# Patient Record
Sex: FEMALE | Race: WHITE | NOT HISPANIC OR LATINO | Employment: OTHER | RURAL
[De-identification: names, ages, dates, MRNs, and addresses within clinical notes are randomized per-mention and may not be internally consistent; named-entity substitution may affect disease eponyms.]

---

## 2017-01-16 ENCOUNTER — OFFICE VISIT (OUTPATIENT)
Dept: FAMILY MEDICINE CLINIC | Facility: CLINIC | Age: 72
End: 2017-01-16

## 2017-01-16 VITALS
DIASTOLIC BLOOD PRESSURE: 79 MMHG | OXYGEN SATURATION: 98 % | TEMPERATURE: 98.3 F | SYSTOLIC BLOOD PRESSURE: 123 MMHG | BODY MASS INDEX: 30.55 KG/M2 | WEIGHT: 166 LBS | HEIGHT: 62 IN | HEART RATE: 81 BPM

## 2017-01-16 DIAGNOSIS — F41.1 GENERALIZED ANXIETY DISORDER: ICD-10-CM

## 2017-01-16 DIAGNOSIS — E78.49 OTHER HYPERLIPIDEMIA: ICD-10-CM

## 2017-01-16 DIAGNOSIS — H81.03 MENIERE'S DISEASE OF BOTH EARS: ICD-10-CM

## 2017-01-16 DIAGNOSIS — Z00.00 INITIAL MEDICARE ANNUAL WELLNESS VISIT: Primary | ICD-10-CM

## 2017-01-16 DIAGNOSIS — R73.9 HYPERGLYCEMIA: ICD-10-CM

## 2017-01-16 DIAGNOSIS — K64.4 EXTERNAL HEMORRHOIDS: ICD-10-CM

## 2017-01-16 DIAGNOSIS — M15.9 GENERALIZED OSTEOARTHRITIS: ICD-10-CM

## 2017-01-16 DIAGNOSIS — I10 ESSENTIAL HYPERTENSION: ICD-10-CM

## 2017-01-16 DIAGNOSIS — K21.9 GASTROESOPHAGEAL REFLUX DISEASE WITHOUT ESOPHAGITIS: ICD-10-CM

## 2017-01-16 LAB
ALBUMIN SERPL-MCNC: 4.1 G/DL (ref 3.4–4.8)
ALBUMIN/GLOB SERPL: 1.3 G/DL (ref 1.5–2.5)
ALP SERPL-CCNC: 68 U/L (ref 46–116)
ALT SERPL W P-5'-P-CCNC: 30 U/L (ref 10–36)
ANION GAP SERPL CALCULATED.3IONS-SCNC: 4.4 MMOL/L (ref 3.6–11.2)
AST SERPL-CCNC: 44 U/L (ref 10–30)
BASOPHILS # BLD AUTO: 0.09 10*3/MM3 (ref 0–0.3)
BASOPHILS NFR BLD AUTO: 1.8 % (ref 0–2)
BILIRUB SERPL-MCNC: 0.6 MG/DL (ref 0.2–1.8)
BUN BLD-MCNC: 10 MG/DL (ref 7–21)
BUN/CREAT SERPL: 11.2 (ref 7–25)
CALCIUM SPEC-SCNC: 9.6 MG/DL (ref 7.7–10)
CHLORIDE SERPL-SCNC: 105 MMOL/L (ref 99–112)
CHOLEST SERPL-MCNC: 229 MG/DL (ref 0–200)
CO2 SERPL-SCNC: 32.6 MMOL/L (ref 24.3–31.9)
CREAT BLD-MCNC: 0.89 MG/DL (ref 0.43–1.29)
DEPRECATED RDW RBC AUTO: 42.9 FL (ref 37–54)
EOSINOPHIL # BLD AUTO: 0.4 10*3/MM3 (ref 0–0.7)
EOSINOPHIL NFR BLD AUTO: 7.9 % (ref 0–7)
ERYTHROCYTE [DISTWIDTH] IN BLOOD BY AUTOMATED COUNT: 12.7 % (ref 11.5–14.5)
GFR SERPL CREATININE-BSD FRML MDRD: 62 ML/MIN/1.73
GLOBULIN UR ELPH-MCNC: 3.2 GM/DL
GLUCOSE BLD-MCNC: 104 MG/DL (ref 70–110)
HBA1C MFR BLD: 5.9 % (ref 4.5–5.7)
HCT VFR BLD AUTO: 46.2 % (ref 37–47)
HDLC SERPL-MCNC: 60 MG/DL (ref 60–100)
HGB BLD-MCNC: 14.8 G/DL (ref 12–16)
IMM GRANULOCYTES # BLD: 0.01 10*3/MM3 (ref 0–0.03)
IMM GRANULOCYTES NFR BLD: 0.2 % (ref 0–0.5)
LDLC SERPL CALC-MCNC: 131 MG/DL (ref 0–100)
LDLC/HDLC SERPL: 2.18 {RATIO}
LYMPHOCYTES # BLD AUTO: 1.46 10*3/MM3 (ref 1–3)
LYMPHOCYTES NFR BLD AUTO: 28.7 % (ref 16–46)
MCH RBC QN AUTO: 30.3 PG (ref 27–33)
MCHC RBC AUTO-ENTMCNC: 32 G/DL (ref 33–37)
MCV RBC AUTO: 94.5 FL (ref 80–94)
MONOCYTES # BLD AUTO: 0.36 10*3/MM3 (ref 0.1–0.9)
MONOCYTES NFR BLD AUTO: 7.1 % (ref 0–12)
NEUTROPHILS # BLD AUTO: 2.77 10*3/MM3 (ref 1.4–6.5)
NEUTROPHILS NFR BLD AUTO: 54.3 % (ref 40–75)
OSMOLALITY SERPL CALC.SUM OF ELEC: 282.5 MOSM/KG (ref 273–305)
PLATELET # BLD AUTO: 166 10*3/MM3 (ref 130–400)
PMV BLD AUTO: 12.1 FL (ref 6–10)
POTASSIUM BLD-SCNC: 4.5 MMOL/L (ref 3.5–5.3)
PROT SERPL-MCNC: 7.3 G/DL (ref 6–8)
RBC # BLD AUTO: 4.89 10*6/MM3 (ref 4.2–5.4)
SODIUM BLD-SCNC: 142 MMOL/L (ref 135–153)
TRIGL SERPL-MCNC: 191 MG/DL (ref 0–150)
TSH SERPL DL<=0.05 MIU/L-ACNC: 0.74 MIU/ML (ref 0.55–4.78)
VLDLC SERPL-MCNC: 38.2 MG/DL
WBC NRBC COR # BLD: 5.09 10*3/MM3 (ref 4.5–12.5)

## 2017-01-16 PROCEDURE — 80061 LIPID PANEL: CPT | Performed by: NURSE PRACTITIONER

## 2017-01-16 PROCEDURE — G0438 PPPS, INITIAL VISIT: HCPCS | Performed by: NURSE PRACTITIONER

## 2017-01-16 PROCEDURE — 85025 COMPLETE CBC W/AUTO DIFF WBC: CPT | Performed by: NURSE PRACTITIONER

## 2017-01-16 PROCEDURE — 36415 COLL VENOUS BLD VENIPUNCTURE: CPT | Performed by: NURSE PRACTITIONER

## 2017-01-16 PROCEDURE — 83036 HEMOGLOBIN GLYCOSYLATED A1C: CPT | Performed by: NURSE PRACTITIONER

## 2017-01-16 PROCEDURE — 80053 COMPREHEN METABOLIC PANEL: CPT | Performed by: NURSE PRACTITIONER

## 2017-01-16 PROCEDURE — 84443 ASSAY THYROID STIM HORMONE: CPT | Performed by: NURSE PRACTITIONER

## 2017-01-16 PROCEDURE — 99213 OFFICE O/P EST LOW 20 MIN: CPT | Performed by: NURSE PRACTITIONER

## 2017-01-16 RX ORDER — OMEPRAZOLE 20 MG/1
20 CAPSULE, DELAYED RELEASE ORAL DAILY
Qty: 90 CAPSULE | Refills: 1 | Status: SHIPPED | OUTPATIENT
Start: 2017-01-16 | End: 2017-04-20 | Stop reason: ALTCHOICE

## 2017-01-16 RX ORDER — VALSARTAN 40 MG/1
20 TABLET ORAL 2 TIMES DAILY
Qty: 90 TABLET | Refills: 1 | Status: SHIPPED | OUTPATIENT
Start: 2017-01-16 | End: 2017-09-01 | Stop reason: SDUPTHER

## 2017-01-16 RX ORDER — MECLIZINE HYDROCHLORIDE 25 MG/1
25 TABLET ORAL EVERY 8 HOURS PRN
Qty: 270 TABLET | Refills: 1 | Status: SHIPPED | OUTPATIENT
Start: 2017-01-16 | End: 2018-07-03 | Stop reason: SDUPTHER

## 2017-01-16 RX ORDER — DIAZEPAM 5 MG/1
5 TABLET ORAL 2 TIMES DAILY
Qty: 60 TABLET | Refills: 2
Start: 2017-01-16 | End: 2017-07-13 | Stop reason: SDUPTHER

## 2017-01-16 RX ORDER — HYDROCODONE BITARTRATE AND ACETAMINOPHEN 5; 325 MG/1; MG/1
1 TABLET ORAL 2 TIMES DAILY PRN
Qty: 40 TABLET | Refills: 0
Start: 2017-01-16 | End: 2017-07-13 | Stop reason: SDUPTHER

## 2017-01-16 RX ORDER — LIDOCAINE 5 G/100G
CREAM RECTAL; TOPICAL 3 TIMES DAILY PRN
Qty: 90 G | Refills: 1 | Status: SHIPPED | OUTPATIENT
Start: 2017-01-16 | End: 2018-05-14

## 2017-01-16 RX ORDER — CALCIUM CARBONATE 200(500)MG
1 TABLET,CHEWABLE ORAL 2 TIMES DAILY PRN
COMMUNITY
End: 2022-02-07 | Stop reason: ALTCHOICE

## 2017-01-16 NOTE — MR AVS SNAPSHOT
Edgar Cotto   1/16/2017 11:00 AM   Office Visit    Dept Phone:  [phone]   Encounter #:  [id number]    Provider:  JOSSELINE Guido   Department:  [facility] FAMILY MEDICINE                Your Full Care Plan              Today's Medication Changes          These changes are accurate as of: 1/16/17 12:16 PM.  If you have any questions, ask your nurse or doctor.               New Medication(s)Ordered:     omeprazole 20 MG capsule   Commonly known as:  priLOSEC   Take 1 capsule by mouth Daily.   Started by:  JOSSELINE Guido         Medication(s)that have changed:     valsartan 40 MG tablet   Commonly known as:  DIOVAN   Take 0.5 tablets by mouth 2 (Two) Times a Day.   What changed:    - how much to take  - when to take this  - additional instructions   Changed by:  JOSSELINE Guido         Stop taking medication(s)listed here:     RABEprazole 20 MG EC tablet   Commonly known as:  ACIPHEX   Stopped by:  JOSSELINE Guido                Where to Get Your Medications      These medications were sent to [facility] - [place], [place] - [address] [place] [address] - [phone]  - [phone] FX  [address] [facility] [address]     Phone:  [phone]     Lidocaine (Anorectal) 5 % cream cream    meclizine 25 MG tablet    omeprazole 20 MG capsule    valsartan 40 MG tablet                  Your Updated Medication List          This list is accurate as of: 1/16/17 12:16 PM.  Always use your most recent med list.                acetaminophen 500 MG tablet   Commonly known as:  TYLENOL       aspirin 81 MG tablet       calcium carbonate 500 MG chewable tablet   Commonly known as:  TUMS       diazePAM 5 MG tablet   Commonly known as:  VALIUM   Take 1 tablet by mouth 2 (two) times a day.       HYDROcodone-acetaminophen 5-325 MG per tablet   Commonly known as:  NORCO   Take 1 tablet by mouth 2 (Two) Times a Day As Needed (pain).       Lidocaine (Anorectal) 5 % cream  cream   Commonly known as:  LMX 5   Apply  topically 3 (Three) Times a Day As Needed (pain).       meclizine 25 MG tablet   Commonly known as:  ANTIVERT   Take 1 tablet by mouth Every 8 (Eight) Hours As Needed for dizziness.       omeprazole 20 MG capsule   Commonly known as:  priLOSEC   Take 1 capsule by mouth Daily.       valsartan 40 MG tablet   Commonly known as:  DIOVAN   Take 0.5 tablets by mouth 2 (Two) Times a Day.               We Performed the Following     CBC & Differential     CBC Auto Differential     Comprehensive Metabolic Panel     Hemoglobin A1c     Lipid Panel     TSH       You Were Diagnosed With        Codes Comments    Initial Medicare annual wellness visit    -  Primary ICD-10-CM: Z00.00  ICD-9-CM: V70.0     Other hyperlipidemia     ICD-10-CM: E78.4  ICD-9-CM: 272.4     External hemorrhoids     ICD-10-CM: K64.4  ICD-9-CM: 455.3     Essential hypertension     ICD-10-CM: I10  ICD-9-CM: 401.9     Meniere's disease of both ears     ICD-10-CM: H81.03  ICD-9-CM: 386.00     Generalized osteoarthritis     ICD-10-CM: M15.9  ICD-9-CM: 715.00     Generalized anxiety disorder     ICD-10-CM: F41.1  ICD-9-CM: 300.02     Gastroesophageal reflux disease without esophagitis     ICD-10-CM: K21.9  ICD-9-CM: 530.81     Hyperglycemia     ICD-10-CM: R73.9  ICD-9-CM: 790.29       Instructions     None    Patient Instructions History      Upcoming Appointments     Visit Type Date Time Department    OFFICE VISIT 1/16/2017 11:00 AM [facility]    FOLLOW UP 7/13/2017 11:00 AM [facility]      Savedailyhart Signup     Our records indicate that you have declined [facility] Pricelockt signup. If you would like to sign up for [email or web address], please email [email or web address] or call [phone] to obtain an activation code.             Other Info from Your Visit           Your Appointments     Jul 13, 2017 11:00 AM EDT   Follow Up with JOSSELINE Guido   [facility] FAMILY MEDICINE (--)  "   [address] [place] [id number] [phone]           Arrive 15 minutes prior to appointment.              Allergies     Z-mary [Azithromycin] Allergy Swelling    Cortisone      Fish-derived Products      SEAFOOD    Iodinated Diagnostic Agents      Niacin      Other      Sulfa Antibiotics      Verapamil        Reason for Visit     Osteoarthritis     Med Refill all medications 90 days    Mouth Lesions           Vital Signs     Blood Pressure Pulse Temperature Height Weight Oxygen Saturation    123/79 (BP Location: Right arm, Patient Position: Sitting) 81 98.3 °F (36.8 °C) (Oral) 61.5\" (156.2 cm) 166 lb (75.3 kg) 98%    Body Mass Index Smoking Status                30.86 kg/m2 Never Smoker          Problems and Diagnoses Noted     High blood pressure    External hemorrhoids    Generalized anxiety disorder    Generalized osteoarthritis    High cholesterol or triglycerides    Meniere's disease of both ears    Initial Medicare annual wellness visit    -  Primary    Acid reflux disease        Hyperglycemia          Results         "

## 2017-01-16 NOTE — PROGRESS NOTES
"Subjective   Edgar Cotto is a 71 y.o. female.   Chief Complaint   Patient presents with   • Osteoarthritis   • Med Refill     all medications 90 days   • Mouth Lesions     History of Present Illness     The patient presents today for routine follow up. Her blood pressure is stable in the office today. She does not monitor routinely at home. She has a history of GERD. States she discontinued the Aciphex, because she could not \"digest the medication.\" Would like to try another agent. She requests refill of Valium. She uses this as needed for treatment of chronic vertigo. This is stable with medication. Also uses the hydrocodone sparingly for chronic arthritis related pain. This has been stable. The chronic mouth discomfort is persistent.     The following portions of the patient's history were reviewed and updated as appropriate: allergies, current medications, past family history, past medical history, past social history, past surgical history and problem list.  Visit Vitals   • /79 (BP Location: Right arm, Patient Position: Sitting)   • Pulse 81   • Temp 98.3 °F (36.8 °C) (Oral)   • Ht 61.5\" (156.2 cm)   • Wt 166 lb (75.3 kg)   • SpO2 98%  Comment: Room air   • BMI 30.86 kg/m2     Review of Systems   Constitutional: Negative for chills and fever.   HENT: Positive for mouth sores. Negative for congestion, ear pain, rhinorrhea and sore throat.    Respiratory: Negative for cough, shortness of breath and wheezing.    Cardiovascular: Negative for chest pain and palpitations.   Gastrointestinal: Negative for abdominal pain, diarrhea, nausea and vomiting.   Genitourinary: Negative for dysuria and urgency.   Musculoskeletal: Positive for arthralgias and back pain. Negative for myalgias.   Skin: Negative for rash and wound.   Neurological: Negative for dizziness, light-headedness and headaches.   Psychiatric/Behavioral: Negative for sleep disturbance. The patient is not nervous/anxious.        Objective "   Physical Exam   Constitutional: She is oriented to person, place, and time. She appears well-developed and well-nourished.   HENT:   Head: Normocephalic and atraumatic.   Mouth/Throat: Oropharynx is clear and moist.   Cardiovascular: Normal rate, regular rhythm and normal heart sounds.    Pulmonary/Chest: Effort normal and breath sounds normal.   Abdominal: Soft. Bowel sounds are normal.   Neurological: She is alert and oriented to person, place, and time.   Skin: Skin is warm and dry.   Psychiatric: She has a normal mood and affect. Her behavior is normal.       Assessment/Plan   Edgar was seen today for osteoarthritis, med refill and mouth lesions.    Diagnoses and all orders for this visit:    Initial Medicare annual wellness visit    Other hyperlipidemia  -     CBC & Differential  -     Comprehensive Metabolic Panel  -     Hemoglobin A1c  -     Lipid Panel  -     TSH  -     CBC Auto Differential    External hemorrhoids  -     Lidocaine, Anorectal, (LMX 5) 5 % cream cream; Apply  topically 3 (Three) Times a Day As Needed (pain).  -     CBC & Differential  -     Comprehensive Metabolic Panel  -     Hemoglobin A1c  -     Lipid Panel  -     TSH  -     CBC Auto Differential    Essential hypertension  -     valsartan (DIOVAN) 40 MG tablet; Take 0.5 tablets by mouth 2 (Two) Times a Day.  -     CBC & Differential  -     Comprehensive Metabolic Panel  -     Hemoglobin A1c  -     Lipid Panel  -     TSH  -     CBC Auto Differential    Meniere's disease of both ears  -     meclizine (ANTIVERT) 25 MG tablet; Take 1 tablet by mouth Every 8 (Eight) Hours As Needed for dizziness.  -     CBC & Differential  -     Comprehensive Metabolic Panel  -     Hemoglobin A1c  -     Lipid Panel  -     TSH  -     CBC Auto Differential    Generalized osteoarthritis  -     CBC & Differential  -     Comprehensive Metabolic Panel  -     Hemoglobin A1c  -     Lipid Panel  -     TSH  -     CBC Auto Differential    Generalized anxiety  disorder  -     CBC & Differential  -     Comprehensive Metabolic Panel  -     Hemoglobin A1c  -     Lipid Panel  -     TSH  -     CBC Auto Differential    Gastroesophageal reflux disease without esophagitis  -     omeprazole (priLOSEC) 20 MG capsule; Take 1 capsule by mouth Daily.  -     CBC & Differential  -     Comprehensive Metabolic Panel  -     Hemoglobin A1c  -     Lipid Panel  -     TSH  -     CBC Auto Differential    Hyperglycemia  -     Hemoglobin A1c      Overall, she is stable at this time. Will renew medications as requested. Will stop the Aciphex and start Prilosec as noted. Will obtain lab today, she is fasting. I have discussed her plan of care with Dr. Ruby. He will renew Valium 5 mg, take one tablet BID, dispense #60 with 2 refills. He will also renew Hydrocodone 5 mg, take one tablet BID PRN, dispense #40 with 0 refills. This is a dose reduction. The patient voices understanding. Will follow up in 6 months and PRN.  As part of this patient's treatment plan Dr. Ruby is  prescribing controlled substances. The patient has been made aware of appropriate use of such medications, including potential risk of somnolence, limited ability to drive and/or work safely, and potential for dependence or overdose. It has also been made clear that these medications are for use by this patient only, without concomitant use of alcohol or other substances unless prescribed.  Patient has completed prescribing agreement detailing terms of continued prescribing of controlled substances, including monitoring LUIS MANUEL reports, urine drug screening, and pill counts if necessary. The patient is aware that inappropriate use will results in cessation of prescribing such medications.  LUIS MANUEL report has been reviewed.  LUIS MANUEL is updated every 3 months.  History and physical exam exhibit continued safe and appropriate use of controlled substances.

## 2017-01-16 NOTE — PROGRESS NOTES
QUICK REFERENCE INFORMATION:  The ABCs of the Annual Wellness Visit    Initial Medicare Wellness Visit    HEALTH RISK ASSESSMENT    Recent Hospitalizations:  No recent hospitalization(s)..    Health Habits:  Current Diet: Well Balanced Diet  Dental Exam. up to date  Eye Exam. up to date  Exercise: 5 times/week.  Current exercise activities include: walking    Current Medical Providers:  Patient Care Team:  Rahat Ruby MD as PCP - General  Rahat Ruby MD as PCP - Family Medicine    [facility] providers who are involved in the care of this patient are listed above. Additional providers and suppliers are listed below:  Ophthalmologist  Chiropractor        Smoking Status:  History   Smoking Status   • Never Smoker   Smokeless Tobacco   • Never Used       Alcohol Consumption:  History   Alcohol Use No       Depression Screen:   PHQ-9 Depression Screening 1/16/2017   Little interest or pleasure in doing things 2   Feeling down, depressed, or hopeless 2   Trouble falling or staying asleep, or sleeping too much 0   Feeling tired or having little energy 1   Poor appetite or overeating 0   Feeling bad about yourself - or that you are a failure or have let yourself or your family down 1   Trouble concentrating on things, such as reading the newspaper or watching television 0   Moving or speaking so slowly that other people could have noticed. Or the opposite - being so fidgety or restless that you have been moving around a lot more than usual 0   Thoughts that you would be better off dead, or of hurting yourself in some way 0   PHQ-9 Total Score 6   If you checked off any problems, how difficult have these problems made it for you to do your work, take care of things at home, or get along with other people? Not difficult at all       Functional and Cognitive Screening:  Functional & Cognitive Status 1/16/2017   Do you have difficulty preparing food and eating? No   Do you have difficulty bathing  yourself? No   Do you have difficulty getting dressed? No   Do you have difficulty using the toilet? No   Do you have difficulty moving around from place to place? No   In the past year have you fallen or experienced a near fall? No   Do you need help using the phone?  No   Are you deaf or do you have serious difficulty hearing?  No   Do you need help with transportation? Yes   Do you need help shopping? Yes   Do you need help preparing meals?  No   Do you need help with housework?  Yes   Do you need help with laundry? No   Do you need help taking your medications? No   Do you need help managing money? No   Do you have difficulty concentrating, remembering or making decisions? No       Falls Risk Assessment:       Does the patient have evidence of cognitive impairment? No    Recent Lab Results:  CMP:  Lab Results   Component Value Date    BUN <5 (L) 08/29/2016    CREATININE 0.79 08/29/2016    EGFRIFNONA 71 08/29/2016    EGFRIFAFRI  08/29/2016      Comment:      <15 Indicative of kidney failure.    BCR  08/29/2016      Comment:      Unable to calculate Bun/Crea Ratio.     08/29/2016    K 4.6 08/29/2016    CO2 32.7 (H) 08/29/2016    CALCIUM 9.3 08/29/2016    ALBUMIN 4.4 04/26/2016    LABIL2 1.4 (L) 04/26/2016    BILITOT 0.9 04/26/2016    ALKPHOS 85 04/26/2016    AST 61 (H) 04/26/2016    ALT 43 (H) 04/26/2016     Lipid Panel:  Lab Results   Component Value Date    CHLPL 231 (H) 04/26/2016    TRIG 152 (H) 04/26/2016    HDL 57 (L) 04/26/2016    VLDL 30 04/26/2016     (H) 04/26/2016     HbA1c:  Lab Results   Component Value Date    HGBA1C 6.30 (H) 08/29/2016     Urine Microalbumin:  No results found for: MICROALBUR, POCMALB  Visual Acuity:  No exam data present    Age-appropriate Screening Schedule:  Refer to the list below for future screening recommendations based on patient's age, sex and/or medical conditions. Orders for these recommended tests are listed in the plan section. The patient has been provided  with a written plan.    Health Maintenance   Topic Date Due   • LIPID PANEL  04/26/2017   • MAMMOGRAM  10/02/2017   • PNEUMOCOCCAL VACCINES (65+ LOW/MEDIUM RISK) (2 of 2 - PPSV23) 01/16/2018   • TDAP/TD VACCINES (2 - Td) 01/16/2020   • COLONOSCOPY  05/08/2025   • INFLUENZA VACCINE  Addressed   • ZOSTER VACCINE  Addressed        Subjective   History of Present Illness    Edgar Cotto is a 71 y.o. female who presents for an Annual Wellness Visit. In addition, we addressed the following health issues: GERD    The following portions of the patient's history were reviewed and updated as appropriate: allergies, current medications, past family history, past medical history, past social history, past surgical history and problem list.    Outpatient Medications Prior to Visit   Medication Sig Dispense Refill   • acetaminophen (TYLENOL) 500 MG tablet Take 500 mg by mouth Every 6 (Six) Hours As Needed for mild pain (1-3) or moderate pain (4-6).     • aspirin 81 MG tablet Take 81 mg by mouth as needed.     • diazepam (VALIUM) 5 MG tablet Take 1 tablet by mouth 2 (two) times a day. 60 tablet 2   • HYDROcodone-acetaminophen (NORCO) 5-325 MG per tablet Take 1 tablet by mouth 2 (Two) Times a Day As Needed (pain). 45 tablet 0   • Lidocaine, Anorectal, (LMX 5) 5 % cream cream Apply  topically 3 (Three) Times a Day As Needed (pain). 30 g 1   • meclizine (ANTIVERT) 25 MG tablet Take 1 tablet by mouth every 8 (eight) hours as needed for dizziness. 90 tablet 6   • valsartan (DIOVAN) 40 MG tablet Take 1 tablet by mouth daily. 1/2 Tablet 2 x daily     • RABEprazole (ACIPHEX) 20 MG EC tablet Take 1 tablet by mouth daily.       No facility-administered medications prior to visit.        Patient Active Problem List   Diagnosis   • Atopic rhinitis   • Ankle swelling   • Dizziness   • External hemorrhoids   • Generalized anxiety disorder   • Generalized osteoarthritis   • Hyperlipidemia   • Essential hypertension   • Auditory vertigo  "  • Mitral and aortic valve disease   • Stomatitis   • Seasonal allergic rhinitis   • Varicose veins of lower extremities   • Meniere's disease of both ears       Advanced Care Planning:  has NO advanced directive - not interested in additional information    Identification of Risk Factors:  Risk factors include: weight .    Review of Systems    Compared to one year ago, the patient feels his physical health is the same.  Compared to one year ago, the patient feels his mental health is the same.    Objective     Physical Exam    Vitals:    01/16/17 1104   BP: 123/79   BP Location: Right arm   Patient Position: Sitting   Pulse: 81   Temp: 98.3 °F (36.8 °C)   TempSrc: Oral   SpO2: 98%   Weight: 166 lb (75.3 kg)   Height: 61.5\" (156.2 cm)       Body mass index is 30.86 kg/(m^2).  Discussed the patient's BMI with her. The BMI is above average; no BMI management plan is appropriate..    Assessment/Plan   Patient Self-Management and Personalized Health Advice  The patient has been provided with information about: exercise, weight management and fall prevention and preventive services including:   · Diabetes screening, see lab orders, Exercise counseling provided.    Visit Diagnoses:    ICD-10-CM ICD-9-CM   1. Initial Medicare annual wellness visit Z00.00 V70.0   2. Other hyperlipidemia E78.4 272.4   3. External hemorrhoids K64.4 455.3   4. Essential hypertension I10 401.9   5. Meniere's disease of both ears H81.03 386.00   6. Generalized osteoarthritis M15.9 715.00   7. Generalized anxiety disorder F41.1 300.02   8. Gastroesophageal reflux disease without esophagitis K21.9 530.81   9. Hyperglycemia R73.9 790.29       Orders Placed This Encounter   Procedures   • Comprehensive Metabolic Panel   • Hemoglobin A1c   • Lipid Panel   • TSH   • CBC Auto Differential       Outpatient Encounter Prescriptions as of 1/16/2017   Medication Sig Dispense Refill   • acetaminophen (TYLENOL) 500 MG tablet Take 500 mg by mouth Every 6 " (Six) Hours As Needed for mild pain (1-3) or moderate pain (4-6).     • aspirin 81 MG tablet Take 81 mg by mouth as needed.     • calcium carbonate (TUMS) 500 MG chewable tablet Chew 1 tablet 2 (Two) Times a Day.     • Lidocaine, Anorectal, (LMX 5) 5 % cream cream Apply  topically 3 (Three) Times a Day As Needed (pain). 90 g 1   • meclizine (ANTIVERT) 25 MG tablet Take 1 tablet by mouth Every 8 (Eight) Hours As Needed for dizziness. 270 tablet 1   • valsartan (DIOVAN) 40 MG tablet Take 0.5 tablets by mouth 2 (Two) Times a Day. 90 tablet 1   • [DISCONTINUED] diazepam (VALIUM) 5 MG tablet Take 1 tablet by mouth 2 (two) times a day. 60 tablet 2   • [DISCONTINUED] HYDROcodone-acetaminophen (NORCO) 5-325 MG per tablet Take 1 tablet by mouth 2 (Two) Times a Day As Needed (pain). 45 tablet 0   • [DISCONTINUED] Lidocaine, Anorectal, (LMX 5) 5 % cream cream Apply  topically 3 (Three) Times a Day As Needed (pain). 30 g 1   • [DISCONTINUED] meclizine (ANTIVERT) 25 MG tablet Take 1 tablet by mouth every 8 (eight) hours as needed for dizziness. 90 tablet 6   • [DISCONTINUED] valsartan (DIOVAN) 40 MG tablet Take 1 tablet by mouth daily. 1/2 Tablet 2 x daily     • omeprazole (priLOSEC) 20 MG capsule Take 1 capsule by mouth Daily. 90 capsule 1   • [DISCONTINUED] RABEprazole (ACIPHEX) 20 MG EC tablet Take 1 tablet by mouth daily.       No facility-administered encounter medications on file as of 1/16/2017.        Reviewed use of high risk medication in the elderly: no  Reviewed for potential of harmful drug interactions in the elderly: no    We have discussed PME. She has deferred vaccinations, mammogram, and pap smear.     Follow Up:  Return in about 6 months (around 7/16/2017) for Recheck.     An After Visit Summary and PPPS with all of these plans were given to the patient.

## 2017-01-24 ENCOUNTER — TELEPHONE (OUTPATIENT)
Dept: FAMILY MEDICINE CLINIC | Facility: CLINIC | Age: 72
End: 2017-01-24

## 2017-04-20 ENCOUNTER — OFFICE VISIT (OUTPATIENT)
Dept: FAMILY MEDICINE CLINIC | Facility: CLINIC | Age: 72
End: 2017-04-20

## 2017-04-20 VITALS
DIASTOLIC BLOOD PRESSURE: 78 MMHG | WEIGHT: 160 LBS | HEART RATE: 90 BPM | TEMPERATURE: 98.5 F | SYSTOLIC BLOOD PRESSURE: 114 MMHG | BODY MASS INDEX: 29.44 KG/M2 | HEIGHT: 62 IN

## 2017-04-20 DIAGNOSIS — S80.11XA HEMATOMA OF LOWER EXTREMITY, RIGHT, INITIAL ENCOUNTER: Primary | ICD-10-CM

## 2017-04-20 DIAGNOSIS — I83.93 VARICOSE VEINS OF BOTH LOWER EXTREMITIES: ICD-10-CM

## 2017-04-20 PROCEDURE — 99213 OFFICE O/P EST LOW 20 MIN: CPT | Performed by: FAMILY MEDICINE

## 2017-04-20 RX ORDER — CYCLOSPORINE 0.5 MG/ML
1 EMULSION OPHTHALMIC EVERY 12 HOURS
COMMUNITY
Start: 2017-04-03 | End: 2018-05-14 | Stop reason: ALTCHOICE

## 2017-04-20 RX ORDER — RABEPRAZOLE SODIUM 20 MG/1
20 TABLET, DELAYED RELEASE ORAL NIGHTLY
COMMUNITY
Start: 2017-04-11 | End: 2018-01-15 | Stop reason: SDUPTHER

## 2017-04-20 RX ORDER — OLOPATADINE HCL 0.2 %
1 DROPS OPHTHALMIC (EYE) DAILY
COMMUNITY
Start: 2017-04-03 | End: 2017-07-13 | Stop reason: SINTOL

## 2017-04-20 NOTE — PROGRESS NOTES
"Subjective   Edgar Cotto is a 72 y.o. female.     History of Present Illness area noted on right leg looks like a bruise wonder about a clot.  Has been active.  No history of trauma.  Area is actually smaller.  No change in persistent concerns regarding oropharyngeal symptoms of chronic nature.  Significant amount of stress within the family structure with recent deaths and family dependencies.    The following portions of the patient's history were reviewed and updated as appropriate: allergies, past family history, past medical history, past social history, past surgical history and problem list.    Review of Systems see the history of present illness    Objective   Physical Exam   Constitutional: She is oriented to person, place, and time. She appears well-developed and well-nourished.   HENT:   Head: Normocephalic.   Eyes: Conjunctivae are normal.   Neck: Normal range of motion. Neck supple.   Cardiovascular: Normal rate, regular rhythm, normal heart sounds and intact distal pulses.    No murmur heard.  Has chronic superficial varicosities.  Has a resolving hematoma right thigh.  No tenderness associated redness or induration.   Pulmonary/Chest: Effort normal and breath sounds normal.   Musculoskeletal: She exhibits no edema.   Neurological: She is alert and oriented to person, place, and time.   Psychiatric: She has a normal mood and affect.   Vitals reviewed.    /78 (BP Location: Right arm, Patient Position: Sitting)  Pulse 90  Temp 98.5 °F (36.9 °C) (Oral)   Ht 61.5\" (156.2 cm)  Wt 160 lb (72.6 kg)  BMI 29.74 kg/m2  Assessment/Plan   Edgar was seen today for leg pain.    Diagnoses and all orders for this visit:    Hematoma of lower extremity, right, initial encounter    Varicose veins of both lower extremities        Reassurance.  Discussed the family anxieties.  Keep follow-ups as scheduled.  Consider warm compresses if desired.  Encourage you to continue to try to use least amount of the " pain medicine as possible.

## 2017-07-13 ENCOUNTER — OFFICE VISIT (OUTPATIENT)
Dept: FAMILY MEDICINE CLINIC | Facility: CLINIC | Age: 72
End: 2017-07-13

## 2017-07-13 VITALS
OXYGEN SATURATION: 97 % | BODY MASS INDEX: 29.77 KG/M2 | SYSTOLIC BLOOD PRESSURE: 139 MMHG | HEART RATE: 84 BPM | TEMPERATURE: 97.7 F | HEIGHT: 62 IN | WEIGHT: 161.8 LBS | DIASTOLIC BLOOD PRESSURE: 88 MMHG

## 2017-07-13 DIAGNOSIS — I10 ESSENTIAL HYPERTENSION: ICD-10-CM

## 2017-07-13 DIAGNOSIS — R73.9 HYPERGLYCEMIA: ICD-10-CM

## 2017-07-13 DIAGNOSIS — F41.1 GENERALIZED ANXIETY DISORDER: ICD-10-CM

## 2017-07-13 DIAGNOSIS — H81.03 MENIERE'S DISEASE OF BOTH EARS: ICD-10-CM

## 2017-07-13 DIAGNOSIS — M15.9 GENERALIZED OSTEOARTHRITIS: ICD-10-CM

## 2017-07-13 DIAGNOSIS — E78.49 OTHER HYPERLIPIDEMIA: Primary | ICD-10-CM

## 2017-07-13 PROCEDURE — 36415 COLL VENOUS BLD VENIPUNCTURE: CPT | Performed by: NURSE PRACTITIONER

## 2017-07-13 PROCEDURE — 99213 OFFICE O/P EST LOW 20 MIN: CPT | Performed by: NURSE PRACTITIONER

## 2017-07-13 RX ORDER — LIDOCAINE 50 MG/G
OINTMENT TOPICAL
COMMUNITY
Start: 2017-04-24 | End: 2017-07-13

## 2017-07-13 RX ORDER — KETOCONAZOLE 20 MG/G
CREAM TOPICAL DAILY
COMMUNITY
Start: 2017-07-10 | End: 2018-05-14 | Stop reason: ALTCHOICE

## 2017-07-13 RX ORDER — HYDROCODONE BITARTRATE AND ACETAMINOPHEN 5; 325 MG/1; MG/1
1 TABLET ORAL 2 TIMES DAILY PRN
Qty: 30 TABLET | Refills: 0
Start: 2017-07-13 | End: 2018-01-15

## 2017-07-13 RX ORDER — DIAZEPAM 5 MG/1
5 TABLET ORAL 2 TIMES DAILY
Qty: 60 TABLET | Refills: 2
Start: 2017-07-13 | End: 2018-01-15 | Stop reason: SDUPTHER

## 2017-07-13 NOTE — PROGRESS NOTES
"Subjective   Edgar Cotto is a 72 y.o. female.   Chief Complaint   Patient presents with   • Anxiety   • Med Refill     Valium,Hydrocodone      History of Present Illness     The patient presents today for routine follow-up.  Her blood pressure is stable in the office.  Home readings are consistent.  She has a history of vertigo.  Uses the Valium and meclizine when needed.  Also has chronic pain related to the arthritis.  Has used hydrocodone sparingly when needed. She does have the chronic, persistent dry mouth. She has been drinking more fluids. Today, she has no new complaints.     The following portions of the patient's history were reviewed and updated as appropriate: allergies, current medications, past family history, past medical history, past social history, past surgical history and problem list.  /88 (BP Location: Right arm, Patient Position: Sitting)  Pulse 84  Temp 97.7 °F (36.5 °C) (Oral)   Ht 61.5\" (156.2 cm)  Wt 161 lb 12.8 oz (73.4 kg)  SpO2 97% Comment: Room air  BMI 30.08 kg/m2  Review of Systems   Constitutional: Negative for chills, fatigue and fever.   HENT: Negative for congestion, ear pain, rhinorrhea and sore throat.    Respiratory: Negative for cough, shortness of breath and wheezing.    Cardiovascular: Negative for chest pain, palpitations and leg swelling.   Gastrointestinal: Negative for abdominal pain, diarrhea, nausea and vomiting.   Genitourinary: Negative for dysuria.   Musculoskeletal: Positive for arthralgias. Negative for myalgias.   Skin: Negative for rash and wound.   Neurological: Positive for dizziness. Negative for light-headedness and headaches.   Psychiatric/Behavioral: Negative for sleep disturbance. The patient is not nervous/anxious.        Objective   Physical Exam   Constitutional: She is oriented to person, place, and time. She appears well-developed and well-nourished.   HENT:   Head: Normocephalic and atraumatic.   Cardiovascular: Normal rate, " regular rhythm and normal heart sounds.    Pulmonary/Chest: Effort normal and breath sounds normal.   Abdominal: Soft. Bowel sounds are normal.   Musculoskeletal:   Gait and station normal.   Neurological: She is alert and oriented to person, place, and time.   Skin: Skin is warm and dry.   Psychiatric: She has a normal mood and affect. Her behavior is normal.       Assessment/Plan   Edgar was seen today for anxiety and med refill.    Diagnoses and all orders for this visit:    Other hyperlipidemia  -     CBC & Differential  -     Comprehensive Metabolic Panel  -     Hemoglobin A1c    Essential hypertension  -     CBC & Differential  -     Comprehensive Metabolic Panel  -     Hemoglobin A1c    Meniere's disease of both ears    Generalized osteoarthritis    Generalized anxiety disorder    Hyperglycemia  -     CBC & Differential  -     Comprehensive Metabolic Panel  -     Hemoglobin A1c      Overall, she is stable.  Lab has been ordered today.  I have discussed her plan of care with Dr. Ruby.  He will renew the Valium 5 mg, take 1 tablet 2 times daily, dispense #60, 2 refills.  He will also renew the Norco 5-3 25 mg, take 1 tablet 2 times daily when needed, dispense #30, 0 refills.  I advised her to use this sparingly.  This will eventually be weaned.  She is agreeable.  No other changes made to medications today.  Will contact with lab results when available.  Follow-up in 6 months and when needed.  As part of this patient's treatment plan Dr. Ruby is  prescribing controlled substances. The patient has been made aware of appropriate use of such medications, including potential risk of somnolence, limited ability to drive and/or work safely, and potential for dependence or overdose. It has also been made clear that these medications are for use by this patient only, without concomitant use of alcohol or other substances unless prescribed.  Patient has completed prescribing agreement detailing terms of  continued prescribing of controlled substances, including monitoring LUIS MANUEL reports, urine drug screening, and pill counts if necessary. The patient is aware that inappropriate use will results in cessation of prescribing such medications.  LUIS MANUEL report has been reviewed.  LUIS MANUEL is updated every 3 months.  History and physical exam exhibit continued safe and appropriate use of controlled substances.

## 2017-07-14 LAB
ALBUMIN SERPL-MCNC: 4.6 G/DL (ref 3.4–4.8)
ALBUMIN/GLOB SERPL: 1.5 G/DL (ref 1.5–2.5)
ALP SERPL-CCNC: 81 U/L (ref 35–104)
ALT SERPL-CCNC: 21 U/L (ref 10–36)
AST SERPL-CCNC: 36 U/L (ref 10–30)
BASOPHILS # BLD AUTO: 0.04 10*3/MM3 (ref 0–0.3)
BASOPHILS NFR BLD AUTO: 0.6 % (ref 0–2)
BILIRUB SERPL-MCNC: 0.6 MG/DL (ref 0.2–1.8)
BUN SERPL-MCNC: 8 MG/DL (ref 7–21)
BUN/CREAT SERPL: 9.3 (ref 7–25)
CALCIUM SERPL-MCNC: 9.6 MG/DL (ref 7.7–10)
CHLORIDE SERPL-SCNC: 105 MMOL/L (ref 99–112)
CO2 SERPL-SCNC: 33.7 MMOL/L (ref 24.3–31.9)
CREAT SERPL-MCNC: 0.86 MG/DL (ref 0.43–1.29)
EOSINOPHIL # BLD AUTO: 0.23 10*3/MM3 (ref 0–0.7)
EOSINOPHIL NFR BLD AUTO: 3.3 % (ref 0–7)
ERYTHROCYTE [DISTWIDTH] IN BLOOD BY AUTOMATED COUNT: 12.6 % (ref 11.5–14.5)
GLOBULIN SER CALC-MCNC: 3.1 GM/DL
GLUCOSE SERPL-MCNC: 93 MG/DL (ref 70–110)
HBA1C MFR BLD: 5.5 % (ref 4.5–5.7)
HCT VFR BLD AUTO: 47 % (ref 37–47)
HGB BLD-MCNC: 15.1 G/DL (ref 12–16)
IMM GRANULOCYTES # BLD: 0.01 10*3/MM3 (ref 0–0.03)
IMM GRANULOCYTES NFR BLD: 0.1 % (ref 0–0.5)
LYMPHOCYTES # BLD AUTO: 2.29 10*3/MM3 (ref 1–3)
LYMPHOCYTES NFR BLD AUTO: 32.9 % (ref 16–46)
MCH RBC QN AUTO: 30.1 PG (ref 27–33)
MCHC RBC AUTO-ENTMCNC: 32.1 G/DL (ref 33–37)
MCV RBC AUTO: 93.6 FL (ref 80–94)
MONOCYTES # BLD AUTO: 0.55 10*3/MM3 (ref 0.1–0.9)
MONOCYTES NFR BLD AUTO: 7.9 % (ref 0–12)
NEUTROPHILS # BLD AUTO: 3.83 10*3/MM3 (ref 1.4–6.5)
NEUTROPHILS NFR BLD AUTO: 55.2 % (ref 40–75)
PLATELET # BLD AUTO: 231 10*3/MM3 (ref 130–400)
POTASSIUM SERPL-SCNC: 4.8 MMOL/L (ref 3.5–5.3)
PROT SERPL-MCNC: 7.7 G/DL (ref 6–8)
RBC # BLD AUTO: 5.02 10*6/MM3 (ref 4.2–5.4)
SODIUM SERPL-SCNC: 144 MMOL/L (ref 135–153)
WBC # BLD AUTO: 6.95 10*3/MM3 (ref 4.5–12.5)

## 2017-08-01 ENCOUNTER — OFFICE VISIT (OUTPATIENT)
Dept: FAMILY MEDICINE CLINIC | Facility: CLINIC | Age: 72
End: 2017-08-01

## 2017-08-01 VITALS
HEART RATE: 94 BPM | WEIGHT: 162.4 LBS | HEIGHT: 62 IN | DIASTOLIC BLOOD PRESSURE: 90 MMHG | BODY MASS INDEX: 29.88 KG/M2 | SYSTOLIC BLOOD PRESSURE: 141 MMHG | OXYGEN SATURATION: 99 % | TEMPERATURE: 98 F

## 2017-08-01 DIAGNOSIS — H10.9 BACTERIAL CONJUNCTIVITIS OF RIGHT EYE: ICD-10-CM

## 2017-08-01 DIAGNOSIS — J01.00 ACUTE NON-RECURRENT MAXILLARY SINUSITIS: Primary | ICD-10-CM

## 2017-08-01 PROCEDURE — 99213 OFFICE O/P EST LOW 20 MIN: CPT | Performed by: NURSE PRACTITIONER

## 2017-08-01 RX ORDER — POLYMYXIN B SULFATE AND TRIMETHOPRIM 1; 10000 MG/ML; [USP'U]/ML
1 SOLUTION OPHTHALMIC EVERY 6 HOURS
Qty: 10 ML | Refills: 0 | Status: SHIPPED | OUTPATIENT
Start: 2017-08-01 | End: 2018-01-15

## 2017-08-01 RX ORDER — AMOXICILLIN 875 MG/1
875 TABLET, COATED ORAL 2 TIMES DAILY
Qty: 20 TABLET | Refills: 0 | Status: SHIPPED | OUTPATIENT
Start: 2017-08-01 | End: 2017-08-11

## 2017-08-01 NOTE — PROGRESS NOTES
"Subjective   Edgar Cotto is a 72 y.o. female.   Chief Complaint   Patient presents with   • URI     URI    This is a new problem. The current episode started in the past 7 days. The problem has been gradually worsening. There has been no fever. Associated symptoms include congestion, a plugged ear sensation, sinus pain and swollen glands. Pertinent negatives include no abdominal pain, chest pain, coughing, diarrhea, dysuria, ear pain, headaches, nausea, rash, rhinorrhea, sore throat, vomiting or wheezing. She has tried increased fluids for the symptoms. The treatment provided no relief.    Has also had redness and drainage from the right eye. Has crusted matter present upon awakening. Has had no vision changes. Has been using Restasis with no relief.     The following portions of the patient's history were reviewed and updated as appropriate: allergies, current medications, past family history, past medical history, past social history, past surgical history and problem list.  /90 (BP Location: Right arm, Patient Position: Sitting)  Pulse 94  Temp 98 °F (36.7 °C) (Oral)   Ht 61.5\" (156.2 cm)  Wt 162 lb 6.4 oz (73.7 kg)  SpO2 99% Comment: Room air  BMI 30.19 kg/m2  Review of Systems   Constitutional: Negative for chills and fever.   HENT: Positive for congestion and sinus pressure. Negative for ear pain, rhinorrhea and sore throat.    Eyes: Positive for discharge and redness. Negative for pain, itching and visual disturbance.   Respiratory: Negative for cough, shortness of breath and wheezing.    Cardiovascular: Negative for chest pain, palpitations and leg swelling.   Gastrointestinal: Negative for abdominal pain, diarrhea, nausea and vomiting.   Genitourinary: Negative for dysuria and urgency.   Musculoskeletal: Positive for back pain. Negative for myalgias.   Skin: Negative for rash and wound.   Neurological: Negative for dizziness, light-headedness and headaches.   Psychiatric/Behavioral: " Negative for sleep disturbance. The patient is not nervous/anxious.        Objective   Physical Exam   Constitutional: She is oriented to person, place, and time. She appears well-developed and well-nourished.   HENT:   Head: Normocephalic and atraumatic.   Right Ear: External ear normal.   Left Ear: External ear normal.   Mouth/Throat: Oropharynx is clear and moist.   Maxillary sinuses tender to percussion  Nasal turbinates erythematous, edematous   Eyes:   Right eye erythematous, no drainage   Cardiovascular: Normal rate, regular rhythm and normal heart sounds.    Pulmonary/Chest: Effort normal and breath sounds normal.   Abdominal: Soft. Bowel sounds are normal.   Musculoskeletal:   Gait and station normal   Neurological: She is alert and oriented to person, place, and time.   Skin: Skin is warm and dry.   Psychiatric: She has a normal mood and affect. Her behavior is normal.       Assessment/Plan   Edgar was seen today for uri.    Diagnoses and all orders for this visit:    Acute non-recurrent maxillary sinusitis  -     amoxicillin (AMOXIL) 875 MG tablet; Take 1 tablet by mouth 2 (Two) Times a Day for 10 days.    Bacterial conjunctivitis of right eye  -     trimethoprim-polymyxin b (POLYTRIM) 65123-1.1 UNIT/ML-% ophthalmic solution; Administer 1 drop to the right eye Every 6 (Six) Hours.      Will treat sinusitis with antibiotic as noted. She has tolerated well in the past. Stay hydrated. Supportive measures encouraged.  WIll treat conjunctivitis with topical antibiotic. Good hand hygiene discussed. Follow up in 2-3 days if no improvement or if symptoms worsen.

## 2017-09-01 DIAGNOSIS — I10 ESSENTIAL HYPERTENSION: ICD-10-CM

## 2017-09-01 RX ORDER — VALSARTAN 40 MG/1
20 TABLET ORAL 2 TIMES DAILY
Qty: 90 TABLET | Refills: 1 | Status: SHIPPED | OUTPATIENT
Start: 2017-09-01 | End: 2018-01-15 | Stop reason: SDUPTHER

## 2017-10-19 ENCOUNTER — TELEPHONE (OUTPATIENT)
Dept: FAMILY MEDICINE CLINIC | Facility: CLINIC | Age: 72
End: 2017-10-19

## 2017-10-19 NOTE — TELEPHONE ENCOUNTER
PATIENT CALLED BACK AND I ADVISED HER SHE WOULD HAVE TO BE SEEN PER VERBAL ORDERS FROM [facility]. PATIENT VOICED UNDERSTANDING, BUT IS UNABLE TO GET A RIDE AND WILL HAVE TO CALL BACK SOMETIME NEXT WEEK. SHE WANTS TO KNOW WHAT SHE CAN TAKE OVER THE COUNTER.

## 2017-10-19 NOTE — TELEPHONE ENCOUNTER
YES SHE SAID SHE DIDN'T HAVE TRANSPORTATION, I LET HER KNOW WE DON'T SEND ANTIBIOTICS IN WITHOUT BEING SEEN AND AT THE END OF THE CALL SHE SAID SHE WOULD SEE IF SOMEONE COULD BRING HER.

## 2017-10-19 NOTE — TELEPHONE ENCOUNTER
PATIENT CALLED SAYS SHE HAS A SINUS INFECTION. SYMTPOMS: STOPPED UP NASAL, WATERY EYES AND SNEEZING. SHE USES [facility].

## 2018-01-02 ENCOUNTER — HOSPITAL ENCOUNTER (EMERGENCY)
Facility: HOSPITAL | Age: 73
Discharge: HOME | End: 2018-01-03
Attending: EMERGENCY MEDICINE | Admitting: EMERGENCY MEDICINE

## 2018-01-02 ENCOUNTER — APPOINTMENT (OUTPATIENT)
Dept: GENERAL RADIOLOGY | Facility: HOSPITAL | Age: 73
End: 2018-01-02

## 2018-01-02 DIAGNOSIS — J11.1 INFLUENZA: Primary | ICD-10-CM

## 2018-01-02 LAB
BASOPHILS # BLD AUTO: 0.01 10*3/MM3 (ref 0–0.3)
BASOPHILS NFR BLD AUTO: 0.2 % (ref 0–2)
DEPRECATED RDW RBC AUTO: 42.9 FL (ref 37–54)
EOSINOPHIL # BLD AUTO: 0.07 10*3/MM3 (ref 0–0.7)
EOSINOPHIL NFR BLD AUTO: 1.3 % (ref 0–7)
ERYTHROCYTE [DISTWIDTH] IN BLOOD BY AUTOMATED COUNT: 12.7 % (ref 11.5–14.5)
FLUAV AG NPH QL: NEGATIVE
FLUBV AG NPH QL IA: NEGATIVE
HCT VFR BLD AUTO: 40.8 % (ref 37–47)
HGB BLD-MCNC: 13.3 G/DL (ref 12–16)
IMM GRANULOCYTES # BLD: 0.01 10*3/MM3 (ref 0–0.03)
IMM GRANULOCYTES NFR BLD: 0.2 % (ref 0–0.5)
LYMPHOCYTES # BLD AUTO: 0.43 10*3/MM3 (ref 1–3)
LYMPHOCYTES NFR BLD AUTO: 8.1 % (ref 16–46)
MCH RBC QN AUTO: 30 PG (ref 27–33)
MCHC RBC AUTO-ENTMCNC: 32.6 G/DL (ref 33–37)
MCV RBC AUTO: 92.1 FL (ref 80–94)
MONOCYTES # BLD AUTO: 0.04 10*3/MM3 (ref 0.1–0.9)
MONOCYTES NFR BLD AUTO: 0.8 % (ref 0–12)
NEUTROPHILS # BLD AUTO: 4.75 10*3/MM3 (ref 1.4–6.5)
NEUTROPHILS NFR BLD AUTO: 89.4 % (ref 40–75)
PLATELET # BLD AUTO: 132 10*3/MM3 (ref 130–400)
PMV BLD AUTO: 11.2 FL (ref 6–10)
RBC # BLD AUTO: 4.43 10*6/MM3 (ref 4.2–5.4)
WBC NRBC COR # BLD: 5.31 10*3/MM3 (ref 4.5–12.5)

## 2018-01-02 PROCEDURE — 83605 ASSAY OF LACTIC ACID: CPT | Performed by: EMERGENCY MEDICINE

## 2018-01-02 PROCEDURE — 36415 COLL VENOUS BLD VENIPUNCTURE: CPT

## 2018-01-02 PROCEDURE — 99285 EMERGENCY DEPT VISIT HI MDM: CPT

## 2018-01-02 PROCEDURE — 80053 COMPREHEN METABOLIC PANEL: CPT | Performed by: EMERGENCY MEDICINE

## 2018-01-02 PROCEDURE — 96375 TX/PRO/DX INJ NEW DRUG ADDON: CPT

## 2018-01-02 PROCEDURE — 85025 COMPLETE CBC W/AUTO DIFF WBC: CPT | Performed by: EMERGENCY MEDICINE

## 2018-01-02 PROCEDURE — 71045 X-RAY EXAM CHEST 1 VIEW: CPT

## 2018-01-02 PROCEDURE — 25010000002 ONDANSETRON PER 1 MG: Performed by: EMERGENCY MEDICINE

## 2018-01-02 PROCEDURE — P9612 CATHETERIZE FOR URINE SPEC: HCPCS

## 2018-01-02 PROCEDURE — 71045 X-RAY EXAM CHEST 1 VIEW: CPT | Performed by: RADIOLOGY

## 2018-01-02 PROCEDURE — 93005 ELECTROCARDIOGRAM TRACING: CPT | Performed by: EMERGENCY MEDICINE

## 2018-01-02 PROCEDURE — 87040 BLOOD CULTURE FOR BACTERIA: CPT | Performed by: EMERGENCY MEDICINE

## 2018-01-02 PROCEDURE — 87804 INFLUENZA ASSAY W/OPTIC: CPT | Performed by: EMERGENCY MEDICINE

## 2018-01-02 PROCEDURE — 96361 HYDRATE IV INFUSION ADD-ON: CPT

## 2018-01-02 RX ORDER — SODIUM CHLORIDE 0.9 % (FLUSH) 0.9 %
10 SYRINGE (ML) INJECTION AS NEEDED
Status: DISCONTINUED | OUTPATIENT
Start: 2018-01-02 | End: 2018-01-03 | Stop reason: HOSPADM

## 2018-01-02 RX ORDER — ACETAMINOPHEN 325 MG/1
650 TABLET ORAL ONCE
Status: COMPLETED | OUTPATIENT
Start: 2018-01-02 | End: 2018-01-02

## 2018-01-02 RX ORDER — ONDANSETRON 2 MG/ML
4 INJECTION INTRAMUSCULAR; INTRAVENOUS ONCE
Status: COMPLETED | OUTPATIENT
Start: 2018-01-02 | End: 2018-01-02

## 2018-01-02 RX ADMIN — ACETAMINOPHEN 650 MG: 325 TABLET ORAL at 23:42

## 2018-01-02 RX ADMIN — ONDANSETRON 4 MG: 2 INJECTION, SOLUTION INTRAMUSCULAR; INTRAVENOUS at 23:44

## 2018-01-02 RX ADMIN — SODIUM CHLORIDE 1000 ML: 9 INJECTION, SOLUTION INTRAVENOUS at 23:44

## 2018-01-03 VITALS
RESPIRATION RATE: 24 BRPM | BODY MASS INDEX: 32.39 KG/M2 | SYSTOLIC BLOOD PRESSURE: 112 MMHG | WEIGHT: 165 LBS | DIASTOLIC BLOOD PRESSURE: 62 MMHG | HEART RATE: 114 BPM | HEIGHT: 60 IN | OXYGEN SATURATION: 95 % | TEMPERATURE: 100.7 F

## 2018-01-03 LAB
ALBUMIN SERPL-MCNC: 4 G/DL (ref 3.4–4.8)
ALBUMIN/GLOB SERPL: 1.4 G/DL (ref 1.5–2.5)
ALP SERPL-CCNC: 77 U/L (ref 35–104)
ALT SERPL W P-5'-P-CCNC: 16 U/L (ref 10–36)
ANION GAP SERPL CALCULATED.3IONS-SCNC: 13.3 MMOL/L (ref 3.6–11.2)
AST SERPL-CCNC: 28 U/L (ref 10–30)
BILIRUB SERPL-MCNC: 0.8 MG/DL (ref 0.2–1.8)
BILIRUB UR QL STRIP: NEGATIVE
BUN BLD-MCNC: 9 MG/DL (ref 7–21)
BUN/CREAT SERPL: 11.4 (ref 7–25)
CALCIUM SPEC-SCNC: 8.9 MG/DL (ref 7.7–10)
CHLORIDE SERPL-SCNC: 104 MMOL/L (ref 99–112)
CLARITY UR: CLEAR
CO2 SERPL-SCNC: 20.7 MMOL/L (ref 24.3–31.9)
COLOR UR: YELLOW
CREAT BLD-MCNC: 0.79 MG/DL (ref 0.43–1.29)
D-LACTATE SERPL-SCNC: 3.4 MMOL/L (ref 0.5–2)
GFR SERPL CREATININE-BSD FRML MDRD: 71 ML/MIN/1.73
GLOBULIN UR ELPH-MCNC: 2.9 GM/DL
GLUCOSE BLD-MCNC: 127 MG/DL (ref 70–110)
GLUCOSE UR STRIP-MCNC: NEGATIVE MG/DL
HGB UR QL STRIP.AUTO: NEGATIVE
HOLD SPECIMEN: NORMAL
KETONES UR QL STRIP: NEGATIVE
LEUKOCYTE ESTERASE UR QL STRIP.AUTO: NEGATIVE
NITRITE UR QL STRIP: NEGATIVE
OSMOLALITY SERPL CALC.SUM OF ELEC: 275.9 MOSM/KG (ref 273–305)
PH UR STRIP.AUTO: 8 [PH] (ref 5–8)
POTASSIUM BLD-SCNC: 3.8 MMOL/L (ref 3.5–5.3)
PROT SERPL-MCNC: 6.9 G/DL (ref 6–8)
PROT UR QL STRIP: NEGATIVE
SODIUM BLD-SCNC: 138 MMOL/L (ref 135–153)
SP GR UR STRIP: 1.01 (ref 1–1.03)
UROBILINOGEN UR QL STRIP: NORMAL
WHOLE BLOOD HOLD SPECIMEN: NORMAL
WHOLE BLOOD HOLD SPECIMEN: NORMAL

## 2018-01-03 PROCEDURE — 36415 COLL VENOUS BLD VENIPUNCTURE: CPT

## 2018-01-03 PROCEDURE — 87040 BLOOD CULTURE FOR BACTERIA: CPT | Performed by: EMERGENCY MEDICINE

## 2018-01-03 PROCEDURE — 81003 URINALYSIS AUTO W/O SCOPE: CPT | Performed by: EMERGENCY MEDICINE

## 2018-01-03 PROCEDURE — 96365 THER/PROPH/DIAG IV INF INIT: CPT

## 2018-01-03 PROCEDURE — 25010000002 CEFTRIAXONE: Performed by: EMERGENCY MEDICINE

## 2018-01-03 RX ORDER — AMOXICILLIN 500 MG/1
1000 CAPSULE ORAL 3 TIMES DAILY
Qty: 30 CAPSULE | Refills: 0 | Status: SHIPPED | OUTPATIENT
Start: 2018-01-03 | End: 2018-01-15

## 2018-01-03 RX ORDER — OSELTAMIVIR PHOSPHATE 75 MG/1
75 CAPSULE ORAL EVERY 12 HOURS
Qty: 10 CAPSULE | Refills: 0 | Status: SHIPPED | OUTPATIENT
Start: 2018-01-03 | End: 2018-01-08

## 2018-01-03 RX ORDER — IBUPROFEN 600 MG/1
600 TABLET ORAL ONCE
Status: COMPLETED | OUTPATIENT
Start: 2018-01-03 | End: 2018-01-03

## 2018-01-03 RX ADMIN — IBUPROFEN 600 MG: 600 TABLET ORAL at 01:56

## 2018-01-03 RX ADMIN — SODIUM CHLORIDE 500 ML: 9 INJECTION, SOLUTION INTRAVENOUS at 00:57

## 2018-01-03 RX ADMIN — CEFTRIAXONE 1 G: 1 INJECTION, POWDER, FOR SOLUTION INTRAMUSCULAR; INTRAVENOUS at 00:57

## 2018-01-08 LAB
BACTERIA SPEC AEROBE CULT: NORMAL
BACTERIA SPEC AEROBE CULT: NORMAL

## 2018-01-15 ENCOUNTER — OFFICE VISIT (OUTPATIENT)
Dept: FAMILY MEDICINE CLINIC | Facility: CLINIC | Age: 73
End: 2018-01-15

## 2018-01-15 VITALS
HEART RATE: 85 BPM | HEIGHT: 60 IN | TEMPERATURE: 97.2 F | BODY MASS INDEX: 32.57 KG/M2 | SYSTOLIC BLOOD PRESSURE: 131 MMHG | DIASTOLIC BLOOD PRESSURE: 84 MMHG | WEIGHT: 165.9 LBS

## 2018-01-15 DIAGNOSIS — F41.1 GENERALIZED ANXIETY DISORDER: Primary | ICD-10-CM

## 2018-01-15 DIAGNOSIS — H81.03 MENIERE'S DISEASE OF BOTH EARS: ICD-10-CM

## 2018-01-15 DIAGNOSIS — I10 ESSENTIAL HYPERTENSION: ICD-10-CM

## 2018-01-15 PROCEDURE — 99214 OFFICE O/P EST MOD 30 MIN: CPT | Performed by: NURSE PRACTITIONER

## 2018-01-15 RX ORDER — VALSARTAN 40 MG/1
20 TABLET ORAL 2 TIMES DAILY
Qty: 90 TABLET | Refills: 1 | Status: SHIPPED | OUTPATIENT
Start: 2018-01-15 | End: 2019-03-04 | Stop reason: SDUPTHER

## 2018-01-15 RX ORDER — DIAZEPAM 5 MG/1
TABLET ORAL
Qty: 60 TABLET | Refills: 2
Start: 2018-01-15 | End: 2018-07-03 | Stop reason: SDUPTHER

## 2018-01-15 RX ORDER — MECLIZINE HYDROCHLORIDE 25 MG/1
25 TABLET ORAL EVERY 8 HOURS PRN
Qty: 270 TABLET | Refills: 1 | Status: CANCELLED | OUTPATIENT
Start: 2018-01-15

## 2018-01-15 RX ORDER — TOBRAMYCIN 3 MG/ML
SOLUTION/ DROPS OPHTHALMIC
COMMUNITY
Start: 2017-12-18 | End: 2018-05-14 | Stop reason: ALTCHOICE

## 2018-01-15 RX ORDER — BESIFLOXACIN 6 MG/ML
SUSPENSION OPHTHALMIC
COMMUNITY
Start: 2017-11-11 | End: 2018-05-14

## 2018-01-15 RX ORDER — MOXIFLOXACIN 5 MG/ML
SOLUTION/ DROPS OPHTHALMIC
COMMUNITY
Start: 2017-12-15 | End: 2018-05-14

## 2018-01-15 RX ORDER — RABEPRAZOLE SODIUM 20 MG/1
20 TABLET, DELAYED RELEASE ORAL NIGHTLY
Qty: 90 TABLET | Refills: 1 | Status: SHIPPED | OUTPATIENT
Start: 2018-01-15 | End: 2019-03-04 | Stop reason: SDUPTHER

## 2018-01-15 RX ORDER — ERYTHROMYCIN 5 MG/G
OINTMENT OPHTHALMIC
COMMUNITY
Start: 2017-12-18 | End: 2018-01-15

## 2018-01-15 NOTE — PROGRESS NOTES
"Subjective   Edgar Cotto is a 72 y.o. female.   Chief Complaint   Patient presents with   • Hyperlipidemia   • Hypertension     History of Present Illness     The patient presents today for routine follow-up.  She has a history of hypertension.  Her blood pressure is controlled.  She has a history of anxiety and Ménière's disease.  She does take the Valium as needed.  Is requesting a refill today.  She has had recent ER visit due to probable influenza.  She has completed medications and states she is feeling much better.  She is no longer taking the Norco for pain. She is using Tylenol when needed and tolerating well. Today, she has no complaints.    The following portions of the patient's history were reviewed and updated as appropriate: allergies, current medications, past family history, past medical history, past social history, past surgical history and problem list.  /84 (BP Location: Right arm, Patient Position: Sitting)  Pulse 85  Temp 97.2 °F (36.2 °C) (Oral)   Ht 152.4 cm (60\")  Wt 75.3 kg (165 lb 14.4 oz)  BMI 32.4 kg/m2  Review of Systems   Constitutional: Negative for chills, fatigue and fever.   HENT: Negative for congestion, ear pain, rhinorrhea and sore throat.    Respiratory: Negative for cough, shortness of breath and wheezing.    Cardiovascular: Negative for chest pain, palpitations and leg swelling.   Gastrointestinal: Negative for abdominal pain, diarrhea, nausea and vomiting.   Genitourinary: Negative for dysuria.   Musculoskeletal: Negative for back pain and myalgias.   Skin: Negative for rash and wound.   Neurological: Negative for light-headedness and headaches.   Psychiatric/Behavioral: Negative for sleep disturbance. The patient is not nervous/anxious.        Objective   Physical Exam   Constitutional: She is oriented to person, place, and time. She appears well-developed and well-nourished.   HENT:   Head: Normocephalic and atraumatic.   Cardiovascular: Normal rate, " regular rhythm and normal heart sounds.    Pulmonary/Chest: Effort normal and breath sounds normal.   Abdominal: Soft. Bowel sounds are normal.   Musculoskeletal:   Gait and station normal   Neurological: She is alert and oriented to person, place, and time.   Skin: Skin is warm and dry.   Psychiatric: She has a normal mood and affect. Her behavior is normal.       Assessment/Plan   Edgar was seen today for hyperlipidemia and hypertension.    Diagnoses and all orders for this visit:    Generalized anxiety disorder    Meniere's disease of both ears    Essential hypertension  -     valsartan (DIOVAN) 40 MG tablet; Take 0.5 tablets by mouth 2 (Two) Times a Day.    Other orders  -     Cancel: meclizine (ANTIVERT) 25 MG tablet; Take 1 tablet by mouth Every 8 (Eight) Hours As Needed for dizziness.  -     RABEprazole (ACIPHEX) 20 MG EC tablet; Take 1 tablet by mouth Every Night.      I have discussed her plan of care with Dr. Ruby.  He will renew the Valium 5 mg, take 1 tablet daily to twice daily, dispense #60 with 2 refills.  I have renewed other requested medication.  Overall, she is doing well.  l plan to follow-up in 6 months and as needed.  As part of this patient's treatment plan Dr. Ruby is  prescribing controlled substances. The patient has been made aware of appropriate use of such medications, including potential risk of somnolence, limited ability to drive and/or work safely, and potential for dependence or overdose. It has also been made clear that these medications are for use by this patient only, without concomitant use of alcohol or other substances unless prescribed.  Patient has completed prescribing agreement detailing terms of continued prescribing of controlled substances, including monitoring LUIS MANUEL reports, urine drug screening, and pill counts if necessary. The patient is aware that inappropriate use will results in cessation of prescribing such medications.  LUIS MANUEL report has been  reviewed.  LUIS MANUEL is updated every 3 months.  History and physical exam exhibit continued safe and appropriate use of controlled substances.

## 2018-04-12 ENCOUNTER — OFFICE VISIT (OUTPATIENT)
Dept: FAMILY MEDICINE CLINIC | Facility: CLINIC | Age: 73
End: 2018-04-12

## 2018-04-12 VITALS
SYSTOLIC BLOOD PRESSURE: 156 MMHG | HEART RATE: 81 BPM | TEMPERATURE: 97.8 F | WEIGHT: 167.4 LBS | HEIGHT: 60 IN | BODY MASS INDEX: 32.86 KG/M2 | DIASTOLIC BLOOD PRESSURE: 90 MMHG

## 2018-04-12 DIAGNOSIS — M54.50 CHRONIC BILATERAL LOW BACK PAIN WITHOUT SCIATICA: Primary | ICD-10-CM

## 2018-04-12 DIAGNOSIS — M15.9 GENERALIZED OSTEOARTHRITIS: ICD-10-CM

## 2018-04-12 DIAGNOSIS — Z12.31 SCREENING MAMMOGRAM, ENCOUNTER FOR: ICD-10-CM

## 2018-04-12 DIAGNOSIS — G89.29 CHRONIC BILATERAL LOW BACK PAIN WITHOUT SCIATICA: Primary | ICD-10-CM

## 2018-04-12 PROCEDURE — 99214 OFFICE O/P EST MOD 30 MIN: CPT | Performed by: FAMILY MEDICINE

## 2018-04-17 ENCOUNTER — HOSPITAL ENCOUNTER (OUTPATIENT)
Dept: GENERAL RADIOLOGY | Facility: HOSPITAL | Age: 73
Discharge: HOME | End: 2018-04-17

## 2018-04-17 ENCOUNTER — HOSPITAL ENCOUNTER (OUTPATIENT)
Dept: GENERAL RADIOLOGY | Facility: HOSPITAL | Age: 73
Discharge: HOME | End: 2018-04-17
Admitting: FAMILY MEDICINE

## 2018-04-17 DIAGNOSIS — G89.29 CHRONIC BILATERAL LOW BACK PAIN WITHOUT SCIATICA: ICD-10-CM

## 2018-04-17 DIAGNOSIS — M54.50 CHRONIC BILATERAL LOW BACK PAIN WITHOUT SCIATICA: ICD-10-CM

## 2018-04-17 DIAGNOSIS — M15.9 GENERALIZED OSTEOARTHRITIS: ICD-10-CM

## 2018-04-17 PROCEDURE — 72100 X-RAY EXAM L-S SPINE 2/3 VWS: CPT

## 2018-04-17 PROCEDURE — 72100 X-RAY EXAM L-S SPINE 2/3 VWS: CPT | Performed by: RADIOLOGY

## 2018-04-17 PROCEDURE — 73523 X-RAY EXAM HIPS BI 5/> VIEWS: CPT | Performed by: RADIOLOGY

## 2018-04-17 PROCEDURE — 73521 X-RAY EXAM HIPS BI 2 VIEWS: CPT

## 2018-04-17 NOTE — PROGRESS NOTES
Let her know that the x-rays showed expected arthritis changes in the spine.  Did not show any significant problems with hips or pelvis.  I believe she will do well to continue with mobility stretching exercises.  May want to consider formal physical therapy in near future.

## 2018-05-14 ENCOUNTER — OFFICE VISIT (OUTPATIENT)
Dept: FAMILY MEDICINE CLINIC | Facility: CLINIC | Age: 73
End: 2018-05-14

## 2018-05-14 VITALS
OXYGEN SATURATION: 96 % | DIASTOLIC BLOOD PRESSURE: 86 MMHG | TEMPERATURE: 97.3 F | SYSTOLIC BLOOD PRESSURE: 142 MMHG | WEIGHT: 168 LBS | BODY MASS INDEX: 32.98 KG/M2 | HEIGHT: 60 IN | HEART RATE: 81 BPM

## 2018-05-14 DIAGNOSIS — K12.1 STOMATITIS: ICD-10-CM

## 2018-05-14 DIAGNOSIS — H66.91 OTITIS OF RIGHT EAR: Primary | ICD-10-CM

## 2018-05-14 DIAGNOSIS — E04.1 THYROID CYST: ICD-10-CM

## 2018-05-14 PROCEDURE — 99214 OFFICE O/P EST MOD 30 MIN: CPT | Performed by: FAMILY MEDICINE

## 2018-05-14 RX ORDER — AMOXICILLIN 875 MG/1
875 TABLET, COATED ORAL 2 TIMES DAILY
Qty: 20 TABLET | Refills: 0 | Status: SHIPPED | OUTPATIENT
Start: 2018-05-14 | End: 2018-05-24

## 2018-05-14 NOTE — PROGRESS NOTES
"Subjective     Chief Complaint   Patient presents with   • Allergies   • thyroid check       Edgar Cotto is a 73 y.o. female.     History of Present Illness right ear discomfort of several days duration.  Some recurrent sore throat postnasal drip.  Recurrent mouth symptoms of chronicity.  Recheck thyroid.  Compliant with medications.  Has had no fever chills CP SOB.  Denies GI .    The following portions of the patient's history were reviewed and updated as appropriate: allergies, current medications, past medical history, past social history, past surgical history and problem list.    Review of Systems see history of present illness    Objective   Physical Exam   Constitutional: She is oriented to person, place, and time. She appears well-nourished.   HENT:   Head: Normocephalic.   Left Ear: External ear normal.   Nose: Nose normal.   Mouth/Throat: Oropharynx is clear and moist.   Right TM dull and injected.  Tender right submandibular.   Eyes: Conjunctivae and EOM are normal. Pupils are equal, round, and reactive to light.   Neck: Normal range of motion. Neck supple. No tracheal deviation present. No thyromegaly present.   Cardiovascular: Normal rate, regular rhythm and normal heart sounds.    Pulmonary/Chest: Effort normal and breath sounds normal.   Lymphadenopathy:     She has no cervical adenopathy.   Neurological: She is alert and oriented to person, place, and time.   Psychiatric: She has a normal mood and affect.   Vitals reviewed.    /86 (BP Location: Right arm, Patient Position: Sitting, Cuff Size: Adult)   Pulse 81   Temp 97.3 °F (36.3 °C) (Oral)   Ht 152.4 cm (60\")   Wt 76.2 kg (168 lb)   SpO2 96%   BMI 32.81 kg/m²   Assessment/Plan   Edgar was seen today for allergies and thyroid check.    Diagnoses and all orders for this visit:    Otitis of right ear  -     amoxicillin (AMOXIL) 875 MG tablet; Take 1 tablet by mouth 2 (Two) Times a Day for 10 days.    Stomatitis  -     nystatin " (MYCOSTATIN) 099716 UNIT/ML suspension; Swish and swallow 5 mL 4 (Four) Times a Day.    Thyroid cyst  -     US Thyroid; Future      Meds as directed.  Will arrange ultrasound.  Follow-up as scheduled and as needed.  Continue chronic medications as reconciled ordered.  Good oral hand hygiene.

## 2018-05-15 ENCOUNTER — HOSPITAL ENCOUNTER (OUTPATIENT)
Dept: MAMMOGRAPHY | Facility: HOSPITAL | Age: 73
Discharge: HOME | End: 2018-05-15
Admitting: FAMILY MEDICINE

## 2018-05-15 DIAGNOSIS — Z12.31 SCREENING MAMMOGRAM, ENCOUNTER FOR: ICD-10-CM

## 2018-05-15 PROCEDURE — 77067 SCR MAMMO BI INCL CAD: CPT

## 2018-05-15 PROCEDURE — 77063 BREAST TOMOSYNTHESIS BI: CPT

## 2018-05-15 PROCEDURE — 77063 BREAST TOMOSYNTHESIS BI: CPT | Performed by: RADIOLOGY

## 2018-05-15 PROCEDURE — 77067 SCR MAMMO BI INCL CAD: CPT | Performed by: RADIOLOGY

## 2018-05-25 ENCOUNTER — HOSPITAL ENCOUNTER (OUTPATIENT)
Dept: ULTRASOUND IMAGING | Facility: HOSPITAL | Age: 73
Discharge: HOME | End: 2018-05-25
Admitting: FAMILY MEDICINE

## 2018-05-25 DIAGNOSIS — E04.1 THYROID CYST: ICD-10-CM

## 2018-05-25 PROCEDURE — 76536 US EXAM OF HEAD AND NECK: CPT

## 2018-05-25 PROCEDURE — 76536 US EXAM OF HEAD AND NECK: CPT | Performed by: RADIOLOGY

## 2018-05-29 ENCOUNTER — TELEPHONE (OUTPATIENT)
Dept: FAMILY MEDICINE CLINIC | Facility: CLINIC | Age: 73
End: 2018-05-29

## 2018-06-19 ENCOUNTER — HOSPITAL ENCOUNTER (EMERGENCY)
Facility: HOSPITAL | Age: 73
Discharge: HOME | End: 2018-06-20
Attending: EMERGENCY MEDICINE | Admitting: EMERGENCY MEDICINE

## 2018-06-19 ENCOUNTER — APPOINTMENT (OUTPATIENT)
Dept: GENERAL RADIOLOGY | Facility: HOSPITAL | Age: 73
End: 2018-06-19

## 2018-06-19 DIAGNOSIS — R00.2 HEART PALPITATIONS: Primary | ICD-10-CM

## 2018-06-19 DIAGNOSIS — R51.9 NONINTRACTABLE EPISODIC HEADACHE, UNSPECIFIED HEADACHE TYPE: ICD-10-CM

## 2018-06-19 LAB
ALBUMIN SERPL-MCNC: 4.1 G/DL (ref 3.4–4.8)
ALBUMIN/GLOB SERPL: 1.4 G/DL (ref 1.5–2.5)
ALP SERPL-CCNC: 66 U/L (ref 35–104)
ALT SERPL W P-5'-P-CCNC: 25 U/L (ref 10–36)
AMYLASE SERPL-CCNC: 52 U/L (ref 28–100)
ANION GAP SERPL CALCULATED.3IONS-SCNC: 6.3 MMOL/L (ref 3.6–11.2)
APTT PPP: 30.9 SECONDS (ref 23.8–36.1)
AST SERPL-CCNC: 35 U/L (ref 10–30)
BACTERIA UR QL AUTO: ABNORMAL /HPF
BASOPHILS # BLD AUTO: 0.04 10*3/MM3 (ref 0–0.3)
BASOPHILS NFR BLD AUTO: 0.3 % (ref 0–2)
BILIRUB SERPL-MCNC: 0.9 MG/DL (ref 0.2–1.8)
BILIRUB UR QL STRIP: NEGATIVE
BUN BLD-MCNC: 8 MG/DL (ref 7–21)
BUN/CREAT SERPL: 8.6 (ref 7–25)
CALCIUM SPEC-SCNC: 9.4 MG/DL (ref 7.7–10)
CHLORIDE SERPL-SCNC: 104 MMOL/L (ref 99–112)
CK MB SERPL-CCNC: <0.18 NG/ML (ref 0–5)
CK MB SERPL-RTO: NORMAL % (ref 0–3)
CK SERPL-CCNC: 51 U/L (ref 24–173)
CLARITY UR: ABNORMAL
CO2 SERPL-SCNC: 28.7 MMOL/L (ref 24.3–31.9)
COLOR UR: YELLOW
CREAT BLD-MCNC: 0.93 MG/DL (ref 0.43–1.29)
DEPRECATED RDW RBC AUTO: 42.5 FL (ref 37–54)
EOSINOPHIL # BLD AUTO: 0.08 10*3/MM3 (ref 0–0.7)
EOSINOPHIL NFR BLD AUTO: 0.6 % (ref 0–7)
ERYTHROCYTE [DISTWIDTH] IN BLOOD BY AUTOMATED COUNT: 12.6 % (ref 11.5–14.5)
GFR SERPL CREATININE-BSD FRML MDRD: 59 ML/MIN/1.73
GLOBULIN UR ELPH-MCNC: 3 GM/DL
GLUCOSE BLD-MCNC: 81 MG/DL (ref 70–110)
GLUCOSE UR STRIP-MCNC: NEGATIVE MG/DL
HCT VFR BLD AUTO: 42.5 % (ref 37–47)
HGB BLD-MCNC: 14.1 G/DL (ref 12–16)
HGB UR QL STRIP.AUTO: NEGATIVE
HYALINE CASTS UR QL AUTO: ABNORMAL /LPF
IMM GRANULOCYTES # BLD: 0.03 10*3/MM3 (ref 0–0.03)
IMM GRANULOCYTES NFR BLD: 0.2 % (ref 0–0.5)
INR PPP: 1.03 (ref 0.9–1.1)
KETONES UR QL STRIP: NEGATIVE
LEUKOCYTE ESTERASE UR QL STRIP.AUTO: ABNORMAL
LIPASE SERPL-CCNC: 31 U/L (ref 13–60)
LYMPHOCYTES # BLD AUTO: 1.52 10*3/MM3 (ref 1–3)
LYMPHOCYTES NFR BLD AUTO: 11.8 % (ref 16–46)
MCH RBC QN AUTO: 30.7 PG (ref 27–33)
MCHC RBC AUTO-ENTMCNC: 33.2 G/DL (ref 33–37)
MCV RBC AUTO: 92.6 FL (ref 80–94)
MONOCYTES # BLD AUTO: 0.82 10*3/MM3 (ref 0.1–0.9)
MONOCYTES NFR BLD AUTO: 6.4 % (ref 0–12)
NEUTROPHILS # BLD AUTO: 10.34 10*3/MM3 (ref 1.4–6.5)
NEUTROPHILS NFR BLD AUTO: 80.7 % (ref 40–75)
NITRITE UR QL STRIP: NEGATIVE
OSMOLALITY SERPL CALC.SUM OF ELEC: 274.9 MOSM/KG (ref 273–305)
PH UR STRIP.AUTO: 6.5 [PH] (ref 5–8)
PLATELET # BLD AUTO: 162 10*3/MM3 (ref 130–400)
PMV BLD AUTO: 11.3 FL (ref 6–10)
POTASSIUM BLD-SCNC: 3.9 MMOL/L (ref 3.5–5.3)
PROT SERPL-MCNC: 7.1 G/DL (ref 6–8)
PROT UR QL STRIP: NEGATIVE
PROTHROMBIN TIME: 13.8 SECONDS (ref 11–15.4)
RBC # BLD AUTO: 4.59 10*6/MM3 (ref 4.2–5.4)
RBC # UR: ABNORMAL /HPF
REF LAB TEST METHOD: ABNORMAL
SODIUM BLD-SCNC: 139 MMOL/L (ref 135–153)
SP GR UR STRIP: 1.02 (ref 1–1.03)
SQUAMOUS #/AREA URNS HPF: ABNORMAL /HPF
TROPONIN I SERPL-MCNC: <0.006 NG/ML
UROBILINOGEN UR QL STRIP: ABNORMAL
WBC NRBC COR # BLD: 12.83 10*3/MM3 (ref 4.5–12.5)
WBC UR QL AUTO: ABNORMAL /HPF

## 2018-06-19 PROCEDURE — 85730 THROMBOPLASTIN TIME PARTIAL: CPT | Performed by: EMERGENCY MEDICINE

## 2018-06-19 PROCEDURE — 96360 HYDRATION IV INFUSION INIT: CPT

## 2018-06-19 PROCEDURE — 83690 ASSAY OF LIPASE: CPT | Performed by: EMERGENCY MEDICINE

## 2018-06-19 PROCEDURE — 82553 CREATINE MB FRACTION: CPT | Performed by: EMERGENCY MEDICINE

## 2018-06-19 PROCEDURE — 85610 PROTHROMBIN TIME: CPT | Performed by: EMERGENCY MEDICINE

## 2018-06-19 PROCEDURE — 82150 ASSAY OF AMYLASE: CPT | Performed by: EMERGENCY MEDICINE

## 2018-06-19 PROCEDURE — 80053 COMPREHEN METABOLIC PANEL: CPT | Performed by: EMERGENCY MEDICINE

## 2018-06-19 PROCEDURE — 99284 EMERGENCY DEPT VISIT MOD MDM: CPT

## 2018-06-19 PROCEDURE — 71045 X-RAY EXAM CHEST 1 VIEW: CPT | Performed by: RADIOLOGY

## 2018-06-19 PROCEDURE — 84484 ASSAY OF TROPONIN QUANT: CPT | Performed by: EMERGENCY MEDICINE

## 2018-06-19 PROCEDURE — 93005 ELECTROCARDIOGRAM TRACING: CPT | Performed by: EMERGENCY MEDICINE

## 2018-06-19 PROCEDURE — 81001 URINALYSIS AUTO W/SCOPE: CPT | Performed by: EMERGENCY MEDICINE

## 2018-06-19 PROCEDURE — 82550 ASSAY OF CK (CPK): CPT | Performed by: EMERGENCY MEDICINE

## 2018-06-19 PROCEDURE — 85025 COMPLETE CBC W/AUTO DIFF WBC: CPT | Performed by: EMERGENCY MEDICINE

## 2018-06-19 PROCEDURE — 96361 HYDRATE IV INFUSION ADD-ON: CPT

## 2018-06-19 PROCEDURE — 71045 X-RAY EXAM CHEST 1 VIEW: CPT

## 2018-06-19 RX ORDER — SODIUM CHLORIDE 9 MG/ML
125 INJECTION, SOLUTION INTRAVENOUS CONTINUOUS
Status: DISCONTINUED | OUTPATIENT
Start: 2018-06-19 | End: 2018-06-20 | Stop reason: HOSPADM

## 2018-06-19 RX ORDER — SODIUM CHLORIDE 0.9 % (FLUSH) 0.9 %
10 SYRINGE (ML) INJECTION AS NEEDED
Status: DISCONTINUED | OUTPATIENT
Start: 2018-06-19 | End: 2018-06-20 | Stop reason: HOSPADM

## 2018-06-19 RX ADMIN — SODIUM CHLORIDE 125 ML/HR: 9 INJECTION, SOLUTION INTRAVENOUS at 23:08

## 2018-06-20 VITALS
HEIGHT: 60 IN | BODY MASS INDEX: 32.39 KG/M2 | HEART RATE: 83 BPM | RESPIRATION RATE: 16 BRPM | WEIGHT: 165 LBS | TEMPERATURE: 98.4 F | OXYGEN SATURATION: 83 % | DIASTOLIC BLOOD PRESSURE: 68 MMHG | SYSTOLIC BLOOD PRESSURE: 131 MMHG

## 2018-06-20 LAB
CK MB SERPL-CCNC: <0.18 NG/ML (ref 0–5)
CK MB SERPL-RTO: NORMAL % (ref 0–3)
CK SERPL-CCNC: 42 U/L (ref 24–173)
TROPONIN I SERPL-MCNC: <0.006 NG/ML

## 2018-06-20 PROCEDURE — 82553 CREATINE MB FRACTION: CPT | Performed by: EMERGENCY MEDICINE

## 2018-06-20 PROCEDURE — 82550 ASSAY OF CK (CPK): CPT | Performed by: EMERGENCY MEDICINE

## 2018-06-20 PROCEDURE — 84484 ASSAY OF TROPONIN QUANT: CPT | Performed by: EMERGENCY MEDICINE

## 2018-06-20 NOTE — ED PROVIDER NOTES
Subjective   Pt comes in with c/o heart racing palpitations.  She denies CP, but reports upper abdominal squeezing after eating.  Prior TBI        History provided by:  Patient, relative and medical records  Palpitations   Palpitations quality:  Fast  Onset quality:  Sudden  Timing:  Intermittent  Progression:  Waxing and waning  Chronicity:  New  Context: anxiety    Context: not appetite suppressants, not blood loss, not bronchodilators, not caffeine, not dehydration, not exercise, not hyperventilation, not illicit drugs, not nicotine and not stimulant use    Relieved by:  Nothing  Worsened by:  Nothing  Ineffective treatments:  None tried  Associated symptoms: no back pain, no chest pain, no chest pressure, no cough, no diaphoresis, no dizziness, no hemoptysis, no leg pain, no lower extremity edema, no malaise/fatigue, no nausea, no near-syncope, no numbness, no orthopnea, no PND, no shortness of breath, no syncope, no vomiting and no weakness    Risk factors: no diabetes mellitus, no heart disease, no hx of atrial fibrillation, no hx of DVT, no hx of PE, no hx of thyroid disease, no hypercoagulable state, no hyperthyroidism, no OTC sinus medications and no stress        Review of Systems   Constitutional: Negative.  Negative for diaphoresis and malaise/fatigue.   HENT: Negative.    Eyes: Negative.    Respiratory: Negative.  Negative for cough, hemoptysis, chest tightness and shortness of breath.    Cardiovascular: Positive for palpitations. Negative for chest pain, orthopnea, syncope, PND and near-syncope.   Gastrointestinal: Positive for abdominal pain. Negative for abdominal distention, nausea and vomiting.   Endocrine: Negative.    Genitourinary: Negative.    Musculoskeletal: Negative.  Negative for back pain.   Skin: Negative.    Allergic/Immunologic: Negative.    Neurological: Negative.  Negative for dizziness, weakness and numbness.   Hematological: Negative.    Psychiatric/Behavioral: Negative.    All  other systems reviewed and are negative.      Past Medical History:   Diagnosis Date   • Abdominal distension    • Allergic rhinitis    • Anxiety    • Arthritis    • Cough    • Dry eyes    • Dyslipidemia    • Dysuria    • Hypertension    • Meniere's disease    • Mitral and aortic valve disease    • Oral candidiasis    • Osteoarthritis    • Stomatitis    • URI, acute    • Vaginal candidiasis    • Vocal cord dysfunction        Allergies   Allergen Reactions   • Z-Ponce [Azithromycin] Swelling   • Cortisone    • Fish-Derived Products      SEAFOOD   • Iodinated Diagnostic Agents    • Niacin    • Other    • Sulfa Antibiotics    • Verapamil        Past Surgical History:   Procedure Laterality Date   • CHOLECYSTECTOMY     • COLONOSCOPY     • DILATATION AND CURETTAGE     • HERNIA REPAIR     • SHOULDER SURGERY     • SKIN CANCER EXCISION      on Nose   • TONSILLECTOMY         Family History   Problem Relation Age of Onset   • Diabetes Mother    • Cancer Father    • Liver disease Sister    • Diabetes Sister    • Liver disease Brother    • Diabetes Brother    • Diabetes Daughter    • Cancer Other    • Liver disease Other    • Breast cancer Neg Hx        Social History     Social History   • Marital status:      Social History Main Topics   • Smoking status: Never Smoker   • Smokeless tobacco: Never Used   • Alcohol use No   • Drug use: No     Other Topics Concern   • Not on file           Objective   Physical Exam   Constitutional: She is oriented to person, place, and time. She appears well-developed and well-nourished. No distress.   HENT:   Head: Normocephalic and atraumatic.   Nose: Nose normal.   Moist mucus membranes   Eyes: Conjunctivae and EOM are normal. Right eye exhibits no discharge. Left eye exhibits no discharge.   Neck: Normal range of motion. Neck supple. No JVD present. No tracheal deviation present. No thyromegaly present.   Cardiovascular: Normal rate, regular rhythm, normal heart sounds and intact  distal pulses.  Exam reveals no gallop and no friction rub.    No murmur heard.  Pulmonary/Chest: Effort normal and breath sounds normal. No stridor. No respiratory distress. She has no wheezes. She has no rales. She exhibits no tenderness.   Abdominal: Soft. Bowel sounds are normal. She exhibits no distension and no mass. There is no tenderness. There is no guarding.   Genitourinary:   Genitourinary Comments: No CVAT   Musculoskeletal: Normal range of motion. She exhibits no deformity.   Lymphadenopathy:     She has no cervical adenopathy.   Neurological: She is alert and oriented to person, place, and time. She exhibits normal muscle tone. Coordination normal.   Slow speech   Skin: Skin is warm and dry. Capillary refill takes less than 2 seconds. No pallor.   Psychiatric: She has a normal mood and affect. Her behavior is normal. Judgment and thought content normal.   Nursing note and vitals reviewed.      Procedures           ED Course  ED Course as of Jun 20 0229   Tue Jun 19, 2018   2326 12-lead EKG performed at 2211 hrs.  Interpreted by me at 2214 hrs.  Normal sinus rhythm.  Normal EKG.  Ventricular rate 83.  WA interval 146.  QRS duration 76.  .  QTc 432.  No pathologic blocks.  No sustained dysrhythmia.  No acute ischemic ST segment deviation.  No pathologic T-wave changes.  No criteria for acute infarct/STEMI. ECG 12 Lead [TZ]   Wed Jun 20, 2018 0226 Patient hemodynamically stable.  Negative EKG and serial cardiac enzymes.  Heart rate 71 currently.  [TZ]      ED Course User Index  [TZ] Drew Nevarez MD     XR Chest 1 View   ED Interpretation   Single AP chest x-ray   My read   No apparent acute infiltrate or injury.        Labs Reviewed   COMPREHENSIVE METABOLIC PANEL - Abnormal; Notable for the following:        Result Value    AST (SGOT) 35 (*)     eGFR Non  Amer 59 (*)     A/G Ratio 1.4 (*)     All other components within normal limits    Narrative:     The MDRD GFR formula is only  valid for adults with stable renal function between ages 18 and 70.   URINALYSIS W/ MICROSCOPIC IF INDICATED (NO CULTURE) - Abnormal; Notable for the following:     Appearance, UA Cloudy (*)     Leuk Esterase, UA Small (1+) (*)     All other components within normal limits   CBC WITH AUTO DIFFERENTIAL - Abnormal; Notable for the following:     WBC 12.83 (*)     MPV 11.3 (*)     Neutrophil % 80.7 (*)     Lymphocyte % 11.8 (*)     Neutrophils, Absolute 10.34 (*)     All other components within normal limits   URINALYSIS, MICROSCOPIC ONLY - Abnormal; Notable for the following:     RBC, UA 3-6 (*)     WBC, UA 3-6 (*)     Squamous Epithelial Cells, UA 3-6 (*)     All other components within normal limits   PROTIME-INR - Normal    Narrative:     Suggested INR therapeutic range for stable oral anticoagulant therapy:    Low Intensity therapy:   1.5-2.0  Moderate Intensity therapy:   2.0-3.0  High Intensity therapy:   2.5-4.0   APTT - Normal    Narrative:     PTT Heparin Therapeutic Range:  59 - 95 seconds   AMYLASE - Normal   LIPASE - Normal   CK - Normal   CK MB - Normal   TROPONIN (IN-HOUSE) - Normal    Narrative:     Ultra Troponin I Reference Range:         <=0.039 ng/mL: Negative    0.04-0.779 ng/mL: Indeterminate Range. Suspicious of MI.  Clinical correlation required.       >=0.78  ng/mL: Consistent with myocardial injury.  Clinical correlation required.   OSMOLALITY, CALCULATED - Normal   CK - Normal   CK MB - Normal   TROPONIN (IN-HOUSE) - Normal    Narrative:     Ultra Troponin I Reference Range:         <=0.039 ng/mL: Negative    0.04-0.779 ng/mL: Indeterminate Range. Suspicious of MI.  Clinical correlation required.       >=0.78  ng/mL: Consistent with myocardial injury.  Clinical correlation required.   CKMB INDEX CALCULATION   CKMB INDEX CALCULATION   CBC AND DIFFERENTIAL    Narrative:     The following orders were created for panel order CBC & Differential.  Procedure                               Abnormality          Status                     ---------                               -----------         ------                     CBC Auto Differential[[id number]]        Abnormal            Final result                 Please view results for these tests on the individual orders.        Medication List      CONTINUE taking these medications    acetaminophen 500 MG tablet  Commonly known as:  TYLENOL     aspirin 81 MG tablet     calcium carbonate 500 MG chewable tablet  Commonly known as:  TUMS     diazePAM 5 MG tablet  Commonly known as:  VALIUM  Take 1 tablet by mouth daily to BID PRN dizziness     meclizine 25 MG tablet  Commonly known as:  ANTIVERT  Take 1 tablet by mouth Every 8 (Eight) Hours As Needed for dizziness.     nystatin 897447 UNIT/ML suspension  Commonly known as:  MYCOSTATIN  Swish and swallow 5 mL 4 (Four) Times a Day.     RABEprazole 20 MG EC tablet  Commonly known as:  ACIPHEX  Take 1 tablet by mouth Every Night.     valsartan 40 MG tablet  Commonly known as:  DIOVAN  Take 0.5 tablets by mouth 2 (Two) Times a Day.                MDM  Number of Diagnoses or Management Options  Heart palpitations: new and requires workup  Nonintractable episodic headache, unspecified headache type: new and requires workup     Amount and/or Complexity of Data Reviewed  Clinical lab tests: ordered and reviewed  Tests in the radiology section of CPT®: ordered and reviewed  Independent visualization of images, tracings, or specimens: yes    Risk of Complications, Morbidity, and/or Mortality  Presenting problems: high  Diagnostic procedures: high  Management options: moderate    Patient Progress  Patient progress: improved        Final diagnoses:   Heart palpitations   Nonintractable episodic headache, unspecified headache type            Drew Nevarez MD  06/20/18 0229

## 2018-07-03 ENCOUNTER — OFFICE VISIT (OUTPATIENT)
Dept: FAMILY MEDICINE CLINIC | Facility: CLINIC | Age: 73
End: 2018-07-03

## 2018-07-03 VITALS
BODY MASS INDEX: 32.59 KG/M2 | HEIGHT: 60 IN | SYSTOLIC BLOOD PRESSURE: 151 MMHG | DIASTOLIC BLOOD PRESSURE: 92 MMHG | TEMPERATURE: 97.1 F | HEART RATE: 85 BPM | OXYGEN SATURATION: 97 % | WEIGHT: 166 LBS

## 2018-07-03 DIAGNOSIS — R30.0 BURNING WITH URINATION: ICD-10-CM

## 2018-07-03 DIAGNOSIS — H81.03 MENIERE'S DISEASE OF BOTH EARS: Primary | ICD-10-CM

## 2018-07-03 LAB
BILIRUB BLD-MCNC: NEGATIVE MG/DL
CLARITY, POC: CLEAR
COLOR UR: YELLOW
GLUCOSE UR STRIP-MCNC: NEGATIVE MG/DL
KETONES UR QL: NEGATIVE
LEUKOCYTE EST, POC: NEGATIVE
NITRITE UR-MCNC: NEGATIVE MG/ML
PH UR: 6.5 [PH] (ref 5–8)
PROT UR STRIP-MCNC: NEGATIVE MG/DL
RBC # UR STRIP: NEGATIVE /UL
SP GR UR: 1.01 (ref 1–1.03)
UROBILINOGEN UR QL: NORMAL

## 2018-07-03 PROCEDURE — 81002 URINALYSIS NONAUTO W/O SCOPE: CPT | Performed by: FAMILY MEDICINE

## 2018-07-03 PROCEDURE — 99213 OFFICE O/P EST LOW 20 MIN: CPT | Performed by: FAMILY MEDICINE

## 2018-07-03 RX ORDER — DIAZEPAM 5 MG/1
TABLET ORAL
Qty: 60 TABLET | Refills: 2
Start: 2018-07-03 | End: 2018-11-27 | Stop reason: SDUPTHER

## 2018-07-03 RX ORDER — MECLIZINE HYDROCHLORIDE 25 MG/1
25 TABLET ORAL EVERY 8 HOURS PRN
Qty: 270 TABLET | Refills: 1 | Status: SHIPPED | OUTPATIENT
Start: 2018-07-03 | End: 2019-03-04 | Stop reason: SDUPTHER

## 2018-07-03 NOTE — PROGRESS NOTES
"Subjective     Chief Complaint   Patient presents with   • Urinary Tract Infection   • Med Refill       Edgar Cotto is a 73 y.o. female.     History of Present Illness F/U needs renewals on some meds   Had few days of lower urinary tract sx's.. Had ED visit recently after anxiety attack.  Lots of family stressors. Denies chest cdv gi changes.    The following portions of the patient's history were reviewed and updated as appropriate: allergies, past family history, past medical history, past social history, past surgical history and problem list.    Review of Systems  See the HPI    Objective   Physical Exam   Constitutional: She is oriented to person, place, and time. She appears well-developed and well-nourished.   HENT:   Head: Normocephalic and atraumatic.   Right Ear: External ear normal.   Left Ear: External ear normal.   Mouth/Throat: Oropharynx is clear and moist.   Cardiovascular: Normal rate, regular rhythm and normal heart sounds.    Pulmonary/Chest: Effort normal and breath sounds normal.   Abdominal: Soft. There is no tenderness.   Neurological: She is alert and oriented to person, place, and time.   Skin: Skin is warm and dry.   Psychiatric: She has a normal mood and affect. Her behavior is normal.   Vitals reviewed.    /92 (BP Location: Right arm, Patient Position: Sitting, Cuff Size: Adult)   Pulse 85   Temp 97.1 °F (36.2 °C) (Oral)   Ht 152.4 cm (60\")   Wt 75.3 kg (166 lb)   SpO2 97%   BMI 32.42 kg/m²   Assessment/Plan   Edgar was seen today for urinary tract infection and med refill.    Diagnoses and all orders for this visit:    Meniere's disease of both ears  -     diazePAM (VALIUM) 5 MG tablet; Take 1 tablet by mouth daily to BID PRN dizziness  -     meclizine (ANTIVERT) 25 MG tablet; Take 1 tablet by mouth Every 8 (Eight) Hours As Needed for dizziness.    Burning with urination  -     POCT urinalysis dipstick, manual    CONTINUE CHRONICS   RECHECK IN 6 MONTHS OR AS NEEDED.  " NO NEED FOR ABX AT THIS TIME.  As part of this patient's treatment plan I am prescribing controlled substances. The patient has been made aware of appropriate use of such medications, including potential risk of somnolence, limited ability to drive and/or work safely, and potential for dependence or overdose. It has also been made clear that these medications are for use by this patient only, without concomitant use of alcohol or other substances unless prescribed.  Patient has completed prescribing agreement detailing terms of continued prescribing of controlled substances, including monitoring LUIS MANUEL reports, urine drug screening, and pill counts if necessary. The patient is aware that inappropriate use will results in cessation of prescribing such medications.  LUIS MANUEL report has been reviewed.  LUIS MANUEL is updated every 3 months.  History and physical exam exhibit continued safe and appropriate use of controlled substances.

## 2018-07-19 ENCOUNTER — OFFICE VISIT (OUTPATIENT)
Dept: FAMILY MEDICINE CLINIC | Facility: CLINIC | Age: 73
End: 2018-07-19

## 2018-07-19 VITALS
SYSTOLIC BLOOD PRESSURE: 131 MMHG | WEIGHT: 162.5 LBS | HEIGHT: 60 IN | OXYGEN SATURATION: 95 % | HEART RATE: 93 BPM | BODY MASS INDEX: 31.9 KG/M2 | TEMPERATURE: 97.1 F | DIASTOLIC BLOOD PRESSURE: 85 MMHG

## 2018-07-19 DIAGNOSIS — J06.9 UPPER RESPIRATORY TRACT INFECTION, UNSPECIFIED TYPE: ICD-10-CM

## 2018-07-19 DIAGNOSIS — R30.9 PAIN WITH URINATION: ICD-10-CM

## 2018-07-19 DIAGNOSIS — N30.90 CYSTITIS: Primary | ICD-10-CM

## 2018-07-19 LAB
BILIRUB BLD-MCNC: ABNORMAL MG/DL
CLARITY, POC: ABNORMAL
COLOR UR: YELLOW
GLUCOSE UR STRIP-MCNC: NEGATIVE MG/DL
KETONES UR QL: NEGATIVE
LEUKOCYTE EST, POC: ABNORMAL
NITRITE UR-MCNC: POSITIVE MG/ML
PH UR: 6 [PH] (ref 5–8)
PROT UR STRIP-MCNC: ABNORMAL MG/DL
RBC # UR STRIP: ABNORMAL /UL
SP GR UR: 1.02 (ref 1–1.03)
UROBILINOGEN UR QL: NORMAL

## 2018-07-19 PROCEDURE — 81002 URINALYSIS NONAUTO W/O SCOPE: CPT | Performed by: FAMILY MEDICINE

## 2018-07-19 PROCEDURE — 99213 OFFICE O/P EST LOW 20 MIN: CPT | Performed by: FAMILY MEDICINE

## 2018-07-19 RX ORDER — AMOXICILLIN 875 MG/1
875 TABLET, COATED ORAL 2 TIMES DAILY
Qty: 20 TABLET | Refills: 0 | Status: SHIPPED | OUTPATIENT
Start: 2018-07-19 | End: 2018-07-24 | Stop reason: ALTCHOICE

## 2018-07-19 NOTE — PROGRESS NOTES
"Subjective     Chief Complaint   Patient presents with   • URI   • Urinary Tract Infection       Edgar Cotto is a 73 y.o. female.     History of Present Illness  having lower urinary tract outlet symptoms.  Did visit with clinic elsewhere a few weeks ago and took a course of Macrodantin.  Symptoms recurred.  Also with upper congestion some sore throat significant nasal congestion.  Lots of drainage.  Minimal productive cough.  Denies fever chills CP palpitations GI.    The following portions of the patient's history were reviewed and updated as appropriate: allergies, current medications, past medical history, past social history, past surgical history and problem list.    Review of Systems see the history of present illness    Objective   Physical Exam   Constitutional: She is oriented to person, place, and time. She appears well-nourished.   HENT:   Head: Normocephalic.   Left Ear: External ear normal.   Mouth/Throat: Oropharynx is clear and moist.   Right TM dull and injected.  Moderate nasal congestion postnasal drainage    Eyes: Pupils are equal, round, and reactive to light. Conjunctivae and EOM are normal.   Neck: Normal range of motion. Neck supple. No thyromegaly present.   Cardiovascular: Normal rate, regular rhythm and normal heart sounds.    Pulmonary/Chest: Effort normal and breath sounds normal.   Abdominal: Soft. She exhibits no distension. There is no tenderness.   Lymphadenopathy:     She has no cervical adenopathy.   Neurological: She is alert and oriented to person, place, and time.   Psychiatric: She has a normal mood and affect.   Vitals reviewed.    /85 (BP Location: Right arm, Patient Position: Sitting, Cuff Size: Adult)   Pulse 93   Temp 97.1 °F (36.2 °C) (Oral)   Ht 152.4 cm (60\")   Wt 73.7 kg (162 lb 8 oz)   SpO2 95%   BMI 31.74 kg/m²   Assessment/Plan   Edgar was seen today for uri and urinary tract infection.    Diagnoses and all orders for this visit:    Cystitis  -  "    POCT urinalysis dipstick, manual  -     amoxicillin (AMOXIL) 875 MG tablet; Take 1 tablet by mouth 2 (Two) Times a Day for 10 days.  -     Urinalysis With Culture If Indicated - Urine, Clean Catch    Pain with urination  -     POCT urinalysis dipstick, manual  -     Urinalysis With Culture If Indicated - Urine, Clean Catch    Upper respiratory tract infection, unspecified type  -     amoxicillin (AMOXIL) 875 MG tablet; Take 1 tablet by mouth 2 (Two) Times a Day for 10 days.     Meds as directed.  Stay well-hydrated.  Try to increase fluids of course.  Maintain heart healthy diet.  Will notify if urinalysis per reference lab suggest change.  Follow-up as scheduled and as needed.

## 2018-07-23 LAB
APPEARANCE UR: ABNORMAL
BACTERIA #/AREA URNS HPF: ABNORMAL /HPF
BACTERIA UR CULT: ABNORMAL
BACTERIA UR CULT: ABNORMAL
BILIRUB UR QL STRIP: NEGATIVE
CASTS URNS MICRO: ABNORMAL
CASTS URNS QL MICRO: PRESENT /LPF
COLOR UR: YELLOW
CRYSTALS URNS MICRO: ABNORMAL
EPI CELLS #/AREA URNS HPF: >10 /HPF
GLUCOSE UR QL: NEGATIVE
HGB UR QL STRIP: ABNORMAL
KETONES UR QL STRIP: ABNORMAL
LEUKOCYTE ESTERASE UR QL STRIP: ABNORMAL
MICRO URNS: ABNORMAL
MUCOUS THREADS URNS QL MICRO: PRESENT /HPF
NITRITE UR QL STRIP: POSITIVE
OTHER ANTIBIOTIC SUSC ISLT: ABNORMAL
PH UR STRIP: 5 [PH] (ref 5–7.5)
PROT UR QL STRIP: ABNORMAL
RBC #/AREA URNS HPF: ABNORMAL /HPF
SP GR UR: 1.02 (ref 1–1.03)
UNIDENT CRYS URNS QL MICRO: PRESENT /HPF
URINALYSIS REFLEX: ABNORMAL
UROBILINOGEN UR STRIP-MCNC: 1 MG/DL (ref 0.2–1)
WBC #/AREA URNS HPF: >30 /HPF

## 2018-07-24 RX ORDER — CIPROFLOXACIN 500 MG/1
500 TABLET, FILM COATED ORAL 2 TIMES DAILY
Qty: 14 TABLET | Refills: 0 | Status: SHIPPED | OUTPATIENT
Start: 2018-07-24 | End: 2018-07-31

## 2018-07-24 NOTE — PROGRESS NOTES
The infection not being treated by the amox  needs change antibiotic   new one sent in to take  take new one and stop the amox

## 2018-10-04 ENCOUNTER — OFFICE VISIT (OUTPATIENT)
Dept: FAMILY MEDICINE CLINIC | Facility: CLINIC | Age: 73
End: 2018-10-04

## 2018-10-04 VITALS
DIASTOLIC BLOOD PRESSURE: 90 MMHG | HEIGHT: 60 IN | WEIGHT: 157.8 LBS | HEART RATE: 90 BPM | SYSTOLIC BLOOD PRESSURE: 155 MMHG | BODY MASS INDEX: 30.98 KG/M2 | TEMPERATURE: 97.4 F

## 2018-10-04 DIAGNOSIS — I10 ESSENTIAL HYPERTENSION: Primary | ICD-10-CM

## 2018-10-04 DIAGNOSIS — R74.01 NONSPECIFIC ELEVATION OF LEVELS OF TRANSAMINASE AND LACTIC ACID DEHYDROGENASE (LDH): ICD-10-CM

## 2018-10-04 DIAGNOSIS — E78.2 MIXED HYPERLIPIDEMIA: ICD-10-CM

## 2018-10-04 DIAGNOSIS — Z20.5 EXPOSURE TO HEPATITIS A: ICD-10-CM

## 2018-10-04 DIAGNOSIS — R74.02 NONSPECIFIC ELEVATION OF LEVELS OF TRANSAMINASE AND LACTIC ACID DEHYDROGENASE (LDH): ICD-10-CM

## 2018-10-04 PROCEDURE — 99213 OFFICE O/P EST LOW 20 MIN: CPT | Performed by: FAMILY MEDICINE

## 2018-10-04 PROCEDURE — 36415 COLL VENOUS BLD VENIPUNCTURE: CPT | Performed by: FAMILY MEDICINE

## 2018-10-04 NOTE — PROGRESS NOTES
Subjective   Edgar Cotto is a 73 y.o. female.     History of Present Illness comes in today concerned about the potential for hepatitis a.  Family member most recently diagnosed with it.  No definite known exposure but always unsure.  Has not been acutely ill.  Eyes fevers chills GI  CDV changes.  Interested in metabolic monitoring also.  Medications are as reconciled.  No interest in flu vaccine or other vaccines.  Has persistence of the oropharyngeal symptoms of significant chronicity.    The following portions of the patient's history were reviewed and updated as appropriate: allergies, past family history, past medical history, past social history, past surgical history and problem list.    Review of Systems  see the history of present illness  Objective   Physical Exam   Constitutional: She is oriented to person, place, and time. She appears well-developed and well-nourished.   HENT:   Head: Normocephalic and atraumatic.   Mouth/Throat: Oropharynx is clear and moist.   Eyes: Conjunctivae are normal.   Cardiovascular: Normal rate, regular rhythm and normal heart sounds.    Pulmonary/Chest: Effort normal and breath sounds normal.   Neurological: She is alert and oriented to person, place, and time.   Skin: Skin is warm and dry.   Psychiatric: She has a normal mood and affect. Her behavior is normal.   Vitals reviewed.      Assessment/Plan   Edgar was seen today for wants labs.    Diagnoses and all orders for this visit:    Essential hypertension  -     Comprehensive Metabolic Panel    Mixed hyperlipidemia  -     Comprehensive Metabolic Panel  -     Lipid Panel  -     TSH    Exposure to hepatitis A  -     Comprehensive Metabolic Panel  -     Hepatitis Panel, Acute    Nonspecific elevation of levels of transaminase and lactic acid dehydrogenase (LDH)   -     Hepatitis Panel, Acute    Will check labs.  You have had transient elevation of liver enzymes this summer.  Exposure and high concern.  Counseled  regarding hepatitis A vaccine.  Will notify of results.  Continue to maintain good oral hand hygiene.  Continue medications as reconciled.  Encourage heart healthy diet as well as regular physical activity.  Encouraged other vaccines.      Patient's Body mass index is 30.82 kg/m². BMI is above normal parameters. Recommendations include: exercise counseling and nutrition counseling.

## 2018-10-05 LAB
ALBUMIN SERPL-MCNC: 3.9 G/DL (ref 3.4–4.8)
ALBUMIN/GLOB SERPL: 1.3 G/DL (ref 1.5–2.5)
ALP SERPL-CCNC: 87 U/L (ref 35–104)
ALT SERPL-CCNC: 26 U/L (ref 10–36)
AST SERPL-CCNC: 56 U/L (ref 10–30)
BILIRUB SERPL-MCNC: 0.7 MG/DL (ref 0.2–1.8)
BUN SERPL-MCNC: 7 MG/DL (ref 7–21)
BUN/CREAT SERPL: 9.1 (ref 7–25)
CALCIUM SERPL-MCNC: 8.6 MG/DL (ref 7.7–10)
CHLORIDE SERPL-SCNC: 104 MMOL/L (ref 99–112)
CHOLEST SERPL-MCNC: 220 MG/DL (ref 0–200)
CO2 SERPL-SCNC: 30.1 MMOL/L (ref 24.3–31.9)
CREAT SERPL-MCNC: 0.77 MG/DL (ref 0.43–1.29)
GLOBULIN SER CALC-MCNC: 3 GM/DL
GLUCOSE SERPL-MCNC: 109 MG/DL (ref 70–110)
HAV IGM SERPL QL IA: NEGATIVE
HBV CORE IGM SERPL QL IA: NEGATIVE
HBV SURFACE AG SERPL QL IA: NEGATIVE
HCV AB S/CO SERPL IA: <0.1 S/CO RATIO (ref 0–0.9)
HDLC SERPL-MCNC: 58 MG/DL (ref 60–100)
LDLC SERPL CALC-MCNC: 128 MG/DL (ref 0–100)
POTASSIUM SERPL-SCNC: 4.2 MMOL/L (ref 3.5–5.3)
PROT SERPL-MCNC: 6.9 G/DL (ref 6–8)
SODIUM SERPL-SCNC: 142 MMOL/L (ref 135–153)
TRIGL SERPL-MCNC: 168 MG/DL (ref 0–150)
TSH SERPL DL<=0.005 MIU/L-ACNC: 1.06 MIU/ML (ref 0.55–4.78)
VLDLC SERPL CALC-MCNC: 33.6 MG/DL

## 2018-10-06 NOTE — PROGRESS NOTES
No evidence of hepatitis.  Should receive the vaccine.  Encourage to get at her pharmacy.  Cholesterol profile is essentially unchanged.  Needs to consider medication if she is willing.

## 2018-10-08 RX ORDER — ROSUVASTATIN CALCIUM 5 MG/1
5 TABLET, COATED ORAL
Qty: 15 TABLET | Refills: 3 | Status: SHIPPED | OUTPATIENT
Start: 2018-10-08 | End: 2019-03-04 | Stop reason: SDUPTHER

## 2018-11-27 ENCOUNTER — OFFICE VISIT (OUTPATIENT)
Dept: FAMILY MEDICINE CLINIC | Facility: CLINIC | Age: 73
End: 2018-11-27

## 2018-11-27 VITALS
SYSTOLIC BLOOD PRESSURE: 148 MMHG | DIASTOLIC BLOOD PRESSURE: 88 MMHG | BODY MASS INDEX: 31.24 KG/M2 | HEART RATE: 92 BPM | HEIGHT: 60 IN | TEMPERATURE: 97.9 F | WEIGHT: 159.1 LBS

## 2018-11-27 DIAGNOSIS — M15.9 GENERALIZED OSTEOARTHRITIS: Primary | ICD-10-CM

## 2018-11-27 DIAGNOSIS — K12.1 STOMATITIS: ICD-10-CM

## 2018-11-27 DIAGNOSIS — H81.03 MENIERE'S DISEASE OF BOTH EARS: ICD-10-CM

## 2018-11-27 DIAGNOSIS — E78.2 MIXED HYPERLIPIDEMIA: ICD-10-CM

## 2018-11-27 PROCEDURE — 99213 OFFICE O/P EST LOW 20 MIN: CPT | Performed by: FAMILY MEDICINE

## 2018-11-27 RX ORDER — DIAZEPAM 5 MG/1
TABLET ORAL
Qty: 60 TABLET | Refills: 2
Start: 2018-11-27 | End: 2019-01-28 | Stop reason: SDUPTHER

## 2018-11-27 NOTE — PROGRESS NOTES
Subjective   Edgar Cotto is a 73 y.o. female.     History of Present Illness follow-up.  Medication management regarding the chronic dizziness associated with the Ménière's.  Persistent mouth symptoms.  Has had no recent chest CDV GI  changes.  Main focuses toward family and their illness.  Compliant with medications including the statin drug.    The following portions of the patient's history were reviewed and updated as appropriate: allergies, past family history, past medical history, past social history, past surgical history and problem list.    Review of Systems the the history of present illness    Objective   Physical Exam   Constitutional: She is oriented to person, place, and time. She appears well-developed and well-nourished.   HENT:   Head: Normocephalic and atraumatic.   Mouth/Throat: Oropharynx is clear and moist.   Cardiovascular: Normal rate, regular rhythm and normal heart sounds.   Pulmonary/Chest: Effort normal and breath sounds normal.   Neurological: She is alert and oriented to person, place, and time.   Skin: Skin is warm and dry.   Psychiatric: She has a normal mood and affect.   Vitals reviewed.      Assessment/Plan   Edgar was seen today for follow-up.    Diagnoses and all orders for this visit:    Generalized osteoarthritis    Meniere's disease of both ears  -     diazePAM (VALIUM) 5 MG tablet; Take 1 tablet by mouth daily to BID PRN dizziness    Stomatitis  -     nystatin (MYCOSTATIN) [id number] UNIT/ML suspension; Swish and swallow 5 mL 4 (Four) Times a Day.    Counseled.  Declined renewing pain medication today.  Utilize the medicines as reconciled ordered.  Renewed Valium No. 60 with 2 refills.  Encourage compliance with medication and diet.  Try to become more physically active.  You declined flu vaccine today.  You state you will be starting hepatitis A vaccines.  Recheck in a few months routinely.  Check labs then.    As part of this patient's treatment plan I am prescribing  controlled substances. The patient has been made aware of appropriate use of such medications, including potential risk of somnolence, limited ability to drive and/or work safely, and potential for dependence or overdose. It has also been made clear that these medications are for use by this patient only, without concomitant use of alcohol or other substances unless prescribed.  Patient has completed prescribing agreement detailing terms of continued prescribing of controlled substances, including monitoring LUIS MANUEL reports, urine drug screening, and pill counts if necessary. The patient is aware that inappropriate use will results in cessation of prescribing such medications.  LUIS MANUEL report has been reviewed.  LUIS MANUEL is updated every 3 months.  History and physical exam exhibit continued safe and appropriate use of controlled substances.       Patient's There is no height or weight on file to calculate BMI. BMI is above normal parameters. Recommendations include: exercise counseling and nutrition counseling.

## 2018-12-06 ENCOUNTER — HOSPITAL ENCOUNTER (OUTPATIENT)
Dept: CARDIOLOGY | Facility: HOSPITAL | Age: 73
Discharge: HOME | End: 2018-12-06
Attending: SURGERY | Admitting: SURGERY

## 2018-12-06 ENCOUNTER — TRANSCRIBE ORDERS (OUTPATIENT)
Dept: LAB | Facility: HOSPITAL | Age: 73
End: 2018-12-06

## 2018-12-06 DIAGNOSIS — Z01.812 PRE-OPERATIVE LABORATORY EXAMINATION: Primary | ICD-10-CM

## 2018-12-06 DIAGNOSIS — Z01.812 PRE-OPERATIVE LABORATORY EXAMINATION: ICD-10-CM

## 2018-12-06 PROCEDURE — 93010 ELECTROCARDIOGRAM REPORT: CPT | Performed by: INTERNAL MEDICINE

## 2018-12-06 PROCEDURE — 93005 ELECTROCARDIOGRAM TRACING: CPT | Performed by: SURGERY

## 2019-01-22 ENCOUNTER — LAB (OUTPATIENT)
Dept: FAMILY MEDICINE CLINIC | Facility: CLINIC | Age: 74
End: 2019-01-22

## 2019-01-22 DIAGNOSIS — E78.2 MIXED HYPERLIPIDEMIA: ICD-10-CM

## 2019-01-22 LAB
ALBUMIN SERPL-MCNC: 3.9 G/DL (ref 3.4–4.8)
ALBUMIN/GLOB SERPL: 1.4 G/DL (ref 1.5–2.5)
ALP SERPL-CCNC: 78 U/L (ref 35–104)
ALT SERPL-CCNC: 20 U/L (ref 10–36)
AST SERPL-CCNC: 34 U/L (ref 10–30)
BASOPHILS # BLD AUTO: 0.07 10*3/MM3 (ref 0–0.3)
BASOPHILS NFR BLD AUTO: 1.6 % (ref 0–2)
BILIRUB SERPL-MCNC: 0.6 MG/DL (ref 0.2–1.8)
BUN SERPL-MCNC: 8 MG/DL (ref 7–21)
BUN/CREAT SERPL: 9 (ref 7–25)
CALCIUM SERPL-MCNC: 9.1 MG/DL (ref 7.7–10)
CHLORIDE SERPL-SCNC: 105 MMOL/L (ref 99–112)
CHOLEST SERPL-MCNC: 190 MG/DL (ref 0–200)
CO2 SERPL-SCNC: 31.6 MMOL/L (ref 24.3–31.9)
CREAT SERPL-MCNC: 0.89 MG/DL (ref 0.43–1.29)
EOSINOPHIL # BLD AUTO: 0.39 10*3/MM3 (ref 0–0.7)
EOSINOPHIL NFR BLD AUTO: 8.8 % (ref 0–7)
ERYTHROCYTE [DISTWIDTH] IN BLOOD BY AUTOMATED COUNT: 13.4 % (ref 11.5–14.5)
GLOBULIN SER CALC-MCNC: 2.8 GM/DL
GLUCOSE SERPL-MCNC: 109 MG/DL (ref 70–110)
HCT VFR BLD AUTO: 44 % (ref 37–47)
HDLC SERPL-MCNC: 59 MG/DL (ref 60–100)
HGB BLD-MCNC: 14.1 G/DL (ref 12–16)
IMM GRANULOCYTES # BLD AUTO: 0 10*3/MM3 (ref 0–0.03)
IMM GRANULOCYTES NFR BLD AUTO: 0 % (ref 0–0.5)
LDLC SERPL CALC-MCNC: 101 MG/DL (ref 0–100)
LYMPHOCYTES # BLD AUTO: 1.31 10*3/MM3 (ref 1–3)
LYMPHOCYTES NFR BLD AUTO: 29.7 % (ref 16–46)
MCH RBC QN AUTO: 29.8 PG (ref 27–33)
MCHC RBC AUTO-ENTMCNC: 32 G/DL (ref 33–37)
MCV RBC AUTO: 93 FL (ref 80–94)
MONOCYTES # BLD AUTO: 0.46 10*3/MM3 (ref 0.1–0.9)
MONOCYTES NFR BLD AUTO: 10.4 % (ref 0–12)
NEUTROPHILS # BLD AUTO: 2.18 10*3/MM3 (ref 1.4–6.5)
NEUTROPHILS NFR BLD AUTO: 49.5 % (ref 40–75)
PLATELET # BLD AUTO: 173 10*3/MM3 (ref 130–400)
POTASSIUM SERPL-SCNC: 5 MMOL/L (ref 3.5–5.3)
PROT SERPL-MCNC: 6.7 G/DL (ref 6–8)
RBC # BLD AUTO: 4.73 10*6/MM3 (ref 4.2–5.4)
SODIUM SERPL-SCNC: 141 MMOL/L (ref 135–153)
TRIGL SERPL-MCNC: 151 MG/DL (ref 0–150)
VLDLC SERPL CALC-MCNC: 30.2 MG/DL
WBC # BLD AUTO: 4.41 10*3/MM3 (ref 4.5–12.5)

## 2019-01-22 PROCEDURE — 36415 COLL VENOUS BLD VENIPUNCTURE: CPT | Performed by: NURSE PRACTITIONER

## 2019-01-28 ENCOUNTER — OFFICE VISIT (OUTPATIENT)
Dept: FAMILY MEDICINE CLINIC | Facility: CLINIC | Age: 74
End: 2019-01-28

## 2019-01-28 VITALS
SYSTOLIC BLOOD PRESSURE: 131 MMHG | HEART RATE: 92 BPM | DIASTOLIC BLOOD PRESSURE: 85 MMHG | WEIGHT: 156.2 LBS | TEMPERATURE: 97 F | HEIGHT: 60 IN | BODY MASS INDEX: 30.67 KG/M2

## 2019-01-28 DIAGNOSIS — H81.03 MENIERE'S DISEASE OF BOTH EARS: Primary | ICD-10-CM

## 2019-01-28 DIAGNOSIS — E78.2 MIXED HYPERLIPIDEMIA: ICD-10-CM

## 2019-01-28 DIAGNOSIS — K12.1 STOMATITIS: ICD-10-CM

## 2019-01-28 DIAGNOSIS — I10 ESSENTIAL HYPERTENSION: ICD-10-CM

## 2019-01-28 PROBLEM — Z98.890 S/P HEMORRHOIDECTOMY: Status: ACTIVE | Noted: 2019-01-28

## 2019-01-28 PROBLEM — Z87.19 S/P HEMORRHOIDECTOMY: Status: ACTIVE | Noted: 2019-01-28

## 2019-01-28 PROCEDURE — G0439 PPPS, SUBSEQ VISIT: HCPCS | Performed by: FAMILY MEDICINE

## 2019-01-28 PROCEDURE — 96160 PT-FOCUSED HLTH RISK ASSMT: CPT | Performed by: FAMILY MEDICINE

## 2019-01-28 RX ORDER — HYDROCODONE BITARTRATE AND ACETAMINOPHEN 7.5; 325 MG/1; MG/1
TABLET ORAL
COMMUNITY
Start: 2018-12-13 | End: 2019-01-28

## 2019-01-28 RX ORDER — DIAZEPAM 5 MG/1
TABLET ORAL
Qty: 60 TABLET | Refills: 2
Start: 2019-01-28 | End: 2019-07-09 | Stop reason: SDUPTHER

## 2019-01-28 NOTE — PROGRESS NOTES
"QUICK REFERENCE INFORMATION:  The ABCs of the Annual Wellness Visit    Subsequent Medicare Wellness Visit    HEALTH RISK ASSESSMENT    1945    Recent Hospitalizations:  {Hospitalization history:[id number]::\"No hospitalization(s) within the last year.\"}.        Current Medical Providers:  Patient Care Team:  Rahat Ruby MD as PCP - General  Rahat Ruby MD as PCP - Family Medicine  Zina Diaz PA as Physician Assistant (Gynecology)  Elif Terry OD (Optometry)  Momo Vega MD as Consulting Physician (Otolaryngology)        Smoking Status:  Social History     Tobacco Use   Smoking Status Never Smoker   Smokeless Tobacco Never Used       Alcohol Consumption:  Social History     Substance and Sexual Activity   Alcohol Use No       Depression Screen:   PHQ-2/PHQ-9 Depression Screening 4/20/2017   Little interest or pleasure in doing things 3   Feeling down, depressed, or hopeless 3   Trouble falling or staying asleep, or sleeping too much 3   Feeling tired or having little energy 2   Poor appetite or overeating 0   Feeling bad about yourself - or that you are a failure or have let yourself or your family down 0   Trouble concentrating on things, such as reading the newspaper or watching television 0   Moving or speaking so slowly that other people could have noticed. Or the opposite - being so fidgety or restless that you have been moving around a lot more than usual 0   Thoughts that you would be better off dead, or of hurting yourself in some way 0   Total Score 11   If you checked off any problems, how difficult have these problems made it for you to do your work, take care of things at home, or get along with other people? Somewhat difficult       Health Habits and Functional and Cognitive Screening:  Functional & Cognitive Status 1/16/2017   Do you have difficulty preparing food and eating? No   Do you have difficulty bathing yourself, getting dressed " "or grooming yourself? No   Do you have difficulty using the toilet? No   Do you have difficulty moving around from place to place? No   In the past year have you fallen or experienced a near fall? No   Do you need help using the phone?  No   Are you deaf or do you have serious difficulty hearing?  No   Do you need help with transportation? Yes   Do you need help shopping? Yes   Do you need help preparing meals?  No   Do you need help with housework?  Yes   Do you need help with laundry? No   Do you need help taking your medications? No   Do you need help managing money? No   Do you have difficulty concentrating, remembering or making decisions? No           Does the patient have evidence of cognitive impairment? {Yes/No w/ pre-defaulted No:44348::\"No\"}    Aspirin use counseling: {Aspirin :56036}      Recent Lab Results:  CMP:  Lab Results   Component Value Date     01/22/2019    BUN 8 01/22/2019    CREATININE 0.89 01/22/2019    EGFRIFNONA 59 (L) 06/19/2018    EGFRIFAFRI  08/29/2016      Comment:      <15 Indicative of kidney failure.    BCR 9.0 01/22/2019     01/22/2019    K 5.0 01/22/2019    CO2 31.6 01/22/2019    CALCIUM 9.1 01/22/2019    PROTENTOTREF 6.7 01/22/2019    ALBUMIN 3.90 01/22/2019    LABGLOBREF 2.8 01/22/2019    LABIL2 1.4 (L) 01/22/2019    BILITOT 0.6 01/22/2019    ALKPHOS 78 01/22/2019    AST 34 (H) 01/22/2019    ALT 20 01/22/2019     Lipid Panel:  Lab Results   Component Value Date    CHOL 229 (H) 01/16/2017    TRIG 151 (H) 01/22/2019    HDL 59 (L) 01/22/2019    VLDL 30.2 01/22/2019    LDLHDL 2.18 01/16/2017     HbA1c:  Lab Results   Component Value Date    HGBA1C 5.50 07/13/2017       Visual Acuity:  No exam data present    Age-appropriate Screening Schedule:  Refer to the list below for future screening recommendations based on patient's age, sex and/or medical conditions. Orders for these recommended tests are listed in the plan section. The patient has been provided with a " "written plan.    Health Maintenance   Topic Date Due   • ZOSTER VACCINE (2 of 2) 03/13/2017   • PNEUMOCOCCAL VACCINES (65+ LOW/MEDIUM RISK) (2 of 2 - PPSV23) 01/16/2018   • TDAP/TD VACCINES (2 - Td) 01/16/2020   • LIPID PANEL  01/22/2020   • MAMMOGRAM  05/15/2020   • COLONOSCOPY  05/08/2025   • INFLUENZA VACCINE  Addressed        Subjective   History of Present Illness    Edgar Cotto is a 74 y.o. female who presents for an Subsequent Wellness Visit.    The following portions of the patient's history were reviewed and updated as appropriate: {history reviewed:20406::\"allergies\",\"current medications\",\"past family history\",\"past medical history\",\"past social history\",\"past surgical history\",\"problem list\"}.    Outpatient Medications Prior to Visit   Medication Sig Dispense Refill   • acetaminophen (TYLENOL) 500 MG tablet Take 500 mg by mouth Every 6 (Six) Hours As Needed for mild pain (1-3) or moderate pain (4-6).     • aspirin 81 MG tablet Take 81 mg by mouth as needed.     • calcium carbonate (TUMS) 500 MG chewable tablet Chew 1 tablet 2 (Two) Times a Day.     • diazePAM (VALIUM) 5 MG tablet Take 1 tablet by mouth daily to BID PRN dizziness 60 tablet 2   • meclizine (ANTIVERT) 25 MG tablet Take 1 tablet by mouth Every 8 (Eight) Hours As Needed for dizziness. 270 tablet 1   • nystatin (MYCOSTATIN) 162226 UNIT/ML suspension Swish and swallow 5 mL 4 (Four) Times a Day. 120 mL 1   • RABEprazole (ACIPHEX) 20 MG EC tablet Take 1 tablet by mouth Every Night. 90 tablet 1   • rosuvastatin (CRESTOR) 5 MG tablet Take 1 tablet by mouth Every Other Day. 15 tablet 3   • valsartan (DIOVAN) 40 MG tablet Take 0.5 tablets by mouth 2 (Two) Times a Day. 90 tablet 1     No facility-administered medications prior to visit.        Patient Active Problem List   Diagnosis   • Atopic rhinitis   • Dizziness   • External hemorrhoids   • Generalized anxiety disorder   • Generalized osteoarthritis   • Hyperlipidemia   • Essential " hypertension   • Auditory vertigo   • Mitral and aortic valve disease   • Stomatitis   • Seasonal allergic rhinitis   • Varicose veins of both lower extremities   • Meniere's disease of both ears       Advance Care Planning:  {Advance Directive Status:14443}    Identification of Risk Factors:  Risk factors include: {MC; WELLNESS RISK FACTORS:06553}.    Review of Systems    Compared to one year ago, the patient feels her physical health is {better worse same:68703}.  Compared to one year ago, the patient feels her mental health is {better worse same:45859}.    Objective     Physical Exam    There were no vitals filed for this visit.    Patient's There is no height or weight on file to calculate BMI. BMI is {BMI range:80999}.      Assessment/Plan   Patient Self-Management and Personalized Health Advice  The patient has been provided with information about: {; PERSONALIZED HEALTH ADVICE:07559} and preventive services including:   · {plan:38073}.    Visit Diagnoses:  No diagnosis found.    No orders of the defined types were placed in this encounter.      Outpatient Encounter Medications as of 1/28/2019   Medication Sig Dispense Refill   • acetaminophen (TYLENOL) 500 MG tablet Take 500 mg by mouth Every 6 (Six) Hours As Needed for mild pain (1-3) or moderate pain (4-6).     • aspirin 81 MG tablet Take 81 mg by mouth as needed.     • calcium carbonate (TUMS) 500 MG chewable tablet Chew 1 tablet 2 (Two) Times a Day.     • diazePAM (VALIUM) 5 MG tablet Take 1 tablet by mouth daily to BID PRN dizziness 60 tablet 2   • meclizine (ANTIVERT) 25 MG tablet Take 1 tablet by mouth Every 8 (Eight) Hours As Needed for dizziness. 270 tablet 1   • nystatin (MYCOSTATIN) 548426 UNIT/ML suspension Swish and swallow 5 mL 4 (Four) Times a Day. 120 mL 1   • RABEprazole (ACIPHEX) 20 MG EC tablet Take 1 tablet by mouth Every Night. 90 tablet 1   • rosuvastatin (CRESTOR) 5 MG tablet Take 1 tablet by mouth Every Other Day. 15 tablet 3   •  valsartan (DIOVAN) 40 MG tablet Take 0.5 tablets by mouth 2 (Two) Times a Day. 90 tablet 1     No facility-administered encounter medications on file as of 1/28/2019.        Reviewed use of high risk medication in the elderly: {Response; yes/no/na:63}  Reviewed for potential of harmful drug interactions in the elderly: {Response; yes/no/na:63}    Follow Up:  No Follow-up on file.     An After Visit Summary and PPPS with all of these plans were given to the patient.

## 2019-01-28 NOTE — PROGRESS NOTES
Subjective   Edgar Cotto is a 74 y.o. female.     History of Present Illness     {Common H&P Review Areas:74932}    Review of Systems    Objective   Physical Exam    Assessment/Plan   {Assess/PlanSmartLinks:98491}    Patient's Body mass index is 30.51 kg/m². BMI is {BMI range:25528}.

## 2019-01-28 NOTE — PROGRESS NOTES
QUICK REFERENCE INFORMATION:  The ABCs of the Annual Wellness Visit    Subsequent Medicare Wellness Visit    HEALTH RISK ASSESSMENT    1945    Recent Hospitalizations:  No hospitalization(s) within the last year..        Current Medical Providers:  Patient Care Team:  Rahat Ruby MD as PCP - General  Rahat Ruby MD as PCP - Family Medicine  Zina Diaz PA as Physician Assistant (Gynecology)  Elif Terry OD (Optometry)  Momo Vega MD as Consulting Physician (Otolaryngology)        Smoking Status:  Social History     Tobacco Use   Smoking Status Never Smoker   Smokeless Tobacco Never Used       Alcohol Consumption:  Social History     Substance and Sexual Activity   Alcohol Use No       Depression Screen:   PHQ-2/PHQ-9 Depression Screening 1/28/2019   Little interest or pleasure in doing things 0   Feeling down, depressed, or hopeless 1   Trouble falling or staying asleep, or sleeping too much -   Feeling tired or having little energy -   Poor appetite or overeating -   Feeling bad about yourself - or that you are a failure or have let yourself or your family down -   Trouble concentrating on things, such as reading the newspaper or watching television -   Moving or speaking so slowly that other people could have noticed. Or the opposite - being so fidgety or restless that you have been moving around a lot more than usual -   Thoughts that you would be better off dead, or of hurting yourself in some way -   Total Score 1   If you checked off any problems, how difficult have these problems made it for you to do your work, take care of things at home, or get along with other people? -       Health Habits and Functional and Cognitive Screening:  Functional & Cognitive Status 1/16/2017   Do you have difficulty preparing food and eating? No   Do you have difficulty bathing yourself, getting dressed or grooming yourself? No   Do you have difficulty using the  toilet? No   Do you have difficulty moving around from place to place? No   In the past year have you fallen or experienced a near fall? No   Do you need help using the phone?  No   Are you deaf or do you have serious difficulty hearing?  No   Do you need help with transportation? Yes   Do you need help shopping? Yes   Do you need help preparing meals?  No   Do you need help with housework?  Yes   Do you need help with laundry? No   Do you need help taking your medications? No   Do you need help managing money? No   Do you have difficulty concentrating, remembering or making decisions? No           Does the patient have evidence of cognitive impairment? No    Aspirin use counseling: Taking ASA appropriately as indicated      Recent Lab Results:  CMP:  Lab Results   Component Value Date     01/22/2019    BUN 8 01/22/2019    CREATININE 0.89 01/22/2019    EGFRIFNONA 59 (L) 06/19/2018    EGFRIFAFRI  08/29/2016      Comment:      <15 Indicative of kidney failure.    BCR 9.0 01/22/2019     01/22/2019    K 5.0 01/22/2019    CO2 31.6 01/22/2019    CALCIUM 9.1 01/22/2019    PROTENTOTREF 6.7 01/22/2019    ALBUMIN 3.90 01/22/2019    LABGLOBREF 2.8 01/22/2019    LABIL2 1.4 (L) 01/22/2019    BILITOT 0.6 01/22/2019    ALKPHOS 78 01/22/2019    AST 34 (H) 01/22/2019    ALT 20 01/22/2019     Lipid Panel:  Lab Results   Component Value Date    CHOL 229 (H) 01/16/2017    TRIG 151 (H) 01/22/2019    HDL 59 (L) 01/22/2019    VLDL 30.2 01/22/2019    LDLHDL 2.18 01/16/2017     HbA1c:  Lab Results   Component Value Date    HGBA1C 5.50 07/13/2017       Visual Acuity:  No exam data present    Age-appropriate Screening Schedule:  Refer to the list below for future screening recommendations based on patient's age, sex and/or medical conditions. Orders for these recommended tests are listed in the plan section. The patient has been provided with a written plan.    Health Maintenance   Topic Date Due   • ZOSTER VACCINE (2 of 2)  03/13/2017   • PNEUMOCOCCAL VACCINES (65+ LOW/MEDIUM RISK) (2 of 2 - PPSV23) 01/16/2018   • TDAP/TD VACCINES (2 - Td) 01/16/2020   • LIPID PANEL  01/22/2020   • MAMMOGRAM  05/15/2020   • COLONOSCOPY  05/08/2025   • INFLUENZA VACCINE  Addressed        Subjective   History of Present Illness comes in today for medication management review of recent metabolic parameters Medicare wellness visit.  Generally having no new health concerns.  Persistence of chronic conditions that wax and wane.  Family members with significant illness that weighs on all aspects of interactions.  Has had no recent illness.  Denies CP SOB palpitations GI  skin orthopedic concerns.  Continuing to have difficulties with the mouth long-standing of course.    Edgar Cotto is a 74 y.o. female who presents for an Subsequent Wellness Visit.    The following portions of the patient's history were reviewed and updated as appropriate: allergies, current medications, past medical history, past social history, past surgical history and problem list.    Outpatient Medications Prior to Visit   Medication Sig Dispense Refill   • acetaminophen (TYLENOL) 500 MG tablet Take 500 mg by mouth Every 6 (Six) Hours As Needed for mild pain (1-3) or moderate pain (4-6).     • aspirin 81 MG tablet Take 81 mg by mouth as needed.     • calcium carbonate (TUMS) 500 MG chewable tablet Chew 1 tablet 2 (Two) Times a Day.     • diazePAM (VALIUM) 5 MG tablet Take 1 tablet by mouth daily to BID PRN dizziness 60 tablet 2   • meclizine (ANTIVERT) 25 MG tablet Take 1 tablet by mouth Every 8 (Eight) Hours As Needed for dizziness. 270 tablet 1   • nystatin (MYCOSTATIN) 071035 UNIT/ML suspension Swish and swallow 5 mL 4 (Four) Times a Day. 120 mL 1   • RABEprazole (ACIPHEX) 20 MG EC tablet Take 1 tablet by mouth Every Night. 90 tablet 1   • rosuvastatin (CRESTOR) 5 MG tablet Take 1 tablet by mouth Every Other Day. 15 tablet 3   • valsartan (DIOVAN) 40 MG tablet Take 0.5  "tablets by mouth 2 (Two) Times a Day. 90 tablet 1   • HYDROcodone-acetaminophen (NORCO) 7.5-325 MG per tablet        No facility-administered medications prior to visit.        Patient Active Problem List   Diagnosis   • Atopic rhinitis   • Dizziness   • Generalized anxiety disorder   • Generalized osteoarthritis   • Hyperlipidemia   • Essential hypertension   • Auditory vertigo   • Mitral and aortic valve disease   • Stomatitis   • Seasonal allergic rhinitis   • Varicose veins of both lower extremities   • Meniere's disease of both ears   • S/P hemorrhoidectomy       Advance Care Planning:  has NO advance directive - information provided to the patient today    Identification of Risk Factors:  Risk factors include: weight , cardiovascular risk, inactivity, chronic pain and caretaker stress.    Review of Systems see the history of present illness    Compared to one year ago, the patient feels her physical health is the same.  Compared to one year ago, the patient feels her mental health is the same.    Objective     Physical Exam   Constitutional: She is oriented to person, place, and time. She appears well-developed and well-nourished.   HENT:   Head: Normocephalic and atraumatic.   Mouth/Throat: Oropharynx is clear and moist.   Neck: Normal range of motion. Neck supple.   Cardiovascular: Normal rate, regular rhythm and normal heart sounds.   Pulmonary/Chest: Effort normal and breath sounds normal.   Musculoskeletal: She exhibits no edema.   Neurological: She is alert and oriented to person, place, and time.   Skin: Skin is warm and dry.   Psychiatric: She has a normal mood and affect.   Vitals reviewed.      Vitals:    01/28/19 0854   BP: 131/85   BP Location: Right arm   Patient Position: Sitting   Cuff Size: Adult   Pulse: 92   Temp: 97 °F (36.1 °C)   TempSrc: Oral   Weight: 70.9 kg (156 lb 3.2 oz)   Height: 152.4 cm (60\")       Patient's Body mass index is 30.51 kg/m². BMI is above normal parameters. " Recommendations include: exercise counseling and nutrition counseling.      Assessment/Plan   Patient Self-Management and Personalized Health Advice  The patient has been provided with information about: diet, exercise, weight management, prevention of cardiac or vascular disease and designing advance directives and preventive services including:   · Advance directive, Exercise counseling provided, Nutrition counseling provided.    Visit Diagnoses:    ICD-10-CM ICD-9-CM   1. Meniere's disease of both ears H81.03 386.00   2. Stomatitis K12.1 528.00   3. Essential hypertension I10 401.9   4. Mixed hyperlipidemia E78.2 272.2       No orders of the defined types were placed in this encounter.      Outpatient Encounter Medications as of 1/28/2019   Medication Sig Dispense Refill   • acetaminophen (TYLENOL) 500 MG tablet Take 500 mg by mouth Every 6 (Six) Hours As Needed for mild pain (1-3) or moderate pain (4-6).     • aspirin 81 MG tablet Take 81 mg by mouth as needed.     • calcium carbonate (TUMS) 500 MG chewable tablet Chew 1 tablet 2 (Two) Times a Day.     • diazePAM (VALIUM) 5 MG tablet Take 1 tablet by mouth daily to BID PRN dizziness 60 tablet 2   • meclizine (ANTIVERT) 25 MG tablet Take 1 tablet by mouth Every 8 (Eight) Hours As Needed for dizziness. 270 tablet 1   • nystatin (MYCOSTATIN) 440751 UNIT/ML suspension Swish and swallow 5 mL 4 (Four) Times a Day. 120 mL 1   • RABEprazole (ACIPHEX) 20 MG EC tablet Take 1 tablet by mouth Every Night. 90 tablet 1   • rosuvastatin (CRESTOR) 5 MG tablet Take 1 tablet by mouth Every Other Day. 15 tablet 3   • valsartan (DIOVAN) 40 MG tablet Take 0.5 tablets by mouth 2 (Two) Times a Day. 90 tablet 1   • [DISCONTINUED] HYDROcodone-acetaminophen (NORCO) 7.5-325 MG per tablet        No facility-administered encounter medications on file as of 1/28/2019.        Reviewed use of high risk medication in the elderly: yes  Reviewed for potential of harmful drug interactions in the  elderly: yes    Follow Up:  No Follow-up on file.     An After Visit Summary and PPPS with all of these plans were given to the patient.    Today we reviewed recent metabolic parameters.  All have improved.  I encourage continuing regular exercise maintaining heart healthy diet.  I encourage considering formulating living will spoke to that as well as gave literature.  Encourage vaccines but you declined today.  Renewed the diazepam to receive after 2/27/19 #60 with 2 refills.  Would ask you to follow-up here in about 4 months or as needed.  Stressed importance of good mobility and dietary compliance.    As part of this patient's treatment plan I am prescribing controlled substances. The patient has been made aware of appropriate use of such medications, including potential risk of somnolence, limited ability to drive and/or work safely, and potential for dependence or overdose. It has also been made clear that these medications are for use by this patient only, without concomitant use of alcohol or other substances unless prescribed.  Patient has completed prescribing agreement detailing terms of continued prescribing of controlled substances, including monitoring LUIS MANUEL reports, urine drug screening, and pill counts if necessary. The patient is aware that inappropriate use will results in cessation of prescribing such medications.  LUIS MANUEL report has been reviewed.  LUIS MANUEL is updated every 3 months.  History and physical exam exhibit continued safe and appropriate use of controlled substances.

## 2019-02-19 ENCOUNTER — APPOINTMENT (OUTPATIENT)
Dept: GENERAL RADIOLOGY | Facility: HOSPITAL | Age: 74
End: 2019-02-19

## 2019-02-19 ENCOUNTER — HOSPITAL ENCOUNTER (OUTPATIENT)
Facility: HOSPITAL | Age: 74
Setting detail: OBSERVATION
Discharge: HOME | End: 2019-02-20
Attending: FAMILY MEDICINE | Admitting: FAMILY MEDICINE

## 2019-02-19 ENCOUNTER — TELEPHONE (OUTPATIENT)
Dept: FAMILY MEDICINE CLINIC | Facility: CLINIC | Age: 74
End: 2019-02-19

## 2019-02-19 DIAGNOSIS — I95.1 ORTHOSTATIC HYPOTENSION: Primary | ICD-10-CM

## 2019-02-19 LAB
A-A DO2: 21.7 MMHG (ref 0–300)
ARTERIAL PATENCY WRIST A: ABNORMAL
ATMOSPHERIC PRESS: 739 MMHG
BASE EXCESS BLDA CALC-SCNC: -1.1 MMOL/L
BDY SITE: ABNORMAL
BODY TEMPERATURE: 98.6 C
COHGB MFR BLD: 1.6 % (ref 0–5)
HCO3 BLDA-SCNC: 22.4 MMOL/L (ref 22–26)
HCT VFR BLD CALC: 42 % (ref 37–47)
HGB BLDA-MCNC: 14.2 G/DL (ref 12–16)
HOROWITZ INDEX BLD+IHG-RTO: 21 %
METHGB BLD QL: 0.4 % (ref 0–3)
MODALITY: ABNORMAL
OXYHGB MFR BLDV: 94.8 % (ref 85–100)
PCO2 BLDA: 33.8 MM HG (ref 35–45)
PH BLDA: 7.44 PH UNITS (ref 7.35–7.45)
PO2 BLDA: 83.1 MM HG (ref 80–100)
SAO2 % BLDCOA: 96.7 % (ref 90–100)

## 2019-02-19 PROCEDURE — 96361 HYDRATE IV INFUSION ADD-ON: CPT

## 2019-02-19 PROCEDURE — 82375 ASSAY CARBOXYHB QUANT: CPT | Performed by: FAMILY MEDICINE

## 2019-02-19 PROCEDURE — 83880 ASSAY OF NATRIURETIC PEPTIDE: CPT | Performed by: FAMILY MEDICINE

## 2019-02-19 PROCEDURE — 84484 ASSAY OF TROPONIN QUANT: CPT | Performed by: FAMILY MEDICINE

## 2019-02-19 PROCEDURE — 86140 C-REACTIVE PROTEIN: CPT | Performed by: FAMILY MEDICINE

## 2019-02-19 PROCEDURE — 83050 HGB METHEMOGLOBIN QUAN: CPT | Performed by: FAMILY MEDICINE

## 2019-02-19 PROCEDURE — 82805 BLOOD GASES W/O2 SATURATION: CPT | Performed by: FAMILY MEDICINE

## 2019-02-19 PROCEDURE — 99285 EMERGENCY DEPT VISIT HI MDM: CPT

## 2019-02-19 PROCEDURE — 71045 X-RAY EXAM CHEST 1 VIEW: CPT

## 2019-02-19 PROCEDURE — 71045 X-RAY EXAM CHEST 1 VIEW: CPT | Performed by: RADIOLOGY

## 2019-02-19 PROCEDURE — 86901 BLOOD TYPING SEROLOGIC RH(D): CPT

## 2019-02-19 PROCEDURE — 85610 PROTHROMBIN TIME: CPT | Performed by: FAMILY MEDICINE

## 2019-02-19 PROCEDURE — 80053 COMPREHEN METABOLIC PANEL: CPT | Performed by: FAMILY MEDICINE

## 2019-02-19 PROCEDURE — 86900 BLOOD TYPING SEROLOGIC ABO: CPT

## 2019-02-19 PROCEDURE — 85025 COMPLETE CBC W/AUTO DIFF WBC: CPT | Performed by: FAMILY MEDICINE

## 2019-02-19 PROCEDURE — 96360 HYDRATION IV INFUSION INIT: CPT

## 2019-02-19 PROCEDURE — 85379 FIBRIN DEGRADATION QUANT: CPT | Performed by: FAMILY MEDICINE

## 2019-02-19 PROCEDURE — 36600 WITHDRAWAL OF ARTERIAL BLOOD: CPT | Performed by: FAMILY MEDICINE

## 2019-02-19 PROCEDURE — 85730 THROMBOPLASTIN TIME PARTIAL: CPT | Performed by: FAMILY MEDICINE

## 2019-02-19 PROCEDURE — 86900 BLOOD TYPING SEROLOGIC ABO: CPT | Performed by: FAMILY MEDICINE

## 2019-02-19 PROCEDURE — 93005 ELECTROCARDIOGRAM TRACING: CPT | Performed by: FAMILY MEDICINE

## 2019-02-19 PROCEDURE — 86901 BLOOD TYPING SEROLOGIC RH(D): CPT | Performed by: FAMILY MEDICINE

## 2019-02-19 PROCEDURE — 86850 RBC ANTIBODY SCREEN: CPT | Performed by: FAMILY MEDICINE

## 2019-02-19 RX ORDER — SODIUM CHLORIDE 0.9 % (FLUSH) 0.9 %
10 SYRINGE (ML) INJECTION AS NEEDED
Status: DISCONTINUED | OUTPATIENT
Start: 2019-02-19 | End: 2019-02-20 | Stop reason: HOSPADM

## 2019-02-19 RX ORDER — SODIUM CHLORIDE 9 MG/ML
125 INJECTION, SOLUTION INTRAVENOUS CONTINUOUS
Status: DISCONTINUED | OUTPATIENT
Start: 2019-02-19 | End: 2019-02-20 | Stop reason: HOSPADM

## 2019-02-19 RX ADMIN — SODIUM CHLORIDE 125 ML/HR: 9 INJECTION, SOLUTION INTRAVENOUS at 23:27

## 2019-02-19 NOTE — TELEPHONE ENCOUNTER
Ava called stating patients HR: was 127  But has dropped now down to 112 her rate increases with activity BP: 90/60  Patient was resting when pulse began racing and BP dropped patient took her BP meds, cholesterol, Antivert, stomach pill  and valium.

## 2019-02-19 NOTE — TELEPHONE ENCOUNTER
I am unsure what to say.  May want to consider a follow-up with us or cardiology.  Continue to monitor.  If becomes symptomatic do not hesitate to use ER.

## 2019-02-20 VITALS
HEIGHT: 66 IN | WEIGHT: 159.8 LBS | SYSTOLIC BLOOD PRESSURE: 118 MMHG | HEART RATE: 98 BPM | OXYGEN SATURATION: 97 % | BODY MASS INDEX: 25.68 KG/M2 | DIASTOLIC BLOOD PRESSURE: 64 MMHG | TEMPERATURE: 97.7 F | RESPIRATION RATE: 18 BRPM

## 2019-02-20 PROBLEM — I95.1 ORTHOSTATIC HYPOTENSION: Status: ACTIVE | Noted: 2019-02-20

## 2019-02-20 LAB
ABO GROUP BLD: NORMAL
ABO GROUP BLD: NORMAL
ALBUMIN SERPL-MCNC: 3.7 G/DL (ref 3.4–4.8)
ALBUMIN/GLOB SERPL: 1.3 G/DL (ref 1.5–2.5)
ALP SERPL-CCNC: 67 U/L (ref 35–104)
ALT SERPL W P-5'-P-CCNC: 23 U/L (ref 10–36)
ANION GAP SERPL CALCULATED.3IONS-SCNC: 5.5 MMOL/L (ref 3.6–11.2)
ANION GAP SERPL CALCULATED.3IONS-SCNC: 8.5 MMOL/L (ref 3.6–11.2)
APTT PPP: 29.9 SECONDS (ref 23.8–36.1)
AST SERPL-CCNC: 37 U/L (ref 10–30)
BACTERIA UR QL AUTO: ABNORMAL /HPF
BASOPHILS # BLD AUTO: 0.02 10*3/MM3 (ref 0–0.3)
BASOPHILS # BLD AUTO: 0.03 10*3/MM3 (ref 0–0.3)
BASOPHILS NFR BLD AUTO: 0.2 % (ref 0–2)
BASOPHILS NFR BLD AUTO: 0.2 % (ref 0–2)
BILIRUB SERPL-MCNC: 0.6 MG/DL (ref 0.2–1.8)
BILIRUB UR QL STRIP: NEGATIVE
BLD GP AB SCN SERPL QL: NEGATIVE
BNP SERPL-MCNC: 68 PG/ML (ref 0–100)
BNP SERPL-MCNC: 87 PG/ML (ref 0–100)
BUN BLD-MCNC: 8 MG/DL (ref 7–21)
BUN BLD-MCNC: 8 MG/DL (ref 7–21)
BUN/CREAT SERPL: 10.4 (ref 7–25)
BUN/CREAT SERPL: 7.8 (ref 7–25)
CALCIUM SPEC-SCNC: 7.8 MG/DL (ref 7.7–10)
CALCIUM SPEC-SCNC: 9.2 MG/DL (ref 7.7–10)
CHLORIDE SERPL-SCNC: 105 MMOL/L (ref 99–112)
CHLORIDE SERPL-SCNC: 109 MMOL/L (ref 99–112)
CHOLEST SERPL-MCNC: 138 MG/DL (ref 0–200)
CLARITY UR: CLEAR
CO2 SERPL-SCNC: 24.5 MMOL/L (ref 24.3–31.9)
CO2 SERPL-SCNC: 25.5 MMOL/L (ref 24.3–31.9)
COLOR UR: YELLOW
CREAT BLD-MCNC: 0.77 MG/DL (ref 0.43–1.29)
CREAT BLD-MCNC: 1.03 MG/DL (ref 0.43–1.29)
CRP SERPL-MCNC: 2.71 MG/DL (ref 0–0.99)
D DIMER PPP FEU-MCNC: 0.5 MCGFEU/ML (ref 0–0.5)
D-LACTATE SERPL-SCNC: 1.9 MMOL/L (ref 0.5–2)
D-LACTATE SERPL-SCNC: 2.3 MMOL/L (ref 0.5–2)
DEPRECATED RDW RBC AUTO: 45.1 FL (ref 37–54)
DEPRECATED RDW RBC AUTO: 45.9 FL (ref 37–54)
EOSINOPHIL # BLD AUTO: 0.01 10*3/MM3 (ref 0–0.7)
EOSINOPHIL # BLD AUTO: 0.05 10*3/MM3 (ref 0–0.7)
EOSINOPHIL NFR BLD AUTO: 0.1 % (ref 0–7)
EOSINOPHIL NFR BLD AUTO: 0.4 % (ref 0–7)
ERYTHROCYTE [DISTWIDTH] IN BLOOD BY AUTOMATED COUNT: 13.8 % (ref 11.5–14.5)
ERYTHROCYTE [DISTWIDTH] IN BLOOD BY AUTOMATED COUNT: 13.8 % (ref 11.5–14.5)
GFR SERPL CREATININE-BSD FRML MDRD: 52 ML/MIN/1.73
GFR SERPL CREATININE-BSD FRML MDRD: 73 ML/MIN/1.73
GLOBULIN UR ELPH-MCNC: 2.9 GM/DL
GLUCOSE BLD-MCNC: 136 MG/DL (ref 70–110)
GLUCOSE BLD-MCNC: 94 MG/DL (ref 70–110)
GLUCOSE UR STRIP-MCNC: NEGATIVE MG/DL
HCT VFR BLD AUTO: 37.4 % (ref 37–47)
HCT VFR BLD AUTO: 42.8 % (ref 37–47)
HDLC SERPL-MCNC: 39 MG/DL (ref 60–100)
HGB BLD-MCNC: 11.6 G/DL (ref 12–16)
HGB BLD-MCNC: 13.8 G/DL (ref 12–16)
HGB UR QL STRIP.AUTO: ABNORMAL
HOLD SPECIMEN: NORMAL
HYALINE CASTS UR QL AUTO: ABNORMAL /LPF
IMM GRANULOCYTES # BLD AUTO: 0.02 10*3/MM3 (ref 0–0.03)
IMM GRANULOCYTES # BLD AUTO: 0.05 10*3/MM3 (ref 0–0.03)
IMM GRANULOCYTES NFR BLD AUTO: 0.2 % (ref 0–0.5)
IMM GRANULOCYTES NFR BLD AUTO: 0.3 % (ref 0–0.5)
INR PPP: 1.09 (ref 0.9–1.1)
KETONES UR QL STRIP: NEGATIVE
LDLC SERPL CALC-MCNC: 84 MG/DL (ref 0–100)
LDLC/HDLC SERPL: 2.15 {RATIO}
LEUKOCYTE ESTERASE UR QL STRIP.AUTO: ABNORMAL
LYMPHOCYTES # BLD AUTO: 0.68 10*3/MM3 (ref 1–3)
LYMPHOCYTES # BLD AUTO: 0.68 10*3/MM3 (ref 1–3)
LYMPHOCYTES NFR BLD AUTO: 4.2 % (ref 16–46)
LYMPHOCYTES NFR BLD AUTO: 6.1 % (ref 16–46)
MAGNESIUM SERPL-MCNC: 1.7 MG/DL (ref 1.7–2.6)
MCH RBC QN AUTO: 29.6 PG (ref 27–33)
MCH RBC QN AUTO: 30.3 PG (ref 27–33)
MCHC RBC AUTO-ENTMCNC: 31 G/DL (ref 33–37)
MCHC RBC AUTO-ENTMCNC: 32.2 G/DL (ref 33–37)
MCV RBC AUTO: 94.1 FL (ref 80–94)
MCV RBC AUTO: 95.4 FL (ref 80–94)
MONOCYTES # BLD AUTO: 0.68 10*3/MM3 (ref 0.1–0.9)
MONOCYTES # BLD AUTO: 0.9 10*3/MM3 (ref 0.1–0.9)
MONOCYTES NFR BLD AUTO: 5.5 % (ref 0–12)
MONOCYTES NFR BLD AUTO: 6.1 % (ref 0–12)
NEUTROPHILS # BLD AUTO: 14.63 10*3/MM3 (ref 1.4–6.5)
NEUTROPHILS # BLD AUTO: 9.75 10*3/MM3 (ref 1.4–6.5)
NEUTROPHILS NFR BLD AUTO: 87 % (ref 40–75)
NEUTROPHILS NFR BLD AUTO: 89.7 % (ref 40–75)
NITRITE UR QL STRIP: NEGATIVE
OSMOLALITY SERPL CALC.SUM OF ELEC: 276.1 MOSM/KG (ref 273–305)
OSMOLALITY SERPL CALC.SUM OF ELEC: 277.5 MOSM/KG (ref 273–305)
PH UR STRIP.AUTO: 6 [PH] (ref 5–8)
PLATELET # BLD AUTO: 103 10*3/MM3 (ref 130–400)
PLATELET # BLD AUTO: 157 10*3/MM3 (ref 130–400)
PMV BLD AUTO: 10.6 FL (ref 6–10)
PMV BLD AUTO: 11.1 FL (ref 6–10)
POTASSIUM BLD-SCNC: 3.7 MMOL/L (ref 3.5–5.3)
POTASSIUM BLD-SCNC: 3.9 MMOL/L (ref 3.5–5.3)
PROT SERPL-MCNC: 6.6 G/DL (ref 6–8)
PROT UR QL STRIP: NEGATIVE
PROTHROMBIN TIME: 14.4 SECONDS (ref 11–15.4)
RBC # BLD AUTO: 3.92 10*6/MM3 (ref 4.2–5.4)
RBC # BLD AUTO: 4.55 10*6/MM3 (ref 4.2–5.4)
RBC # UR: ABNORMAL /HPF
REF LAB TEST METHOD: ABNORMAL
RH BLD: POSITIVE
RH BLD: POSITIVE
SODIUM BLD-SCNC: 138 MMOL/L (ref 135–153)
SODIUM BLD-SCNC: 140 MMOL/L (ref 135–153)
SP GR UR STRIP: 1.01 (ref 1–1.03)
SQUAMOUS #/AREA URNS HPF: ABNORMAL /HPF
T&S EXPIRATION DATE: NORMAL
TRIGL SERPL-MCNC: 75 MG/DL (ref 0–150)
TROPONIN I SERPL-MCNC: <0.006 NG/ML
TSH SERPL DL<=0.05 MIU/L-ACNC: 1.55 MIU/ML (ref 0.55–4.78)
UROBILINOGEN UR QL STRIP: ABNORMAL
VLDLC SERPL-MCNC: 15 MG/DL
WBC NRBC COR # BLD: 11.2 10*3/MM3 (ref 4.5–12.5)
WBC NRBC COR # BLD: 16.3 10*3/MM3 (ref 4.5–12.5)
WBC UR QL AUTO: ABNORMAL /HPF

## 2019-02-20 PROCEDURE — 96372 THER/PROPH/DIAG INJ SC/IM: CPT

## 2019-02-20 PROCEDURE — 93010 ELECTROCARDIOGRAM REPORT: CPT | Performed by: INTERNAL MEDICINE

## 2019-02-20 PROCEDURE — 25010000002 CEFTRIAXONE: Performed by: NURSE PRACTITIONER

## 2019-02-20 PROCEDURE — G0378 HOSPITAL OBSERVATION PER HR: HCPCS

## 2019-02-20 PROCEDURE — 87086 URINE CULTURE/COLONY COUNT: CPT | Performed by: NURSE PRACTITIONER

## 2019-02-20 PROCEDURE — 81001 URINALYSIS AUTO W/SCOPE: CPT | Performed by: FAMILY MEDICINE

## 2019-02-20 PROCEDURE — 85025 COMPLETE CBC W/AUTO DIFF WBC: CPT | Performed by: NURSE PRACTITIONER

## 2019-02-20 PROCEDURE — 83605 ASSAY OF LACTIC ACID: CPT | Performed by: FAMILY MEDICINE

## 2019-02-20 PROCEDURE — 96361 HYDRATE IV INFUSION ADD-ON: CPT

## 2019-02-20 PROCEDURE — 87040 BLOOD CULTURE FOR BACTERIA: CPT | Performed by: FAMILY MEDICINE

## 2019-02-20 PROCEDURE — 94799 UNLISTED PULMONARY SVC/PX: CPT

## 2019-02-20 PROCEDURE — 86901 BLOOD TYPING SEROLOGIC RH(D): CPT

## 2019-02-20 PROCEDURE — 83880 ASSAY OF NATRIURETIC PEPTIDE: CPT | Performed by: NURSE PRACTITIONER

## 2019-02-20 PROCEDURE — 86900 BLOOD TYPING SEROLOGIC ABO: CPT

## 2019-02-20 PROCEDURE — 83735 ASSAY OF MAGNESIUM: CPT | Performed by: NURSE PRACTITIONER

## 2019-02-20 PROCEDURE — 93005 ELECTROCARDIOGRAM TRACING: CPT | Performed by: NURSE PRACTITIONER

## 2019-02-20 PROCEDURE — 84484 ASSAY OF TROPONIN QUANT: CPT | Performed by: NURSE PRACTITIONER

## 2019-02-20 PROCEDURE — 80048 BASIC METABOLIC PNL TOTAL CA: CPT | Performed by: NURSE PRACTITIONER

## 2019-02-20 PROCEDURE — 84443 ASSAY THYROID STIM HORMONE: CPT | Performed by: NURSE PRACTITIONER

## 2019-02-20 PROCEDURE — 25010000002 ENOXAPARIN PER 10 MG: Performed by: NURSE PRACTITIONER

## 2019-02-20 PROCEDURE — 80061 LIPID PANEL: CPT | Performed by: NURSE PRACTITIONER

## 2019-02-20 RX ORDER — CEFDINIR 300 MG/1
300 CAPSULE ORAL 2 TIMES DAILY
Qty: 10 CAPSULE | Refills: 0 | Status: SHIPPED | OUTPATIENT
Start: 2019-02-20 | End: 2019-03-04

## 2019-02-20 RX ORDER — SODIUM CHLORIDE 0.9 % (FLUSH) 0.9 %
3-10 SYRINGE (ML) INJECTION AS NEEDED
Status: DISCONTINUED | OUTPATIENT
Start: 2019-02-20 | End: 2019-02-20 | Stop reason: HOSPADM

## 2019-02-20 RX ORDER — ACETAMINOPHEN 500 MG
500 TABLET ORAL EVERY 6 HOURS PRN
Status: CANCELLED | OUTPATIENT
Start: 2019-02-20

## 2019-02-20 RX ORDER — ONDANSETRON 4 MG/1
4 TABLET, FILM COATED ORAL EVERY 6 HOURS PRN
Status: DISCONTINUED | OUTPATIENT
Start: 2019-02-20 | End: 2019-02-20 | Stop reason: HOSPADM

## 2019-02-20 RX ORDER — POTASSIUM CHLORIDE 7.45 MG/ML
10 INJECTION INTRAVENOUS
Status: DISCONTINUED | OUTPATIENT
Start: 2019-02-20 | End: 2019-02-20 | Stop reason: HOSPADM

## 2019-02-20 RX ORDER — ASPIRIN 81 MG/1
81 TABLET ORAL DAILY
Status: DISCONTINUED | OUTPATIENT
Start: 2019-02-20 | End: 2019-02-20 | Stop reason: HOSPADM

## 2019-02-20 RX ORDER — CALCIUM CARBONATE 200(500)MG
1 TABLET,CHEWABLE ORAL 2 TIMES DAILY PRN
Status: DISCONTINUED | OUTPATIENT
Start: 2019-02-20 | End: 2019-02-20 | Stop reason: HOSPADM

## 2019-02-20 RX ORDER — ACETAMINOPHEN 325 MG/1
650 TABLET ORAL EVERY 4 HOURS PRN
Status: DISCONTINUED | OUTPATIENT
Start: 2019-02-20 | End: 2019-02-20 | Stop reason: HOSPADM

## 2019-02-20 RX ORDER — ONDANSETRON 2 MG/ML
4 INJECTION INTRAMUSCULAR; INTRAVENOUS EVERY 6 HOURS PRN
Status: DISCONTINUED | OUTPATIENT
Start: 2019-02-20 | End: 2019-02-20 | Stop reason: HOSPADM

## 2019-02-20 RX ORDER — POTASSIUM CHLORIDE 20 MEQ/1
40 TABLET, EXTENDED RELEASE ORAL AS NEEDED
Status: DISCONTINUED | OUTPATIENT
Start: 2019-02-20 | End: 2019-02-20 | Stop reason: HOSPADM

## 2019-02-20 RX ORDER — ALUMINA, MAGNESIA, AND SIMETHICONE 2400; 2400; 240 MG/30ML; MG/30ML; MG/30ML
15 SUSPENSION ORAL EVERY 6 HOURS PRN
Status: DISCONTINUED | OUTPATIENT
Start: 2019-02-20 | End: 2019-02-20 | Stop reason: HOSPADM

## 2019-02-20 RX ORDER — MAGNESIUM SULFATE HEPTAHYDRATE 40 MG/ML
2 INJECTION, SOLUTION INTRAVENOUS AS NEEDED
Status: DISCONTINUED | OUTPATIENT
Start: 2019-02-20 | End: 2019-02-20 | Stop reason: HOSPADM

## 2019-02-20 RX ORDER — ONDANSETRON 4 MG/1
4 TABLET, ORALLY DISINTEGRATING ORAL EVERY 6 HOURS PRN
Status: DISCONTINUED | OUTPATIENT
Start: 2019-02-20 | End: 2019-02-20 | Stop reason: HOSPADM

## 2019-02-20 RX ORDER — ROSUVASTATIN CALCIUM 10 MG/1
5 TABLET, COATED ORAL EVERY OTHER DAY
Status: DISCONTINUED | OUTPATIENT
Start: 2019-02-20 | End: 2019-02-20 | Stop reason: HOSPADM

## 2019-02-20 RX ORDER — MECLIZINE HYDROCHLORIDE 25 MG/1
25 TABLET ORAL EVERY 8 HOURS PRN
Status: DISCONTINUED | OUTPATIENT
Start: 2019-02-20 | End: 2019-02-20 | Stop reason: HOSPADM

## 2019-02-20 RX ORDER — MAGNESIUM SULFATE HEPTAHYDRATE 40 MG/ML
4 INJECTION, SOLUTION INTRAVENOUS AS NEEDED
Status: DISCONTINUED | OUTPATIENT
Start: 2019-02-20 | End: 2019-02-20 | Stop reason: HOSPADM

## 2019-02-20 RX ORDER — MAGNESIUM SULFATE HEPTAHYDRATE 40 MG/ML
4 INJECTION, SOLUTION INTRAVENOUS ONCE
Status: COMPLETED | OUTPATIENT
Start: 2019-02-20 | End: 2019-02-20

## 2019-02-20 RX ORDER — DIAZEPAM 5 MG/1
5 TABLET ORAL 2 TIMES DAILY PRN
Status: DISCONTINUED | OUTPATIENT
Start: 2019-02-20 | End: 2019-02-20 | Stop reason: HOSPADM

## 2019-02-20 RX ORDER — PANTOPRAZOLE SODIUM 40 MG/1
40 TABLET, DELAYED RELEASE ORAL EVERY MORNING
Status: DISCONTINUED | OUTPATIENT
Start: 2019-02-20 | End: 2019-02-20 | Stop reason: HOSPADM

## 2019-02-20 RX ORDER — VALSARTAN 40 MG/1
20 TABLET ORAL 2 TIMES DAILY
Status: CANCELLED | OUTPATIENT
Start: 2019-02-20

## 2019-02-20 RX ORDER — NITROGLYCERIN 0.4 MG/1
0.4 TABLET SUBLINGUAL
Status: DISCONTINUED | OUTPATIENT
Start: 2019-02-20 | End: 2019-02-20 | Stop reason: HOSPADM

## 2019-02-20 RX ORDER — POTASSIUM CHLORIDE 1.5 G/1.77G
40 POWDER, FOR SOLUTION ORAL AS NEEDED
Status: DISCONTINUED | OUTPATIENT
Start: 2019-02-20 | End: 2019-02-20 | Stop reason: HOSPADM

## 2019-02-20 RX ORDER — SODIUM CHLORIDE 0.9 % (FLUSH) 0.9 %
3 SYRINGE (ML) INJECTION EVERY 12 HOURS SCHEDULED
Status: DISCONTINUED | OUTPATIENT
Start: 2019-02-20 | End: 2019-02-20 | Stop reason: HOSPADM

## 2019-02-20 RX ADMIN — SODIUM CHLORIDE 125 ML/HR: 9 INJECTION, SOLUTION INTRAVENOUS at 08:29

## 2019-02-20 RX ADMIN — SODIUM CHLORIDE 125 ML/HR: 9 INJECTION, SOLUTION INTRAVENOUS at 02:33

## 2019-02-20 RX ADMIN — NYSTATIN 500000 UNITS: 100000 SUSPENSION ORAL at 16:51

## 2019-02-20 RX ADMIN — ASPIRIN 81 MG: 81 TABLET ORAL at 08:29

## 2019-02-20 RX ADMIN — CEFTRIAXONE 2 G: 2 INJECTION, POWDER, FOR SOLUTION INTRAMUSCULAR; INTRAVENOUS at 05:49

## 2019-02-20 RX ADMIN — MAGNESIUM SULFATE IN WATER 4 G: 40 INJECTION, SOLUTION INTRAVENOUS at 08:36

## 2019-02-20 RX ADMIN — SODIUM CHLORIDE 1000 ML: 9 INJECTION, SOLUTION INTRAVENOUS at 00:07

## 2019-02-20 RX ADMIN — PANTOPRAZOLE SODIUM 40 MG: 40 TABLET, DELAYED RELEASE ORAL at 06:18

## 2019-02-20 RX ADMIN — ENOXAPARIN SODIUM 40 MG: 40 INJECTION SUBCUTANEOUS at 08:29

## 2019-02-20 RX ADMIN — NYSTATIN 500000 UNITS: 100000 SUSPENSION ORAL at 08:29

## 2019-02-20 RX ADMIN — ACETAMINOPHEN 650 MG: 325 TABLET, FILM COATED ORAL at 16:51

## 2019-02-20 RX ADMIN — NYSTATIN 500000 UNITS: 100000 SUSPENSION ORAL at 11:43

## 2019-02-20 RX ADMIN — ROSUVASTATIN CALCIUM 5 MG: 10 TABLET, FILM COATED ORAL at 08:29

## 2019-02-20 RX ADMIN — SODIUM CHLORIDE 2040 ML: 9 INJECTION, SOLUTION INTRAVENOUS at 00:25

## 2019-02-20 NOTE — H&P
HISTORY AND PHYSICAL        Patient Identification:  Name:  Edgar Cotto  Age:  74 y.o.  Sex:  female  :  1945  MRN:  [id number]   Visit Number:  [id number]  Primary Care Physician:  Rahat Ruby MD       Subjective     Subjective     Chief complaint:     Chief Complaint   Patient presents with   • Hypotension       History of presenting illness:     Patient is a 74 year old female that presented to the ED with complaints of low blood pressure and palpitations. Patient has a past medical history of Meniere's disease, hypertension and dyslipidemia. Chest x-ray unremarkable. Troponins remain negative. Lactic acid 2.3 on admission, now normalized. CRP 2.71 on admission. WBC 16.30 on admission, now normalized. Urinalysis reveals moderate leukocyte estrace, 6-12 WBCs, 6-12 RBCs.       ---------------------------------------------------------------------------------------------------------------------     Review Of Systems:    Constitutional: no fever, chills and night sweats. No appetite change or unexpected weight change. No fatigue.  Eyes: no eye drainage, itching or redness.  HEENT: no mouth sores, dysphagia or nose bleed.  Respiratory: no for shortness of breath, cough or production of sputum.  Cardiovascular: no chest pain, no palpitations, no orthopnea.  Gastrointestinal: no nausea, vomiting or diarrhea. No abdominal pain, hematemesis or rectal bleeding.  Genitourinary: no dysuria or polyuria.  Hematologic/lymphatic: no lymph node abnormalities, no easy bruising or easy bleeding.  Musculoskeletal: no muscle or joint pain.  Skin: No rash and no itching.  Neurological: no loss of consciousness, no seizure, no headache.  Behavioral/Psych: no depression or suicidal ideation.  Endocrine: no hot flashes.  Immunologic: negative.    ---------------------------------------------------------------------------------------------------------------------     Past Medical History    Past  Medical History:   Diagnosis Date   • Abdominal distension    • Allergic rhinitis    • Anxiety    • Arthritis    • Cough    • Dry eyes    • Dyslipidemia    • Dysuria    • Hypertension    • Meniere's disease    • Mitral and aortic valve disease    • Oral candidiasis    • Osteoarthritis    • Stomatitis    • URI, acute    • Vaginal candidiasis    • Vocal cord dysfunction        Past Surgical History    Past Surgical History:   Procedure Laterality Date   • CHOLECYSTECTOMY     • COLONOSCOPY     • DILATATION AND CURETTAGE     • HERNIA REPAIR     • SHOULDER SURGERY     • SKIN CANCER EXCISION      on Nose   • TONSILLECTOMY         Family History    Family History   Problem Relation Age of Onset   • Diabetes Mother    • Cancer Father    • Liver disease Sister    • Diabetes Sister    • Liver disease Brother    • Diabetes Brother    • Diabetes Daughter    • Cancer Other    • Liver disease Other    • Breast cancer Neg Hx        Social History    Social History     Tobacco Use   • Smoking status: Never Smoker   • Smokeless tobacco: Never Used   Substance Use Topics   • Alcohol use: No   • Drug use: No       Allergies    Z-mary [azithromycin]; Cortisone; Fish-derived products; Iodinated diagnostic agents; Lidocaine; Morphine; Niacin; Other; Sulfa antibiotics; and Verapamil  ---------------------------------------------------------------------------------------------------------------------     Home Medications:    Prior to Admission Medications     Prescriptions Last Dose Informant Patient Reported? Taking?    acetaminophen (TYLENOL) 500 MG tablet 2/19/2019 Self Yes Yes    Take 500 mg by mouth Every 6 (Six) Hours As Needed for mild pain (1-3) or moderate pain (4-6).    aspirin 81 MG tablet 2/19/2019 Self Yes Yes    Take 81 mg by mouth Daily.    calcium carbonate (TUMS) 500 MG chewable tablet 2/19/2019 Self Yes Yes    Chew 1 tablet 2 (Two) Times a Day As Needed for Indigestion or Heartburn.    diazePAM (VALIUM) 5 MG tablet  2/19/2019 Self No Yes    Take 1 tablet by mouth daily to BID PRN dizziness    Patient taking differently:  Take 5 mg by mouth 2 (Two) Times a Day As Needed for Anxiety or Sleep. Take 1 tablet by mouth daily to BID PRN dizziness    meclizine (ANTIVERT) 25 MG tablet Past Week Self No Yes    Take 1 tablet by mouth Every 8 (Eight) Hours As Needed for dizziness.    Patient taking differently:  Take 25 mg by mouth Every 8 (Eight) Hours As Needed for dizziness or Nausea.    nystatin (MYCOSTATIN) 759995 UNIT/ML suspension 2/19/2019 Self No Yes    Swish and swallow 5 mL 4 (Four) Times a Day.    psyllium (METAMUCIL) 58.6 % packet 2/19/2019 Self Yes Yes    Take 1 packet by mouth Daily.    RABEprazole (ACIPHEX) 20 MG EC tablet 2/19/2019 Self No Yes    Take 1 tablet by mouth Every Night.    rosuvastatin (CRESTOR) 5 MG tablet 2/19/2019 Self No Yes    Take 1 tablet by mouth Every Other Day.    Patient taking differently:  Take 5 mg by mouth Every Other Day.    valsartan (DIOVAN) 40 MG tablet 2/19/2019 Self No Yes    Take 0.5 tablets by mouth 2 (Two) Times a Day.        ---------------------------------------------------------------------------------------------------------------------    Objective     Objective     Hospital Scheduled Meds:    aspirin 81 mg Oral Daily   ceftriaxone 2 g Intravenous Q24H   enoxaparin 40 mg Subcutaneous Q24H   nystatin 500,000 Units Swish & Swallow 4x Daily   pantoprazole 40 mg Oral QAM   psyllium 1 packet Oral Daily   rosuvastatin 5 mg Oral Every Other Day   sodium chloride 3 mL Intravenous Q12H       sodium chloride 125 mL/hr Last Rate: 125 mL/hr (02/20/19 0829)     ---------------------------------------------------------------------------------------------------------------------   Vital Signs:  Temp:  [97.4 °F (36.3 °C)-98.4 °F (36.9 °C)] 97.7 °F (36.5 °C)  Heart Rate:  [] 98  Resp:  [18] 18  BP: ()/(52-83) 118/64  Mean Arterial Pressure (Non-Invasive) for the past 24 hrs (Last 3  readings):   Noninvasive MAP (mmHg)   02/20/19 1500 78   02/20/19 1100 82   02/20/19 0511 106     SpO2 Percentage    02/20/19 0905 02/20/19 1100 02/20/19 1500   SpO2: 94% 95% 97%     SpO2:  [93 %-98 %] 97 %  on   ;   Device (Oxygen Therapy): room air    Body mass index is 25.79 kg/m².  Wt Readings from Last 3 Encounters:   02/20/19 72.5 kg (159 lb 12.8 oz)   01/28/19 70.9 kg (156 lb 3.2 oz)   11/27/18 72.2 kg (159 lb 1.6 oz)     ---------------------------------------------------------------------------------------------------------------------     Physical Exam:    Constitutional:  Well-developed and well-nourished.  No respiratory distress.      HENT:  Head: Normocephalic and atraumatic.  Mouth:  Moist mucous membranes.    Eyes:  Conjunctivae and EOM are normal.  No scleral icterus.  Neck:  Neck supple.  No JVD present.    Cardiovascular:  Normal rate, regular rhythm and normal heart sounds with no murmur. No edema.  Pulmonary/Chest:  No respiratory distress, no wheezes, no crackles, with normal breath sounds and good air movement.  Abdominal:  Soft.  Bowel sounds are normal.  No distension and no tenderness.   Musculoskeletal:  No edema, no tenderness, and no deformity.  No swelling or redness of joints.  Neurological:  Alert and oriented to person, place, and time.  No facial droop.  No slurred speech.   Skin:  Skin is warm and dry.  No rash noted.  No pallor.   Psychiatric:  Normal mood and affect.  Behavior is normal.    ---------------------------------------------------------------------------------------------------------------------  I have personally reviewed the EKG/Telemetry strip  ---------------------------------------------------------------------------------------------------------------------   Results from last 7 days   Lab Units 02/20/19  1236 02/20/19  0547 02/19/19  6805   TROPONIN I ng/mL <0.006 <0.006 <0.006       Results from last 7 days   Lab Units 02/20/19  0547   CHOLESTEROL mg/dL 138    TRIGLYCERIDES mg/dL 75   HDL CHOL mg/dL 39*   LDL CHOL mg/dL 84     Results from last 7 days   Lab Units 02/19/19  2333   PH, ARTERIAL pH units 7.439   PO2 ART mm Hg 83.1   PCO2, ARTERIAL mm Hg 33.8*   HCO3 ART mmol/L 22.4     Results from last 7 days   Lab Units 02/20/19  0547 02/20/19  0125 02/19/19  2355   CRP mg/dL  --   --  2.71*   LACTATE mmol/L 1.9 2.3*  --    WBC 10*3/mm3 11.20  --  16.30*   HEMOGLOBIN g/dL 11.6*  --  13.8   HEMATOCRIT % 37.4  --  42.8   MCV fL 95.4*  --  94.1*   MCHC g/dL 31.0*  --  32.2*   PLATELETS 10*3/mm3 103*  --  157   INR   --   --  1.09     Results from last 7 days   Lab Units 02/20/19  0547 02/19/19  2355   SODIUM mmol/L 140 138   POTASSIUM mmol/L 3.9 3.7   MAGNESIUM mg/dL 1.7  --    CHLORIDE mmol/L 109 105   CO2 mmol/L 25.5 24.5   BUN mg/dL 8 8   CREATININE mg/dL 0.77 1.03   EGFR IF NONAFRICN AM mL/min/1.73 73 52*   CALCIUM mg/dL 7.8 9.2   GLUCOSE mg/dL 94 136*   ALBUMIN g/dL  --  3.70   BILIRUBIN mg/dL  --  0.6   ALK PHOS U/L  --  67   AST (SGOT) U/L  --  37*   ALT (SGPT) U/L  --  23   Estimated Creatinine Clearance: 61 mL/min (by C-G formula based on SCr of 0.77 mg/dL).  No results found for: AMMONIA    No results found for: HGBA1C, POCGLU  Lab Results   Component Value Date    HGBA1C 5.50 07/13/2017     Lab Results   Component Value Date    TSH 1.551 02/20/2019    FREET4 1.13 04/26/2016       Blood Culture   Date Value Ref Range Status   02/20/2019 No growth at less than 24 hours  Preliminary   02/20/2019 No growth at less than 24 hours  Preliminary                 Pain Management Panel     There is no flowsheet data to display.        I have personally reviewed the above laboratory results.   ---------------------------------------------------------------------------------------------------------------------  Imaging Results (last 7 days)     Procedure Component Value Units Date/Time    XR Chest 1 View [[id number]] Collected:  02/20/19 0815     Updated:  02/20/19 0817     Narrative:       EXAMINATION:  XR CHEST 1 VW-      CLINICAL INDICATION:     hypotension     TECHNIQUE:  XR CHEST 1 VW-       COMPARISON: 6/19/2018      FINDINGS:   The lungs remain aerated.   Heart size is stable.   No pneumothorax.   No pleural effusion.   Bony and soft tissue structures are unremarkable.            Impression:       Stable radiographic appearance of the chest.     This report was finalized on 2/20/2019 8:15 AM by Dr. Leobardo Camara MD.           I have personally reviewed the above radiology results.   ---------------------------------------------------------------------------------------------------------------------      Assessment & Plan        Assessment/Plan       ASSESSMENT:    1. Orthostatic hypotension  2. UTI    PLAN:    Patient is a 74 year old female that presented to the ED with complaints of low blood pressure and palpitations. Patient has a past medical history of Meniere's disease, hypertension and dyslipidemia. Chest x-ray unremarkable. Troponins remain negative. Lactic acid 2.3 on admission, now normalized. CRP 2.71 on admission. WBC 16.30 on admission, now normalized. Urinalysis reveals moderate leukocyte estrace, 6-12 WBCs, 6-12 RBCs.     Patient was admitted to the observation unit for further monitoring and evaluation. Continuous cardiac monitoring, IV hydration, and Rocephin 2gm IV Q24H were initiated.     Patient's findings and recommendations were discussed with patient, family and nursing staff      Code Status:   Code Status and Medical Interventions:   Ordered at: 02/20/19 0418     Level Of Support Discussed With:    Patient     Code Status:    CPR     Medical Interventions (Level of Support Prior to Arrest):    Full           Rosita Benavides, APRN  02/20/19  4:57 PM

## 2019-02-20 NOTE — DISCHARGE SUMMARY
DISCHARGE SUMMARY        Patient Identification:  Name:  Edgar Cotto  Age:  74 y.o.  Sex:  female  :  1945  MRN:  [id number]  Visit Number:  [id number]    Date of Admission: 2019  Date of Discharge:  2019    PCP: Rahat Ruby MD    Discharging Provider: JOSSELINE Barr      Discharge Diagnoses     Orthostatic hypotension, resolved  UTI, improved    Consults/Procedures     Consults:   Consults     Date and Time Order Name Status Description    2019 0416 IP General Consult (Use specialty-specific consult if known)            Procedures Performed:         History of Presenting Illness     Patient presented to the ED with complaints of low blood pressure. See admission history and physical for more details.       Hospital Course     Patient was admitted to the Observation unit for further monitoring and evaluation. Continuous cardiac monitoring, IV hydration, and Rocephin 2gm IV Q24H were initiated.     Patient significantly improved overnight. Blood pressure remained stable throughout night. Patient was deemed stable for discharge home to continue Omnicef 300mg PO BID x5 more days.      Discharge Vitals/Physical Examination     Vital Signs:  Temp:  [97.4 °F (36.3 °C)-98.4 °F (36.9 °C)] 97.7 °F (36.5 °C)  Heart Rate:  [] 98  Resp:  [18] 18  BP: ()/(52-83) 118/64  Mean Arterial Pressure (Non-Invasive) for the past 24 hrs (Last 3 readings):   Noninvasive MAP (mmHg)   19 1500 78   19 1100 82   19 0511 106     SpO2 Percentage    19 0905 19 1100 19 1500   SpO2: 94% 95% 97%     SpO2:  [93 %-98 %] 97 %  on   ;   Device (Oxygen Therapy): room air    Body mass index is 25.79 kg/m².  Wt Readings from Last 3 Encounters:   19 72.5 kg (159 lb 12.8 oz)   19 70.9 kg (156 lb 3.2 oz)   18 72.2 kg (159 lb 1.6 oz)         Physical Exam:    Constitutional:  Well-developed and well-nourished.  No respiratory distress.       HENT:  Head: Normocephalic and atraumatic.  Mouth:  Moist mucous membranes.    Eyes:  Conjunctivae and EOM are normal.  No scleral icterus.  Neck:  Neck supple.  No JVD present.    Cardiovascular:  Normal rate, regular rhythm and normal heart sounds with no murmur. No edema.  Pulmonary/Chest:  No respiratory distress, no wheezes, no crackles, with normal breath sounds and good air movement.  Abdominal:  Soft.  Bowel sounds are normal.  No distension and no tenderness.   Musculoskeletal:  No edema, no tenderness, and no deformity.  No swelling or redness of joints.  Neurological:  Alert and oriented to person, place, and time.  No facial droop.  No slurred speech.   Skin:  Skin is warm and dry.  No rash noted.  No pallor.   Psychiatric:  Normal mood and affect.  Behavior is normal.      Pertinent Laboratory/Radiology Results     Pertinent Laboratory Results:    Results from last 7 days   Lab Units 02/20/19  1236 02/20/19  0547 02/19/19  2355   TROPONIN I ng/mL <0.006 <0.006 <0.006       Results from last 7 days   Lab Units 02/20/19  0547   CHOLESTEROL mg/dL 138   TRIGLYCERIDES mg/dL 75   HDL CHOL mg/dL 39*   LDL CHOL mg/dL 84     Results from last 7 days   Lab Units 02/19/19  2333   PH, ARTERIAL pH units 7.439   PO2 ART mm Hg 83.1   PCO2, ARTERIAL mm Hg 33.8*   HCO3 ART mmol/L 22.4     Results from last 7 days   Lab Units 02/20/19  0547 02/20/19  0125 02/19/19  2355   CRP mg/dL  --   --  2.71*   LACTATE mmol/L 1.9 2.3*  --    WBC 10*3/mm3 11.20  --  16.30*   HEMOGLOBIN g/dL 11.6*  --  13.8   HEMATOCRIT % 37.4  --  42.8   MCV fL 95.4*  --  94.1*   MCHC g/dL 31.0*  --  32.2*   PLATELETS 10*3/mm3 103*  --  157   INR   --   --  1.09     Results from last 7 days   Lab Units 02/20/19  0547 02/19/19  2355   SODIUM mmol/L 140 138   POTASSIUM mmol/L 3.9 3.7   MAGNESIUM mg/dL 1.7  --    CHLORIDE mmol/L 109 105   CO2 mmol/L 25.5 24.5   BUN mg/dL 8 8   CREATININE mg/dL 0.77 1.03   EGFR IF NONAFRICN AM mL/min/1.73 73 52*    CALCIUM mg/dL 7.8 9.2   GLUCOSE mg/dL 94 136*   ALBUMIN g/dL  --  3.70   BILIRUBIN mg/dL  --  0.6   ALK PHOS U/L  --  67   AST (SGOT) U/L  --  37*   ALT (SGPT) U/L  --  23   Estimated Creatinine Clearance: 61 mL/min (by C-G formula based on SCr of 0.77 mg/dL).  No results found for: AMMONIA    No results found for: HGBA1C, POCGLU  Lab Results   Component Value Date    HGBA1C 5.50 07/13/2017     Lab Results   Component Value Date    TSH 1.551 02/20/2019    FREET4 1.13 04/26/2016       Blood Culture   Date Value Ref Range Status   02/20/2019 No growth at less than 24 hours  Preliminary   02/20/2019 No growth at less than 24 hours  Preliminary                 Pain Management Panel     There is no flowsheet data to display.          Pertinent Radiology Results:  Imaging Results (all)     Procedure Component Value Units Date/Time    XR Chest 1 View [[id number]] Collected:  02/20/19 0815     Updated:  02/20/19 0817    Narrative:       EXAMINATION:  XR CHEST 1 VW-      CLINICAL INDICATION:     hypotension     TECHNIQUE:  XR CHEST 1 VW-       COMPARISON: 6/19/2018      FINDINGS:   The lungs remain aerated.   Heart size is stable.   No pneumothorax.   No pleural effusion.   Bony and soft tissue structures are unremarkable.            Impression:       Stable radiographic appearance of the chest.     This report was finalized on 2/20/2019 8:15 AM by Dr. Leobardo Camara MD.             Test Results Pending at Discharge:   Order Current Status    Urine Culture - Urine, Urine, Clean Catch In process    Blood Culture - Blood, Arm, Left Preliminary result    Blood Culture - Blood, Arm, Left Preliminary result          Discharge Disposition/Discharge Medications/Discharge Appointments     Discharge Disposition:   Home or Self Care    Condition at Discharge:  Stable, much improved with no issues today     Code Status While Inpatient:  Code Status and Medical Interventions:   Ordered at: 02/20/19 0418     Level Of Support Discussed  With:    Patient     Code Status:    CPR     Medical Interventions (Level of Support Prior to Arrest):    Full       Discharge Medications:     Discharge Medications      New Medications      Instructions Start Date   cefdinir 300 MG capsule  Commonly known as:  OMNICEF   300 mg, Oral, 2 Times Daily         Changes to Medications      Instructions Start Date   diazePAM 5 MG tablet  Commonly known as:  VALIUM  What changed:    · how much to take  · how to take this  · when to take this  · reasons to take this  · additional instructions   Take 1 tablet by mouth daily to BID PRN dizziness      meclizine 25 MG tablet  Commonly known as:  ANTIVERT  What changed:  reasons to take this   25 mg, Oral, Every 8 Hours PRN      rosuvastatin 5 MG tablet  Commonly known as:  CRESTOR  What changed:  when to take this   5 mg, Oral, Every 48 Hours Scheduled         Continue These Medications      Instructions Start Date   acetaminophen 500 MG tablet  Commonly known as:  TYLENOL   500 mg, Oral, Every 6 Hours PRN      aspirin 81 MG tablet   81 mg, Oral, Daily      calcium carbonate 500 MG chewable tablet  Commonly known as:  TUMS   1 tablet, Oral, 2 Times Daily PRN      nystatin 401349 UNIT/ML suspension  Commonly known as:  MYCOSTATIN   500,000 Units, Swish & Swallow, 4 Times Daily      psyllium 58.6 % packet  Commonly known as:  METAMUCIL   1 packet, Oral, Daily      RABEprazole 20 MG EC tablet  Commonly known as:  ACIPHEX   20 mg, Oral, Nightly      valsartan 40 MG tablet  Commonly known as:  DIOVAN   20 mg, Oral, 2 Times Daily             Discharge Diet:  Cardiac    Discharge Activity:  As tolerated    Discharge Appointments:  Your Scheduled Appointments    May 30, 2019  9:15 AM EDT  Follow Up with Rahat Ruby MD  [facility] (--) [address] [place] [id number] [phone]   Arrive 15 minutes prior to appointment.          Follow-up Information     Rahat Ruby  MD [facility] Follow up in 1 week(s).    Specialty:  Family Medicine  Contact information:  [address] [facility] [address]  [phone]                           JOSSELINE Barr  02/20/19  5:09 PM          Please note that this discharge summary required more than 30 minutes to complete.

## 2019-02-20 NOTE — PROGRESS NOTES
Discharge Planning Assessment  [facility]     Patient Name: Edgar Cotto  MRN: [id number]  Today's Date: 2/20/2019    Admit Date: 2/19/2019    Discharge Needs Assessment     Row Name 02/20/19 1019       Living Environment    Lives With  child(eleonora), adult    Current Living Arrangements  home/apartment/condo        Discharge Plan     Row Name 02/20/19 1019       Plan    Plan  Pt admitted on 2/19/19.  SS received consult per [facility] for discharge planning.  SS spoke with pt on this date.  Pt lives at home with two daughters and plans to return home at discharge.  Pt currently does not utilize home health or DMe.  Pt's PCP is Dr. Ruby.  SS will follow and assist with discharge needs.         Demographic Summary     Row Name 02/20/19 1018       General Information    Admission Type  observation    Referral Source  nursing    Reason for Consult  discharge planning    Preferred Language  English              Salma Weaver

## 2019-02-20 NOTE — ED PROVIDER NOTES
Subjective   75 yo female with history of Meniere's disease, HTN, dyslipidemia presents with problems with regulating her blood pressure and feeling palpitations and it has been doing it for several days but tonight it worsened - and he says she feels like her Meniere's has flared again        History provided by:  Patient  Illness   Location:  Palputations and low blood pressure  Severity:  Moderate  Onset quality:  Gradual  Timing:  Intermittent  Progression:  Worsening  Chronicity:  New  Context:  Says for several days he blood pressure has been low or then normal and palpitations  Relieved by:  Nothing  Worsened by:  Nothing  Associated symptoms: no abdominal pain, no chest pain, no congestion, no cough, no diarrhea, no ear pain, no fatigue, no fever, no headaches, no loss of consciousness, no myalgias, no nausea, no rash, no rhinorrhea, no shortness of breath, no sore throat, no vomiting and no wheezing        Review of Systems   Constitutional: Negative.  Negative for fatigue and fever.   HENT: Negative.  Negative for congestion, ear pain, rhinorrhea and sore throat.    Respiratory: Negative.  Negative for cough, shortness of breath and wheezing.    Cardiovascular: Negative.  Negative for chest pain.   Gastrointestinal: Negative.  Negative for abdominal pain, diarrhea, nausea and vomiting.   Endocrine: Negative.    Genitourinary: Negative.  Negative for dysuria.   Musculoskeletal: Negative for myalgias.   Skin: Negative.  Negative for rash.   Neurological: Negative.  Negative for loss of consciousness and headaches.   Psychiatric/Behavioral: Negative.    All other systems reviewed and are negative.      Past Medical History:   Diagnosis Date   • Abdominal distension    • Allergic rhinitis    • Anxiety    • Arthritis    • Cough    • Dry eyes    • Dyslipidemia    • Dysuria    • Hypertension    • Meniere's disease    • Mitral and aortic valve disease    • Oral candidiasis    • Osteoarthritis    • Stomatitis    •  URI, acute    • Vaginal candidiasis    • Vocal cord dysfunction        Allergies   Allergen Reactions   • Z-Ponce [Azithromycin] Swelling   • Cortisone    • Fish-Derived Products      SEAFOOD   • Iodinated Diagnostic Agents    • Niacin    • Other    • Sulfa Antibiotics    • Verapamil        Past Surgical History:   Procedure Laterality Date   • CHOLECYSTECTOMY     • COLONOSCOPY     • DILATATION AND CURETTAGE     • HERNIA REPAIR     • SHOULDER SURGERY     • SKIN CANCER EXCISION      on Nose   • TONSILLECTOMY         Family History   Problem Relation Age of Onset   • Diabetes Mother    • Cancer Father    • Liver disease Sister    • Diabetes Sister    • Liver disease Brother    • Diabetes Brother    • Diabetes Daughter    • Cancer Other    • Liver disease Other    • Breast cancer Neg Hx        Social History     Socioeconomic History   • Marital status:      Spouse name: Not on file   • Number of children: Not on file   • Years of education: Not on file   • Highest education level: Not on file   Tobacco Use   • Smoking status: Never Smoker   • Smokeless tobacco: Never Used   Substance and Sexual Activity   • Alcohol use: No   • Drug use: No           Objective   Physical Exam   Constitutional: She is oriented to person, place, and time. She appears well-developed and well-nourished.   HENT:   Head: Normocephalic and atraumatic.   Right Ear: External ear normal.   Left Ear: External ear normal.   Mouth/Throat: Oropharynx is clear and moist.   Eyes: EOM are normal. Pupils are equal, round, and reactive to light.   Neck: Neck supple.   Cardiovascular: Regular rhythm. Tachycardia present.   Pulmonary/Chest: Effort normal.   Abdominal: Soft. Bowel sounds are normal.   Musculoskeletal: Normal range of motion.   Neurological: She is alert and oriented to person, place, and time.   Skin: Skin is warm. Capillary refill takes less than 2 seconds.   Psychiatric: She has a normal mood and affect. Her behavior is normal.  Judgment and thought content normal.   Nursing note and vitals reviewed.      Procedures           ED Course  ED Course as of Feb 21 1136   Wed Feb 20, 2019   0247 Discussed with Dr Yu  who wants a UA before making decision-   []   0250 Endorsed to DR Brunson at change of shift  []      ED Course User Index  [] Amy Davey DO                  [facility]      Final diagnoses:   None            Amy Davey DO  02/21/19 1137

## 2019-02-22 LAB — BACTERIA SPEC AEROBE CULT: NORMAL

## 2019-02-23 NOTE — PROGRESS NOTES
2/23/2019 at 1300. EKG follow up.    EKG from 2/20/2019 at 0529 revealing a new inferior infarct that was not present on EKG on 2/20/2019 0126. Troponins during stay prior to EKG and post EKG were <0.006. Patient denied chest pain during hospital stay. EKGs, troponins, and patient history discussed with Cardiologist on call Dr. Felder. Dr. Felder did not feel patient needed to be readmitted. Dr. Felder recommended outpatient follow up with PCP that patient currently has scheduled with repeat EKG and possible outpatient cardiology follow up.     Rosita Benavides, APRN

## 2019-02-25 LAB
BACTERIA SPEC AEROBE CULT: NORMAL
BACTERIA SPEC AEROBE CULT: NORMAL

## 2019-02-28 DIAGNOSIS — R94.31 ABNORMAL EKG: Primary | ICD-10-CM

## 2019-03-04 ENCOUNTER — OFFICE VISIT (OUTPATIENT)
Dept: FAMILY MEDICINE CLINIC | Facility: CLINIC | Age: 74
End: 2019-03-04

## 2019-03-04 VITALS
HEART RATE: 103 BPM | BODY MASS INDEX: 24.83 KG/M2 | TEMPERATURE: 97 F | WEIGHT: 154.5 LBS | SYSTOLIC BLOOD PRESSURE: 117 MMHG | HEIGHT: 66 IN | DIASTOLIC BLOOD PRESSURE: 77 MMHG

## 2019-03-04 DIAGNOSIS — M15.9 GENERALIZED OSTEOARTHRITIS: ICD-10-CM

## 2019-03-04 DIAGNOSIS — E78.2 MIXED HYPERLIPIDEMIA: ICD-10-CM

## 2019-03-04 DIAGNOSIS — H81.03 MENIERE'S DISEASE OF BOTH EARS: ICD-10-CM

## 2019-03-04 DIAGNOSIS — K12.1 STOMATITIS: ICD-10-CM

## 2019-03-04 DIAGNOSIS — I10 ESSENTIAL HYPERTENSION: Primary | ICD-10-CM

## 2019-03-04 PROCEDURE — 99214 OFFICE O/P EST MOD 30 MIN: CPT | Performed by: FAMILY MEDICINE

## 2019-03-04 RX ORDER — RABEPRAZOLE SODIUM 20 MG/1
20 TABLET, DELAYED RELEASE ORAL NIGHTLY
Qty: 90 TABLET | Refills: 1 | Status: SHIPPED | OUTPATIENT
Start: 2019-03-04 | End: 2021-07-22

## 2019-03-04 RX ORDER — DIAZEPAM 5 MG/1
TABLET ORAL
Qty: 60 TABLET | Refills: 2 | Status: CANCELLED
Start: 2019-03-04

## 2019-03-04 RX ORDER — ROSUVASTATIN CALCIUM 5 MG/1
5 TABLET, COATED ORAL
Qty: 15 TABLET | Refills: 3 | Status: SHIPPED | OUTPATIENT
Start: 2019-03-04 | End: 2019-10-28 | Stop reason: SDUPTHER

## 2019-03-04 RX ORDER — MECLIZINE HYDROCHLORIDE 25 MG/1
25 TABLET ORAL EVERY 8 HOURS PRN
Qty: 270 TABLET | Refills: 1 | Status: SHIPPED | OUTPATIENT
Start: 2019-03-04 | End: 2020-04-16 | Stop reason: SDUPTHER

## 2019-03-04 RX ORDER — VALSARTAN 40 MG/1
20 TABLET ORAL 2 TIMES DAILY
Qty: 90 TABLET | Refills: 1 | Status: SHIPPED | OUTPATIENT
Start: 2019-03-04 | End: 2019-12-19 | Stop reason: SDUPTHER

## 2019-03-04 NOTE — PROGRESS NOTES
"Subjective     Chief Complaint   Patient presents with   • Back Pain       Edgar Cotto is a 74 y.o. female.     History of Present Illness having some mechanical nontraumatic nonradiating low back pain.  Seem to originate from laying in the hospital bed a couple of weeks ago.  No rashes.  Denies nausea vomiting.  Denies lower GI symptoms.  Denies .  Denies CP S OB palpitations.  Will be following with cardiology soon regarding the reported abnormal EKG from the brief hospitalization.  Did complete course of antibiotic for presumed UTI.  Medications are as reconciled.    The following portions of the patient's history were reviewed and updated as appropriate: allergies, current medications, past family history, past social history, past surgical history and problem list.    Review of Systems see the HPI  Objective   Physical Exam   Constitutional: She is oriented to person, place, and time. She appears well-developed and well-nourished.   HENT:   Head: Normocephalic.   Right Ear: External ear normal.   Left Ear: External ear normal.   Mouth/Throat: Oropharynx is clear and moist.   Eyes: Conjunctivae and EOM are normal. Pupils are equal, round, and reactive to light.   Neck: Normal range of motion. Neck supple. No tracheal deviation present. No thyromegaly present.   Cardiovascular: Normal rate, regular rhythm, normal heart sounds and intact distal pulses.   No murmur heard.  Pulmonary/Chest: Effort normal and breath sounds normal.   Musculoskeletal: Normal range of motion. She exhibits no edema.   Neurological: She is alert and oriented to person, place, and time.   Skin: Skin is warm and dry.   Psychiatric: She has a normal mood and affect.   Vitals reviewed.    /77 (BP Location: Left arm, Patient Position: Sitting)   Pulse 103   Temp 97 °F (36.1 °C) (Oral)   Ht 167.6 cm (66\")   Wt 70.1 kg (154 lb 8 oz)   BMI 24.94 kg/m²   Assessment/Plan   Edgar was seen today for back pain.    Diagnoses and " all orders for this visit:    Essential hypertension  -     valsartan (DIOVAN) 40 MG tablet; Take 0.5 tablets by mouth 2 (Two) Times a Day.    Meniere's disease of both ears  -     meclizine (ANTIVERT) 25 MG tablet; Take 1 tablet by mouth Every 8 (Eight) Hours As Needed for dizziness.    Stomatitis  -     nystatin (MYCOSTATIN) 626013 UNIT/ML suspension; Swish and swallow 5 mL 4 (Four) Times a Day.    Generalized osteoarthritis    Mixed hyperlipidemia  -     rosuvastatin (CRESTOR) 5 MG tablet; Take 1 tablet by mouth Every Other Day.    Other orders  -     Cancel: diazePAM (VALIUM) 5 MG tablet; Take 1 tablet by mouth daily to BID PRN dizziness  -     RABEprazole (ACIPHEX) 20 MG EC tablet; Take 1 tablet by mouth Every Night.  -     Cancel: psyllium (METAMUCIL) 58.6 % packet; Take 1 packet by mouth Daily.  -     psyllium (METAMUCIL) 58.6 % packet; Take 1 packet by mouth Daily.    We reviewed the records from the brief hospitalization.  We will continue medications as reconciled ordered.  Some renewals authorized.  The psychosocial situation you are experiencing is contributing to the stress.  Keep follow-up with cardiology as scheduled.  We will ask you to recheck here in a few months or as needed.  Stay safely active.  I have nothing to add regarding the known mild DJD lumbar spine at this time.

## 2019-03-13 ENCOUNTER — OFFICE VISIT (OUTPATIENT)
Dept: CARDIOLOGY | Facility: CLINIC | Age: 74
End: 2019-03-13

## 2019-03-13 VITALS
HEART RATE: 89 BPM | WEIGHT: 150 LBS | BODY MASS INDEX: 29.45 KG/M2 | SYSTOLIC BLOOD PRESSURE: 129 MMHG | DIASTOLIC BLOOD PRESSURE: 84 MMHG | OXYGEN SATURATION: 97 % | HEIGHT: 60 IN

## 2019-03-13 DIAGNOSIS — H81.03 MENIERE'S DISEASE OF BOTH EARS: ICD-10-CM

## 2019-03-13 DIAGNOSIS — R94.31 ABNORMAL EKG: Primary | ICD-10-CM

## 2019-03-13 DIAGNOSIS — I10 ESSENTIAL HYPERTENSION: ICD-10-CM

## 2019-03-13 DIAGNOSIS — E78.2 MIXED HYPERLIPIDEMIA: ICD-10-CM

## 2019-03-13 DIAGNOSIS — E66.8 MODERATE OBESITY: ICD-10-CM

## 2019-03-13 PROBLEM — E66.9 MODERATE OBESITY: Status: ACTIVE | Noted: 2019-03-13

## 2019-03-13 PROCEDURE — 99204 OFFICE O/P NEW MOD 45 MIN: CPT | Performed by: INTERNAL MEDICINE

## 2019-03-13 PROCEDURE — 93000 ELECTROCARDIOGRAM COMPLETE: CPT | Performed by: INTERNAL MEDICINE

## 2019-03-13 NOTE — PROGRESS NOTES
Subjective   Edgar Cotto is a 74 y.o. female.    Patient Active Problem List   Diagnosis   • Atopic rhinitis   • Dizziness   • Generalized anxiety disorder   • Generalized osteoarthritis   • Hyperlipidemia   • Essential hypertension   • Auditory vertigo   • Mitral and aortic valve disease   • Stomatitis   • Seasonal allergic rhinitis   • Varicose veins of both lower extremities   • Meniere's disease of both ears   • S/P hemorrhoidectomy   • Orthostatic hypotension   • Abnormal EKG   • Moderate obesity         Chief Complaint   Patient presents with   • Establish Care   • Abnormal ECG     S/P ER visit 02-19-19     History of Present Illness     Edgar Cotto is a 73 yo female who presents to our office today for a possible abnormal EKG. Patient notes she is under a extreme amount of stress due to her  passing away 3 years ago and her daughter being sick with a terminal cirrhosis. Patient notes dizziness but has meniere's disease x 25 years. Patient denies chest pain, shortness of breath, palpitations, edema or syncope. She thinks she would be short of breath after walking 2 blocks.   She has hypertension and dyslipidemia. She denies diabetes, smoking or family history of premature CAD.         Allergies   Allergen Reactions   • Z-Ponce [Azithromycin] Swelling   • Cortisone Swelling     Face swelling     • Fish-Derived Products      SEAFOOD   • Iodinated Diagnostic Agents    • Lidocaine Other (See Comments)     Shaking, fever when she had mouth numbed at the dentist   • Morphine Mental Status Change   • Niacin Other (See Comments)     Flushing    • Other    • Sulfa Antibiotics    • Verapamil Other (See Comments)     Feel funny          Current Outpatient Medications:   •  acetaminophen (TYLENOL) 500 MG tablet, Take 500 mg by mouth Every 6 (Six) Hours As Needed for mild pain (1-3) or moderate pain (4-6)., Disp: , Rfl:   •  aspirin 81 MG tablet, Take 81 mg by mouth Daily., Disp: , Rfl:   •  calcium  "carbonate (TUMS) 500 MG chewable tablet, Chew 1 tablet 2 (Two) Times a Day As Needed for Indigestion or Heartburn., Disp: , Rfl:   •  diazePAM (VALIUM) 5 MG tablet, Take 1 tablet by mouth daily to BID PRN dizziness (Patient taking differently: Take 5 mg by mouth 2 (Two) Times a Day As Needed for Anxiety or Sleep. Take 1 tablet by mouth daily to BID PRN dizziness), Disp: 60 tablet, Rfl: 2  •  meclizine (ANTIVERT) 25 MG tablet, Take 1 tablet by mouth Every 8 (Eight) Hours As Needed for dizziness., Disp: 270 tablet, Rfl: 1  •  nystatin (MYCOSTATIN) [id number] UNIT/ML suspension, Swish and swallow 5 mL 4 (Four) Times a Day., Disp: 120 mL, Rfl: 1  •  psyllium (METAMUCIL) 58.6 % packet, Take 1 packet by mouth Daily., Disp: 162 each, Rfl: 3  •  RABEprazole (ACIPHEX) 20 MG EC tablet, Take 1 tablet by mouth Every Night., Disp: 90 tablet, Rfl: 1  •  rosuvastatin (CRESTOR) 5 MG tablet, Take 1 tablet by mouth Every Other Day., Disp: 15 tablet, Rfl: 3  •  valsartan (DIOVAN) 40 MG tablet, Take 0.5 tablets by mouth 2 (Two) Times a Day., Disp: 90 tablet, Rfl: 1    Review of Systems   HENT: Positive for hearing loss.    Musculoskeletal: Positive for back pain, joint pain, joint swelling, muscle cramps and stiffness.   Genitourinary: Positive for frequency.   Neurological: Positive for dizziness, headaches and light-headedness.   Psychiatric/Behavioral: Positive for depression. The patient is nervous/anxious.    Poor appetite   Thyroid disease       Objective      /84 (BP Location: Right arm, Patient Position: Sitting, Cuff Size: Adult)   Pulse 89   Ht 152.4 cm (60\")   Wt 68 kg (150 lb)   SpO2 97%   BMI 29.29 kg/m²     Physical Exam   Constitutional: She is oriented to person, place, and time. She appears well-developed and well-nourished. No distress.   Moderate obesity    Cardiovascular: Normal rate, regular rhythm, normal heart sounds and intact distal pulses. Exam reveals no gallop and no friction rub.   No murmur " heard.  Pulses:       Femoral pulses are 2+ on the right side, and 2+ on the left side.       Dorsalis pedis pulses are 2+ on the right side, and 2+ on the left side.   Pulmonary/Chest: Effort normal. No respiratory distress. She has no wheezes. She has rales (Coarse bi-basilar rales ). She exhibits no tenderness.   Abdominal: Soft. Bowel sounds are normal. She exhibits no distension and no mass. There is no tenderness. There is no rebound and no guarding.   Moderate obesity    Musculoskeletal: She exhibits no edema, tenderness or deformity.   Neurological: She is alert and oriented to person, place, and time. She displays normal reflexes. No cranial nerve deficit. She exhibits normal muscle tone. Coordination normal.   Skin: Skin is warm and dry. No rash noted. She is not diaphoretic. No erythema. No pallor.   Psychiatric: She has a normal mood and affect. Her behavior is normal. Judgment and thought content normal.       Lab Review:      ECG 12 Lead  Date/Time: 3/13/2019 11:36 AM  Performed by: Fantasma Gibson MD  Authorized by: Fantasma Gibson MD   Comparison: compared with previous ECG from 2/20/2019  Similar to previous ECG  Comparison to previous ECG: Very poor baseline on 2/20/19 making it suggest an old inferior MI   No change from 6/19/18 probably without old inferior MI  Rhythm: sinus rhythm  Rate: normal  BPM: 86  Conduction: conduction normal  ST Segments: ST segments normal  QRS axis: left  Other findings: poor R wave progression    Clinical impression: abnormal EKG  Comments: No definite evidence of old inferior MI             Lab Results   Component Value Date     02/20/2019    K 3.9 02/20/2019     02/20/2019    CO2 25.5 02/20/2019    BUN 8 02/20/2019    CREATININE 0.77 02/20/2019    GLUCOSE 94 02/20/2019    CALCIUM 7.8 02/20/2019    AST 37 (H) 02/19/2019    ALT 23 02/19/2019    ALKPHOS 67 02/19/2019    LABIL2 1.4 (L) 01/22/2019     Lab Results   Component Value Date    CKTOTAL 42  06/20/2018     Lab Results   Component Value Date    WBC 11.20 02/20/2019    HGB 11.6 (L) 02/20/2019    HCT 37.4 02/20/2019     (L) 02/20/2019     Lab Results   Component Value Date    INR 1.09 02/19/2019    INR 1.03 06/19/2018     Lab Results   Component Value Date    MG 1.7 02/20/2019     Lab Results   Component Value Date    TSH 1.551 02/20/2019    CHLPL 190 01/22/2019    TRIG 75 02/20/2019    HDL 39 (L) 02/20/2019    LDL 84 02/20/2019      Lab Results   Component Value Date    BNP 87.0 02/20/2019       Chemistry        Component Value Date/Time     02/20/2019 0547    K 3.9 02/20/2019 0547     02/20/2019 0547    CO2 25.5 02/20/2019 0547    BUN 8 02/20/2019 0547    CREATININE 0.77 02/20/2019 0547     01/22/2019 0000        Component Value Date/Time    CALCIUM 7.8 02/20/2019 0547    ALKPHOS 67 02/19/2019 2355    AST 37 (H) 02/19/2019 2355    ALT 23 02/19/2019 2355    BILITOT 0.6 02/19/2019 2355            I reviewed the patient's new clinical results.  I personally viewed and interpreted the patient's EKG/Telemetry data      Assessment:     Diagnosis Plan   1. Abnormal EKG     2. Essential hypertension     3. Meniere's disease of both ears x25 years     4. Moderate obesity     5. Mixed hyperlipidemia          Plan:  1. Echocardiogram to check LV wall motion because of questionable EKG abnormality       Return in about 3 weeks (around 4/3/2019).     This document signed by Fantasma Gibson MD March 13, 2019 4:00 PM     I, Fantasma Gibson MD, personally performed the services described in this documentation as scribed by the above named individual in my presence, and it is both accurate and complete.  3/13/2019  4:00 PM    Scribed for Fantasma Gibson MD by Devi Stone CMA. 3/13/2019  4:00 PM
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
## 2019-03-19 ENCOUNTER — TELEPHONE (OUTPATIENT)
Dept: CARDIOLOGY | Facility: CLINIC | Age: 74
End: 2019-03-19

## 2019-03-19 DIAGNOSIS — I10 ESSENTIAL HYPERTENSION: Primary | ICD-10-CM

## 2019-03-19 DIAGNOSIS — I08.0 MITRAL AND AORTIC VALVE DISEASE: ICD-10-CM

## 2019-03-19 NOTE — TELEPHONE ENCOUNTER
Dr. Gibson wanted an Echo on this patient but the order did not get placed can you please do this?

## 2019-04-01 ENCOUNTER — HOSPITAL ENCOUNTER (OUTPATIENT)
Dept: CARDIOLOGY | Facility: HOSPITAL | Age: 74
Discharge: HOME | End: 2019-04-01
Admitting: PHYSICIAN ASSISTANT

## 2019-04-01 DIAGNOSIS — I08.0 MITRAL AND AORTIC VALVE DISEASE: ICD-10-CM

## 2019-04-01 PROCEDURE — 93306 TTE W/DOPPLER COMPLETE: CPT

## 2019-04-01 PROCEDURE — 93306 TTE W/DOPPLER COMPLETE: CPT | Performed by: INTERNAL MEDICINE

## 2019-04-03 ENCOUNTER — OFFICE VISIT (OUTPATIENT)
Dept: CARDIOLOGY | Facility: CLINIC | Age: 74
End: 2019-04-03

## 2019-04-03 VITALS
DIASTOLIC BLOOD PRESSURE: 81 MMHG | RESPIRATION RATE: 16 BRPM | SYSTOLIC BLOOD PRESSURE: 123 MMHG | HEIGHT: 60 IN | WEIGHT: 157 LBS | BODY MASS INDEX: 30.82 KG/M2 | HEART RATE: 86 BPM

## 2019-04-03 DIAGNOSIS — R94.31 ABNORMAL EKG: ICD-10-CM

## 2019-04-03 DIAGNOSIS — E78.5 DYSLIPIDEMIA: ICD-10-CM

## 2019-04-03 DIAGNOSIS — I10 ESSENTIAL HYPERTENSION: Primary | ICD-10-CM

## 2019-04-03 LAB
BH CV ECHO MEAS - ACS: 1.7 CM
BH CV ECHO MEAS - AI DEC SLOPE: 245.3 CM/SEC^2
BH CV ECHO MEAS - AI MAX PG: 72 MMHG
BH CV ECHO MEAS - AI MAX VEL: 424.3 CM/SEC
BH CV ECHO MEAS - AI P1/2T: 506.7 MSEC
BH CV ECHO MEAS - AO ROOT AREA (BSA CORRECTED): 1.8
BH CV ECHO MEAS - AO ROOT AREA: 6.9 CM^2
BH CV ECHO MEAS - AO ROOT DIAM: 3 CM
BH CV ECHO MEAS - BSA(HAYCOCK): 1.7 M^2
BH CV ECHO MEAS - BSA: 1.7 M^2
BH CV ECHO MEAS - BZI_BMI: 29.3 KILOGRAMS/M^2
BH CV ECHO MEAS - BZI_METRIC_HEIGHT: 152.4 CM
BH CV ECHO MEAS - BZI_METRIC_WEIGHT: 68 KG
BH CV ECHO MEAS - EDV(CUBED): 33 ML
BH CV ECHO MEAS - EDV(MOD-SP4): 32 ML
BH CV ECHO MEAS - EDV(TEICH): 41.2 ML
BH CV ECHO MEAS - EF(CUBED): 84 %
BH CV ECHO MEAS - EF(MOD-SP4): 75 %
BH CV ECHO MEAS - EF(TEICH): 78.3 %
BH CV ECHO MEAS - ESV(CUBED): 5.3 ML
BH CV ECHO MEAS - ESV(MOD-SP4): 8 ML
BH CV ECHO MEAS - ESV(TEICH): 8.9 ML
BH CV ECHO MEAS - FS: 45.7 %
BH CV ECHO MEAS - IVS/LVPW: 1
BH CV ECHO MEAS - IVSD: 1.1 CM
BH CV ECHO MEAS - LA DIMENSION: 3.5 CM
BH CV ECHO MEAS - LA/AO: 1.2
BH CV ECHO MEAS - LV DIASTOLIC VOL/BSA (35-75): 19.4 ML/M^2
BH CV ECHO MEAS - LV MASS(C)D: 96.9 GRAMS
BH CV ECHO MEAS - LV MASS(C)DI: 58.7 GRAMS/M^2
BH CV ECHO MEAS - LV SYSTOLIC VOL/BSA (12-30): 4.8 ML/M^2
BH CV ECHO MEAS - LVIDD: 3.2 CM
BH CV ECHO MEAS - LVIDS: 1.7 CM
BH CV ECHO MEAS - LVLD AP4: 6.8 CM
BH CV ECHO MEAS - LVLS AP4: 5.2 CM
BH CV ECHO MEAS - LVOT AREA (M): 2.3 CM^2
BH CV ECHO MEAS - LVOT AREA: 2.3 CM^2
BH CV ECHO MEAS - LVOT DIAM: 1.7 CM
BH CV ECHO MEAS - LVPWD: 1 CM
BH CV ECHO MEAS - MV A MAX VEL: 92.3 CM/SEC
BH CV ECHO MEAS - MV E MAX VEL: 82.9 CM/SEC
BH CV ECHO MEAS - MV E/A: 0.9
BH CV ECHO MEAS - PA ACC SLOPE: 781.7 CM/SEC^2
BH CV ECHO MEAS - PA ACC TIME: 0.11 SEC
BH CV ECHO MEAS - PA PR(ACCEL): 29.9 MMHG
BH CV ECHO MEAS - RAP SYSTOLE: 10 MMHG
BH CV ECHO MEAS - RVDD: 1.5 CM
BH CV ECHO MEAS - RVSP: 29.1 MMHG
BH CV ECHO MEAS - SI(CUBED): 16.8 ML/M^2
BH CV ECHO MEAS - SI(MOD-SP4): 14.5 ML/M^2
BH CV ECHO MEAS - SI(TEICH): 19.5 ML/M^2
BH CV ECHO MEAS - SV(CUBED): 27.7 ML
BH CV ECHO MEAS - SV(MOD-SP4): 24 ML
BH CV ECHO MEAS - SV(TEICH): 32.3 ML
BH CV ECHO MEAS - TR MAX VEL: 218.5 CM/SEC
MAXIMAL PREDICTED HEART RATE: 144 BPM
STRESS TARGET HR: 122 BPM

## 2019-04-03 PROCEDURE — 99213 OFFICE O/P EST LOW 20 MIN: CPT | Performed by: PHYSICIAN ASSISTANT

## 2019-04-03 NOTE — PROGRESS NOTES
Rahat Ruby MD  Edgar Cotto  1945 04/03/2019    Patient Active Problem List   Diagnosis   • Atopic rhinitis   • Dizziness   • Generalized anxiety disorder   • Generalized osteoarthritis   • Essential hypertension   • Auditory vertigo   • Mitral and aortic valve disease   • Stomatitis   • Seasonal allergic rhinitis   • Varicose veins of both lower extremities   • Meniere's disease of both ears   • S/P hemorrhoidectomy   • Orthostatic hypotension   • Abnormal EKG   • Moderate obesity   • Dyslipidemia       Dear Rahat Ruby MD:    Subjective     History of Present Illness:    Chief Complaint   Patient presents with   • Follow-up     echo findings   • Med Management     list provided       Edgar Cotto is a pleasant 74 y.o. female with a past medical history significant for essential hypertension, dyslipidemia, and venous stasis.  Patient comes in for routine cardiology follow-up regarding recent transthoracic echocardiogram.    Transthoracic echocardiogram has not been read since it was performed on 4/1/2019 thankfully Dr. Jarrett, cardiologist was in the office today who was able to read this for me per his review found echocardiogram to have normal ejection fraction with only valvular abnormality being mild aortic regurgitation.     Patient states she is doing well symptom wise. Patient denies any chest pain, shortness of breath, palpitations, dizziness, syncope or near syncope.      Allergies   Allergen Reactions   • Z-Ponce [Azithromycin] Swelling   • Cortisone Swelling     Face swelling     • Fish-Derived Products      SEAFOOD   • Iodinated Diagnostic Agents    • Lidocaine Other (See Comments)     Shaking, fever when she had mouth numbed at the dentist   • Morphine Mental Status Change   • Niacin Other (See Comments)     Flushing    • Other    • Sulfa Antibiotics    • Verapamil Other (See Comments)     Feel funny    :      Current Outpatient Medications:   •   acetaminophen (TYLENOL) 500 MG tablet, Take 500 mg by mouth Every 6 (Six) Hours As Needed for mild pain (1-3) or moderate pain (4-6)., Disp: , Rfl:   •  aspirin 81 MG tablet, Take 81 mg by mouth Daily., Disp: , Rfl:   •  calcium carbonate (TUMS) 500 MG chewable tablet, Chew 1 tablet 2 (Two) Times a Day As Needed for Indigestion or Heartburn., Disp: , Rfl:   •  diazePAM (VALIUM) 5 MG tablet, Take 1 tablet by mouth daily to BID PRN dizziness (Patient taking differently: Take 5 mg by mouth 2 (Two) Times a Day As Needed for Anxiety or Sleep. Take 1 tablet by mouth daily to BID PRN dizziness), Disp: 60 tablet, Rfl: 2  •  meclizine (ANTIVERT) 25 MG tablet, Take 1 tablet by mouth Every 8 (Eight) Hours As Needed for dizziness., Disp: 270 tablet, Rfl: 1  •  nystatin (MYCOSTATIN) 118827 UNIT/ML suspension, Swish and swallow 5 mL 4 (Four) Times a Day., Disp: 120 mL, Rfl: 1  •  psyllium (METAMUCIL) 58.6 % packet, Take 1 packet by mouth Daily., Disp: 162 each, Rfl: 3  •  RABEprazole (ACIPHEX) 20 MG EC tablet, Take 1 tablet by mouth Every Night., Disp: 90 tablet, Rfl: 1  •  rosuvastatin (CRESTOR) 5 MG tablet, Take 1 tablet by mouth Every Other Day., Disp: 15 tablet, Rfl: 3  •  valsartan (DIOVAN) 40 MG tablet, Take 0.5 tablets by mouth 2 (Two) Times a Day., Disp: 90 tablet, Rfl: 1    The following portions of the patient's history were reviewed and updated as appropriate: allergies, current medications, past family history, past medical history, past social history, past surgical history and problem list.    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   Cardiovascular: Negative for chest pain, dyspnea on exertion, irregular heartbeat and palpitations.  "  Respiratory: Negative for cough and shortness of breath.    Hematologic/Lymphatic: Negative for bleeding problem. Does not bruise/bleed easily.   Gastrointestinal: Negative for nausea and vomiting.       Objective   Vitals:    04/03/19 0955   BP: 123/81   Pulse: 86   Resp: 16   Weight: 71.2 kg (157 lb)   Height: 152.4 cm (60\")     Body mass index is 30.66 kg/m².    Physical Exam   Constitutional: She is oriented to person, place, and time. She appears well-developed and well-nourished. No distress.   HENT:   Head: Normocephalic and atraumatic.   Cardiovascular: Normal rate, regular rhythm, normal heart sounds and intact distal pulses.   Pulmonary/Chest: Effort normal and breath sounds normal. No respiratory distress.   Musculoskeletal: She exhibits no edema.   Neurological: She is alert and oriented to person, place, and time.   Skin: She is not diaphoretic.       Lab Results   Component Value Date     02/20/2019    K 3.9 02/20/2019     02/20/2019    CO2 25.5 02/20/2019    BUN 8 02/20/2019    CREATININE 0.77 02/20/2019    GLUCOSE 94 02/20/2019    CALCIUM 7.8 02/20/2019    AST 37 (H) 02/19/2019    ALT 23 02/19/2019    ALKPHOS 67 02/19/2019    LABIL2 1.4 (L) 01/22/2019     Lab Results   Component Value Date    CKTOTAL 42 06/20/2018     Lab Results   Component Value Date    WBC 11.20 02/20/2019    HGB 11.6 (L) 02/20/2019    HCT 37.4 02/20/2019     (L) 02/20/2019     Lab Results   Component Value Date    INR 1.09 02/19/2019    INR 1.03 06/19/2018     Lab Results   Component Value Date    MG 1.7 02/20/2019     Lab Results   Component Value Date    TSH 1.551 02/20/2019    CHLPL 190 01/22/2019    TRIG 75 02/20/2019    HDL 39 (L) 02/20/2019    LDL 84 02/20/2019      Lab Results   Component Value Date    BNP 87.0 02/20/2019       During this visit the following were done:  Labs Reviewed [x]    Labs Ordered []    Radiology Reports Reviewed [x]    Radiology Ordered []    PCP Records Reviewed []  "   Referring Provider Records Reviewed []    ER Records Reviewed []    Hospital Records Reviewed []    History Obtained From Family []    Radiology Images Reviewed []    Other Reviewed []    Records Requested []       Procedures    Assessment/Plan    Diagnosis Plan   1. Essential hypertension     2. Dyslipidemia     3. Abnormal EKG              Recommendations:  1. I reviewed patient's transthoracic echocardiogram with her.  2. Continue valsartan, rosuvastatin, and aspirin.    Return in about 6 months (around 10/3/2019).    As always, I appreciate very much the opportunity to participate in the cardiovascular care of your patients.      With Best Regards,    Sae Heller PA-C

## 2019-04-14 ENCOUNTER — HOSPITAL ENCOUNTER (EMERGENCY)
Facility: HOSPITAL | Age: 74
Discharge: HOME | End: 2019-04-14
Attending: EMERGENCY MEDICINE | Admitting: EMERGENCY MEDICINE

## 2019-04-14 VITALS
HEIGHT: 60 IN | HEART RATE: 79 BPM | RESPIRATION RATE: 16 BRPM | TEMPERATURE: 98 F | WEIGHT: 155 LBS | DIASTOLIC BLOOD PRESSURE: 69 MMHG | OXYGEN SATURATION: 98 % | BODY MASS INDEX: 30.43 KG/M2 | SYSTOLIC BLOOD PRESSURE: 124 MMHG

## 2019-04-14 DIAGNOSIS — H81.01 MENIERE'S DISEASE OF RIGHT EAR: Primary | ICD-10-CM

## 2019-04-14 LAB
ALBUMIN SERPL-MCNC: 3.85 G/DL (ref 3.5–5.2)
ALBUMIN/GLOB SERPL: 1.1 G/DL
ALP SERPL-CCNC: 82 U/L (ref 39–117)
ALT SERPL W P-5'-P-CCNC: 20 U/L (ref 1–33)
ANION GAP SERPL CALCULATED.3IONS-SCNC: 12.5 MMOL/L
AST SERPL-CCNC: 44 U/L (ref 1–32)
BASOPHILS # BLD AUTO: 0.05 10*3/MM3 (ref 0–0.2)
BASOPHILS NFR BLD AUTO: 1 % (ref 0–1.5)
BILIRUB SERPL-MCNC: 0.3 MG/DL (ref 0.2–1.2)
BUN BLD-MCNC: 11 MG/DL (ref 8–23)
BUN/CREAT SERPL: 12.5 (ref 7–25)
CALCIUM SPEC-SCNC: 9.5 MG/DL (ref 8.6–10.5)
CHLORIDE SERPL-SCNC: 103 MMOL/L (ref 98–107)
CO2 SERPL-SCNC: 27.5 MMOL/L (ref 22–29)
CREAT BLD-MCNC: 0.88 MG/DL (ref 0.57–1)
DEPRECATED RDW RBC AUTO: 41.9 FL (ref 37–54)
EOSINOPHIL # BLD AUTO: 0.33 10*3/MM3 (ref 0–0.4)
EOSINOPHIL NFR BLD AUTO: 6.3 % (ref 0.3–6.2)
ERYTHROCYTE [DISTWIDTH] IN BLOOD BY AUTOMATED COUNT: 12.6 % (ref 12.3–15.4)
GFR SERPL CREATININE-BSD FRML MDRD: 62 ML/MIN/1.73
GLOBULIN UR ELPH-MCNC: 3.7 GM/DL
GLUCOSE BLD-MCNC: 118 MG/DL (ref 65–99)
HCT VFR BLD AUTO: 42 % (ref 34–46.6)
HGB BLD-MCNC: 13.4 G/DL (ref 12–15.9)
IMM GRANULOCYTES # BLD AUTO: 0.01 10*3/MM3 (ref 0–0.05)
IMM GRANULOCYTES NFR BLD AUTO: 0.2 % (ref 0–0.5)
LYMPHOCYTES # BLD AUTO: 1.4 10*3/MM3 (ref 0.7–3.1)
LYMPHOCYTES NFR BLD AUTO: 26.6 % (ref 19.6–45.3)
MCH RBC QN AUTO: 29.8 PG (ref 26.6–33)
MCHC RBC AUTO-ENTMCNC: 31.9 G/DL (ref 31.5–35.7)
MCV RBC AUTO: 93.3 FL (ref 79–97)
MONOCYTES # BLD AUTO: 0.56 10*3/MM3 (ref 0.1–0.9)
MONOCYTES NFR BLD AUTO: 10.6 % (ref 5–12)
NEUTROPHILS # BLD AUTO: 2.91 10*3/MM3 (ref 1.4–7)
NEUTROPHILS NFR BLD AUTO: 55.3 % (ref 42.7–76)
PLATELET # BLD AUTO: 172 10*3/MM3 (ref 140–450)
PMV BLD AUTO: 10.5 FL (ref 6–12)
POTASSIUM BLD-SCNC: 4.4 MMOL/L (ref 3.5–5.2)
PROT SERPL-MCNC: 7.5 G/DL (ref 6–8.5)
RBC # BLD AUTO: 4.5 10*6/MM3 (ref 3.77–5.28)
SODIUM BLD-SCNC: 143 MMOL/L (ref 136–145)
WBC NRBC COR # BLD: 5.26 10*3/MM3 (ref 3.4–10.8)

## 2019-04-14 PROCEDURE — 80053 COMPREHEN METABOLIC PANEL: CPT | Performed by: EMERGENCY MEDICINE

## 2019-04-14 PROCEDURE — 99283 EMERGENCY DEPT VISIT LOW MDM: CPT

## 2019-04-14 PROCEDURE — 85025 COMPLETE CBC W/AUTO DIFF WBC: CPT | Performed by: EMERGENCY MEDICINE

## 2019-04-14 RX ORDER — MECLIZINE HCL 12.5 MG/1
25 TABLET ORAL ONCE
Status: COMPLETED | OUTPATIENT
Start: 2019-04-14 | End: 2019-04-14

## 2019-04-14 RX ORDER — LORAZEPAM 1 MG/1
1 TABLET ORAL ONCE
Status: COMPLETED | OUTPATIENT
Start: 2019-04-14 | End: 2019-04-14

## 2019-04-14 RX ORDER — MECLIZINE HYDROCHLORIDE 25 MG/1
25 TABLET ORAL 4 TIMES DAILY PRN
Qty: 24 TABLET | Refills: 0 | Status: SHIPPED | OUTPATIENT
Start: 2019-04-14 | End: 2019-04-23

## 2019-04-14 RX ADMIN — MECLIZINE HYDROCHLORIDE 25 MG: 12.5 TABLET, FILM COATED ORAL at 17:55

## 2019-04-14 RX ADMIN — LORAZEPAM 1 MG: 1 TABLET ORAL at 17:55

## 2019-04-23 ENCOUNTER — OFFICE VISIT (OUTPATIENT)
Dept: FAMILY MEDICINE CLINIC | Facility: CLINIC | Age: 74
End: 2019-04-23

## 2019-04-23 VITALS
OXYGEN SATURATION: 95 % | SYSTOLIC BLOOD PRESSURE: 115 MMHG | HEART RATE: 107 BPM | WEIGHT: 156.2 LBS | TEMPERATURE: 97.6 F | DIASTOLIC BLOOD PRESSURE: 82 MMHG | HEIGHT: 60 IN | BODY MASS INDEX: 30.67 KG/M2

## 2019-04-23 DIAGNOSIS — F41.1 GENERALIZED ANXIETY DISORDER: ICD-10-CM

## 2019-04-23 DIAGNOSIS — I10 ESSENTIAL HYPERTENSION: Primary | ICD-10-CM

## 2019-04-23 DIAGNOSIS — H81.03 MENIERE'S DISEASE OF BOTH EARS: ICD-10-CM

## 2019-04-23 DIAGNOSIS — I95.1 ORTHOSTATIC HYPOTENSION: ICD-10-CM

## 2019-04-23 PROCEDURE — 99214 OFFICE O/P EST MOD 30 MIN: CPT | Performed by: FAMILY MEDICINE

## 2019-04-23 RX ORDER — ALBUTEROL SULFATE 90 UG/1
AEROSOL, METERED RESPIRATORY (INHALATION)
COMMUNITY
Start: 2019-04-18 | End: 2019-10-07 | Stop reason: ALTCHOICE

## 2019-04-23 RX ORDER — CEFDINIR 300 MG/1
CAPSULE ORAL
COMMUNITY
Start: 2019-04-18 | End: 2019-07-09

## 2019-04-23 RX ORDER — DOXYCYCLINE HYCLATE 100 MG/1
CAPSULE ORAL
COMMUNITY
Start: 2019-04-18 | End: 2019-07-09

## 2019-04-23 NOTE — PROGRESS NOTES
Subjective   Edgar Cotto is a 74 y.o. female.     History of Present Illness follow-up from recent ER visit x2.  One time at [facility].  One time at [facility].  Available records are reviewed.  Feels like has been a flare of many years but also some profound blood pressure fluctuations and associated shortness of breath.  As having no current chest pain shortness of breath palpitations GI .  Still with the episodic dizziness mostly positional in nature.  Medications are as reconciled.    The following portions of the patient's history were reviewed and updated as appropriate: allergies, current medications, past medical history, past social history, past surgical history and problem list.    Review of Systems see the HPI    Objective   Physical Exam   Constitutional: She is oriented to person, place, and time. She appears well-developed and well-nourished.   HENT:   Head: Normocephalic.   Mouth/Throat: Oropharynx is clear and moist.   Eyes: Conjunctivae and EOM are normal. Pupils are equal, round, and reactive to light.   Neck: Normal range of motion. Neck supple. No tracheal deviation present. No thyromegaly present.   Cardiovascular: Normal rate, regular rhythm and normal heart sounds.   No murmur heard.  Pulmonary/Chest: Effort normal and breath sounds normal.   Musculoskeletal: Normal range of motion. She exhibits no edema.   Neurological: She is alert and oriented to person, place, and time.   Skin: Skin is warm and dry.   Psychiatric: She has a normal mood and affect.   Vitals reviewed.      Assessment/Plan   Edgar was seen today for shortness of breath and follow-up.    Diagnoses and all orders for this visit:    Essential hypertension  -     Ambulatory Referral to Cardiology    Meniere's disease of both ears    Generalized anxiety disorder    Orthostatic hypotension  -     Ambulatory Referral to Cardiology    You do present some therapeutic challenges.  Blood pressure is very labile.   The Ménière's is chronic in nature going back to pre-establishing at this clinic multiple years ago.  Will make referral back to cardiology for further management regarding the blood pressure.  Counseled about possible ENT referral you declined.  I would continue medications as reconciled ordered.  Stay safely active.  Maintain heart healthy diet.  Would ask you to recheck with us as scheduled.  Again we reviewed recent available labs and imaging.  I believe anxiety of the family situation is a contributor and we discussed possible counseling but again you declined.    Patient's Body mass index is 30.51 kg/m². BMI is above normal parameters. Recommendations include: exercise counseling and nutrition counseling.

## 2019-07-09 ENCOUNTER — OFFICE VISIT (OUTPATIENT)
Dept: FAMILY MEDICINE CLINIC | Facility: CLINIC | Age: 74
End: 2019-07-09

## 2019-07-09 VITALS
HEART RATE: 98 BPM | HEIGHT: 60 IN | BODY MASS INDEX: 31.28 KG/M2 | OXYGEN SATURATION: 98 % | TEMPERATURE: 97.5 F | DIASTOLIC BLOOD PRESSURE: 82 MMHG | WEIGHT: 159.3 LBS | SYSTOLIC BLOOD PRESSURE: 160 MMHG

## 2019-07-09 DIAGNOSIS — H81.03 MENIERE'S DISEASE OF BOTH EARS: Primary | ICD-10-CM

## 2019-07-09 PROCEDURE — 99213 OFFICE O/P EST LOW 20 MIN: CPT | Performed by: FAMILY MEDICINE

## 2019-07-09 RX ORDER — DIAZEPAM 5 MG/1
TABLET ORAL
Qty: 60 TABLET | Refills: 2
Start: 2019-07-09 | End: 2019-10-03 | Stop reason: SDUPTHER

## 2019-07-09 NOTE — PROGRESS NOTES
Subjective   Edgar Cotto is a 74 y.o. female.     History of Present Illness   F/U med management  No new problems   Dizziness episodes stable  The following portions of the patient's history were reviewed and updated as appropriate: allergies, current medications, past family history, past social history, past surgical history and problem list.    Review of Systems   Constitutional: Negative.    HENT: Negative.    Eyes: Negative.    Respiratory: Negative.    Cardiovascular: Negative.    Gastrointestinal: Negative.    Endocrine: Negative.    Genitourinary: Negative.    Musculoskeletal: Negative.    Skin: Negative.    Allergic/Immunologic: Negative.    Neurological: Negative.    Hematological: Negative.    Psychiatric/Behavioral: Negative.          Objective     Physical Exam   Constitutional: She is oriented to person, place, and time. She appears well-developed and well-nourished.   HENT:   Head: Normocephalic.   Mouth/Throat: Oropharynx is clear and moist.   Eyes: Conjunctivae are normal.   Neck: Normal range of motion. Neck supple.   Cardiovascular: Normal rate, regular rhythm and normal heart sounds.   No murmur heard.  Pulmonary/Chest: Effort normal and breath sounds normal.   Neurological: She is alert and oriented to person, place, and time.   Skin: Skin is warm and dry.   Psychiatric: She has a normal mood and affect.   Vitals reviewed.      PHQ-9 Total Score:      Patient's Body mass index is 31.11 kg/m². BMI is above normal parameters. Recommendations include: exercise counseling and nutrition counseling.   (Normal BMI:  18.5-24.9, OW 25-29.9, Obesity 30 or greater)      Assessment/Plan     Edgar was seen today for essential hypertension and menieres disease in both ears.    Diagnoses and all orders for this visit:    Meniere's disease of both ears  -     diazePAM (VALIUM) 5 MG tablet; Take 1 tablet by mouth daily to BID PRN dizziness    renewed meds  Continue TLC  Counseled about illness in  family  Recheck as needed  As part of this patient's treatment plan I am prescribing controlled substances. The patient has been made aware of appropriate use of such medications, including potential risk of somnolence, limited ability to drive and/or work safely, and potential for dependence or overdose. It has also been made clear that these medications are for use by this patient only, without concomitant use of alcohol or other substances unless prescribed.  Patient has completed prescribing agreement detailing terms of continued prescribing of controlled substances, including monitoring LUIS MANUEL reports, urine drug screening, and pill counts if necessary. The patient is aware that inappropriate use will results in cessation of prescribing such medications.  LUIS MANUEL report has been reviewed.  LUIS MANUEL is updated every 3 months.  History and physical exam exhibit continued safe and appropriate use of controlled substances.                      This document has been electronically signed by Rahat Ruby MD   July 9, 2019 1:11 PM

## 2019-09-12 ENCOUNTER — OFFICE VISIT (OUTPATIENT)
Dept: FAMILY MEDICINE CLINIC | Facility: CLINIC | Age: 74
End: 2019-09-12

## 2019-09-12 VITALS
HEIGHT: 60 IN | SYSTOLIC BLOOD PRESSURE: 140 MMHG | TEMPERATURE: 97.2 F | OXYGEN SATURATION: 98 % | BODY MASS INDEX: 30.73 KG/M2 | DIASTOLIC BLOOD PRESSURE: 98 MMHG | WEIGHT: 156.5 LBS | HEART RATE: 83 BPM

## 2019-09-12 DIAGNOSIS — M15.9 GENERALIZED OSTEOARTHRITIS: Primary | ICD-10-CM

## 2019-09-12 DIAGNOSIS — M54.50 ACUTE BILATERAL LOW BACK PAIN WITHOUT SCIATICA: ICD-10-CM

## 2019-09-12 PROCEDURE — 99213 OFFICE O/P EST LOW 20 MIN: CPT | Performed by: FAMILY MEDICINE

## 2019-09-12 RX ORDER — HYDROCODONE BITARTRATE AND ACETAMINOPHEN 7.5; 325 MG/1; MG/1
1 TABLET ORAL EVERY 8 HOURS PRN
Qty: 12 TABLET | Refills: 0
Start: 2019-09-12 | End: 2019-10-03 | Stop reason: SDUPTHER

## 2019-09-12 NOTE — PROGRESS NOTES
Subjective   Edgar Cotto is a 74 y.o. female.     History of Present Illness mechanical low back pain nonradiating.  Started several weeks ago and gradually got worse.  Actually started after trying to lift daughter who had fallen.  Significant struggles in the family.  Went to a walk-in clinic.  Had x-rays done report reviewed.  Expected degenerative changes.  Denies GI  changes.  Utilizing all medications as reconciled.    The following portions of the patient's history were reviewed and updated as appropriate: allergies, current medications, past medical history, past social history, past surgical history and problem list.    Review of Systems  See history of Present Illness     Objective     Physical Exam   Constitutional: She is oriented to person, place, and time. She appears well-developed and well-nourished.   HENT:   Head: Normocephalic and atraumatic.   Neck: Normal range of motion. Neck supple.   Cardiovascular: Normal rate, regular rhythm and normal heart sounds.   Pulmonary/Chest: Effort normal and breath sounds normal.   Musculoskeletal: She exhibits no edema.   Painful bilateral SI region to palpation.  Range of motion LS-spine impaired secondary to discomfort.   Neurological: She is alert and oriented to person, place, and time.   Skin: Skin is warm and dry.   Psychiatric: She has a normal mood and affect.   Vitals reviewed.      PHQ-9 Total Score:      Patient's Body mass index is 30.56 kg/m². BMI is above normal parameters. Recommendations include: exercise counseling and nutrition counseling.   (Normal BMI:  18.5-24.9, OW 25-29.9, Obesity 30 or greater)      Assessment/Plan     Edgar was seen today for hip pain and back pain.    Diagnoses and all orders for this visit:    Generalized osteoarthritis  -     HYDROcodone-acetaminophen (NORCO) 7.5-325 MG per tablet; Take 1 tablet by mouth Every 8 (Eight) Hours As Needed for Moderate Pain .    Acute bilateral low back pain without sciatica  -      HYDROcodone-acetaminophen (NORCO) 7.5-325 MG per tablet; Take 1 tablet by mouth Every 8 (Eight) Hours As Needed for Moderate Pain .    Brief burst of pain medication available.  Discourage NSAIDs secondary to GI effects.  Discussed range of motion activities.  Stays physically active as comfortably possible.  Continue all medications otherwise as reconciled ordered.  Keep follow-up as scheduled.  Counseled regarding activities focused around the extremely ill family member.  Chris reviewed.                     This document has been electronically signed by Rahat Ruby MD   September 12, 2019 9:40 AM

## 2019-10-03 ENCOUNTER — OFFICE VISIT (OUTPATIENT)
Dept: FAMILY MEDICINE CLINIC | Facility: CLINIC | Age: 74
End: 2019-10-03

## 2019-10-03 VITALS
HEIGHT: 60 IN | DIASTOLIC BLOOD PRESSURE: 80 MMHG | OXYGEN SATURATION: 98 % | TEMPERATURE: 97.6 F | SYSTOLIC BLOOD PRESSURE: 138 MMHG | WEIGHT: 157.5 LBS | BODY MASS INDEX: 30.92 KG/M2 | HEART RATE: 93 BPM

## 2019-10-03 DIAGNOSIS — M54.50 ACUTE BILATERAL LOW BACK PAIN WITHOUT SCIATICA: ICD-10-CM

## 2019-10-03 DIAGNOSIS — M15.9 GENERALIZED OSTEOARTHRITIS: ICD-10-CM

## 2019-10-03 DIAGNOSIS — J30.2 SEASONAL ALLERGIC RHINITIS, UNSPECIFIED TRIGGER: Primary | ICD-10-CM

## 2019-10-03 DIAGNOSIS — H81.03 MENIERE'S DISEASE OF BOTH EARS: ICD-10-CM

## 2019-10-03 PROCEDURE — 99213 OFFICE O/P EST LOW 20 MIN: CPT | Performed by: FAMILY MEDICINE

## 2019-10-03 RX ORDER — HYDROCODONE BITARTRATE AND ACETAMINOPHEN 7.5; 325 MG/1; MG/1
1 TABLET ORAL EVERY 8 HOURS PRN
Qty: 12 TABLET | Refills: 0 | Status: SHIPPED | OUTPATIENT
Start: 2019-10-03 | End: 2019-10-28

## 2019-10-03 RX ORDER — DIAZEPAM 5 MG/1
TABLET ORAL
Qty: 60 TABLET | Refills: 2 | Status: SHIPPED | OUTPATIENT
Start: 2019-10-03 | End: 2019-12-18 | Stop reason: SDUPTHER

## 2019-10-03 NOTE — PROGRESS NOTES
Subjective   Edgar Cotto is a 74 y.o. female.     History of Present Illness follow-up regarding medication management.  Having a flare of upper respiratory symptoms allergic sounding in nature.  Denies cough.  Headaches are recurring somewhat.  Lots of stress in family.  Dizziness is persistent episodic.  Denies GI .    The following portions of the patient's history were reviewed and updated as appropriate: allergies, past family history, past medical history, past social history, past surgical history and problem list.    Review of Systems  See history of Present Illness     Objective     Physical Exam   Constitutional: She is oriented to person, place, and time. She appears well-developed and well-nourished.   HENT:   Head: Normocephalic and atraumatic.   Right Ear: External ear normal.   Left Ear: External ear normal.   Mouth/Throat: Oropharynx is clear and moist.   Eyes: Conjunctivae and EOM are normal. Pupils are equal, round, and reactive to light.   Neck: Normal range of motion. Neck supple.   Cardiovascular: Normal rate, regular rhythm and normal heart sounds.   Pulmonary/Chest: Effort normal and breath sounds normal.   Musculoskeletal: She exhibits no edema.   Neurological: She is alert and oriented to person, place, and time.   Skin: Skin is warm and dry.   Psychiatric: She has a normal mood and affect.   Vitals reviewed.      PHQ-9 Total Score:      Patient's Body mass index is 30.76 kg/m². BMI is above normal parameters. Recommendations include: exercise counseling and nutrition counseling.   (Normal BMI:  18.5-24.9, OW 25-29.9, Obesity 30 or greater)      Assessment/Plan     Edgar was seen today for anxiety and uri.    Diagnoses and all orders for this visit:    Seasonal allergic rhinitis, unspecified trigger    Meniere's disease of both ears  -     diazePAM (VALIUM) 5 MG tablet; Take 1 tablet by mouth daily to BID PRN dizziness    Generalized osteoarthritis  -     HYDROcodone-acetaminophen  (NORCO) 7.5-325 MG per tablet; Take 1 tablet by mouth Every 8 (Eight) Hours As Needed for Moderate Pain .    Acute bilateral low back pain without sciatica  -     HYDROcodone-acetaminophen (NORCO) 7.5-325 MG per tablet; Take 1 tablet by mouth Every 8 (Eight) Hours As Needed for Moderate Pain .    Meds as directed.  No antibiotics indicated.  Recheck as scheduled.  As part of this patient's treatment plan I am prescribing controlled substances. The patient has been made aware of appropriate use of such medications, including potential risk of somnolence, limited ability to drive and/or work safely, and potential for dependence or overdose. It has also been made clear that these medications are for use by this patient only, without concomitant use of alcohol or other substances unless prescribed.  Patient has completed prescribing agreement detailing terms of continued prescribing of controlled substances, including monitoring LUIS MANUEL reports, urine drug screening, and pill counts if necessary. The patient is aware that inappropriate use will results in cessation of prescribing such medications.  LUIS MANUEL report has been reviewed.  LUIS MANUEL is updated every 3 months.  History and physical exam exhibit continued safe and appropriate use of controlled substances.                        This document has been electronically signed by Rahat Ruby MD   October 3, 2019 2:12 PM

## 2019-10-07 ENCOUNTER — OFFICE VISIT (OUTPATIENT)
Dept: CARDIOLOGY | Facility: CLINIC | Age: 74
End: 2019-10-07

## 2019-10-07 VITALS
HEART RATE: 99 BPM | SYSTOLIC BLOOD PRESSURE: 146 MMHG | BODY MASS INDEX: 30.9 KG/M2 | HEIGHT: 60 IN | WEIGHT: 157.4 LBS | OXYGEN SATURATION: 95 % | DIASTOLIC BLOOD PRESSURE: 93 MMHG

## 2019-10-07 DIAGNOSIS — E78.5 DYSLIPIDEMIA: ICD-10-CM

## 2019-10-07 DIAGNOSIS — I10 ESSENTIAL HYPERTENSION: Primary | ICD-10-CM

## 2019-10-07 PROCEDURE — 93000 ELECTROCARDIOGRAM COMPLETE: CPT | Performed by: INTERNAL MEDICINE

## 2019-10-07 PROCEDURE — 99212 OFFICE O/P EST SF 10 MIN: CPT | Performed by: INTERNAL MEDICINE

## 2019-10-07 NOTE — PROGRESS NOTES
Rahat Ruby MD  Edgar Cotto  1945  10/07/2019    Patient Active Problem List   Diagnosis   • Atopic rhinitis   • Dizziness   • Generalized anxiety disorder   • Generalized osteoarthritis   • Essential hypertension   • Auditory vertigo   • Mitral and aortic valve disease   • Stomatitis   • Seasonal allergic rhinitis   • Varicose veins of both lower extremities   • Meniere's disease of both ears   • S/P hemorrhoidectomy   • Orthostatic hypotension   • Abnormal EKG   • Moderate obesity   • Dyslipidemia       Dear Rahat Ruby MD:    Subjective     Edgar Cotto is a 74 y.o. female with the problems as listed above, presents    Chief Complaint   Patient presents with   • Follow-up     6 mths f/up   • Med Management   • Hypertension       History of Present Illness: Ms. Cotto is a pleasant 74-year-old  female with no previous history of known coronary artery disease or significant structural heart disease, is here for regular follow-up.  She denies any complaints of chest pains.  She has some occasional shortness of breath related to sinus problems.  She says her blood pressure at home is much better.  She indicates that sometimes she tends to have episodes of hypotension.    Allergies   Allergen Reactions   • Z-Ponce [Azithromycin] Swelling   • Cortisone Swelling     Face swelling     • Fish-Derived Products      SEAFOOD   • Iodinated Diagnostic Agents    • Lidocaine Other (See Comments)     Shaking, fever when she had mouth numbed at the dentist   • Morphine Mental Status Change   • Niacin Other (See Comments)     Flushing    • Other    • Sulfa Antibiotics    • Verapamil Other (See Comments)     Feel funny    :      Current Outpatient Medications:   •  acetaminophen (TYLENOL) 500 MG tablet, Take 500 mg by mouth Every 6 (Six) Hours As Needed for mild pain (1-3) or moderate pain (4-6)., Disp: , Rfl:   •  aspirin 81 MG tablet, Take 81 mg by mouth Daily., Disp: , Rfl:    •  calcium carbonate (TUMS) 500 MG chewable tablet, Chew 1 tablet 2 (Two) Times a Day As Needed for Indigestion or Heartburn., Disp: , Rfl:   •  diazePAM (VALIUM) 5 MG tablet, Take 1 tablet by mouth daily to BID PRN dizziness, Disp: 60 tablet, Rfl: 2  •  HYDROcodone-acetaminophen (NORCO) 7.5-325 MG per tablet, Take 1 tablet by mouth Every 8 (Eight) Hours As Needed for Moderate Pain ., Disp: 12 tablet, Rfl: 0  •  meclizine (ANTIVERT) 25 MG tablet, Take 1 tablet by mouth Every 8 (Eight) Hours As Needed for dizziness., Disp: 270 tablet, Rfl: 1  •  nystatin (MYCOSTATIN) 489518 UNIT/ML suspension, Swish and swallow 5 mL 4 (Four) Times a Day., Disp: 120 mL, Rfl: 1  •  psyllium (METAMUCIL) 58.6 % packet, Take 1 packet by mouth Daily., Disp: 162 each, Rfl: 3  •  RABEprazole (ACIPHEX) 20 MG EC tablet, Take 1 tablet by mouth Every Night., Disp: 90 tablet, Rfl: 1  •  rosuvastatin (CRESTOR) 5 MG tablet, Take 1 tablet by mouth Every Other Day., Disp: 15 tablet, Rfl: 3  •  valsartan (DIOVAN) 40 MG tablet, Take 0.5 tablets by mouth 2 (Two) Times a Day., Disp: 90 tablet, Rfl: 1      The following portions of the patient's history were reviewed and updated as appropriate: allergies, current medications, past family history, past medical history, past social history, past surgical history and problem list.    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   Respiratory: Negative for shortness of breath.    Neurological: Negative for dizziness.       Objective   Vitals:    10/07/19 0851   BP: 146/93   BP Location: Left arm   Patient Position: Sitting   Cuff Size: Adult   Pulse: 99   SpO2: 95%   Weight: 71.4 kg (157 lb 6.4 oz)   Height: 152.4 cm  "(60\")     Body mass index is 30.74 kg/m².        Physical Exam   Constitutional: She is oriented to person, place, and time. She appears well-developed and well-nourished.   HENT:   Mouth/Throat: Oropharynx is clear and moist.   Eyes: EOM are normal. Pupils are equal, round, and reactive to light.   Neck: Neck supple. No JVD present. No tracheal deviation present. No thyromegaly present.   Cardiovascular: Normal rate, regular rhythm, S1 normal and S2 normal. Exam reveals no gallop and no friction rub.   No murmur heard.  Pulmonary/Chest: Effort normal and breath sounds normal.   Abdominal: Soft. Bowel sounds are normal. She exhibits no mass. There is no tenderness.   Musculoskeletal: Normal range of motion. She exhibits no edema.   Lymphadenopathy:     She has no cervical adenopathy.   Neurological: She is alert and oriented to person, place, and time.   Skin: Skin is warm and dry. No rash noted.   Psychiatric: She has a normal mood and affect.       Lab Results   Component Value Date     04/14/2019    K 4.4 04/14/2019     04/14/2019    CO2 27.5 04/14/2019    BUN 11 04/14/2019    CREATININE 0.88 04/14/2019    GLUCOSE 118 (H) 04/14/2019    CALCIUM 9.5 04/14/2019    AST 44 (H) 04/14/2019    ALT 20 04/14/2019    ALKPHOS 82 04/14/2019    LABIL2 1.4 (L) 01/22/2019     Lab Results   Component Value Date    CKTOTAL 42 06/20/2018     Lab Results   Component Value Date    WBC 5.26 04/14/2019    HGB 13.4 04/14/2019    HCT 42.0 04/14/2019     04/14/2019     Lab Results   Component Value Date    INR 1.09 02/19/2019    INR 1.03 06/19/2018     Lab Results   Component Value Date    MG 1.7 02/20/2019     Lab Results   Component Value Date    TSH 1.551 02/20/2019    CHLPL 190 01/22/2019    TRIG 75 02/20/2019    HDL 39 (L) 02/20/2019    LDL 84 02/20/2019      Lab Results   Component Value Date    BNP 87.0 02/20/2019             ECG 12 Lead  Date/Time: 10/7/2019 8:47 AM  Performed by: Lazarus Gilmore MD  Authorized " by: Lazarus Gilmore MD   Rhythm: sinus rhythm  Comments: Low voltage QRS complex in the precordial leads.          Assessment/Plan    Diagnosis Plan   1. Essential hypertension     2. Dyslipidemia            Recommendations:  I have asked her to keep a check on the blood pressure at home and follow-up with Dr. Ruby.  I have discussed about salt restricted diet and the foods to avoid.      Return in about 1 year (around 10/7/2020).    As always, Rahat Ruby MD  I appreciate very much the opportunity to participate in the cardiovascular care of your patients. Please do not hesitate to call me with any questions with regards to Edgar Cotto evaluation and management.           With Best Regards,        Lazarus Gilmore MD, [facility]    Please note that portions of this note were completed with a voice recognition program.

## 2019-10-21 DIAGNOSIS — M15.9 GENERALIZED OSTEOARTHRITIS: ICD-10-CM

## 2019-10-21 DIAGNOSIS — M54.50 ACUTE BILATERAL LOW BACK PAIN WITHOUT SCIATICA: ICD-10-CM

## 2019-10-21 RX ORDER — HYDROCODONE BITARTRATE AND ACETAMINOPHEN 7.5; 325 MG/1; MG/1
1 TABLET ORAL EVERY 8 HOURS PRN
Qty: 12 TABLET | Refills: 0 | OUTPATIENT
Start: 2019-10-21

## 2019-10-21 NOTE — TELEPHONE ENCOUNTER
Patient called in left message requesting Hydrocodone refill   Patient did not leave pharmacy information on message     Hydrocodone 10-3-19 refill 0   Last seen: 10-3-19

## 2019-10-28 ENCOUNTER — OFFICE VISIT (OUTPATIENT)
Dept: FAMILY MEDICINE CLINIC | Facility: CLINIC | Age: 74
End: 2019-10-28

## 2019-10-28 VITALS
SYSTOLIC BLOOD PRESSURE: 123 MMHG | HEART RATE: 84 BPM | BODY MASS INDEX: 31.04 KG/M2 | WEIGHT: 158.1 LBS | DIASTOLIC BLOOD PRESSURE: 79 MMHG | TEMPERATURE: 97.4 F | HEIGHT: 60 IN

## 2019-10-28 DIAGNOSIS — H81.03 MENIERE'S DISEASE OF BOTH EARS: Primary | ICD-10-CM

## 2019-10-28 DIAGNOSIS — R42 DIZZINESS: ICD-10-CM

## 2019-10-28 DIAGNOSIS — E78.2 MIXED HYPERLIPIDEMIA: ICD-10-CM

## 2019-10-28 PROCEDURE — 99213 OFFICE O/P EST LOW 20 MIN: CPT | Performed by: FAMILY MEDICINE

## 2019-10-28 RX ORDER — ROSUVASTATIN CALCIUM 5 MG/1
5 TABLET, COATED ORAL
Qty: 15 TABLET | Refills: 3 | Status: SHIPPED | OUTPATIENT
Start: 2019-10-28 | End: 2020-04-16 | Stop reason: SDUPTHER
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
## 2019-12-18 ENCOUNTER — OFFICE VISIT (OUTPATIENT)
Dept: FAMILY MEDICINE CLINIC | Facility: CLINIC | Age: 74
End: 2019-12-18

## 2019-12-18 VITALS
TEMPERATURE: 98.9 F | DIASTOLIC BLOOD PRESSURE: 82 MMHG | BODY MASS INDEX: 31.88 KG/M2 | WEIGHT: 162.4 LBS | SYSTOLIC BLOOD PRESSURE: 140 MMHG | HEIGHT: 60 IN | HEART RATE: 90 BPM | OXYGEN SATURATION: 98 %

## 2019-12-18 DIAGNOSIS — H81.03 MENIERE'S DISEASE OF BOTH EARS: ICD-10-CM

## 2019-12-18 DIAGNOSIS — H92.03 OTALGIA OF BOTH EARS: Primary | ICD-10-CM

## 2019-12-18 PROBLEM — I95.1 ORTHOSTATIC HYPOTENSION: Status: RESOLVED | Noted: 2019-02-20 | Resolved: 2019-12-18

## 2019-12-18 PROBLEM — E04.1 THYROID CYST: Status: ACTIVE | Noted: 2019-12-18

## 2019-12-18 PROCEDURE — 99213 OFFICE O/P EST LOW 20 MIN: CPT | Performed by: FAMILY MEDICINE

## 2019-12-18 RX ORDER — DIAZEPAM 5 MG/1
TABLET ORAL
Qty: 60 TABLET | Refills: 3 | Status: SHIPPED | OUTPATIENT
Start: 2020-01-03 | End: 2020-04-16 | Stop reason: SDUPTHER

## 2019-12-18 NOTE — PROGRESS NOTES
Subjective   Edgar Cotto is a 74 y.o. female.     History of Present Illness bilateral ear pain.  Desires furl to ENT.  Medication management.  Denies chest CDV GI  changes.    The following portions of the patient's history were reviewed and updated as appropriate: current medications, past family history, past medical history, past social history, past surgical history and problem list.    Review of Systems  See history of Present Illness     Objective     Physical Exam   Constitutional: She is oriented to person, place, and time. She appears well-developed and well-nourished.   HENT:   Head: Normocephalic and atraumatic.   Right Ear: External ear normal.   Left Ear: External ear normal.   Mouth/Throat: Oropharynx is clear and moist.   Neck: Normal range of motion. Neck supple. No thyromegaly present.   Cardiovascular: Normal rate, regular rhythm and normal heart sounds.   Pulmonary/Chest: Effort normal and breath sounds normal.   Neurological: She is alert and oriented to person, place, and time.   Psychiatric: She has a normal mood and affect.   Vitals reviewed.      PHQ-9 Total Score:      Patient's Body mass index is 31.72 kg/m². BMI is above normal parameters. Recommendations include: exercise counseling and nutrition counseling.   (Normal BMI:  18.5-24.9, OW 25-29.9, Obesity 30 or greater)      Assessment/Plan     Edgar was seen today for earache and neck pain.    Diagnoses and all orders for this visit:    Otalgia of both ears  -     Ambulatory Referral to ENT (Otolaryngology)    Meniere's disease of both ears  -     diazePAM (VALIUM) 5 MG tablet; Take 1 tablet by mouth daily to BID PRN dizziness  -     Ambulatory Referral to ENT (Otolaryngology)      Renewed medication.  On exam I see no abnormalities.  Will refer to ENT.  Follow-up in about 4 months or so routinely otherwise as needed.  You declined flu vaccine.    As part of this patient's treatment plan I am prescribing controlled substances.  The patient has been made aware of appropriate use of such medications, including potential risk of somnolence, limited ability to drive and/or work safely, and potential for dependence or overdose. It has also been made clear that these medications are for use by this patient only, without concomitant use of alcohol or other substances unless prescribed.  Patient has completed prescribing agreement detailing terms of continued prescribing of controlled substances, including monitoring LUIS MANUEL reports, urine drug screening, and pill counts if necessary. The patient is aware that inappropriate use will results in cessation of prescribing such medications.  LUIS MANUEL report has been reviewed.  LUIS MANUEL is updated every 3 months.  History and physical exam exhibit continued safe and appropriate use of controlled substances.                      This document has been electronically signed by Rahat Ruby MD   December 18, 2019 10:02 AM

## 2019-12-19 DIAGNOSIS — I10 ESSENTIAL HYPERTENSION: ICD-10-CM

## 2019-12-19 RX ORDER — VALSARTAN 40 MG/1
20 TABLET ORAL 2 TIMES DAILY
Qty: 90 TABLET | Refills: 1 | Status: SHIPPED | OUTPATIENT
Start: 2019-12-19 | End: 2021-07-22 | Stop reason: SDUPTHER

## 2020-01-06 DIAGNOSIS — H81.03 MENIERE'S DISEASE OF BOTH EARS: ICD-10-CM

## 2020-01-06 RX ORDER — DIAZEPAM 5 MG/1
TABLET ORAL
Qty: 60 TABLET | Refills: 3 | Status: CANCELLED | OUTPATIENT
Start: 2020-01-06

## 2020-01-06 NOTE — TELEPHONE ENCOUNTER
PATIENT LEFT MESSAGE ON VOICEMAIL REQUESTING REFILL SENT TO [facility].    VALIUM 1-3-20    [facility] HAS SCRIPT ON FILE.

## 2020-02-03 ENCOUNTER — OFFICE VISIT (OUTPATIENT)
Dept: FAMILY MEDICINE CLINIC | Facility: CLINIC | Age: 75
End: 2020-02-03

## 2020-02-03 VITALS
BODY MASS INDEX: 30.15 KG/M2 | OXYGEN SATURATION: 98 % | WEIGHT: 153.6 LBS | TEMPERATURE: 97.6 F | SYSTOLIC BLOOD PRESSURE: 124 MMHG | HEART RATE: 91 BPM | DIASTOLIC BLOOD PRESSURE: 88 MMHG | HEIGHT: 60 IN

## 2020-02-03 DIAGNOSIS — E04.1 THYROID CYST: ICD-10-CM

## 2020-02-03 DIAGNOSIS — R13.10 ODYNOPHAGIA: Primary | ICD-10-CM

## 2020-02-03 PROCEDURE — 84443 ASSAY THYROID STIM HORMONE: CPT | Performed by: FAMILY MEDICINE

## 2020-02-03 PROCEDURE — 85652 RBC SED RATE AUTOMATED: CPT | Performed by: FAMILY MEDICINE

## 2020-02-03 PROCEDURE — 84439 ASSAY OF FREE THYROXINE: CPT | Performed by: FAMILY MEDICINE

## 2020-02-03 PROCEDURE — 99213 OFFICE O/P EST LOW 20 MIN: CPT | Performed by: FAMILY MEDICINE

## 2020-02-03 PROCEDURE — 36415 COLL VENOUS BLD VENIPUNCTURE: CPT | Performed by: FAMILY MEDICINE

## 2020-02-03 NOTE — PROGRESS NOTES
"Subjective   Edgar Cotto is a 75 y.o. female.     Chief Complaint   Patient presents with   • Thyroid Problem       History of Present Illness     The following portions of the patient's history were reviewed and updated as appropriate: allergies, current medications, past family history, past medical history, past social history, past surgical history and problem list.    Review of Systems    Objective     /88 (BP Location: Left arm, Patient Position: Sitting, Cuff Size: Adult)   Pulse 91   Temp 97.6 °F (36.4 °C) (Oral)   Ht 152.4 cm (60\")   Wt 69.7 kg (153 lb 9.6 oz)   SpO2 98%   BMI 30.00 kg/m²     Physical Exam    Assessment/Plan     Problem List Items Addressed This Visit     None          {PHQ-9 Score:47668:::1}    Patient's Body mass index is 30 kg/m². BMI is {BMI range:13842}.   (Normal BMI:  18.5-24.9, OW 25-29.9, Obesity 30 or greater)    Edgar Cotto  reports that she has never smoked. She has never used smokeless tobacco.. I have educated her on the risk of diseases from using tobacco products such as {Tobacco Cessation Diseases:15427::\"cancer\",\"COPD\",\"heart diease\"}.     I advised her to quit and she is {Willing/Not Willing to Quit Tobacco Products:63261}.    I spent {Time Spent Tobacco :22980} minutes counseling the patient.           Understands disease processes and need for medications.  Understands reasons for urgent and emergent care.  Patient (& family) verbalized agreement for treatment plan.   Emotional support and active listening provided.  Patient provided time to verbalize feelings.               This document has been electronically signed by JOSSELINE Wharton   February 3, 2020 1:17 PM  "

## 2020-02-03 NOTE — PROGRESS NOTES
Subjective   Edgar Cotto is a 75 y.o. female.     History of Present Illness concerned about thyroid.  Longstanding episodic anterior neck discomfort.  Historically does have known thyroid cyst from ultrasound prior.  Cyst of isthmus that actually had gotten smaller.  Will be visiting ENT soon regarding the chronic otalgia Ménière's.  Denies GI.  Denies fever chills tachycardia tremulousness palpitations weight changes.    The following portions of the patient's history were reviewed and updated as appropriate: allergies, current medications, past family history, past social history, past surgical history and problem list.    Review of Systems  See history of Present Illness     Objective     Physical Exam   Constitutional: She is oriented to person, place, and time. She appears well-developed and well-nourished.   HENT:   Head: Normocephalic and atraumatic.   Mouth/Throat: Oropharynx is clear and moist.   Neck: Normal range of motion. Neck supple. No thyromegaly present.   Tender anterior neck   Cardiovascular: Normal rate, regular rhythm and normal heart sounds.   Pulmonary/Chest: Effort normal and breath sounds normal.   Neurological: She is alert and oriented to person, place, and time.   Psychiatric: She has a normal mood and affect.   Vitals reviewed.      PHQ-9 Total Score:      Patient's Body mass index is 30 kg/m². BMI is above normal parameters. Recommendations include: exercise counseling and nutrition counseling.   (Normal BMI:  18.5-24.9, OW 25-29.9, Obesity 30 or greater)      Assessment/Plan     Edgar was seen today for thyroid problem.    Diagnoses and all orders for this visit:    Odynophagia  -     TSH  -     T4, Free  -     Sedimentation Rate  -     US thyroid; Future    Thyroid cyst  -     TSH  -     T4, Free  -     Sedimentation Rate  -     US thyroid; Future    Will ask for ultrasound of thyroid.  Check lab including inflammatory marker.  Will notify of results.  No med changes.  Keep  follow-up with ENT and schedule visit here.  See us sooner if problems.                     This document has been electronically signed by Rahat Ruby MD   February 3, 2020 1:22 PM

## 2020-02-04 LAB
ERYTHROCYTE [SEDIMENTATION RATE] IN BLOOD: 5 MM/HR (ref 0–30)
T4 FREE SERPL-MCNC: 1.11 NG/DL (ref 0.93–1.7)
TSH SERPL DL<=0.05 MIU/L-ACNC: 0.91 UIU/ML (ref 0.27–4.2)

## 2020-02-07 ENCOUNTER — HOSPITAL ENCOUNTER (OUTPATIENT)
Dept: ULTRASOUND IMAGING | Facility: HOSPITAL | Age: 75
Discharge: HOME | End: 2020-02-07
Admitting: FAMILY MEDICINE

## 2020-02-07 DIAGNOSIS — R13.10 ODYNOPHAGIA: ICD-10-CM

## 2020-02-07 DIAGNOSIS — E04.1 THYROID CYST: ICD-10-CM

## 2020-02-07 PROCEDURE — 76536 US EXAM OF HEAD AND NECK: CPT | Performed by: RADIOLOGY

## 2020-02-07 PROCEDURE — 76536 US EXAM OF HEAD AND NECK: CPT

## 2020-03-16 ENCOUNTER — TELEPHONE (OUTPATIENT)
Dept: FAMILY MEDICINE CLINIC | Facility: CLINIC | Age: 75
End: 2020-03-16

## 2020-03-16 RX ORDER — AMOXICILLIN AND CLAVULANATE POTASSIUM 875; 125 MG/1; MG/1
1 TABLET, FILM COATED ORAL 2 TIMES DAILY
Qty: 20 TABLET | Refills: 0 | Status: SHIPPED | OUTPATIENT
Start: 2020-03-16 | End: 2020-03-26

## 2020-03-16 NOTE — TELEPHONE ENCOUNTER
Patients daughter called in to request an antibiotic be called in on behalf of mother due to congestion.  Please return call and advise.

## 2020-04-16 ENCOUNTER — OFFICE VISIT (OUTPATIENT)
Dept: FAMILY MEDICINE CLINIC | Facility: CLINIC | Age: 75
End: 2020-04-16

## 2020-04-16 DIAGNOSIS — H81.03 MENIERE'S DISEASE OF BOTH EARS: ICD-10-CM

## 2020-04-16 DIAGNOSIS — K12.1 STOMATITIS: ICD-10-CM

## 2020-04-16 DIAGNOSIS — I10 ESSENTIAL HYPERTENSION: Primary | ICD-10-CM

## 2020-04-16 DIAGNOSIS — E78.2 MIXED HYPERLIPIDEMIA: ICD-10-CM

## 2020-04-16 PROCEDURE — 99441 PR PHYS/QHP TELEPHONE EVALUATION 5-10 MIN: CPT | Performed by: FAMILY MEDICINE

## 2020-04-16 RX ORDER — MECLIZINE HYDROCHLORIDE 25 MG/1
25 TABLET ORAL EVERY 8 HOURS PRN
Qty: 270 TABLET | Refills: 1 | Status: SHIPPED | OUTPATIENT
Start: 2020-04-16 | End: 2021-12-21 | Stop reason: SDUPTHER

## 2020-04-16 RX ORDER — ROSUVASTATIN CALCIUM 5 MG/1
5 TABLET, COATED ORAL
Qty: 15 TABLET | Refills: 3 | Status: SHIPPED | OUTPATIENT
Start: 2020-04-16 | End: 2020-10-20

## 2020-04-16 RX ORDER — DIAZEPAM 5 MG/1
TABLET ORAL
Qty: 60 TABLET | Refills: 3 | Status: SHIPPED | OUTPATIENT
Start: 2020-04-16 | End: 2020-10-20

## 2020-04-16 NOTE — PROGRESS NOTES
Subjective   Edgar Cotto is a 75 y.o. female.     History of Present Illness follow-up visit telephone regarding hypertension chronic dizziness.  Has been meeting the challenges of the pandemic quarantine but unfortunately a daughter passed away most recently.  Had been under hospice care.  Presently having some slight increase in the chronic dizziness.  No change in chronic pain characteristics.  Denies shortness of breath chest pain palpitations nausea vomiting diarrhea.  Denies urinary symptoms.  Denies headache.  Denies current swallowing difficulties.  Did see ENT regarding the laryngitis hoarseness negative evaluation.  Records reviewed.  Did have unremarkable totally negative thyroid ultrasound 2 months ago.  Utilizing medications as reconciled.  Desires renewal on nystatin solution oral.  Trying to stay active in home walking on daily basis.  Feels like he has lost a little weight.  Blood pressure readings at home have been very acceptable normal.    The following portions of the patient's history were reviewed and updated as appropriate: current medications, past family history, past medical history, past social history, past surgical history and problem list.    Review of Systems  See history of Present Illness     Objective     Physical Exam    PHQ-9 Total Score:      Patient's There is no height or weight on file to calculate BMI. BMI is above normal parameters. Recommendations include: exercise counseling and nutrition counseling.   (Normal BMI:  18.5-24.9, OW 25-29.9, Obesity 30 or greater)      Assessment/Plan     Edgar was seen today for hypertension.    Diagnoses and all orders for this visit:    Essential hypertension    Mixed hyperlipidemia  -     rosuvastatin (CRESTOR) 5 MG tablet; Take 1 tablet by mouth Every Other Day.    Meniere's disease of both ears  -     meclizine (ANTIVERT) 25 MG tablet; Take 1 tablet by mouth Every 8 (Eight) Hours As Needed for Dizziness.  -     diazePAM  (VALIUM) 5 MG tablet; Take 1 tablet by mouth daily to BID PRN dizziness    Stomatitis  -     nystatin (MYCOSTATIN) 932397 UNIT/ML suspension; Swish and swallow 5 mL 4 (Four) Times a Day.    Overall for what you currently are experiencing and have recently experienced it sounds like you are doing quite well.  Family friends supportive.  I encourage you to utilize medications as reconciled.  Try to increase daily activity with walking.  Maintain reasonable heart healthy diet.  Some medications are renewed.  We will ask you to follow-up in roughly 4 months hopefully will be in person and can monitor metabolically then.  Recheck or contact us in the interim if need be.  Maintain good social distancing and hygiene.    As part of this patient's treatment plan I am prescribing controlled substances. The patient has been made aware of appropriate use of such medications, including potential risk of somnolence, limited ability to drive and/or work safely, and potential for dependence or overdose. It has also been made clear that these medications are for use by this patient only, without concomitant use of alcohol or other substances unless prescribed.  Patient has completed prescribing agreement detailing terms of continued prescribing of controlled substances, including monitoring LUIS MANUEL reports, urine drug screening, and pill counts if necessary. The patient is aware that inappropriate use will results in cessation of prescribing such medications.  LUIS MANUEL report has been reviewed.  LUIS MANUEL is updated every 3 months.  History and physical exam exhibit continued safe and appropriate use of controlled substances.           You have chosen to receive care through a telephone visit. Do you consent to use a telephone visit for your medical care today? Yes  This visit has been rescheduled as a phone visit to comply with patient safety concerns in accordance with CDC recommendations. Total time of discussion was 9  minutes.            This document has been electronically signed by Rahat Ruby MD   April 16, 2020 13:10

## 2020-05-22 ENCOUNTER — TELEPHONE (OUTPATIENT)
Dept: FAMILY MEDICINE CLINIC | Facility: CLINIC | Age: 75
End: 2020-05-22

## 2020-05-22 ENCOUNTER — OFFICE VISIT (OUTPATIENT)
Dept: FAMILY MEDICINE CLINIC | Facility: CLINIC | Age: 75
End: 2020-05-22

## 2020-05-22 DIAGNOSIS — H65.196 OTHER RECURRENT ACUTE NONSUPPURATIVE OTITIS MEDIA OF BOTH EARS: Primary | ICD-10-CM

## 2020-05-22 DIAGNOSIS — J01.41 ACUTE RECURRENT PANSINUSITIS: ICD-10-CM

## 2020-05-22 PROCEDURE — 99441 PR PHYS/QHP TELEPHONE EVALUATION 5-10 MIN: CPT | Performed by: NURSE PRACTITIONER

## 2020-05-22 RX ORDER — AMOXICILLIN AND CLAVULANATE POTASSIUM 875; 125 MG/1; MG/1
1 TABLET, FILM COATED ORAL 2 TIMES DAILY
Qty: 20 TABLET | Refills: 0 | Status: SHIPPED | OUTPATIENT
Start: 2020-05-22 | End: 2020-12-10 | Stop reason: SDUPTHER

## 2020-05-22 NOTE — TELEPHONE ENCOUNTER
Patient states that she has an earache and would like an antibiotic called in.  She can be reached at [phone]    [facility]

## 2020-05-22 NOTE — PROGRESS NOTES
Subjective   Edgar Cotto is a 75 y.o. female.     Chief Complaint   Patient presents with   • Earache   • Sinus Problem   • Headache       She presents via telephone visit with c/o earache and sinus infection. She denies fever. She states she doesn't get out but wears a mask if she does. She usually takes amoxicillin for ear infections. She has a history of meniere's disease. She states her ear hurt real bad last night and it is sore to touch. She c/o pain under and around her eyes. Her daughter passed away 5 weeks ago. She is taking benadryl for allergies. She uses flonase as well. She thinks she has fluid in her ear.        The following portions of the patient's history were reviewed and updated as appropriate: allergies, current medications, past family history, past medical history, past social history, past surgical history and problem list.    Review of Systems   Constitutional: Negative for fatigue, fever and unexpected weight change.   HENT: Positive for ear pain and rhinorrhea. Negative for sore throat.    Eyes: Negative for visual disturbance.   Respiratory: Negative for cough, chest tightness, shortness of breath and wheezing.    Cardiovascular: Negative for chest pain, palpitations and leg swelling.   Gastrointestinal: Negative for abdominal pain, blood in stool, constipation, diarrhea, nausea and vomiting.   Endocrine: Negative for cold intolerance and heat intolerance.   Genitourinary: Negative for dysuria and hematuria.   Musculoskeletal: Negative for arthralgias and myalgias.   Skin: Negative for color change.   Allergic/Immunologic: Positive for environmental allergies.   Neurological: Positive for dizziness. Negative for headaches.   Hematological: Negative for adenopathy.   Psychiatric/Behavioral: Negative for suicidal ideas. The patient is not nervous/anxious.        Objective     There were no vitals taken for this visit.    Physical Exam   Constitutional: She is oriented to person,  place, and time.   Pulmonary/Chest: Effort normal.   Neurological: She is alert and oriented to person, place, and time.       Assessment/Plan     Problem List Items Addressed This Visit     None      Visit Diagnoses     Other recurrent acute nonsuppurative otitis media of both ears    -  Primary    Acute recurrent pansinusitis              Plan: Augmentin ordered for otitis media and sinusitis. Rest. Drink lots of fluids. Follow up as needed.     Patient's There is no height or weight on file to calculate BMI.       Understands disease processes and need for medications.  Understands reasons for urgent and emergent care.  Patient (& family) verbalized agreement for treatment plan.   Emotional support and active listening provided.  Patient provided time to verbalize feelings.         You have chosen to receive care through a telephone visit. Do you consent to use a telephone visit for your medical care today? Yes  This visit has been rescheduled as a phone visit to comply with patient safety concerns in accordance with CDC recommendations. Total time of discussion was 10 minutes.        This document has been electronically signed by JOSSELINE Wharton   May 22, 2020 11:13

## 2020-07-07 ENCOUNTER — TELEPHONE (OUTPATIENT)
Dept: FAMILY MEDICINE CLINIC | Facility: CLINIC | Age: 75
End: 2020-07-07

## 2020-07-07 ENCOUNTER — OFFICE VISIT (OUTPATIENT)
Dept: FAMILY MEDICINE CLINIC | Facility: CLINIC | Age: 75
End: 2020-07-07

## 2020-07-07 VITALS
HEIGHT: 60 IN | OXYGEN SATURATION: 96 % | HEART RATE: 106 BPM | DIASTOLIC BLOOD PRESSURE: 84 MMHG | SYSTOLIC BLOOD PRESSURE: 145 MMHG | WEIGHT: 159.4 LBS | TEMPERATURE: 97.1 F | BODY MASS INDEX: 31.29 KG/M2

## 2020-07-07 DIAGNOSIS — H65.114 RECURRENT ACUTE ALLERGIC OTITIS MEDIA OF RIGHT EAR: Primary | ICD-10-CM

## 2020-07-07 PROCEDURE — 99213 OFFICE O/P EST LOW 20 MIN: CPT | Performed by: NURSE PRACTITIONER

## 2020-07-07 RX ORDER — FEXOFENADINE HCL 180 MG/1
180 TABLET ORAL DAILY
Qty: 30 TABLET | Refills: 5 | Status: SHIPPED | OUTPATIENT
Start: 2020-07-07 | End: 2021-05-03

## 2020-07-07 RX ORDER — FLUTICASONE PROPIONATE 50 MCG
2 SPRAY, SUSPENSION (ML) NASAL DAILY
Qty: 15.8 ML | Refills: 0 | Status: SHIPPED | OUTPATIENT
Start: 2020-07-07 | End: 2021-07-22 | Stop reason: SDUPTHER

## 2020-07-07 RX ORDER — AZELASTINE HYDROCHLORIDE 137 UG/1
1 SPRAY, METERED NASAL 2 TIMES DAILY
Qty: 30 ML | Refills: 0 | Status: SHIPPED | OUTPATIENT
Start: 2020-07-07

## 2020-07-07 NOTE — TELEPHONE ENCOUNTER
Patient requested a call back. Patient stated she has right ear pain, her hearing is muffled, headache, runny nose, and yellow phlegm that occasionally has blood in it. This was first noticed 2 days ago. Patient would like to know if an antibiotic could called in for her to help with this.    Please call and advise. Patient call back [phone]    [place], [place] - [address] - [phone]  - [phone] FX

## 2020-07-07 NOTE — PROGRESS NOTES
"Subjective   Edgar Cotto is a 75 y.o. female.     Chief Complaint   Patient presents with   • Ear Problem       She presents with c/o earache in the right ear for the past couple weeks. She states her head felt big yesterday. She states her neighbor has flowers that she is allergic to. She states she takes a little red pill over the counter for allergies. She denies fever and chills. She states she took all the augmentin.       The following portions of the patient's history were reviewed and updated as appropriate: allergies, current medications, past family history, past medical history, past social history, past surgical history and problem list.    Review of Systems   Constitutional: Negative for fatigue, fever and unexpected weight change.   HENT: Positive for ear pain, postnasal drip and rhinorrhea. Negative for sore throat.    Eyes: Negative for visual disturbance.   Respiratory: Negative for cough, chest tightness, shortness of breath and wheezing.    Cardiovascular: Negative for chest pain, palpitations and leg swelling.   Gastrointestinal: Negative for abdominal pain, blood in stool, constipation, diarrhea, nausea and vomiting.   Endocrine: Negative for cold intolerance and heat intolerance.   Genitourinary: Negative for dysuria and hematuria.   Musculoskeletal: Negative for arthralgias and myalgias.   Skin: Negative for color change.   Allergic/Immunologic: Positive for environmental allergies.   Neurological: Positive for headaches. Negative for dizziness.   Hematological: Negative for adenopathy.   Psychiatric/Behavioral: Negative for suicidal ideas. The patient is not nervous/anxious.        Objective     /84 (BP Location: Left arm, Patient Position: Sitting, Cuff Size: Adult)   Pulse 106   Temp 97.1 °F (36.2 °C) (Temporal)   Ht 152.4 cm (60\")   Wt 72.3 kg (159 lb 6.4 oz)   SpO2 96%   BMI 31.13 kg/m²     Physical Exam   Constitutional: She is oriented to person, place, and time. " Vital signs are normal. She appears well-developed and well-nourished. No distress.   HENT:   Head: Normocephalic and atraumatic.   Right Ear: Hearing, tympanic membrane, external ear and ear canal normal.   Left Ear: Hearing, tympanic membrane, external ear and ear canal normal.   Nose: Nose normal. Right sinus exhibits no maxillary sinus tenderness and no frontal sinus tenderness. Left sinus exhibits no maxillary sinus tenderness and no frontal sinus tenderness.   Mouth/Throat: Uvula is midline and mucous membranes are normal. Posterior oropharyngeal erythema present.   Postnasal drainage on posterior pharynx.   Eyes: Pupils are equal, round, and reactive to light. Conjunctivae, EOM and lids are normal.   Neck: Trachea normal. Neck supple. No tracheal tenderness present. No tracheal deviation present.   Cardiovascular: Normal rate, regular rhythm, S1 normal, S2 normal, normal heart sounds and intact distal pulses. Exam reveals no gallop and no friction rub.   No murmur heard.  Pulmonary/Chest: Effort normal and breath sounds normal. No respiratory distress.   Abdominal: Soft. Normal appearance and bowel sounds are normal. She exhibits no distension. There is no tenderness.   Musculoskeletal: She exhibits no edema.   Lymphadenopathy:     She has no cervical adenopathy.   Neurological: She is alert and oriented to person, place, and time.   Skin: Skin is warm and dry. She is not diaphoretic.   Psychiatric: She has a normal mood and affect. Her behavior is normal. Judgment and thought content normal.   Vitals reviewed.      Assessment/Plan     Problem List Items Addressed This Visit     None      Visit Diagnoses     Recurrent acute allergic otitis media of right ear    -  Primary    Relevant Medications    fluticasone (Flonase) 50 MCG/ACT nasal spray    fexofenadine (ALLEGRA) 180 MG tablet    Azelastine HCl 137 MCG/SPRAY solution          Plan: Your ear does not seem to be infected. It seems you are having seasonal  allergies. Flonase, allegra, and azelastine ordered for allergies. She states she cannot tolerate steroids but she does tolerate flonase well. She is feeling sad because her daughter passed away recently. Emotional support provided. Keep scheduled follow up.     Patient's Body mass index is 31.13 kg/m². BMI is above normal parameters. Recommendations include: none (medical contraindication).   (Normal BMI:  18.5-24.9, OW 25-29.9, Obesity 30 or greater)        Understands disease processes and need for medications.  Understands reasons for urgent and emergent care.  Patient (& family) verbalized agreement for treatment plan.   Emotional support and active listening provided.  Patient provided time to verbalize feelings.               This document has been electronically signed by JOSSELINE Wharton   July 8, 2020 09:22

## 2020-08-17 ENCOUNTER — OFFICE VISIT (OUTPATIENT)
Dept: FAMILY MEDICINE CLINIC | Facility: CLINIC | Age: 75
End: 2020-08-17

## 2020-08-17 VITALS
HEART RATE: 88 BPM | RESPIRATION RATE: 18 BRPM | OXYGEN SATURATION: 100 % | HEIGHT: 60 IN | WEIGHT: 160 LBS | SYSTOLIC BLOOD PRESSURE: 130 MMHG | TEMPERATURE: 96.8 F | DIASTOLIC BLOOD PRESSURE: 90 MMHG | BODY MASS INDEX: 31.41 KG/M2

## 2020-08-17 DIAGNOSIS — I10 ESSENTIAL HYPERTENSION: ICD-10-CM

## 2020-08-17 DIAGNOSIS — E78.5 DYSLIPIDEMIA: ICD-10-CM

## 2020-08-17 DIAGNOSIS — R42 DIZZINESS: Primary | ICD-10-CM

## 2020-08-17 DIAGNOSIS — Z00.00 MEDICARE ANNUAL WELLNESS VISIT, SUBSEQUENT: ICD-10-CM

## 2020-08-17 PROCEDURE — 85025 COMPLETE CBC W/AUTO DIFF WBC: CPT | Performed by: FAMILY MEDICINE

## 2020-08-17 PROCEDURE — 80053 COMPREHEN METABOLIC PANEL: CPT | Performed by: FAMILY MEDICINE

## 2020-08-17 PROCEDURE — 36415 COLL VENOUS BLD VENIPUNCTURE: CPT | Performed by: FAMILY MEDICINE

## 2020-08-17 PROCEDURE — 84443 ASSAY THYROID STIM HORMONE: CPT | Performed by: FAMILY MEDICINE

## 2020-08-17 PROCEDURE — G0439 PPPS, SUBSEQ VISIT: HCPCS | Performed by: FAMILY MEDICINE

## 2020-08-17 PROCEDURE — 80061 LIPID PANEL: CPT | Performed by: FAMILY MEDICINE

## 2020-08-17 NOTE — PROGRESS NOTES
The ABCs of the Annual Wellness Visit  Subsequent Medicare Wellness Visit    Chief Complaint   Patient presents with   • Medicare Wellness-subsequent       Subjective   History of Present Illness:  Edgar Cotto is a 75 y.o. female who presents for a Subsequent Medicare Wellness Visit.    HEALTH RISK ASSESSMENT    Recent Hospitalizations:  No hospitalization(s) within the last year.    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       Alcohol Consumption:  Social History     Substance and Sexual Activity   Alcohol Use No       Depression Screen:   PHQ-2/PHQ-9 Depression Screening 8/17/2020   Little interest or pleasure in doing things 0   Feeling down, depressed, or hopeless 0   Trouble falling or staying asleep, or sleeping too much -   Feeling tired or having little energy -   Poor appetite or overeating -   Feeling bad about yourself - or that you are a failure or have let yourself or your family down -   Trouble concentrating on things, such as reading the newspaper or watching television -   Moving or speaking so slowly that other people could have noticed. Or the opposite - being so fidgety or restless that you have been moving around a lot more than usual -   Thoughts that you would be better off dead, or of hurting yourself in some way -   Total Score 0   If you checked off any problems, how difficult have these problems made it for you to do your work, take care of things at home, or get along with other people? -       Fall Risk Screen:  RANDY  Fall Risk Assessment was completed, and patient is at LOW risk for falls.Assessment completed on:8/17/2020    Health Habits and Functional and Cognitive Screening:  Functional & Cognitive Status 8/17/2020   Do you have difficulty preparing food and eating? No   Do you have difficulty bathing yourself, getting dressed or grooming yourself? No   Do you have difficulty using the toilet? No   Do you have difficulty moving around from place to place? No   Do you have trouble with steps or getting out of a bed or a chair? No   Current Diet Well Balanced Diet   Dental Exam Unknown   Eye Exam Up to date   Exercise (times per week) 7 times per week   Current Exercise Activities Include Walking   Do you need help using the phone?  No   Are you deaf or do you have serious difficulty hearing?  No   Do you need help with transportation? No   Do you need help shopping? No   Do you need help preparing meals?  No   Do you need help with housework?  No   Do you need help with laundry? No   Do you need help taking your medications? No   Do you need help managing money? No   Do you ever drive or ride in a car without wearing a seat belt? No   Have you felt unusual stress, anger or loneliness in the last month? No   Who do you live with? Child   If you need help, do you have trouble finding someone available to you? No   Have you been bothered in the last four weeks by sexual problems? No   Do you have difficulty concentrating, remembering or making decisions? No         Does the patient have evidence of cognitive impairment? No    Asprin use counseling:Taking ASA appropriately as indicated    Age-appropriate Screening Schedule:  Refer to the list below for future screening recommendations based on patient's age, sex and/or medical conditions. Orders for these recommended tests are listed in the plan section. The patient has been provided with a written plan.    Health Maintenance   Topic Date Due   • ZOSTER VACCINE (2 of 2) 03/13/2017    • TDAP/TD VACCINES (2 - Td) 01/16/2020   • LIPID PANEL  02/20/2020   • MAMMOGRAM  05/15/2020   • INFLUENZA VACCINE  08/01/2020   • COLONOSCOPY  05/08/2025          The following portions of the patient's history were reviewed and updated as appropriate: allergies, current medications, past family history, past social history, past surgical history and problem list.    Outpatient Medications Prior to Visit   Medication Sig Dispense Refill   • acetaminophen (TYLENOL) 500 MG tablet Take 500 mg by mouth Every 6 (Six) Hours As Needed for mild pain (1-3) or moderate pain (4-6).     • aspirin 81 MG tablet Take 81 mg by mouth Daily.     • Azelastine HCl 137 MCG/SPRAY solution 1 spray into the nostril(s) as directed by provider 2 (two) times a day. 30 mL 0   • calcium carbonate (TUMS) 500 MG chewable tablet Chew 1 tablet 2 (Two) Times a Day As Needed for Indigestion or Heartburn.     • diazePAM (VALIUM) 5 MG tablet Take 1 tablet by mouth daily to BID PRN dizziness 60 tablet 3   • fexofenadine (ALLEGRA) 180 MG tablet Take 1 tablet by mouth Daily. 30 tablet 5   • fluticasone (Flonase) 50 MCG/ACT nasal spray 2 sprays into the nostril(s) as directed by provider Daily. 15.8 mL 0   • meclizine (ANTIVERT) 25 MG tablet Take 1 tablet by mouth Every 8 (Eight) Hours As Needed for Dizziness. 270 tablet 1   • nystatin (MYCOSTATIN) 023914 UNIT/ML suspension Swish and swallow 5 mL 4 (Four) Times a Day. 120 mL 1   • psyllium (METAMUCIL) 58.6 % packet Take 1 packet by mouth Daily. 162 each 3   • RABEprazole (ACIPHEX) 20 MG EC tablet Take 1 tablet by mouth Every Night. 90 tablet 1   • rosuvastatin (CRESTOR) 5 MG tablet Take 1 tablet by mouth Every Other Day. 15 tablet 3   • valsartan (DIOVAN) 40 MG tablet Take 0.5 tablets by mouth 2 (Two) Times a Day. 90 tablet 1     No facility-administered medications prior to visit.        Patient Active Problem List   Diagnosis   • Atopic rhinitis   • Dizziness   • Generalized anxiety disorder   •  "Generalized osteoarthritis   • Essential hypertension   • Auditory vertigo   • Mitral and aortic valve disease   • Stomatitis   • Seasonal allergic rhinitis   • Varicose veins of both lower extremities   • Meniere's disease of both ears   • S/P hemorrhoidectomy   • Abnormal EKG   • Moderate obesity   • Dyslipidemia   • Thyroid cyst       Advanced Care Planning:  ACP discussion was declined by the patient. Patient does not have an advance directive, information provided.    Review of Systems    Compared to one year ago, the patient feels her physical health is the same.  Compared to one year ago, the patient feels her mental health is the same.    Reviewed chart for potential of high risk medication in the elderly: yes  Reviewed chart for potential of harmful drug interactions in the elderly:yes    Objective         Vitals:    08/17/20 1121   BP: 130/90   BP Location: Right arm   Patient Position: Sitting   Cuff Size: Adult   Pulse: 88   Resp: 18   Temp: 96.8 °F (36 °C)   TempSrc: Temporal   SpO2: 100%   Weight: 72.6 kg (160 lb)   Height: 152.4 cm (60\")       Body mass index is 31.25 kg/m².  Discussed the patient's BMI with her. The BMI is above average; BMI management plan is completed.    Physical Exam   Constitutional: She is oriented to person, place, and time. She appears well-developed and well-nourished.   HENT:   Head: Normocephalic and atraumatic.   Neck: Normal range of motion. Neck supple. No thyromegaly present.   Cardiovascular: Normal rate, regular rhythm and normal heart sounds.   Pulmonary/Chest: Effort normal and breath sounds normal.   Musculoskeletal: She exhibits no edema.   Neurological: She is alert and oriented to person, place, and time.   Psychiatric: She has a normal mood and affect.   Vitals reviewed.            Assessment/Plan   Medicare Risks and Personalized Health Plan  CMS Preventative Services Quick Reference  Advance Directive Discussion  Breast Cancer/Mammogram Screening  Hearing " Problem  Immunizations Discussed/Encouraged (specific immunizations; Influenza, Pneumococcal 23 and Shingrix )  Inadequate Social Support, Isolation, Loneliness, Lack of Transportation, Financial Difficulties, or Caregiver Stress   Inactivity/Sedentary  Osteoprorosis Risk    The above risks/problems have been discussed with the patient.  Pertinent information has been shared with the patient in the After Visit Summary.  Follow up plans and orders are seen below in the Assessment/Plan Section.    Diagnoses and all orders for this visit:    1. Dizziness (Primary)  -     CBC & Differential  -     Comprehensive Metabolic Panel  -     TSH  -     CBC Auto Differential    2. Essential hypertension  -     CBC & Differential  -     Comprehensive Metabolic Panel  -     CBC Auto Differential    3. Dyslipidemia  -     Comprehensive Metabolic Panel  -     Lipid Panel  -     TSH    4. Medicare annual wellness visit, subsequent      Follow Up:  Return in about 6 months (around 2/17/2021).     An After Visit Summary and PPPS were given to the patient.     Overall he seems stable.  Discussed some vaccines.  Discussed mammogram.  Advanced directives.  You are current on colonoscopy recommendation.  Will not be adding pain medication for the chronic headaches.  Maybe we consider ENT visit for the recurrent dizziness.  Very chronic.  Maintain pandemic response.  Maintain reasonable heart healthy diet.  Recheck in 6 months or as needed.

## 2020-08-18 DIAGNOSIS — Z12.31 SCREENING MAMMOGRAM, ENCOUNTER FOR: ICD-10-CM

## 2020-08-18 DIAGNOSIS — Z78.0 POST-MENOPAUSAL: Primary | ICD-10-CM

## 2020-08-18 LAB
ALBUMIN SERPL-MCNC: 4.1 G/DL (ref 3.5–5.2)
ALBUMIN/GLOB SERPL: 1.3 G/DL
ALP SERPL-CCNC: 58 U/L (ref 39–117)
ALT SERPL W P-5'-P-CCNC: 13 U/L (ref 1–33)
ANION GAP SERPL CALCULATED.3IONS-SCNC: 9.9 MMOL/L (ref 5–15)
AST SERPL-CCNC: 25 U/L (ref 1–32)
BASOPHILS # BLD AUTO: 0.06 10*3/MM3 (ref 0–0.2)
BASOPHILS NFR BLD AUTO: 0.9 % (ref 0–1.5)
BILIRUB SERPL-MCNC: 0.4 MG/DL (ref 0–1.2)
BUN SERPL-MCNC: 13 MG/DL (ref 8–23)
BUN/CREAT SERPL: 16.5 (ref 7–25)
CALCIUM SPEC-SCNC: 9.9 MG/DL (ref 8.6–10.5)
CHLORIDE SERPL-SCNC: 103 MMOL/L (ref 98–107)
CHOLEST SERPL-MCNC: 190 MG/DL (ref 0–200)
CO2 SERPL-SCNC: 28.1 MMOL/L (ref 22–29)
CREAT SERPL-MCNC: 0.79 MG/DL (ref 0.57–1)
DEPRECATED RDW RBC AUTO: 41.8 FL (ref 37–54)
EOSINOPHIL # BLD AUTO: 0.15 10*3/MM3 (ref 0–0.4)
EOSINOPHIL NFR BLD AUTO: 2.2 % (ref 0.3–6.2)
ERYTHROCYTE [DISTWIDTH] IN BLOOD BY AUTOMATED COUNT: 12.3 % (ref 12.3–15.4)
GFR SERPL CREATININE-BSD FRML MDRD: 71 ML/MIN/1.73
GLOBULIN UR ELPH-MCNC: 3.2 GM/DL
GLUCOSE SERPL-MCNC: 105 MG/DL (ref 65–99)
HCT VFR BLD AUTO: 45.3 % (ref 34–46.6)
HDLC SERPL-MCNC: 59 MG/DL (ref 40–60)
HGB BLD-MCNC: 14.9 G/DL (ref 12–15.9)
IMM GRANULOCYTES # BLD AUTO: 0.01 10*3/MM3 (ref 0–0.05)
IMM GRANULOCYTES NFR BLD AUTO: 0.1 % (ref 0–0.5)
LDLC SERPL CALC-MCNC: 97 MG/DL (ref 0–100)
LDLC/HDLC SERPL: 1.64 {RATIO}
LYMPHOCYTES # BLD AUTO: 1.72 10*3/MM3 (ref 0.7–3.1)
LYMPHOCYTES NFR BLD AUTO: 25.5 % (ref 19.6–45.3)
MCH RBC QN AUTO: 30.6 PG (ref 26.6–33)
MCHC RBC AUTO-ENTMCNC: 32.9 G/DL (ref 31.5–35.7)
MCV RBC AUTO: 93 FL (ref 79–97)
MONOCYTES # BLD AUTO: 0.46 10*3/MM3 (ref 0.1–0.9)
MONOCYTES NFR BLD AUTO: 6.8 % (ref 5–12)
NEUTROPHILS NFR BLD AUTO: 4.35 10*3/MM3 (ref 1.7–7)
NEUTROPHILS NFR BLD AUTO: 64.5 % (ref 42.7–76)
NRBC BLD AUTO-RTO: 0 /100 WBC (ref 0–0.2)
PLATELET # BLD AUTO: 184 10*3/MM3 (ref 140–450)
PMV BLD AUTO: 12.4 FL (ref 6–12)
POTASSIUM SERPL-SCNC: 4.9 MMOL/L (ref 3.5–5.2)
PROT SERPL-MCNC: 7.3 G/DL (ref 6–8.5)
RBC # BLD AUTO: 4.87 10*6/MM3 (ref 3.77–5.28)
SODIUM SERPL-SCNC: 141 MMOL/L (ref 136–145)
TRIGL SERPL-MCNC: 172 MG/DL (ref 0–150)
TSH SERPL DL<=0.05 MIU/L-ACNC: 1.3 UIU/ML (ref 0.27–4.2)
VLDLC SERPL-MCNC: 34.4 MG/DL (ref 5–40)
WBC # BLD AUTO: 6.75 10*3/MM3 (ref 3.4–10.8)

## 2020-08-26 ENCOUNTER — TELEPHONE (OUTPATIENT)
Dept: FAMILY MEDICINE CLINIC | Facility: CLINIC | Age: 75
End: 2020-08-26

## 2020-08-26 NOTE — TELEPHONE ENCOUNTER
Caller: Edgar Cotto    Relationship: Self    Best call back number: [phone]    What medication are you requesting: muscle relaxer    What are your current symptoms: back pain and hip pain    How long have you been experiencing symptoms: gradually getting worse over the last year    If a prescription is needed, what is your preferred pharmacy and phone number: [facility]  [place] - [address] [facility] [phone] [facility] [phone]      Additional notes: Patient stated she has arthritis and the pain in her back and hips has been getting worse over the last year. Patient stated the pain is now making it hard for her to sleep at night and she would like to know if a muscle relaxer could be called in to help her.

## 2020-08-27 NOTE — TELEPHONE ENCOUNTER
Called [phone] notified patient of medication options per Dr Ruby and she verbalized understanding and had no further questions.

## 2020-08-27 NOTE — TELEPHONE ENCOUNTER
Not appropriate with current medications you are presently taking.  Try OTC Tylenol each evening before bed routinely for a while.

## 2020-10-16 ENCOUNTER — APPOINTMENT (OUTPATIENT)
Dept: BONE DENSITY | Facility: HOSPITAL | Age: 75
End: 2020-10-16

## 2020-10-16 ENCOUNTER — APPOINTMENT (OUTPATIENT)
Dept: MAMMOGRAPHY | Facility: HOSPITAL | Age: 75
End: 2020-10-16

## 2020-10-19 DIAGNOSIS — E78.2 MIXED HYPERLIPIDEMIA: ICD-10-CM

## 2020-10-19 DIAGNOSIS — H81.03 MENIERE'S DISEASE OF BOTH EARS: ICD-10-CM

## 2020-10-20 RX ORDER — ROSUVASTATIN CALCIUM 5 MG/1
TABLET, COATED ORAL
Qty: 15 TABLET | Refills: 3 | Status: SHIPPED | OUTPATIENT
Start: 2020-10-20 | End: 2021-03-09 | Stop reason: SDUPTHER

## 2020-10-20 RX ORDER — DIAZEPAM 5 MG/1
TABLET ORAL
Qty: 60 TABLET | Refills: 1 | Status: SHIPPED | OUTPATIENT
Start: 2020-10-20 | End: 2020-10-21 | Stop reason: SDUPTHER

## 2020-10-21 DIAGNOSIS — H81.03 MENIERE'S DISEASE OF BOTH EARS: ICD-10-CM

## 2020-10-22 RX ORDER — DIAZEPAM 5 MG/1
TABLET ORAL
Qty: 60 TABLET | Refills: 1 | Status: SHIPPED | OUTPATIENT
Start: 2020-10-22 | End: 2021-02-08

## 2020-10-22 NOTE — TELEPHONE ENCOUNTER
Patient stated the pharmacy did not receive the prescription for Valium. She stated she only has a couple left.     Requesting that it be sent to:  [facility] - [place], [place] - [address] [place] - [phone]  - [phone] FX

## 2020-12-07 ENCOUNTER — OFFICE VISIT (OUTPATIENT)
Dept: FAMILY MEDICINE CLINIC | Facility: CLINIC | Age: 75
End: 2020-12-07

## 2020-12-07 VITALS — OXYGEN SATURATION: 95 % | TEMPERATURE: 98.1 F

## 2020-12-07 DIAGNOSIS — R05.9 COUGH: Primary | ICD-10-CM

## 2020-12-07 DIAGNOSIS — R50.9 FEVER, UNSPECIFIED FEVER CAUSE: ICD-10-CM

## 2020-12-07 PROCEDURE — 99441 PR PHYS/QHP TELEPHONE EVALUATION 5-10 MIN: CPT | Performed by: FAMILY MEDICINE

## 2020-12-07 NOTE — PROGRESS NOTES
Subjective   Edgar Cotto is a 75 y.o. female.     History of Present Illness several days of nasal congestion some sore throat low-grade fever.  Little cough.  Mild nausea no vomiting diarrhea.  No taste or smell distortion.  No known exposures although has been around people.    The following portions of the patient's history were reviewed and updated as appropriate: allergies, current medications, past family history, past social history, past surgical history and problem list.    Review of Systems  See history of Present Illness     Objective      Physical Exam   Neurological: She is alert.       PHQ-9 Total Score:      Patient's There is no height or weight on file to calculate BMI. BMI is above normal parameters. Recommendations include: exercise counseling and nutrition counseling.   (Normal BMI:  18.5-24.9, OW 25-29.9, Obesity 30 or greater)      Assessment/Plan     Diagnoses and all orders for this visit:    1. Cough (Primary)  -     COVID-19 PCR, [facility], NP SWAB IN [facility]AL TRANSPORT MEDIA 24-30 HR TAT - Swab, Nasopharynx    2. Fever, unspecified fever cause  -     COVID-19 PCR, [facility], NP SWAB IN [facility] V[facility]AL TRANSPORT MEDIA 24-30 HR TAT - Swab, Nasopharynx    COVID-19 test.  Will notify of results.  Maintain quarantine isolation.  Fever control stay well-hydrated.  Do not hesitate to use ER if symptoms were to worsen or become acutely ill of course.    You have chosen to receive care through a telephone visit. Do you consent to use a telephone visit for your medical care today? Yes    This visit has been rescheduled as a phone visit to comply with patient safety concerns in accordance with CDC recommendations. Total time of discussion was 4 minutes.                       This document has been electronically signed by Rahat Ruby MD   December 7, 2020 17:05 EST

## 2020-12-08 ENCOUNTER — TELEPHONE (OUTPATIENT)
Dept: FAMILY MEDICINE CLINIC | Facility: CLINIC | Age: 75
End: 2020-12-08

## 2020-12-08 NOTE — TELEPHONE ENCOUNTER
LAB CALLED STATING UNABLE TO PERFORM COVID TEST, DO YOU WANT TO HAVE HER COME IN TO [facility] OR WAIT ON DAUGHTERS RESULTS?

## 2020-12-10 ENCOUNTER — TELEPHONE (OUTPATIENT)
Dept: FAMILY MEDICINE CLINIC | Facility: CLINIC | Age: 75
End: 2020-12-10

## 2020-12-10 DIAGNOSIS — J01.41 ACUTE RECURRENT PANSINUSITIS: ICD-10-CM

## 2020-12-10 DIAGNOSIS — H65.196 OTHER RECURRENT ACUTE NONSUPPURATIVE OTITIS MEDIA OF BOTH EARS: ICD-10-CM

## 2020-12-10 RX ORDER — AMOXICILLIN AND CLAVULANATE POTASSIUM 875; 125 MG/1; MG/1
1 TABLET, FILM COATED ORAL 2 TIMES DAILY
Qty: 20 TABLET | Refills: 0 | Status: SHIPPED | OUTPATIENT
Start: 2020-12-10 | End: 2020-12-20

## 2020-12-10 NOTE — TELEPHONE ENCOUNTER
PATIENT HAVING LEFT EAR PAIN.  WOULD LIKE ANTIBIOTIC FOR EAR INFECTION.     PHARMACY:  [facility]    PLEASE CALL [phone]

## 2021-02-06 ENCOUNTER — HOSPITAL ENCOUNTER (EMERGENCY)
Facility: HOSPITAL | Age: 76
Discharge: HOME | End: 2021-02-06
Attending: EMERGENCY MEDICINE | Admitting: EMERGENCY MEDICINE

## 2021-02-06 ENCOUNTER — APPOINTMENT (OUTPATIENT)
Dept: GENERAL RADIOLOGY | Facility: HOSPITAL | Age: 76
End: 2021-02-06

## 2021-02-06 ENCOUNTER — APPOINTMENT (OUTPATIENT)
Dept: CT IMAGING | Facility: HOSPITAL | Age: 76
End: 2021-02-06

## 2021-02-06 VITALS
TEMPERATURE: 98.7 F | OXYGEN SATURATION: 99 % | DIASTOLIC BLOOD PRESSURE: 78 MMHG | HEIGHT: 64 IN | BODY MASS INDEX: 27.31 KG/M2 | RESPIRATION RATE: 20 BRPM | SYSTOLIC BLOOD PRESSURE: 138 MMHG | WEIGHT: 160 LBS | HEART RATE: 85 BPM

## 2021-02-06 DIAGNOSIS — S42.292A OTHER CLOSED DISPLACED FRACTURE OF PROXIMAL END OF LEFT HUMERUS, INITIAL ENCOUNTER: Primary | ICD-10-CM
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
## 2021-02-06 LAB
ALBUMIN SERPL-MCNC: 3.94 G/DL (ref 3.5–5.2)
ALBUMIN/GLOB SERPL: 1.1 G/DL
ALP SERPL-CCNC: 78 U/L (ref 39–117)
ALT SERPL W P-5'-P-CCNC: 13 U/L (ref 1–33)
ANION GAP SERPL CALCULATED.3IONS-SCNC: 9 MMOL/L (ref 5–15)
AST SERPL-CCNC: 25 U/L (ref 1–32)
BASOPHILS # BLD AUTO: 0.08 10*3/MM3 (ref 0–0.2)
BASOPHILS NFR BLD AUTO: 0.8 % (ref 0–1.5)
BILIRUB SERPL-MCNC: 0.3 MG/DL (ref 0–1.2)
BUN SERPL-MCNC: 18 MG/DL (ref 8–23)
BUN/CREAT SERPL: 16.1 (ref 7–25)
CALCIUM SPEC-SCNC: 9.5 MG/DL (ref 8.6–10.5)
CHLORIDE SERPL-SCNC: 99 MMOL/L (ref 98–107)
CO2 SERPL-SCNC: 30 MMOL/L (ref 22–29)
CREAT SERPL-MCNC: 1.12 MG/DL (ref 0.57–1)
DEPRECATED RDW RBC AUTO: 41.5 FL (ref 37–54)
EOSINOPHIL # BLD AUTO: 0.4 10*3/MM3 (ref 0–0.4)
EOSINOPHIL NFR BLD AUTO: 4.2 % (ref 0.3–6.2)
ERYTHROCYTE [DISTWIDTH] IN BLOOD BY AUTOMATED COUNT: 11.9 % (ref 12.3–15.4)
GFR SERPL CREATININE-BSD FRML MDRD: 47 ML/MIN/1.73
GLOBULIN UR ELPH-MCNC: 3.5 GM/DL
GLUCOSE SERPL-MCNC: 122 MG/DL (ref 65–99)
HCT VFR BLD AUTO: 46 % (ref 34–46.6)
HGB BLD-MCNC: 14.8 G/DL (ref 12–15.9)
IMM GRANULOCYTES # BLD AUTO: 0.06 10*3/MM3 (ref 0–0.05)
IMM GRANULOCYTES NFR BLD AUTO: 0.6 % (ref 0–0.5)
LYMPHOCYTES # BLD AUTO: 2.98 10*3/MM3 (ref 0.7–3.1)
LYMPHOCYTES NFR BLD AUTO: 31.4 % (ref 19.6–45.3)
MCH RBC QN AUTO: 30.8 PG (ref 26.6–33)
MCHC RBC AUTO-ENTMCNC: 32.2 G/DL (ref 31.5–35.7)
MCV RBC AUTO: 95.6 FL (ref 79–97)
MONOCYTES # BLD AUTO: 0.78 10*3/MM3 (ref 0.1–0.9)
MONOCYTES NFR BLD AUTO: 8.2 % (ref 5–12)
NEUTROPHILS NFR BLD AUTO: 5.19 10*3/MM3 (ref 1.7–7)
NEUTROPHILS NFR BLD AUTO: 54.8 % (ref 42.7–76)
NRBC BLD AUTO-RTO: 0 /100 WBC (ref 0–0.2)
PLATELET # BLD AUTO: 171 10*3/MM3 (ref 140–450)
PMV BLD AUTO: 10.9 FL (ref 6–12)
POTASSIUM SERPL-SCNC: 4.2 MMOL/L (ref 3.5–5.2)
PROT SERPL-MCNC: 7.4 G/DL (ref 6–8.5)
RBC # BLD AUTO: 4.81 10*6/MM3 (ref 3.77–5.28)
SODIUM SERPL-SCNC: 138 MMOL/L (ref 136–145)
WBC # BLD AUTO: 9.49 10*3/MM3 (ref 3.4–10.8)

## 2021-02-06 PROCEDURE — 72125 CT NECK SPINE W/O DYE: CPT

## 2021-02-06 PROCEDURE — 85025 COMPLETE CBC W/AUTO DIFF WBC: CPT | Performed by: PHYSICIAN ASSISTANT

## 2021-02-06 PROCEDURE — 99284 EMERGENCY DEPT VISIT MOD MDM: CPT

## 2021-02-06 PROCEDURE — 80053 COMPREHEN METABOLIC PANEL: CPT | Performed by: PHYSICIAN ASSISTANT

## 2021-02-06 PROCEDURE — 73030 X-RAY EXAM OF SHOULDER: CPT

## 2021-02-06 PROCEDURE — 70450 CT HEAD/BRAIN W/O DYE: CPT

## 2021-02-06 PROCEDURE — 73080 X-RAY EXAM OF ELBOW: CPT

## 2021-02-06 PROCEDURE — 73110 X-RAY EXAM OF WRIST: CPT

## 2021-02-06 RX ORDER — SODIUM CHLORIDE 0.9 % (FLUSH) 0.9 %
10 SYRINGE (ML) INJECTION AS NEEDED
Status: DISCONTINUED | OUTPATIENT
Start: 2021-02-06 | End: 2021-02-06 | Stop reason: HOSPADM

## 2021-02-06 RX ORDER — HYDROCODONE BITARTRATE AND ACETAMINOPHEN 5; 325 MG/1; MG/1
1 TABLET ORAL EVERY 6 HOURS PRN
Qty: 8 TABLET | Refills: 0 | Status: SHIPPED | OUTPATIENT
Start: 2021-02-06 | End: 2021-03-09

## 2021-02-06 RX ORDER — HYDROCODONE BITARTRATE AND ACETAMINOPHEN 5; 325 MG/1; MG/1
1 TABLET ORAL EVERY 6 HOURS PRN
Status: DISCONTINUED | OUTPATIENT
Start: 2021-02-06 | End: 2021-02-06 | Stop reason: HOSPADM

## 2021-02-06 RX ORDER — ACETAMINOPHEN 325 MG/1
650 TABLET ORAL ONCE
Status: COMPLETED | OUTPATIENT
Start: 2021-02-06 | End: 2021-02-06

## 2021-02-06 RX ADMIN — HYDROCODONE BITARTRATE AND ACETAMINOPHEN 1 TABLET: 5; 325 TABLET ORAL at 20:43

## 2021-02-06 RX ADMIN — ACETAMINOPHEN 650 MG: 325 TABLET ORAL at 19:49

## 2021-02-06 NOTE — ED PROVIDER NOTES
Subjective   Patient is a 76-year-old female with hyperlipidemia, hypertension, Ménière's disease, and osteoarthritis that presents to the ED after sustaining a fall at home.  She states she was going down her steps when she fell down the last 2 or 3 stairs.  She states that she thinks she hit the back of her head and landed on the left side.  She is complaining of significant left shoulder pain radiating down the arm.  She is also complaining of mild pain in her elbow and wrist.  Symptoms are worse with any movement of the left arm.  She denies paresthesias.  She denies chest pain, shortness of breath, fever, chills, nausea, vomiting, headache, dizziness, change in vision, abdominal pain, or dysuria.      History provided by:  Patient   used: No    Fall  Mechanism of injury: fall    Injury location:  Head/neck and shoulder/arm  Head/neck injury location:  Head  Shoulder/arm injury location:  L shoulder, L elbow and L wrist  Incident location:  Home  Time since incident:  1 hour  Arrived directly from scene: yes    Fall:     Fall occurred:  Down stairs    Impact surface:  Hard floor    Point of impact:  Unable to specify    Entrapped after fall: no    Suspicion of alcohol use: no    Suspicion of drug use: no    Prior to arrival data:     Patient ambulatory at scene: yes      Loss of consciousness: no      Amnesic to event: no    Associated symptoms: neck pain    Associated symptoms: no abdominal pain, no back pain, no blindness, no chest pain, no difficulty breathing, no headaches, no hearing loss, no loss of consciousness, no nausea, no seizures and no vomiting        Review of Systems   Constitutional: Negative.  Negative for chills, diaphoresis and fever.   HENT: Negative.  Negative for congestion, drooling, ear discharge, ear pain, facial swelling, hearing loss, postnasal drip, rhinorrhea, sinus pressure, sinus pain, sneezing, sore throat, tinnitus and trouble swallowing.    Eyes: Negative.   Negative for blindness, photophobia, pain, discharge, redness and itching.   Respiratory: Negative.  Negative for cough, shortness of breath and wheezing.    Cardiovascular: Negative.  Negative for chest pain.   Gastrointestinal: Negative.  Negative for abdominal distention, abdominal pain, constipation, diarrhea, nausea, rectal pain and vomiting.   Endocrine: Negative.    Genitourinary: Negative.  Negative for difficulty urinating, dysuria, flank pain, frequency, pelvic pain and urgency.   Musculoskeletal: Positive for arthralgias, joint swelling and neck pain. Negative for back pain, gait problem, myalgias and neck stiffness.   Skin: Negative.    Neurological: Negative.  Negative for dizziness, seizures, loss of consciousness, syncope, facial asymmetry, weakness, light-headedness, numbness and headaches.   Psychiatric/Behavioral: Negative.  Negative for confusion.   All other systems reviewed and are negative.      Past Medical History:   Diagnosis Date   • Abdominal distension    • Allergic rhinitis    • Anxiety    • Arthritis    • Cough    • Dry eyes    • Dyslipidemia    • Dysuria    • Hyperlipidemia    • Hypertension    • Meniere's disease    • Mitral and aortic valve disease    • Oral candidiasis    • Osteoarthritis    • Stomatitis    • URI, acute    • Vaginal candidiasis    • Vocal cord dysfunction        Allergies   Allergen Reactions   • Z-Ponce [Azithromycin] Swelling   • Cortisone Swelling     Face swelling     • Fish-Derived Products      SEAFOOD   • Iodinated Diagnostic Agents    • Lidocaine Other (See Comments)     Shaking, fever when she had mouth numbed at the dentist   • Morphine Mental Status Change   • Niacin Other (See Comments)     Flushing    • Other    • Sulfa Antibiotics    • Verapamil Other (See Comments)     Feel funny        Past Surgical History:   Procedure Laterality Date   • CHOLECYSTECTOMY     • COLONOSCOPY     • DILATATION AND CURETTAGE     • HEMORRHOIDECTOMY     • HERNIA REPAIR     •  SHOULDER SURGERY     • SKIN CANCER EXCISION      on Nose   • TONSILLECTOMY         Family History   Problem Relation Age of Onset   • Diabetes Mother    • Cancer Father    • Liver disease Sister    • Diabetes Sister    • Liver disease Brother    • Diabetes Brother    • Diabetes Daughter    • Cancer Other    • Liver disease Other    • Breast cancer Neg Hx        Social History     Socioeconomic History   • Marital status:      Spouse name: Not on file   • Number of children: Not on file   • Years of education: Not on file   • Highest education level: Not on file   Tobacco Use   • Smoking status: Never Smoker   • Smokeless tobacco: Never Used   • Tobacco comment: 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   Constitutional:       General: She is not in acute distress.     Appearance: She is well-developed. She is not diaphoretic.   HENT:      Head: Normocephalic and atraumatic.      Right Ear: External ear normal.      Left Ear: External ear normal.      Nose: Nose normal.      Mouth/Throat:      Mouth: Mucous membranes are moist.      Pharynx: Oropharynx is clear.   Eyes:      Extraocular Movements: Extraocular movements intact.      Conjunctiva/sclera: Conjunctivae normal.      Pupils: Pupils are equal, round, and reactive to light.   Neck:      Musculoskeletal: Normal range of motion and neck supple.      Vascular: No JVD.      Trachea: No tracheal deviation.   Cardiovascular:      Rate and Rhythm: Normal rate and regular rhythm.      Heart sounds: Normal heart sounds. No murmur.   Pulmonary:      Effort: Pulmonary effort is normal. No respiratory distress.      Breath sounds: Normal breath sounds. No wheezing.   Abdominal:      General: Bowel sounds are normal. There is no distension.      Palpations: Abdomen is soft.       Tenderness: There is no abdominal tenderness. There is no guarding or rebound.   Musculoskeletal: Normal range of motion.         General: No deformity.      Comments: Examination of the left upper extremity reveals tenderness along the proximal humerus with decreased range of motion secondary to pain.  She also has tenderness posterior lateral elbow that is worsened with range of motion.  In addition to this she has some mild pain distal radius.  Range of motion of the wrist is full.  Neurovascular status is intact.   Skin:     General: Skin is warm and dry.      Coloration: Skin is not pale.      Findings: No erythema or rash.   Neurological:      Mental Status: She is alert and oriented to person, place, and time.      Cranial Nerves: No cranial nerve deficit.   Psychiatric:         Behavior: Behavior normal.         Thought Content: Thought content normal.         Splint - Cast - Strapping    Date/Time: 2/6/2021 9:03 PM  Performed by: Amy Felipe PA-C  Authorized by: Javier Zarate MD     Consent:     Consent obtained:  Verbal    Consent given by:  Patient    Risks discussed:  Discoloration, numbness, pain and swelling    Alternatives discussed:  No treatment  Pre-procedure details:     Sensation:  Normal    Skin color:  Pink  Procedure details:     Laterality:  Left    Location:  Shoulder    Shoulder:  L shoulder    Strapping: yes      Supplies:  Sling  Post-procedure details:     Pain:  Improved    Sensation:  Normal    Skin color:  Pink    Patient tolerance of procedure:  Tolerated well, no immediate complications               ED Course  ED Course as of Feb 06 2103   Sat Feb 06, 2021 1931 IMPRESSION:  Normal, negative unenhanced head CT.              Signer Name: Joshua Queen MD   Signed: 2/6/2021 7:26 PM   CT Head Without Contrast []   1931 IMPRESSION:  Mid cervical spine degenerative changes. No acute findings     Signer Name: Joshua Queen MD   Signed: 2/6/2021 7:25 PM   CT Cervical Spine  Without Contrast []   1954 IMPRESSION:  Images show a 5 mm cortical offset along the lateral margin of the proximal humerus. It is Difficult see the rest of the probable fracture but this finding suggests that there is an acute humeral neck fracture. Correlation with history of any old  fractures is recommended     Signer Name: Joshua Queen MD   Signed: 2/6/2021 7:51 PM   XR Shoulder 2+ View Left []   1955 IMPRESSION:  Negative left elbow.     Signer Name: Joshua Queen MD   Signed: 2/6/2021 7:49 PM   XR Elbow 3+ View Left []   1955 IMPRESSION:  Negative left wrist.     Signer Name: Joshua Queen MD   Signed: 2/6/2021 7:48 PM   XR Wrist 3+ View Left []   2010 Discussed plan of care with patient.  Advised if symptoms worsen return to the ED.  Advised to follow-up with orthopedics in 1 to 2 days.    []      ED Course User Index  [] Amy Felipe PA-C                                           [facility]    Final diagnoses:   Other closed displaced fracture of proximal end of left humerus, initial encounter   Fall, initial encounter            Amy Felipe PA-C  02/06/21 2038       Amy Felipe PA-C  02/06/21 2103

## 2021-02-08 ENCOUNTER — TELEPHONE (OUTPATIENT)
Dept: FAMILY MEDICINE CLINIC | Facility: CLINIC | Age: 76
End: 2021-02-08

## 2021-02-08 DIAGNOSIS — H81.03 MENIERE'S DISEASE OF BOTH EARS: ICD-10-CM

## 2021-02-08 RX ORDER — DIAZEPAM 5 MG/1
TABLET ORAL
Qty: 60 TABLET | Refills: 1 | Status: SHIPPED | OUTPATIENT
Start: 2021-02-08 | End: 2021-02-08 | Stop reason: SDUPTHER

## 2021-02-08 RX ORDER — DIAZEPAM 5 MG/1
5 TABLET ORAL EVERY 12 HOURS PRN
Qty: 60 TABLET | Refills: 1 | Status: SHIPPED | OUTPATIENT
Start: 2021-02-08 | End: 2021-04-12 | Stop reason: SDUPTHER

## 2021-02-19 ENCOUNTER — APPOINTMENT (OUTPATIENT)
Dept: CT IMAGING | Facility: HOSPITAL | Age: 76
End: 2021-02-19

## 2021-02-19 ENCOUNTER — APPOINTMENT (OUTPATIENT)
Dept: GENERAL RADIOLOGY | Facility: HOSPITAL | Age: 76
End: 2021-02-19

## 2021-02-19 ENCOUNTER — HOSPITAL ENCOUNTER (EMERGENCY)
Facility: HOSPITAL | Age: 76
Discharge: HOME | End: 2021-02-19
Attending: EMERGENCY MEDICINE | Admitting: EMERGENCY MEDICINE

## 2021-02-19 VITALS
DIASTOLIC BLOOD PRESSURE: 70 MMHG | WEIGHT: 164 LBS | HEIGHT: 60 IN | SYSTOLIC BLOOD PRESSURE: 107 MMHG | BODY MASS INDEX: 32.2 KG/M2 | TEMPERATURE: 98.7 F | HEART RATE: 81 BPM | OXYGEN SATURATION: 99 % | RESPIRATION RATE: 18 BRPM

## 2021-02-19 DIAGNOSIS — S42.295A OTHER CLOSED NONDISPLACED FRACTURE OF PROXIMAL END OF LEFT HUMERUS, INITIAL ENCOUNTER: Primary | ICD-10-CM

## 2021-02-19 PROCEDURE — 70450 CT HEAD/BRAIN W/O DYE: CPT | Performed by: RADIOLOGY

## 2021-02-19 PROCEDURE — 71101 X-RAY EXAM UNILAT RIBS/CHEST: CPT

## 2021-02-19 PROCEDURE — 25010000002 MORPHINE PER 10 MG: Performed by: EMERGENCY MEDICINE

## 2021-02-19 PROCEDURE — 73030 X-RAY EXAM OF SHOULDER: CPT

## 2021-02-19 PROCEDURE — 73060 X-RAY EXAM OF HUMERUS: CPT | Performed by: RADIOLOGY

## 2021-02-19 PROCEDURE — 70450 CT HEAD/BRAIN W/O DYE: CPT

## 2021-02-19 PROCEDURE — 73030 X-RAY EXAM OF SHOULDER: CPT | Performed by: RADIOLOGY

## 2021-02-19 PROCEDURE — 73060 X-RAY EXAM OF HUMERUS: CPT

## 2021-02-19 PROCEDURE — 96372 THER/PROPH/DIAG INJ SC/IM: CPT

## 2021-02-19 PROCEDURE — 99282 EMERGENCY DEPT VISIT SF MDM: CPT

## 2021-02-19 PROCEDURE — 72131 CT LUMBAR SPINE W/O DYE: CPT | Performed by: RADIOLOGY

## 2021-02-19 PROCEDURE — 72125 CT NECK SPINE W/O DYE: CPT

## 2021-02-19 PROCEDURE — 72131 CT LUMBAR SPINE W/O DYE: CPT

## 2021-02-19 PROCEDURE — 71101 X-RAY EXAM UNILAT RIBS/CHEST: CPT | Performed by: RADIOLOGY

## 2021-02-19 PROCEDURE — 72125 CT NECK SPINE W/O DYE: CPT | Performed by: RADIOLOGY

## 2021-02-19 RX ORDER — HYDROCODONE BITARTRATE AND ACETAMINOPHEN 10; 325 MG/1; MG/1
1 TABLET ORAL EVERY 6 HOURS PRN
Qty: 10 TABLET | Refills: 0 | Status: SHIPPED | OUTPATIENT
Start: 2021-02-19 | End: 2021-03-09

## 2021-02-19 RX ADMIN — MORPHINE SULFATE 4 MG: 4 INJECTION, SOLUTION INTRAMUSCULAR; INTRAVENOUS at 10:02

## 2021-02-19 NOTE — ED PROVIDER NOTES
Subjective   76-year-old white female presents secondary to continued left shoulder pain.  Patient was seen here recently after fall and had a humerus fracture.  She reports that she also struck her head and has had persistent headache.  She occasionally has blurred vision.  She also complains of neck pain and lower back pain.          Review of Systems   Constitutional: Negative.  Negative for fever.   Respiratory: Negative.    Cardiovascular: Negative.  Negative for chest pain.   Gastrointestinal: Negative.  Negative for abdominal pain.   Endocrine: Negative.    Genitourinary: Negative.  Negative for dysuria.   Skin: Negative.    Neurological: Positive for headaches.   Psychiatric/Behavioral: Negative.    All other systems reviewed and are negative.      Past Medical History:   Diagnosis Date   • Abdominal distension    • Allergic rhinitis    • Anxiety    • Arthritis    • Cough    • Dry eyes    • Dyslipidemia    • Dysuria    • Hyperlipidemia    • Hypertension    • Meniere's disease    • Mitral and aortic valve disease    • Oral candidiasis    • Osteoarthritis    • Stomatitis    • URI, acute    • Vaginal candidiasis    • Vocal cord dysfunction        Allergies   Allergen Reactions   • Z-Ponce [Azithromycin] Swelling   • Cortisone Swelling     Face swelling     • Fish-Derived Products      SEAFOOD   • Iodinated Diagnostic Agents    • Lidocaine Other (See Comments)     Shaking, fever when she had mouth numbed at the dentist   • Morphine Mental Status Change   • Niacin Other (See Comments)     Flushing    • Other    • Sulfa Antibiotics    • Verapamil Other (See Comments)     Feel funny        Past Surgical History:   Procedure Laterality Date   • CHOLECYSTECTOMY     • COLONOSCOPY     • DILATATION AND CURETTAGE     • HEMORRHOIDECTOMY     • HERNIA REPAIR     • SHOULDER SURGERY     • SKIN CANCER EXCISION      on Nose   • TONSILLECTOMY         Family History   Problem Relation Age of Onset   • Diabetes Mother    • Cancer  Father    • Liver disease Sister    • Diabetes Sister    • Liver disease Brother    • Diabetes Brother    • Diabetes Daughter    • Cancer Other    • Liver disease Other    • Breast cancer Neg Hx        Social History     Socioeconomic History   • Marital status:      Spouse name: Not on file   • Number of children: Not on file   • Years of education: Not on file   • Highest education level: Not on file   Tobacco Use   • Smoking status: Never Smoker   • Smokeless tobacco: Never Used   • Tobacco comment: 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   Constitutional:       General: She is not in acute distress.     Appearance: She is well-developed. She is not diaphoretic.   HENT:      Head: Normocephalic and atraumatic.      Right Ear: External ear normal.      Left Ear: External ear normal.      Nose: Nose normal.   Eyes:      Conjunctiva/sclera: Conjunctivae normal.      Pupils: Pupils are equal, round, and reactive to light.   Neck:      Musculoskeletal: Normal range of motion and neck supple.      Vascular: No JVD.      Trachea: No tracheal deviation.   Cardiovascular:      Rate and Rhythm: Normal rate and regular rhythm.      Heart sounds: Normal heart sounds. No murmur.   Pulmonary:      Effort: Pulmonary effort is normal. No respiratory distress.      Breath sounds: Normal breath sounds. No wheezing.   Abdominal:      General: Bowel sounds are normal.      Palpations: Abdomen is soft.      Tenderness: There is no abdominal tenderness.   Musculoskeletal: Normal range of motion.         General: No deformity.      Comments: Fracture appropriate bruising to patient's left upper arm.  She is tenderness over left proximal humerus.  Neurovascular intact distally.   Skin:     General: Skin is warm and dry.      Coloration: Skin is  not pale.      Findings: No erythema or rash.   Neurological:      Mental Status: She is alert and oriented to person, place, and time.      Cranial Nerves: No cranial nerve deficit.   Psychiatric:         Behavior: Behavior normal.         Thought Content: Thought content normal.         Procedures           ED Course                                           MDM  Number of Diagnoses or Management Options  Other closed nondisplaced fracture of proximal end of left humerus, initial encounter: established and improving     Amount and/or Complexity of Data Reviewed  Tests in the radiology section of CPT®: ordered and reviewed  Independent visualization of images, tracings, or specimens: yes        Final diagnoses:   Other closed nondisplaced fracture of proximal end of left humerus, initial encounter            Frank Gimenez PA  02/19/21 1819

## 2021-03-09 ENCOUNTER — OFFICE VISIT (OUTPATIENT)
Dept: FAMILY MEDICINE CLINIC | Facility: CLINIC | Age: 76
End: 2021-03-09

## 2021-03-09 VITALS
WEIGHT: 164 LBS | HEART RATE: 73 BPM | TEMPERATURE: 98 F | BODY MASS INDEX: 32.2 KG/M2 | OXYGEN SATURATION: 95 % | HEIGHT: 60 IN

## 2021-03-09 DIAGNOSIS — S42.215D CLOSED NONDISPLACED FRACTURE OF SURGICAL NECK OF LEFT HUMERUS WITH ROUTINE HEALING, UNSPECIFIED FRACTURE MORPHOLOGY, SUBSEQUENT ENCOUNTER: Primary | ICD-10-CM

## 2021-03-09 DIAGNOSIS — H81.03 MENIERE'S DISEASE OF BOTH EARS: ICD-10-CM

## 2021-03-09 DIAGNOSIS — I10 ESSENTIAL HYPERTENSION: ICD-10-CM

## 2021-03-09 DIAGNOSIS — E78.2 MIXED HYPERLIPIDEMIA: ICD-10-CM

## 2021-03-09 PROCEDURE — 99442 PR PHYS/QHP TELEPHONE EVALUATION 11-20 MIN: CPT | Performed by: FAMILY MEDICINE

## 2021-03-09 RX ORDER — ROSUVASTATIN CALCIUM 5 MG/1
5 TABLET, COATED ORAL EVERY OTHER DAY
Qty: 15 TABLET | Refills: 3 | Status: SHIPPED | OUTPATIENT
Start: 2021-03-09 | End: 2021-07-22 | Stop reason: SDUPTHER

## 2021-03-09 NOTE — PROGRESS NOTES
Subjective   Edgar Cotto is a 76 y.o. female.     History of Present Illness follow-up regarding hypertension but also challenges focused around the fall and subsequent fracture of left humerus.  Has followed with orthopedist and [facility] receiving physical therapy.  Current notes not available.  Having significant difficulty getting up and down from chair and home desires lift product realizing may have to pay for it herself.  Also desires to have a rolling walker with a seat.  The main years longstanding as become exacerbated by this fall and the challenges.  Blood pressure been doing good.  States has had no chest CDV GI  concerns at current time.    The following portions of the patient's history were reviewed and updated as appropriate: allergies, current medications, past family history, past medical history, past surgical history and problem list.    Review of Systems  See history of Present Illness     Objective     Physical Exam   Psychiatric: She has a normal mood and affect.       PHQ-9 Total Score:      Patient's Body mass index is 32.03 kg/m². BMI is above normal parameters. Recommendations include: exercise counseling and nutrition counseling.   (Normal BMI:  18.5-24.9, OW 25-29.9, Obesity 30 or greater)      Assessment/Plan     Diagnoses and all orders for this visit:    1. Closed nondisplaced fracture of surgical neck of left humerus with routine healing, unspecified fracture morphology, subsequent encounter (Primary)    2. Mixed hyperlipidemia  -     rosuvastatin (CRESTOR) 5 MG tablet; Take 1 tablet by mouth Every Other Day.  Dispense: 15 tablet; Refill: 3    3. Essential hypertension    4. Meniere's disease of both ears    Discussed ongoing challenges with the orthopedic injury.  The dizziness chronic in nature.  Some renewals authorized.  At your request we will authorize a lift chair.  You understand it may not be covered by insurance.  At your request authorized a rolling walker with a  seat to help you become more mobile and safe because of the episodic dizziness made worse by the fall and course the fracture.  You continue with physical therapy and [facility] as well as orthopedist there.  We will ask you to recheck here in a few months routinely.    You have chosen to receive care through a telephone visit. Do you consent to use a telephone visit for your medical care today? Yes    This visit has been rescheduled as a phone visit to comply with patient safety concerns in accordance with CDC recommendations. Total time of discussion was 11 minutes.                        This document has been electronically signed by Rahat Ruby MD   March 9, 2021 15:11 EST

## 2021-04-12 DIAGNOSIS — H81.03 MENIERE'S DISEASE OF BOTH EARS: ICD-10-CM

## 2021-04-12 RX ORDER — DIAZEPAM 5 MG/1
5 TABLET ORAL EVERY 12 HOURS PRN
Qty: 60 TABLET | Refills: 1 | Status: SHIPPED | OUTPATIENT
Start: 2021-04-12 | End: 2021-06-24

## 2021-04-12 NOTE — TELEPHONE ENCOUNTER
Caller: Edgar Cotto    Relationship: Self    Best call back number: [phone]    Medication needed:   Requested Prescriptions     Pending Prescriptions Disp Refills   • diazePAM (VALIUM) 5 MG tablet 60 tablet 1     Sig: Take 1 tablet by mouth Every 12 (Twelve) Hours As Needed (Dizziness vertigo).       When do you need the refill by: 04/12/2021    What additional details did the patient provide when requesting the medication: PATIENT IS OUT OF MEDICATION     Does the patient have less than a 3 day supply:  [x] Yes  [] No    What is the patient's preferred pharmacy: [place], [place] -  [address] [place] - [phone] [facility] [phone] FX

## 2021-05-03 ENCOUNTER — OFFICE VISIT (OUTPATIENT)
Dept: FAMILY MEDICINE CLINIC | Facility: CLINIC | Age: 76
End: 2021-05-03

## 2021-05-03 VITALS
OXYGEN SATURATION: 96 % | WEIGHT: 167 LBS | DIASTOLIC BLOOD PRESSURE: 80 MMHG | SYSTOLIC BLOOD PRESSURE: 120 MMHG | TEMPERATURE: 97.5 F | BODY MASS INDEX: 32.79 KG/M2 | HEART RATE: 100 BPM | HEIGHT: 60 IN | RESPIRATION RATE: 16 BRPM

## 2021-05-03 DIAGNOSIS — J30.1 SEASONAL ALLERGIC RHINITIS DUE TO POLLEN: ICD-10-CM

## 2021-05-03 DIAGNOSIS — M13.0 POLYARTHRITIS: Chronic | ICD-10-CM

## 2021-05-03 DIAGNOSIS — G89.29 CHRONIC BILATERAL LOW BACK PAIN, UNSPECIFIED WHETHER SCIATICA PRESENT: Primary | Chronic | ICD-10-CM

## 2021-05-03 DIAGNOSIS — M54.50 CHRONIC BILATERAL LOW BACK PAIN, UNSPECIFIED WHETHER SCIATICA PRESENT: Primary | Chronic | ICD-10-CM

## 2021-05-03 PROCEDURE — 99214 OFFICE O/P EST MOD 30 MIN: CPT | Performed by: NURSE PRACTITIONER

## 2021-05-03 NOTE — PROGRESS NOTES
"Subjective   Edgar Cotto is a 76 y.o. female.     Chief Complaint   Patient presents with   • Hip Pain       She presents with c/o right hip pain and low back pain. She also c/o pain in her shoulder and one of her knees. She fell a few months ago and has had severe headaches since then. She is requesting a referral to pain management. She has Meniere's disease. She c/o neck pain from falling down the steps. She is still doing physical therapy where she broke her shoulder.        The following portions of the patient's history were reviewed and updated as appropriate: allergies, current medications, past family history, past medical history, past social history, past surgical history and problem list.    Review of Systems   Constitutional: Negative for fatigue, fever and unexpected weight change.   HENT: Negative for ear pain, rhinorrhea and sore throat.    Eyes: Negative for visual disturbance.   Respiratory: Negative for cough, chest tightness and shortness of breath.    Cardiovascular: Negative for chest pain, palpitations and leg swelling.   Gastrointestinal: Negative for abdominal pain, blood in stool, constipation, diarrhea, nausea and vomiting.   Endocrine: Negative for cold intolerance and heat intolerance.   Genitourinary: Negative for dysuria and hematuria.   Musculoskeletal: Positive for arthralgias, back pain, myalgias, neck pain and neck stiffness.   Skin: Negative for color change.   Allergic/Immunologic: Negative for environmental allergies.   Neurological: Positive for dizziness and headaches.   Hematological: Negative for adenopathy.   Psychiatric/Behavioral: Negative for suicidal ideas. The patient is not nervous/anxious.        Objective     /80 (BP Location: Right arm, Patient Position: Sitting, Cuff Size: Adult)   Pulse 100   Temp 97.5 °F (36.4 °C) (Temporal)   Resp 16   Ht 152.4 cm (60\")   Wt 75.8 kg (167 lb)   SpO2 96%   BMI 32.61 kg/m²     Physical Exam  Vitals reviewed. "   Constitutional:       General: She is not in acute distress.     Appearance: Normal appearance. She is well-developed. She is not diaphoretic.   HENT:      Head: Normocephalic and atraumatic.      Right Ear: Hearing, tympanic membrane, ear canal and external ear normal.      Left Ear: Hearing, tympanic membrane, ear canal and external ear normal.      Mouth/Throat:      Pharynx: Oropharynx is clear.      Comments: Post nasal drainage noted.  Cardiovascular:      Rate and Rhythm: Normal rate and regular rhythm.      Heart sounds: Normal heart sounds, S1 normal and S2 normal. No murmur heard.   No friction rub. No gallop.    Pulmonary:      Effort: Pulmonary effort is normal. No respiratory distress.      Breath sounds: Normal breath sounds.   Abdominal:      General: Bowel sounds are normal. There is no distension.      Palpations: Abdomen is soft.      Tenderness: There is no abdominal tenderness.   Musculoskeletal:      Comments: Walking with cane.   Skin:     General: Skin is warm and dry.   Neurological:      Mental Status: She is alert and oriented to person, place, and time.   Psychiatric:         Behavior: Behavior normal.         Thought Content: Thought content normal.         Judgment: Judgment normal.         Assessment/Plan     Problem List Items Addressed This Visit        Allergies and Adverse Reactions    Seasonal allergic rhinitis      Other Visit Diagnoses     Chronic bilateral low back pain, unspecified whether sciatica present    -  Primary    Relevant Orders    Ambulatory Referral to Pain Management (Completed)    Polyarthritis        Relevant Orders    Ambulatory Referral to Pain Management (Completed)          Plan: Referred to Dr. Monk per her request for pain management. Try flonase for your post nasal drainage and ear pain. Pain medication may make your dizziness worse. She is aware. Keep scheduled follow up.    Patient's Body mass index is 32.61 kg/m². BMI is above normal parameters.  Recommendations include: none (medical contraindication).   (Normal BMI:  18.5-24.9, OW 25-29.9, Obesity 30 or greater)             Understands disease processes and need for medications.  Understands reasons for urgent and emergent care.  Patient (& family) verbalized agreement for treatment plan.   Emotional support and active listening provided.  Patient provided time to verbalize feelings.               This document has been electronically signed by JOSSELINE Wharton   May 3, 2021 15:33 EDT

## 2021-05-06 ENCOUNTER — APPOINTMENT (OUTPATIENT)
Dept: CT IMAGING | Facility: HOSPITAL | Age: 76
End: 2021-05-06

## 2021-05-06 ENCOUNTER — HOSPITAL ENCOUNTER (EMERGENCY)
Facility: HOSPITAL | Age: 76
Discharge: HOME | End: 2021-05-06
Attending: EMERGENCY MEDICINE | Admitting: EMERGENCY MEDICINE

## 2021-05-06 VITALS
RESPIRATION RATE: 16 BRPM | WEIGHT: 169 LBS | TEMPERATURE: 98.2 F | SYSTOLIC BLOOD PRESSURE: 94 MMHG | DIASTOLIC BLOOD PRESSURE: 56 MMHG | OXYGEN SATURATION: 97 % | HEART RATE: 93 BPM | BODY MASS INDEX: 33.18 KG/M2 | HEIGHT: 60 IN

## 2021-05-06 DIAGNOSIS — M54.41 CHRONIC MIDLINE LOW BACK PAIN WITH RIGHT-SIDED SCIATICA: Primary | ICD-10-CM

## 2021-05-06 DIAGNOSIS — G89.29 CHRONIC MIDLINE LOW BACK PAIN WITH RIGHT-SIDED SCIATICA: Primary | ICD-10-CM

## 2021-05-06 LAB
ALBUMIN SERPL-MCNC: 4.23 G/DL (ref 3.5–5.2)
ALBUMIN/GLOB SERPL: 1.2 G/DL
ALP SERPL-CCNC: 78 U/L (ref 39–117)
ALT SERPL W P-5'-P-CCNC: 11 U/L (ref 1–33)
ANION GAP SERPL CALCULATED.3IONS-SCNC: 11.6 MMOL/L (ref 5–15)
AST SERPL-CCNC: 22 U/L (ref 1–32)
BACTERIA UR QL AUTO: NORMAL /HPF
BASOPHILS # BLD AUTO: 0.06 10*3/MM3 (ref 0–0.2)
BASOPHILS NFR BLD AUTO: 0.6 % (ref 0–1.5)
BILIRUB SERPL-MCNC: 0.5 MG/DL (ref 0–1.2)
BILIRUB UR QL STRIP: NEGATIVE
BUN SERPL-MCNC: 12 MG/DL (ref 8–23)
BUN/CREAT SERPL: 15 (ref 7–25)
CALCIUM SPEC-SCNC: 9.7 MG/DL (ref 8.6–10.5)
CHLORIDE SERPL-SCNC: 102 MMOL/L (ref 98–107)
CLARITY UR: CLEAR
CO2 SERPL-SCNC: 28.4 MMOL/L (ref 22–29)
COLOR UR: YELLOW
CREAT SERPL-MCNC: 0.8 MG/DL (ref 0.57–1)
CRP SERPL-MCNC: 0.32 MG/DL (ref 0–0.5)
DEPRECATED RDW RBC AUTO: 43.6 FL (ref 37–54)
EOSINOPHIL # BLD AUTO: 0.08 10*3/MM3 (ref 0–0.4)
EOSINOPHIL NFR BLD AUTO: 0.7 % (ref 0.3–6.2)
ERYTHROCYTE [DISTWIDTH] IN BLOOD BY AUTOMATED COUNT: 12.2 % (ref 12.3–15.4)
ERYTHROCYTE [SEDIMENTATION RATE] IN BLOOD: 12 MM/HR (ref 0–30)
GFR SERPL CREATININE-BSD FRML MDRD: 69 ML/MIN/1.73
GLOBULIN UR ELPH-MCNC: 3.7 GM/DL
GLUCOSE SERPL-MCNC: 124 MG/DL (ref 65–99)
GLUCOSE UR STRIP-MCNC: NEGATIVE MG/DL
HCT VFR BLD AUTO: 47.9 % (ref 34–46.6)
HGB BLD-MCNC: 15.2 G/DL (ref 12–15.9)
HGB UR QL STRIP.AUTO: NEGATIVE
HOLD SPECIMEN: NORMAL
HOLD SPECIMEN: NORMAL
HYALINE CASTS UR QL AUTO: NORMAL /LPF
IMM GRANULOCYTES # BLD AUTO: 0.02 10*3/MM3 (ref 0–0.05)
IMM GRANULOCYTES NFR BLD AUTO: 0.2 % (ref 0–0.5)
KETONES UR QL STRIP: NEGATIVE
LEUKOCYTE ESTERASE UR QL STRIP.AUTO: ABNORMAL
LYMPHOCYTES # BLD AUTO: 1.56 10*3/MM3 (ref 0.7–3.1)
LYMPHOCYTES NFR BLD AUTO: 14.6 % (ref 19.6–45.3)
MCH RBC QN AUTO: 30.6 PG (ref 26.6–33)
MCHC RBC AUTO-ENTMCNC: 31.7 G/DL (ref 31.5–35.7)
MCV RBC AUTO: 96.6 FL (ref 79–97)
MONOCYTES # BLD AUTO: 0.76 10*3/MM3 (ref 0.1–0.9)
MONOCYTES NFR BLD AUTO: 7.1 % (ref 5–12)
NEUTROPHILS NFR BLD AUTO: 76.8 % (ref 42.7–76)
NEUTROPHILS NFR BLD AUTO: 8.24 10*3/MM3 (ref 1.7–7)
NITRITE UR QL STRIP: NEGATIVE
NRBC BLD AUTO-RTO: 0 /100 WBC (ref 0–0.2)
PH UR STRIP.AUTO: 8 [PH] (ref 5–8)
PLATELET # BLD AUTO: 189 10*3/MM3 (ref 140–450)
PMV BLD AUTO: 10.4 FL (ref 6–12)
POTASSIUM SERPL-SCNC: 4.2 MMOL/L (ref 3.5–5.2)
PROT SERPL-MCNC: 7.9 G/DL (ref 6–8.5)
PROT UR QL STRIP: NEGATIVE
QT INTERVAL: 328 MS
QTC INTERVAL: 441 MS
RBC # BLD AUTO: 4.96 10*6/MM3 (ref 3.77–5.28)
RBC # UR: NORMAL /HPF
REF LAB TEST METHOD: NORMAL
SODIUM SERPL-SCNC: 142 MMOL/L (ref 136–145)
SP GR UR STRIP: 1.01 (ref 1–1.03)
SQUAMOUS #/AREA URNS HPF: NORMAL /HPF
TROPONIN T SERPL-MCNC: <0.01 NG/ML (ref 0–0.03)
UROBILINOGEN UR QL STRIP: ABNORMAL
WBC # BLD AUTO: 10.72 10*3/MM3 (ref 3.4–10.8)
WBC UR QL AUTO: NORMAL /HPF
WHOLE BLOOD HOLD SPECIMEN: NORMAL
WHOLE BLOOD HOLD SPECIMEN: NORMAL

## 2021-05-06 PROCEDURE — 85652 RBC SED RATE AUTOMATED: CPT | Performed by: PHYSICIAN ASSISTANT

## 2021-05-06 PROCEDURE — 93005 ELECTROCARDIOGRAM TRACING: CPT | Performed by: PHYSICIAN ASSISTANT

## 2021-05-06 PROCEDURE — 80053 COMPREHEN METABOLIC PANEL: CPT | Performed by: PHYSICIAN ASSISTANT

## 2021-05-06 PROCEDURE — 81001 URINALYSIS AUTO W/SCOPE: CPT | Performed by: PHYSICIAN ASSISTANT

## 2021-05-06 PROCEDURE — 72192 CT PELVIS W/O DYE: CPT

## 2021-05-06 PROCEDURE — 85025 COMPLETE CBC W/AUTO DIFF WBC: CPT | Performed by: PHYSICIAN ASSISTANT

## 2021-05-06 PROCEDURE — 99284 EMERGENCY DEPT VISIT MOD MDM: CPT

## 2021-05-06 PROCEDURE — 72131 CT LUMBAR SPINE W/O DYE: CPT

## 2021-05-06 PROCEDURE — 84484 ASSAY OF TROPONIN QUANT: CPT | Performed by: PHYSICIAN ASSISTANT

## 2021-05-06 PROCEDURE — 72192 CT PELVIS W/O DYE: CPT | Performed by: RADIOLOGY

## 2021-05-06 PROCEDURE — 72131 CT LUMBAR SPINE W/O DYE: CPT | Performed by: RADIOLOGY

## 2021-05-06 PROCEDURE — 86140 C-REACTIVE PROTEIN: CPT | Performed by: PHYSICIAN ASSISTANT

## 2021-05-06 RX ORDER — HYDROCODONE BITARTRATE AND ACETAMINOPHEN 5; 325 MG/1; MG/1
1 TABLET ORAL EVERY 6 HOURS PRN
Qty: 10 TABLET | Refills: 0 | Status: SHIPPED | OUTPATIENT
Start: 2021-05-06 | End: 2021-07-22

## 2021-05-06 RX ORDER — HYDROCODONE BITARTRATE AND ACETAMINOPHEN 5; 325 MG/1; MG/1
1 TABLET ORAL ONCE
Status: COMPLETED | OUTPATIENT
Start: 2021-05-06 | End: 2021-05-06

## 2021-05-06 RX ADMIN — HYDROCODONE BITARTRATE AND ACETAMINOPHEN 1 TABLET: 5; 325 TABLET ORAL at 11:40

## 2021-06-24 DIAGNOSIS — H81.03 MENIERE'S DISEASE OF BOTH EARS: ICD-10-CM

## 2021-06-24 RX ORDER — DIAZEPAM 5 MG/1
TABLET ORAL
Qty: 60 TABLET | Refills: 0 | Status: SHIPPED | OUTPATIENT
Start: 2021-06-24 | End: 2021-06-25 | Stop reason: SDUPTHER

## 2021-06-25 RX ORDER — DIAZEPAM 5 MG/1
5 TABLET ORAL EVERY 12 HOURS PRN
Qty: 60 TABLET | Refills: 0 | Status: SHIPPED | OUTPATIENT
Start: 2021-06-25 | End: 2021-07-22 | Stop reason: SDUPTHER

## 2021-07-14 ENCOUNTER — HOSPITAL ENCOUNTER (EMERGENCY)
Facility: HOSPITAL | Age: 76
Discharge: HOME | End: 2021-07-15
Attending: EMERGENCY MEDICINE | Admitting: EMERGENCY MEDICINE

## 2021-07-14 DIAGNOSIS — M54.50 ACUTE BILATERAL LOW BACK PAIN WITHOUT SCIATICA: Primary | ICD-10-CM

## 2021-07-14 PROCEDURE — 99283 EMERGENCY DEPT VISIT LOW MDM: CPT

## 2021-07-14 RX ORDER — HYDROCODONE BITARTRATE AND ACETAMINOPHEN 7.5; 325 MG/1; MG/1
1 TABLET ORAL ONCE
Status: COMPLETED | OUTPATIENT
Start: 2021-07-14 | End: 2021-07-14

## 2021-07-14 RX ADMIN — HYDROCODONE BITARTRATE AND ACETAMINOPHEN 1 TABLET: 7.5; 325 TABLET ORAL at 23:33

## 2021-07-15 ENCOUNTER — APPOINTMENT (OUTPATIENT)
Dept: CT IMAGING | Facility: HOSPITAL | Age: 76
End: 2021-07-15

## 2021-07-15 VITALS
HEIGHT: 60 IN | RESPIRATION RATE: 18 BRPM | BODY MASS INDEX: 31.41 KG/M2 | DIASTOLIC BLOOD PRESSURE: 88 MMHG | TEMPERATURE: 98.8 F | WEIGHT: 160 LBS | HEART RATE: 90 BPM | OXYGEN SATURATION: 98 % | SYSTOLIC BLOOD PRESSURE: 142 MMHG

## 2021-07-15 PROCEDURE — 72131 CT LUMBAR SPINE W/O DYE: CPT

## 2021-07-15 NOTE — ED PROVIDER NOTES
Subjective   Pt was at home and back pain started not sure what happened. Began when she stood up. No fall/ or know injury noted. Pt states she heard a pop. Came to ER because she was in significant pain. Took home pain med pta         History provided by:  Patient   used: No    Back Pain  Location:  Lumbar spine  Quality:  Aching  Radiates to:  Does not radiate  Pain severity:  Mild  Pain is:  Worse during the day  Onset quality:  Sudden  Duration:  1 hour  Timing:  Constant  Progression:  Worsening  Chronicity:  New  Context: twisting    Relieved by:  Nothing  Worsened by:  Ambulation, movement and twisting  Ineffective treatments:  None tried  Associated symptoms: no bladder incontinence, no bowel incontinence and no paresthesias        Review of Systems   Gastrointestinal: Negative for bowel incontinence.   Genitourinary: Negative for bladder incontinence.   Musculoskeletal: Positive for back pain.   Neurological: Negative for paresthesias.       Past Medical History:   Diagnosis Date   • Abdominal distension    • Allergic rhinitis    • Anxiety    • Arthritis    • Cough    • Dry eyes    • Dyslipidemia    • Dysuria    • Hyperlipidemia    • Hypertension    • Meniere's disease    • Mitral and aortic valve disease    • Oral candidiasis    • Osteoarthritis    • Stomatitis    • URI, acute    • Vaginal candidiasis    • Vocal cord dysfunction        Allergies   Allergen Reactions   • Z-Ponce [Azithromycin] Swelling   • Cortisone Swelling     Face swelling     • Fish-Derived Products      SEAFOOD   • Iodinated Diagnostic Agents    • Lidocaine Other (See Comments)     Shaking, fever when she had mouth numbed at the dentist   • Morphine Mental Status Change   • Niacin Other (See Comments)     Flushing    • Other    • Sulfa Antibiotics    • Verapamil Other (See Comments)     Feel funny        Past Surgical History:   Procedure Laterality Date   • CHOLECYSTECTOMY     • COLONOSCOPY     • DILATATION AND  CURETTAGE     • HEMORRHOIDECTOMY     • HERNIA REPAIR     • SHOULDER SURGERY     • SKIN CANCER EXCISION      on Nose   • TONSILLECTOMY         Family History   Problem Relation Age of Onset   • Diabetes Mother    • Cancer Father    • Liver disease Sister    • Diabetes Sister    • Liver disease Brother    • Diabetes Brother    • Diabetes Daughter    • Cancer Other    • Liver disease Other    • Breast cancer Neg Hx        Social History     Socioeconomic History   • Marital status:      Spouse name: Not on file   • Number of children: Not on file   • Years of education: Not on file   • Highest education level: Not on file   Tobacco Use   • Smoking status: Never Smoker   • Smokeless tobacco: Never Used   • Tobacco comment: 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   Constitutional:       Appearance: She is well-developed.   HENT:      Head: Normocephalic.   Cardiovascular:      Rate and Rhythm: Normal rate and regular rhythm.   Pulmonary:      Effort: Pulmonary effort is normal.      Breath sounds: Normal breath sounds.   Abdominal:      General: Bowel sounds are normal.      Palpations: Abdomen is soft.      Tenderness: There is no abdominal tenderness.   Musculoskeletal:      Cervical back: Neck supple.      Lumbar back: Tenderness present. Decreased range of motion.   Skin:     General: Skin is warm and dry.   Neurological:      Mental Status: She is alert and oriented to person, place, and time.   Psychiatric:         Behavior: Behavior normal.         Thought Content: Thought content normal.         Judgment: Judgment normal.         Procedures           ED Course  ED Course as of Jul 19 2238   Thu Jul 15, 2021   0045 Pt feels better and is ready for d/c     [LC]      ED Course User Index  [LC] Jenniffer Zarate PA                                            MDM    Final diagnoses:   Acute bilateral low back pain without sciatica       ED Disposition  ED Disposition     ED Disposition Condition Comment    Discharge Stable           Rahat Ruby MD  [address] [facility] [address] [phone]    In 2 days           Medication List      No changes were made to your prescriptions during this visit.          Jenniffer Zarate PA  07/19/21 [id number]

## 2021-07-22 ENCOUNTER — OFFICE VISIT (OUTPATIENT)
Dept: FAMILY MEDICINE CLINIC | Facility: CLINIC | Age: 76
End: 2021-07-22

## 2021-07-22 VITALS
SYSTOLIC BLOOD PRESSURE: 140 MMHG | OXYGEN SATURATION: 90 % | TEMPERATURE: 97.3 F | HEART RATE: 99 BPM | BODY MASS INDEX: 32.39 KG/M2 | WEIGHT: 165 LBS | RESPIRATION RATE: 18 BRPM | DIASTOLIC BLOOD PRESSURE: 80 MMHG | HEIGHT: 60 IN

## 2021-07-22 DIAGNOSIS — H81.03 MENIERE'S DISEASE OF BOTH EARS: ICD-10-CM

## 2021-07-22 DIAGNOSIS — H65.114 RECURRENT ACUTE ALLERGIC OTITIS MEDIA OF RIGHT EAR: ICD-10-CM

## 2021-07-22 DIAGNOSIS — I10 ESSENTIAL HYPERTENSION: ICD-10-CM

## 2021-07-22 DIAGNOSIS — G89.29 CHRONIC MIDLINE LOW BACK PAIN WITH RIGHT-SIDED SCIATICA: ICD-10-CM

## 2021-07-22 DIAGNOSIS — E78.2 MIXED HYPERLIPIDEMIA: ICD-10-CM

## 2021-07-22 DIAGNOSIS — M54.41 CHRONIC MIDLINE LOW BACK PAIN WITH RIGHT-SIDED SCIATICA: ICD-10-CM

## 2021-07-22 PROBLEM — E78.5 DYSLIPIDEMIA: Chronic | Status: ACTIVE | Noted: 2019-04-03

## 2021-07-22 PROBLEM — E04.1 THYROID CYST: Chronic | Status: ACTIVE | Noted: 2019-12-18

## 2021-07-22 PROCEDURE — 99214 OFFICE O/P EST MOD 30 MIN: CPT | Performed by: FAMILY MEDICINE

## 2021-07-22 RX ORDER — HYDROCODONE BITARTRATE AND ACETAMINOPHEN 5; 325 MG/1; MG/1
1 TABLET ORAL 2 TIMES DAILY
Qty: 10 TABLET | Refills: 0 | Status: SHIPPED | COMMUNITY
Start: 2021-07-22 | End: 2021-10-25

## 2021-07-22 RX ORDER — FLUTICASONE PROPIONATE 50 MCG
2 SPRAY, SUSPENSION (ML) NASAL DAILY
Qty: 15.8 ML | Refills: 0 | Status: SHIPPED | OUTPATIENT
Start: 2021-07-22 | End: 2022-07-19 | Stop reason: SDUPTHER

## 2021-07-22 RX ORDER — VALSARTAN 40 MG/1
20 TABLET ORAL 2 TIMES DAILY
Qty: 90 TABLET | Refills: 1 | Status: SHIPPED | OUTPATIENT
Start: 2021-07-22 | End: 2022-11-07 | Stop reason: SDUPTHER

## 2021-07-22 RX ORDER — ROSUVASTATIN CALCIUM 5 MG/1
5 TABLET, COATED ORAL EVERY OTHER DAY
Qty: 15 TABLET | Refills: 3 | Status: SHIPPED | OUTPATIENT
Start: 2021-07-22 | End: 2021-12-23

## 2021-07-22 RX ORDER — DIAZEPAM 2 MG/1
2 TABLET ORAL EVERY 12 HOURS PRN
Qty: 60 TABLET | Refills: 0 | Status: SHIPPED | OUTPATIENT
Start: 2021-07-22 | End: 2021-12-16

## 2021-07-22 NOTE — PROGRESS NOTES
Subjective   Edgar Cotto is a 76 y.o. female.     History of Present Illness follow-up hypertension chronic anxiety Ménière's.  Since visiting here has had a few ER visits unfortunately secondary to a fall fracture left humerus has followed with orthopedist in [place].  Has had challenges with low back pain and has had ER visits locally for that.  Has been visiting now with pain management who are prescribing hydrocodone on a regular basis.  Utilizing a cane sometimes a walker.  Living with a family member.  Still lots of challenges with ADLs.  Denies respiratory CDV GI  changes.    The following portions of the patient's history were reviewed and updated as appropriate: allergies, current medications, past medical history, past social history, past surgical history and problem list.    Review of Systems  See history of Present Illness     Objective     Physical Exam  Vitals reviewed.   Constitutional:       Appearance: Normal appearance.      Comments: Using a cane   HENT:      Head: Normocephalic.   Eyes:      Conjunctiva/sclera: Conjunctivae normal.   Cardiovascular:      Rate and Rhythm: Normal rate and regular rhythm.      Heart sounds: Normal heart sounds.   Pulmonary:      Effort: Pulmonary effort is normal.      Breath sounds: Normal breath sounds.   Musculoskeletal:      Cervical back: Normal range of motion and neck supple.      Comments: Slightly decreased range of motion of that left upper extremity   Skin:     General: Skin is warm and dry.   Neurological:      Mental Status: She is alert and oriented to person, place, and time.   Psychiatric:         Mood and Affect: Mood normal.         PHQ-9 Total Score:      There is no height or weight on file to calculate BMI.       Assessment/Plan     Diagnoses and all orders for this visit:    1. Meniere's disease of both ears  -     diazePAM (VALIUM) 2 MG tablet; Take 1 tablet by mouth Every 12 (Twelve) Hours As Needed for Anxiety.  Dispense: 60  tablet; Refill: 0    2. Chronic midline low back pain with right-sided sciatica    3. Mixed hyperlipidemia  -     rosuvastatin (CRESTOR) 5 MG tablet; Take 1 tablet by mouth Every Other Day.  Dispense: 15 tablet; Refill: 3    4. Essential hypertension  -     valsartan (DIOVAN) 40 MG tablet; Take 0.5 tablets by mouth 2 (Two) Times a Day.  Dispense: 90 tablet; Refill: 1    5. Recurrent acute allergic otitis media of right ear  -     fluticasone (Flonase) 50 MCG/ACT nasal spray; 2 sprays into the nostril(s) as directed by provider Daily.  Dispense: 15.8 mL; Refill: 0    In general you seem to be stable and have all needs being met.  Will attempt dose reduction with the Valium to a dose of 2 mg twice daily.  60 no refills.  That might could allow for increased dosing of the hydrocodone from pain management.  Keep follow-ups of pain management and elsewhere.  Stay safely active.  Try to be as mobile as possible.  Maintain pandemic response.  You expressed no current interest in any vaccines.  We will ask you to recheck with us in about 3 months or as needed.    As part of this patient's treatment plan I am prescribing controlled substances. The patient has been made aware of appropriate use of such medications, including potential risk of somnolence, limited ability to drive and/or work safely, and potential for dependence or overdose. It has also been made clear that these medications are for use by this patient only, without concomitant use of alcohol or other substances unless prescribed.  Patient has completed prescribing agreement detailing terms of continued prescribing of controlled substances, including monitoring LUIS MANUEL reports, urine drug screening, and pill counts if necessary. The patient is aware that inappropriate use will results in cessation of prescribing such medications.  LUIS MANUEL report has been reviewed.  LUIS MANUEL is updated every 3 months.  History and physical exam exhibit continued safe and appropriate  use of controlled substances.                        This document has been electronically signed by Rahat Ruby MD   July 22, 2021 10:22 EDT    Part of this note may be an electronic transcription/translation of spoken language to printed text using the Dragon Dictation System.

## 2021-09-20 ENCOUNTER — TELEPHONE (OUTPATIENT)
Dept: FAMILY MEDICINE CLINIC | Facility: CLINIC | Age: 76
End: 2021-09-20

## 2021-09-20 RX ORDER — DOXYCYCLINE 100 MG/1
100 CAPSULE ORAL 2 TIMES DAILY
Qty: 20 CAPSULE | Refills: 0 | Status: SHIPPED | OUTPATIENT
Start: 2021-09-20 | End: 2021-09-30

## 2021-09-20 NOTE — TELEPHONE ENCOUNTER
Called [phone] notified patient of medication being called into pharmacy and she verbalized understanding.

## 2021-09-20 NOTE — TELEPHONE ENCOUNTER
Sinus infection c/o nasal congestion and ear ache started three or four days ago. Patient taking tylenol with no relief. Patient states that she has stayed home and hasn't been around anyone with covid symptoms. Allergies have really flared up this year. [facility].

## 2021-09-20 NOTE — TELEPHONE ENCOUNTER
PATIENT IS NOT FEELING WELL AND WOULD LIKE TO SEE IF HER PCP WOULD CALL IN SOMETHING TO TREAT A POSSIBLE SINUS INFECTION. SHE ALSO HAS AN EAR ACHE.    CONTACT: [phone]

## 2021-10-14 ENCOUNTER — OFFICE VISIT (OUTPATIENT)
Dept: FAMILY MEDICINE CLINIC | Facility: CLINIC | Age: 76
End: 2021-10-14

## 2021-10-14 VITALS
WEIGHT: 160 LBS | SYSTOLIC BLOOD PRESSURE: 128 MMHG | HEART RATE: 87 BPM | TEMPERATURE: 97.8 F | DIASTOLIC BLOOD PRESSURE: 80 MMHG | RESPIRATION RATE: 18 BRPM | HEIGHT: 60 IN | OXYGEN SATURATION: 93 % | BODY MASS INDEX: 31.41 KG/M2

## 2021-10-14 DIAGNOSIS — L98.9 SKIN LESION: ICD-10-CM

## 2021-10-14 DIAGNOSIS — R21 RASH: ICD-10-CM

## 2021-10-14 DIAGNOSIS — J06.9 ACUTE URI: Primary | ICD-10-CM

## 2021-10-14 LAB
FLUAV RNA RESP QL NAA+PROBE: NOT DETECTED
FLUBV RNA RESP QL NAA+PROBE: NOT DETECTED
SARS-COV-2 RNA RESP QL NAA+PROBE: NOT DETECTED

## 2021-10-14 PROCEDURE — 99213 OFFICE O/P EST LOW 20 MIN: CPT | Performed by: NURSE PRACTITIONER

## 2021-10-14 PROCEDURE — 87636 SARSCOV2 & INF A&B AMP PRB: CPT | Performed by: NURSE PRACTITIONER

## 2021-10-14 RX ORDER — CLOTRIMAZOLE AND BETAMETHASONE DIPROPIONATE 10; .64 MG/G; MG/G
1 CREAM TOPICAL 2 TIMES DAILY
Qty: 45 G | Refills: 1 | Status: SHIPPED | OUTPATIENT
Start: 2021-10-14 | End: 2022-07-19

## 2021-10-14 RX ORDER — AMOXICILLIN AND CLAVULANATE POTASSIUM 875; 125 MG/1; MG/1
1 TABLET, FILM COATED ORAL 2 TIMES DAILY
Qty: 20 TABLET | Refills: 0 | Status: SHIPPED | OUTPATIENT
Start: 2021-10-14 | End: 2021-10-25

## 2021-10-14 NOTE — PROGRESS NOTES
Subjective   Edgar Cotto is a 76 y.o. female.     Chief Complaint   Patient presents with   • Nasal Congestion       She presents with c/o nasal congestion, headache, and earache for the past few days. She hasn't been out of the house into a store in a few months. She has not lost taste or smell. She is taking mucinex. She has been taking tylenol sinus as well that has helped a little bit. She has been using allergy eye drops. She has not had the covid vaccine. She c/o a rash on her vagina that she has had for 30 years. She states it bleeds. She has had two biopsies and has been prescribed cream but it continues to get worse.        The following portions of the patient's history were reviewed and updated as appropriate: allergies, current medications, past family history, past medical history, past social history, past surgical history and problem list.    Review of Systems   Constitutional: Positive for chills (chronic). Negative for fatigue, fever and unexpected weight change.   HENT: Positive for ear pain and rhinorrhea. Negative for sore throat.    Eyes: Negative for visual disturbance.   Respiratory: Positive for cough. Negative for chest tightness and shortness of breath.    Cardiovascular: Negative for chest pain, palpitations and leg swelling.   Gastrointestinal: Negative for abdominal pain, blood in stool, constipation, diarrhea, nausea and vomiting.   Endocrine: Negative for cold intolerance and heat intolerance.   Genitourinary: Negative for dysuria and hematuria.   Musculoskeletal: Positive for arthralgias and myalgias.   Skin: Positive for rash. Negative for color change.   Allergic/Immunologic: Negative for environmental allergies.   Neurological: Positive for headaches.   Hematological: Negative for adenopathy.   Psychiatric/Behavioral: Negative for suicidal ideas. The patient is not nervous/anxious.        Objective     /80 (BP Location: Right arm, Patient Position: Sitting, Cuff Size:  "Adult)   Pulse 87   Temp 97.8 °F (36.6 °C) (Temporal)   Resp 18   Ht 152.4 cm (60\")   Wt 72.6 kg (160 lb)   SpO2 93%   BMI 31.25 kg/m²     Physical Exam  Vitals reviewed. Exam conducted with a chaperone present.   Constitutional:       General: She is not in acute distress.     Appearance: Normal appearance. She is well-developed. She is not diaphoretic.   HENT:      Head: Normocephalic and atraumatic.      Right Ear: Hearing, tympanic membrane, ear canal and external ear normal.      Left Ear: Hearing, tympanic membrane, ear canal and external ear normal.      Nose: Nose normal.      Right Sinus: No maxillary sinus tenderness or frontal sinus tenderness.      Left Sinus: No maxillary sinus tenderness or frontal sinus tenderness.      Mouth/Throat:      Pharynx: Uvula midline.   Eyes:      General: Lids are normal.      Conjunctiva/sclera: Conjunctivae normal.   Neck:      Trachea: Trachea normal. No tracheal tenderness or tracheal deviation.   Cardiovascular:      Rate and Rhythm: Normal rate and regular rhythm.      Heart sounds: Normal heart sounds, S1 normal and S2 normal. No murmur heard.  No friction rub. No gallop.    Pulmonary:      Effort: Pulmonary effort is normal. No respiratory distress.      Breath sounds: Normal breath sounds.   Abdominal:      General: Bowel sounds are normal. There is no distension.      Palpations: Abdomen is soft.      Tenderness: There is no abdominal tenderness.   Genitourinary:         Comments: Erythematous patch on suprapubic area with central blanching. Scabbing and bleeding noted.   Phuong Brunson  is chaperone.  Lymphadenopathy:      Cervical: No cervical adenopathy.   Skin:     General: Skin is warm and dry.   Neurological:      Mental Status: She is alert and oriented to person, place, and time.   Psychiatric:         Behavior: Behavior normal.         Thought Content: Thought content normal.         Judgment: Judgment normal.         Assessment/Plan     Problem " List Items Addressed This Visit     None      Visit Diagnoses     Acute URI    -  Primary    Relevant Medications    amoxicillin-clavulanate (Augmentin) 875-125 MG per tablet    Other Relevant Orders    COVID-19 and FLU A/B PCR - Swab, Nasopharynx    Rash        Relevant Medications    clotrimazole-betamethasone (Lotrisone) 1-0.05 % cream    Other Relevant Orders    Ambulatory Referral to Gynecology (Completed)    Skin lesion        Relevant Medications    clotrimazole-betamethasone (Lotrisone) 1-0.05 % cream    Other Relevant Orders    Ambulatory Referral to Gynecology (Completed)          Plan: Get covid swab. Self quarantine. Go to ER with chest pain or shortness of breath. Lotrisone ordered for rash, refer to gynecology for biopsy. Keep scheduled follow up and follow up as needed.     @Body mass index is 31.25 kg/m².         Understands disease processes and need for medications.  Understands reasons for urgent and emergent care.  Patient (& family) verbalized agreement for treatment plan.   Emotional support and active listening provided.  Patient provided time to verbalize feelings.               This document has been electronically signed by JOSSELINE Wharton   October 14, 2021 10:18 EDT

## 2021-10-25 ENCOUNTER — OFFICE VISIT (OUTPATIENT)
Dept: FAMILY MEDICINE CLINIC | Facility: CLINIC | Age: 76
End: 2021-10-25

## 2021-10-25 VITALS
HEART RATE: 96 BPM | OXYGEN SATURATION: 97 % | HEIGHT: 60 IN | BODY MASS INDEX: 31.25 KG/M2 | DIASTOLIC BLOOD PRESSURE: 83 MMHG | SYSTOLIC BLOOD PRESSURE: 125 MMHG | TEMPERATURE: 97.1 F | WEIGHT: 159.2 LBS

## 2021-10-25 DIAGNOSIS — G89.29 CHRONIC MIDLINE LOW BACK PAIN WITH RIGHT-SIDED SCIATICA: ICD-10-CM

## 2021-10-25 DIAGNOSIS — E78.5 DYSLIPIDEMIA: Chronic | ICD-10-CM

## 2021-10-25 DIAGNOSIS — Z78.0 POSTMENOPAUSAL: ICD-10-CM

## 2021-10-25 DIAGNOSIS — F41.1 GENERALIZED ANXIETY DISORDER: Chronic | ICD-10-CM

## 2021-10-25 DIAGNOSIS — I10 ESSENTIAL HYPERTENSION: Primary | Chronic | ICD-10-CM

## 2021-10-25 DIAGNOSIS — Z00.00 MEDICARE ANNUAL WELLNESS VISIT, SUBSEQUENT: ICD-10-CM

## 2021-10-25 DIAGNOSIS — H81.03 MENIERE'S DISEASE OF BOTH EARS: Chronic | ICD-10-CM

## 2021-10-25 DIAGNOSIS — M54.41 CHRONIC MIDLINE LOW BACK PAIN WITH RIGHT-SIDED SCIATICA: ICD-10-CM

## 2021-10-25 PROBLEM — S42.215D CLOSED NONDISPLACED FRACTURE OF SURGICAL NECK OF LEFT HUMERUS WITH ROUTINE HEALING: Status: RESOLVED | Noted: 2021-03-09 | Resolved: 2021-10-25

## 2021-10-25 LAB
ALBUMIN SERPL-MCNC: 4.2 G/DL (ref 3.5–5.2)
ALBUMIN/GLOB SERPL: 1.4 G/DL
ALP SERPL-CCNC: 66 U/L (ref 39–117)
ALT SERPL W P-5'-P-CCNC: 13 U/L (ref 1–33)
ANION GAP SERPL CALCULATED.3IONS-SCNC: 9.2 MMOL/L (ref 5–15)
AST SERPL-CCNC: 27 U/L (ref 1–32)
BILIRUB SERPL-MCNC: 0.5 MG/DL (ref 0–1.2)
BUN SERPL-MCNC: 15 MG/DL (ref 8–23)
BUN/CREAT SERPL: 19 (ref 7–25)
CALCIUM SPEC-SCNC: 9.4 MG/DL (ref 8.6–10.5)
CHLORIDE SERPL-SCNC: 102 MMOL/L (ref 98–107)
CHOLEST SERPL-MCNC: 150 MG/DL (ref 0–200)
CO2 SERPL-SCNC: 27.8 MMOL/L (ref 22–29)
CREAT SERPL-MCNC: 0.79 MG/DL (ref 0.57–1)
GFR SERPL CREATININE-BSD FRML MDRD: 70 ML/MIN/1.73
GLOBULIN UR ELPH-MCNC: 2.9 GM/DL
GLUCOSE SERPL-MCNC: 107 MG/DL (ref 65–99)
HDLC SERPL-MCNC: 60 MG/DL (ref 40–60)
LDLC SERPL CALC-MCNC: 70 MG/DL (ref 0–100)
LDLC/HDLC SERPL: 1.12 {RATIO}
POTASSIUM SERPL-SCNC: 4.4 MMOL/L (ref 3.5–5.2)
PROT SERPL-MCNC: 7.1 G/DL (ref 6–8.5)
SODIUM SERPL-SCNC: 139 MMOL/L (ref 136–145)
TRIGL SERPL-MCNC: 115 MG/DL (ref 0–150)
TSH SERPL DL<=0.05 MIU/L-ACNC: 1.35 UIU/ML (ref 0.27–4.2)
VLDLC SERPL-MCNC: 20 MG/DL (ref 5–40)

## 2021-10-25 PROCEDURE — 80061 LIPID PANEL: CPT | Performed by: FAMILY MEDICINE

## 2021-10-25 PROCEDURE — 1160F RVW MEDS BY RX/DR IN RCRD: CPT | Performed by: FAMILY MEDICINE

## 2021-10-25 PROCEDURE — 84443 ASSAY THYROID STIM HORMONE: CPT | Performed by: FAMILY MEDICINE

## 2021-10-25 PROCEDURE — 36415 COLL VENOUS BLD VENIPUNCTURE: CPT | Performed by: FAMILY MEDICINE

## 2021-10-25 PROCEDURE — 1170F FXNL STATUS ASSESSED: CPT | Performed by: FAMILY MEDICINE

## 2021-10-25 PROCEDURE — 80053 COMPREHEN METABOLIC PANEL: CPT | Performed by: FAMILY MEDICINE

## 2021-10-25 PROCEDURE — G0439 PPPS, SUBSEQ VISIT: HCPCS | Performed by: FAMILY MEDICINE

## 2021-10-25 RX ORDER — HYDROCODONE BITARTRATE AND ACETAMINOPHEN 5; 325 MG/1; MG/1
1 TABLET ORAL EVERY 6 HOURS PRN
Qty: 1 TABLET | Refills: 0 | COMMUNITY
Start: 2021-10-25 | End: 2022-01-28

## 2021-10-25 NOTE — PROGRESS NOTES
The ABCs of the Annual Wellness Visit  Subsequent Medicare Wellness Visit    Chief Complaint   Patient presents with   • Medicare Wellness-subsequent      Subjective    History of Present Illness:  Edgar Cotto is a 76 y.o. female who presents for a Subsequent Medicare Wellness Visit.  Follow-up dyslipidemia hypertension.  Is now participating with pain management clinic in [facility].  Has most recently visited with [facility] had gynecology consultation.  Desires to hold off on mammogram till shoulder is better.  Is interested in bone density study.  Has no interest whatsoever in any vaccines and declines consultation.  Denies respiratory CDV GI  presently.  No new orthopedic concerns.  The left shoulder is improving.  No skin concerns.  Energy level is fair.  Lives with daughter.  Utilizes a cane to help with ambulatory support.    The following portions of the patient's history were reviewed and   updated as appropriate: allergies, current medications, past family history, past social history, past surgical history and problem list.    Compared to one year ago, the patient feels her physical   health is worse.    Compared to one year ago, the patient feels her mental   health is better.    Recent Hospitalizations:  She was not admitted to the hospital during the last year.       Current Medical Providers:  Patient Care Team:  Rahat Ruby MD as PCP - General  Rahat Ruby MD as PCP - Family Medicine  Zina Diaz PA as Physician Assistant (Gynecology)  Elif Terry OD (Optometry)  Momo Vega MD as Consulting Physician (Otolaryngology)    Outpatient Medications Prior to Visit   Medication Sig Dispense Refill   • acetaminophen (TYLENOL) 500 MG tablet Take 500 mg by mouth Every 6 (Six) Hours As Needed for mild pain (1-3) or moderate pain (4-6).     • aspirin 81 MG tablet Take 81 mg by mouth Daily.     • Azelastine HCl 137 MCG/SPRAY solution 1  spray into the nostril(s) as directed by provider 2 (two) times a day. 30 mL 0   • calcium carbonate (TUMS) 500 MG chewable tablet Chew 1 tablet 2 (Two) Times a Day As Needed for Indigestion or Heartburn.     • clotrimazole-betamethasone (Lotrisone) 1-0.05 % cream Apply 1 application topically to the appropriate area as directed 2 (Two) Times a Day. 45 g 1   • diazePAM (VALIUM) 2 MG tablet Take 1 tablet by mouth Every 12 (Twelve) Hours As Needed for Anxiety. 60 tablet 0   • fluticasone (Flonase) 50 MCG/ACT nasal spray 2 sprays into the nostril(s) as directed by provider Daily. 15.8 mL 0   • HYDROcodone-acetaminophen (NORCO) 5-325 MG per tablet Take 1 tablet by mouth Every 6 (Six) Hours As Needed. 1 tablet 0   • meclizine (ANTIVERT) 25 MG tablet Take 1 tablet by mouth Every 8 (Eight) Hours As Needed for Dizziness. 270 tablet 1   • nystatin (MYCOSTATIN) 718407 UNIT/ML suspension Swish and swallow 5 mL 4 (Four) Times a Day. 120 mL 1   • psyllium (METAMUCIL) 58.6 % packet Take 1 packet by mouth Daily. 162 each 3   • rosuvastatin (CRESTOR) 5 MG tablet Take 1 tablet by mouth Every Other Day. 15 tablet 3   • valsartan (DIOVAN) 40 MG tablet Take 0.5 tablets by mouth 2 (Two) Times a Day. 90 tablet 1   • HYDROcodone-acetaminophen (NORCO) 5-325 MG per tablet Take 1 tablet by mouth 2 (Two) Times a Day. 10 tablet 0   • amoxicillin-clavulanate (Augmentin) 875-125 MG per tablet Take 1 tablet by mouth 2 (Two) Times a Day for 10 days. 20 tablet 0     No facility-administered medications prior to visit.       Opioid medication/s are on active medication list.  and I have evaluated her active treatment plan and pain score trends (see table).  There were no vitals filed for this visit.  I have reviewed the chart for potential of high risk medication and harmful drug interactions in the elderly.            Aspirin is on active medication list. Aspirin use is indicated based on review of current medical condition/s. Pros and cons of this  "therapy have been discussed today. Benefits of this medication outweigh potential harm.  Patient has been encouraged to continue taking this medication.  .      Patient Active Problem List   Diagnosis   • Atopic rhinitis   • Dizziness   • Generalized anxiety disorder   • Generalized osteoarthritis   • Essential hypertension   • Auditory vertigo   • Mitral and aortic valve disease   • Stomatitis   • Seasonal allergic rhinitis   • Varicose veins of both lower extremities   • Meniere's disease of both ears   • S/P hemorrhoidectomy   • Abnormal EKG   • Moderate obesity   • Dyslipidemia   • Thyroid cyst     Advance Care Planning  Advance Directive is not on file.  ACP discussion was held with the patient during this visit. Patient does not have an advance directive, declines further assistance.          Objective    Vitals:    10/25/21 0929   BP: 125/83   BP Location: Right arm   Patient Position: Sitting   Cuff Size: Adult   Pulse: 96   Temp: 97.1 °F (36.2 °C)   TempSrc: Temporal   SpO2: 97%   Weight: 72.2 kg (159 lb 3.2 oz)   Height: 152.4 cm (60\")     BMI Readings from Last 1 Encounters:   10/25/21 31.09 kg/m²   BMI is above normal parameters. Recommendations include: exercise counseling and nutrition counseling    Does the patient have evidence of cognitive impairment? No    Physical Exam  Vitals reviewed.   Constitutional:       Appearance: Normal appearance.   HENT:      Head: Normocephalic.   Cardiovascular:      Rate and Rhythm: Normal rate and regular rhythm.      Heart sounds: Normal heart sounds.   Pulmonary:      Effort: Pulmonary effort is normal.      Breath sounds: Normal breath sounds.   Musculoskeletal:      Cervical back: Normal range of motion and neck supple.      Comments: Mild impaired rom left shoulder   Skin:     General: Skin is warm and dry.   Neurological:      Mental Status: She is alert and oriented to person, place, and time.   Psychiatric:         Mood and Affect: Mood normal.           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 Assessment completed on:10/25/2021    Depression Screening:  PHQ-2/PHQ-9 Depression Screening 10/25/2021   Little interest or pleasure in doing things 0   Feeling down, depressed, or hopeless 0   Trouble falling or staying asleep, or sleeping too much -   Feeling tired or having little energy -   Poor appetite or overeating -   Feeling bad about yourself - or that you are a failure or have let yourself or your family down -   Trouble concentrating on things, such as reading the newspaper or watching television -   Moving or speaking so slowly that other people could have noticed. Or the opposite - being so fidgety or restless that you have been moving around a lot more than usual -   Thoughts that you would be better off dead, or of hurting yourself in some way -   Total Score 0   If you checked off any problems, how difficult have these problems made it for you to do your work, take care of things at home, or get along with other people? -       Health Habits and Functional and Cognitive Screening:  Functional & Cognitive Status 10/25/2021   Do you have difficulty preparing food and eating? No   Do you have difficulty bathing yourself, getting dressed or grooming yourself? No   Do you have difficulty using the toilet? No   Do you have difficulty moving around from place to place? No   Do you have trouble with steps or getting out of a bed or a chair? No   Current Diet Well Balanced Diet   Dental Exam Other        Dental Exam Comment dentures   Eye Exam Up to date   Exercise (times per week) 6 times per week    Current Exercises Include Walking   Current Exercise Activities Include -   Do you need help using the phone?  No   Are you deaf or do you have serious difficulty hearing?  No   Do you need help with transportation? No   Do you need help shopping? No   Do you need help preparing meals?  No   Do you need help with housework?  No   Do you need help with laundry? No   Do you need help taking your medications? No   Do you need help managing money? No   Do you ever drive or ride in a car without wearing a seat belt? No   Have you felt unusual stress, anger or loneliness in the last month? No   Who do you live with? Child   If you need help, do you have trouble finding someone available to you? No   Have you been bothered in the last four weeks by sexual problems? No   Do you have difficulty concentrating, remembering or making decisions? No       Age-appropriate Screening Schedule:  Refer to the list below for future screening recommendations based on patient's age, sex and/or medical conditions. Orders for these recommended tests are listed in the plan section. The patient has been provided with a written plan.    Health Maintenance   Topic Date Due   • DXA SCAN  Never done   • MAMMOGRAM  05/15/2020   • LIPID PANEL  08/17/2021   • INFLUENZA VACCINE  Discontinued   • TDAP/TD VACCINES  Discontinued   • ZOSTER VACCINE  Discontinued              Assessment/Plan   CMS Preventative Services Quick Reference  Risk Factors Identified During Encounter  Cardiovascular Disease  Chronic Pain   Immunizations Discussed/Encouraged (specific Immunizations; Tdap, Influenza, Pneumococcal 23, Shingrix and COVID19  Inactivity/Sedentary  Obesity/Overweight   The above risks/problems have been discussed with the patient.  Follow up actions/plans if indicated are seen below in the Assessment/Plan Section.  Pertinent information has been shared with the patient in the After Visit Summary.    Diagnoses and all orders for this visit:    1.  Essential hypertension (Primary)  -     Comprehensive Metabolic Panel    2. Dyslipidemia  -     Comprehensive Metabolic Panel  -     Lipid Panel  -     TSH    3. Meniere's disease of both ears    4. Generalized anxiety disorder    5. Medicare annual wellness visit, subsequent    6. Chronic midline low back pain with right-sided sciatica    7. Postmenopausal  -     Dexa Bone Density, Axial (Every 2 Years); Future    We talked about vaccines but you have no interest.  You are current on GI and GYN PME.  Mammogram you want to hold a little longer.  We will ask for bone density study.  Keep follow-ups at pain management as you are.  Stay safely active.  Maintain pandemic response.  Will notify of lab results.  Recheck in about 6 months or as needed.    Follow Up:   Return in about 6 months (around 4/25/2022).     An After Visit Summary and PPPS were made available to the patient.

## 2021-11-08 ENCOUNTER — HOSPITAL ENCOUNTER (OUTPATIENT)
Dept: BONE DENSITY | Facility: HOSPITAL | Age: 76
Discharge: HOME | End: 2021-11-08
Admitting: FAMILY MEDICINE

## 2021-11-08 DIAGNOSIS — Z78.0 POSTMENOPAUSAL: ICD-10-CM

## 2021-11-08 PROCEDURE — 77080 DXA BONE DENSITY AXIAL: CPT

## 2021-11-08 PROCEDURE — 77080 DXA BONE DENSITY AXIAL: CPT | Performed by: RADIOLOGY

## 2021-11-08 NOTE — PROGRESS NOTES
The bone mineral density test documented osteoporosis.  This is high risk for a fracture because of the weakened bones.  I believe this needs to be treated either with an oral agent or possibly injection therapy.  If she is interested please arrange a visit with 1 of us providers to further discuss.  I encourage treating this.

## 2021-11-18 ENCOUNTER — OFFICE VISIT (OUTPATIENT)
Dept: FAMILY MEDICINE CLINIC | Facility: CLINIC | Age: 76
End: 2021-11-18

## 2021-11-18 VITALS
BODY MASS INDEX: 31.18 KG/M2 | TEMPERATURE: 97.3 F | SYSTOLIC BLOOD PRESSURE: 101 MMHG | DIASTOLIC BLOOD PRESSURE: 84 MMHG | HEART RATE: 99 BPM | WEIGHT: 158.8 LBS | HEIGHT: 60 IN | OXYGEN SATURATION: 92 %

## 2021-11-18 DIAGNOSIS — M81.0 AGE-RELATED OSTEOPOROSIS WITHOUT CURRENT PATHOLOGICAL FRACTURE: Primary | ICD-10-CM

## 2021-11-18 PROCEDURE — 99213 OFFICE O/P EST LOW 20 MIN: CPT | Performed by: FAMILY MEDICINE

## 2021-11-18 PROCEDURE — 82306 VITAMIN D 25 HYDROXY: CPT | Performed by: FAMILY MEDICINE

## 2021-11-18 RX ORDER — ALBUTEROL SULFATE 90 UG/1
2 AEROSOL, METERED RESPIRATORY (INHALATION) EVERY 4 HOURS
COMMUNITY
End: 2022-03-08 | Stop reason: SDUPTHER

## 2021-11-18 RX ORDER — RABEPRAZOLE SODIUM 20 MG/1
TABLET, DELAYED RELEASE ORAL
COMMUNITY
End: 2021-12-21

## 2021-11-18 RX ORDER — ALENDRONATE SODIUM 70 MG/1
70 TABLET ORAL
Qty: 4 TABLET | Refills: 6 | Status: SHIPPED | OUTPATIENT
Start: 2021-11-18 | End: 2021-12-21 | Stop reason: SINTOL

## 2021-11-18 RX ORDER — UREA 10 %
LOTION (ML) TOPICAL
COMMUNITY
End: 2021-12-21

## 2021-11-18 NOTE — PROGRESS NOTES
Venipuncture Blood Specimen Collection  Venipuncture performed in Right Arm by Odette Hassan MA with good hemostasis. Patient tolerated the procedure well without complications.   11/18/21   Odette Hassan MA

## 2021-11-18 NOTE — PROGRESS NOTES
Subjective   Edgar Cotto is a 76 y.o. female.     History of Present Illness follow-up DEXA scan.  Discussed results.  No new concerns or problems at this time.  No changes since last visit.    The following portions of the patient's history were reviewed and updated as appropriate: allergies, current medications, past medical history, past social history, past surgical history and problem list.    Review of Systems  See history of Present Illness     Objective     Physical Exam  Constitutional:       Appearance: Normal appearance.   Neurological:      Mental Status: She is alert and oriented to person, place, and time.   Psychiatric:         Mood and Affect: Mood normal.         PHQ-9 Total Score:      Body mass index is 31.01 kg/m².       Assessment/Plan     Diagnoses and all orders for this visit:    1. Age-related osteoporosis without current pathological fracture (Primary)  -     Vitamin D 25 Hydroxy  -     alendronate (Fosamax) 70 MG tablet; Take 1 tablet by mouth Every 7 (Seven) Days.  Dispense: 4 tablet; Refill: 6    We discussed the bone density study documenting osteoporosis.  Will check vitamin D level.  Continue calcium supplementation as well as vitamin D supplementation.  Discussed treatment options and will initiate oral Fosamax once weekly.  Talked about dosing regimen as well as possible common side effects.  Keep follow-up as scheduled otherwise.  If intolerant will consider referral for potential parenteral therapy.  I believe this needs to be definitely addressed.  Maintain pandemic response.                     This document has been electronically signed by Rahat Ruby MD   November 18, 2021 13:59 EST    Part of this note may be an electronic transcription/translation of spoken language to printed text using the Dragon Dictation System.

## 2021-11-19 LAB — 25(OH)D3 SERPL-MCNC: 38.7 NG/ML (ref 30–100)

## 2021-12-16 DIAGNOSIS — H81.03 MENIERE'S DISEASE OF BOTH EARS: ICD-10-CM

## 2021-12-17 DIAGNOSIS — H81.03 MENIERE'S DISEASE OF BOTH EARS: ICD-10-CM

## 2021-12-17 RX ORDER — DIAZEPAM 2 MG/1
TABLET ORAL
Qty: 60 TABLET | Refills: 0 | Status: SHIPPED | OUTPATIENT
Start: 2021-12-17 | End: 2021-12-21

## 2021-12-21 ENCOUNTER — OFFICE VISIT (OUTPATIENT)
Dept: FAMILY MEDICINE CLINIC | Facility: CLINIC | Age: 76
End: 2021-12-21

## 2021-12-21 VITALS
WEIGHT: 158.2 LBS | TEMPERATURE: 97.7 F | HEART RATE: 90 BPM | BODY MASS INDEX: 31.06 KG/M2 | DIASTOLIC BLOOD PRESSURE: 78 MMHG | HEIGHT: 60 IN | OXYGEN SATURATION: 95 % | SYSTOLIC BLOOD PRESSURE: 110 MMHG

## 2021-12-21 DIAGNOSIS — H81.03 MENIERE'S DISEASE OF BOTH EARS: ICD-10-CM

## 2021-12-21 DIAGNOSIS — M81.0 AGE-RELATED OSTEOPOROSIS WITHOUT CURRENT PATHOLOGICAL FRACTURE: Chronic | ICD-10-CM

## 2021-12-21 DIAGNOSIS — I10 ESSENTIAL HYPERTENSION: Primary | Chronic | ICD-10-CM

## 2021-12-21 PROCEDURE — 99213 OFFICE O/P EST LOW 20 MIN: CPT | Performed by: FAMILY MEDICINE

## 2021-12-21 RX ORDER — MECLIZINE HYDROCHLORIDE 25 MG/1
25 TABLET ORAL EVERY 8 HOURS PRN
Qty: 270 TABLET | Refills: 1 | Status: SHIPPED | OUTPATIENT
Start: 2021-12-21 | End: 2022-12-16 | Stop reason: SDUPTHER

## 2021-12-21 NOTE — PROGRESS NOTES
Subjective   Edgar Cotto is a 76 y.o. female.     History of Present Illness follow-up medication management.  Hypertension dyslipidemia osteoporosis.  It appears that notes from pain management suggest they no longer desire to continue hydrocodone as long as benzodiazepine is also being prescribed.  I counseled about that.  You present no significant new problems but was intolerant to the oral bisphosphonate Fosamax.  Significant GI distress you report.    The following portions of the patient's history were reviewed and updated as appropriate: allergies, current medications, past family history, past medical history, past surgical history and problem list.    Review of Systems  See history of Present Illness     Objective     Physical Exam  Vitals reviewed.   Constitutional:       Appearance: Normal appearance.   Neurological:      Mental Status: She is alert and oriented to person, place, and time.   Psychiatric:         Mood and Affect: Mood normal.         PHQ-9 Total Score:      Body mass index is 30.9 kg/m².       Assessment/Plan     Diagnoses and all orders for this visit:    1. Essential hypertension (Primary)    2. Meniere's disease of both ears  -     meclizine (ANTIVERT) 25 MG tablet; Take 1 tablet by mouth Every 8 (Eight) Hours As Needed for Dizziness.  Dispense: 270 tablet; Refill: 1    3. Age-related osteoporosis without current pathological fracture    We reviewed Chris.  We reviewed the notes from pain management.  At this time I would encourage you to continue the hydrocodone and discontinue the diazepam.  Last prescription did not go through anyway.  In regard to the osteoporosis and intolerant to the all oral medication will make referral to Dr. Hillman in this office for recommendations regarding parenteral therapy possibilities.  I am not enthusiastic about you continuing the hydrocodone along with benzodiazepine albeit extremely low-dose.  I believe you can do well with the as needed  Antivert.  Follow-up in his clinic as noted.  Maintain pandemic response.  You expressed no desire for any vaccines.                     This document has been electronically signed by Rahat Ruby MD   December 21, 2021 15:14 EST    Part of this note may be an electronic transcription/translation of spoken language to printed text using the Dragon Dictation System.

## 2021-12-22 DIAGNOSIS — E78.2 MIXED HYPERLIPIDEMIA: ICD-10-CM

## 2021-12-23 RX ORDER — ROSUVASTATIN CALCIUM 5 MG/1
TABLET, COATED ORAL
Qty: 15 TABLET | Refills: 3 | Status: SHIPPED | OUTPATIENT
Start: 2021-12-23 | End: 2022-07-19 | Stop reason: SINTOL

## 2022-01-25 DIAGNOSIS — Z20.822 CLOSE EXPOSURE TO COVID-19 VIRUS: Primary | ICD-10-CM

## 2022-01-26 ENCOUNTER — CLINICAL SUPPORT (OUTPATIENT)
Dept: FAMILY MEDICINE CLINIC | Facility: CLINIC | Age: 77
End: 2022-01-26

## 2022-01-26 DIAGNOSIS — Z20.822 CLOSE EXPOSURE TO COVID-19 VIRUS: ICD-10-CM

## 2022-01-26 PROCEDURE — U0004 COV-19 TEST NON-CDC HGH THRU: HCPCS | Performed by: FAMILY MEDICINE

## 2022-01-27 LAB — SARS-COV-2 RNA PNL SPEC NAA+PROBE: NOT DETECTED

## 2022-01-28 ENCOUNTER — OFFICE VISIT (OUTPATIENT)
Dept: FAMILY MEDICINE CLINIC | Facility: CLINIC | Age: 77
End: 2022-01-28

## 2022-01-28 VITALS
RESPIRATION RATE: 18 BRPM | WEIGHT: 155 LBS | DIASTOLIC BLOOD PRESSURE: 73 MMHG | SYSTOLIC BLOOD PRESSURE: 123 MMHG | BODY MASS INDEX: 30.43 KG/M2 | TEMPERATURE: 97.1 F | HEART RATE: 99 BPM | HEIGHT: 60 IN | OXYGEN SATURATION: 99 %

## 2022-01-28 DIAGNOSIS — J06.9 ACUTE URI: Primary | ICD-10-CM

## 2022-01-28 DIAGNOSIS — K12.1 STOMATITIS: ICD-10-CM

## 2022-01-28 PROCEDURE — 99213 OFFICE O/P EST LOW 20 MIN: CPT | Performed by: NURSE PRACTITIONER

## 2022-01-28 PROCEDURE — 0202U NFCT DS 22 TRGT SARS-COV-2: CPT | Performed by: NURSE PRACTITIONER

## 2022-01-28 RX ORDER — AMOXICILLIN AND CLAVULANATE POTASSIUM 875; 125 MG/1; MG/1
1 TABLET, FILM COATED ORAL 2 TIMES DAILY
Qty: 20 TABLET | Refills: 0 | Status: SHIPPED | OUTPATIENT
Start: 2022-01-28 | End: 2022-02-07

## 2022-01-28 NOTE — PROGRESS NOTES
"Subjective   Edgar Cotto is a 77 y.o. female.     Chief Complaint   Patient presents with   • URI       She presents with c/o earache and sinus congestion for about a week. She denies fever and chills. She c/o sore throat and sore mouth. She denies loss of taste and smell. She has taken some tylenol sinus. She c/o cough with thick clear sputum.        The following portions of the patient's history were reviewed and updated as appropriate: allergies, current medications, past family history, past medical history, past social history, past surgical history and problem list.    Review of Systems   Constitutional: Negative for fatigue, fever and unexpected weight change.   HENT: Positive for congestion, postnasal drip and sore throat. Negative for ear pain and rhinorrhea.    Eyes: Negative for visual disturbance.   Respiratory: Positive for cough. Negative for chest tightness, shortness of breath and wheezing.    Cardiovascular: Negative for chest pain, palpitations and leg swelling.   Gastrointestinal: Negative for abdominal pain, blood in stool, constipation, diarrhea, nausea and vomiting.   Endocrine: Negative for cold intolerance and heat intolerance.   Genitourinary: Negative for dysuria and hematuria.   Musculoskeletal: Negative for arthralgias and myalgias.   Skin: Negative for color change.   Allergic/Immunologic: Negative for environmental allergies.   Neurological: Positive for dizziness (chronic) and headaches.   Hematological: Negative for adenopathy.   Psychiatric/Behavioral: Negative for suicidal ideas. The patient is not nervous/anxious.        Objective     /73 (BP Location: Left arm, Patient Position: Sitting, Cuff Size: Adult)   Pulse 99   Temp 97.1 °F (36.2 °C) (Temporal)   Resp 18   Ht 152.4 cm (60\")   Wt 70.3 kg (155 lb)   SpO2 99%   BMI 30.27 kg/m²     Physical Exam  Vitals reviewed.   Constitutional:       General: She is not in acute distress.     Appearance: Normal " appearance. She is well-developed. She is not diaphoretic.   HENT:      Head: Normocephalic and atraumatic.      Right Ear: Hearing, tympanic membrane, ear canal and external ear normal.      Left Ear: Hearing, tympanic membrane, ear canal and external ear normal.      Nose: Nose normal.      Right Sinus: No maxillary sinus tenderness or frontal sinus tenderness.      Left Sinus: No maxillary sinus tenderness or frontal sinus tenderness.      Mouth/Throat:      Pharynx: Uvula midline. Pharyngeal swelling and posterior oropharyngeal erythema present.   Eyes:      General: Lids are normal.      Conjunctiva/sclera: Conjunctivae normal.   Neck:      Trachea: Trachea normal. No tracheal tenderness or tracheal deviation.   Cardiovascular:      Rate and Rhythm: Normal rate and regular rhythm.      Heart sounds: Normal heart sounds, S1 normal and S2 normal. No murmur heard.  No friction rub. No gallop.    Pulmonary:      Effort: Pulmonary effort is normal. No respiratory distress.      Breath sounds: Normal breath sounds.   Abdominal:      General: Bowel sounds are normal. There is no distension.      Palpations: Abdomen is soft.      Tenderness: There is no abdominal tenderness.   Lymphadenopathy:      Cervical: No cervical adenopathy.   Skin:     General: Skin is warm and dry.   Neurological:      Mental Status: She is alert and oriented to person, place, and time.   Psychiatric:         Behavior: Behavior normal.         Thought Content: Thought content normal.         Judgment: Judgment normal.         Assessment/Plan     Problem List Items Addressed This Visit        ENT    Stomatitis    Relevant Medications    nystatin (MYCOSTATIN) 100,000 unit/mL suspension      Other Visit Diagnoses     Acute URI    -  Primary    Relevant Medications    amoxicillin-clavulanate (Augmentin) 875-125 MG per tablet    Other Relevant Orders    Respiratory Panel PCR w/COVID-19(SARS-CoV-2) DEMETRI/LUCIA/ANETTE/PAD/[facility]AD/JIAN In-House, NP Swab in  UTM/VTM, 3-4 HR TAT - Swab, Nasopharynx          Plan: Get covid/resp viral panel. Self quarantine per cdc recommendations. Augmentin ordered for acute uri. Follow up as needed.     @Body mass index is 30.27 kg/m².           Understands disease processes and need for medications.  Understands reasons for urgent and emergent care.  Patient (& family) verbalized agreement for treatment plan.   Emotional support and active listening provided.  Patient provided time to verbalize feelings.               This document has been electronically signed by JOSSELINE Wharton   January 28, 2022 11:51 EST

## 2022-02-07 ENCOUNTER — OFFICE VISIT (OUTPATIENT)
Dept: FAMILY MEDICINE CLINIC | Facility: CLINIC | Age: 77
End: 2022-02-07

## 2022-02-07 VITALS
WEIGHT: 153 LBS | BODY MASS INDEX: 30.04 KG/M2 | SYSTOLIC BLOOD PRESSURE: 123 MMHG | DIASTOLIC BLOOD PRESSURE: 79 MMHG | TEMPERATURE: 97.3 F | HEIGHT: 60 IN | OXYGEN SATURATION: 94 % | RESPIRATION RATE: 18 BRPM | HEART RATE: 101 BPM

## 2022-02-07 DIAGNOSIS — K21.9 GASTROESOPHAGEAL REFLUX DISEASE WITHOUT ESOPHAGITIS: ICD-10-CM

## 2022-02-07 DIAGNOSIS — M81.0 AGE-RELATED OSTEOPOROSIS WITHOUT CURRENT PATHOLOGICAL FRACTURE: Primary | Chronic | ICD-10-CM

## 2022-02-07 DIAGNOSIS — F41.1 GENERALIZED ANXIETY DISORDER: Chronic | ICD-10-CM

## 2022-02-07 DIAGNOSIS — R42 DIZZINESS: ICD-10-CM

## 2022-02-07 LAB
AMPHET+METHAMPHET UR QL: NEGATIVE
AMPHETAMINES UR QL: NEGATIVE
BARBITURATES UR QL SCN: NEGATIVE
BENZODIAZ UR QL SCN: POSITIVE
BUPRENORPHINE SERPL-MCNC: NEGATIVE NG/ML
CANNABINOIDS SERPL QL: NEGATIVE
COCAINE UR QL: NEGATIVE
METHADONE UR QL SCN: NEGATIVE
OPIATES UR QL: NEGATIVE
OXYCODONE UR QL SCN: NEGATIVE
PCP UR QL SCN: NEGATIVE
PROPOXYPH UR QL: NEGATIVE
TRICYCLICS UR QL SCN: NEGATIVE

## 2022-02-07 PROCEDURE — 99214 OFFICE O/P EST MOD 30 MIN: CPT | Performed by: INTERNAL MEDICINE

## 2022-02-07 PROCEDURE — 80306 DRUG TEST PRSMV INSTRMNT: CPT | Performed by: INTERNAL MEDICINE

## 2022-02-07 RX ORDER — DIAZEPAM 2 MG/1
2 TABLET ORAL EVERY 12 HOURS PRN
Qty: 60 TABLET | Refills: 0 | Status: SHIPPED | OUTPATIENT
Start: 2022-02-07 | End: 2022-03-08 | Stop reason: DRUGHIGH

## 2022-02-08 RX ORDER — ESOMEPRAZOLE MAGNESIUM 40 MG/1
40 CAPSULE, DELAYED RELEASE ORAL
Qty: 30 CAPSULE | Refills: 5 | Status: SHIPPED | OUTPATIENT
Start: 2022-02-08 | End: 2023-01-24 | Stop reason: SDUPTHER

## 2022-02-08 NOTE — PROGRESS NOTES
Patient Name: Edgar Cotto Today's Date: 2022   Patient MRN / CSN: [id number] / [id number] Date of Encounter: 2022   Patient Age / : 77 y.o. / 1945 Encounter Provider: Abbie Hillman DO   Referring Physician: No ref. provider found          Edgar is a 77 y.o. who is being seen today for Follow-up      Edgar presents today for follow-up on osteoporosis.  She had a recent DEXA scan which showed osteoporosis with a -2.7 T score in her left hip.  She presents with her daughter who lives with her and assist in her care.  Both patient and her daughter reports that patient has a difficult time tolerating medicines and frequently has GI upset.  They had discussed oral therapies for osteoporosis with Dr. Ruby but it was felt that she would be intolerant to such therapies due to her gastroesophageal reflux disease.  Edgar is agreeable to trying Prolia.  She has been taking calcium but not vitamin D recently.      Edgar complains of dizziness and anxiety.  She has a history of Ménière's disease.  She reports having these symptoms for a long time.  She was started on Valium 2 mg to take twice a day as needed.  She reports doing very well with that regimen.  However, she was also taking hydrocodone as needed for pain.  She was told she would have to stop either the Valium or the hydrocodone.  She felt that she could tolerate stopping the Valium better than she could tolerate stopping hydrocodone so she stopped Valium last fall.  Since being off Valium, she reports her anxiety and dizziness are worse.  She has fall concerns due to dizziness.  She has recently stopped going to the pain management clinic and has stopped hydrocodone because she would like to restart Valium therapy.  She feels that she would do better with Valium therapy than the hydrocodone at this time.  She is agreeable to taking Tylenol arthritis as needed for her joint pain.  She request a refill of Valium  today.        Allergies include:Z-mary [azithromycin], Amoxicillin, Cortisone, Fish-derived products, Iodinated diagnostic agents, Iodine, Lidocaine, Morphine, Niacin, Nystatin, Other, Sulfa antibiotics, and Verapamil  Current Outpatient Medications   Medication Sig Dispense Refill   • acetaminophen (TYLENOL) 500 MG tablet Take 500 mg by mouth Every 6 (Six) Hours As Needed for mild pain (1-3) or moderate pain (4-6).     • albuterol sulfate HFA (Ventolin HFA) 108 (90 Base) MCG/ACT inhaler Inhale 2 puffs Every 4 (Four) Hours.     • aspirin 81 MG tablet Take 81 mg by mouth Daily.     • Azelastine HCl 137 MCG/SPRAY solution 1 spray into the nostril(s) as directed by provider 2 (two) times a day. 30 mL 0   • clotrimazole-betamethasone (Lotrisone) 1-0.05 % cream Apply 1 application topically to the appropriate area as directed 2 (Two) Times a Day. 45 g 1   • fluticasone (Flonase) 50 MCG/ACT nasal spray 2 sprays into the nostril(s) as directed by provider Daily. 15.8 mL 0   • meclizine (ANTIVERT) 25 MG tablet Take 1 tablet by mouth Every 8 (Eight) Hours As Needed for Dizziness. 270 tablet 1   • nystatin (MYCOSTATIN) 100,000 unit/mL suspension Swish and swallow 5 mL 4 (Four) Times a Day. 120 mL 1   • psyllium (METAMUCIL) 58.6 % packet Take 1 packet by mouth Daily. 162 each 3   • rosuvastatin (CRESTOR) 5 MG tablet TAKE ONE TABLET BY MOUTH EVERY OTHER DAY 15 tablet 3   • valsartan (DIOVAN) 40 MG tablet Take 0.5 tablets by mouth 2 (Two) Times a Day. 90 tablet 1   • calcium-vitamin D (Oscal 500/200 D-3) 500-200 MG-UNIT per tablet Take 2 tablets by mouth Daily. 60 tablet 11   • denosumab (PROLIA) 60 MG/ML solution prefilled syringe syringe Inject 1 mL under the skin into the appropriate area as directed 1 (One) Time for 1 dose. 1 mL 1   • diazePAM (Valium) 2 MG tablet Take 1 tablet by mouth Every 12 (Twelve) Hours As Needed for Anxiety. 60 tablet 0   • esomeprazole (nexIUM) 40 MG capsule Take 1 capsule by mouth Every Morning  Before Breakfast. 30 capsule 5     No current facility-administered medications for this visit.     Past Medical History:   Diagnosis Date   • Abdominal distension    • Allergic rhinitis    • Anxiety    • Arthritis    • Closed nondisplaced fracture of surgical neck of left humerus with routine healing 3/9/2021   • Cough    • Dry eyes    • Dyslipidemia    • Dysuria    • Hyperlipidemia    • Hypertension    • Meniere's disease    • Mitral and aortic valve disease    • Oral candidiasis    • Osteoarthritis    • Stomatitis    • URI, acute    • Vaginal candidiasis    • Vocal cord dysfunction      Family History   Problem Relation Age of Onset   • Diabetes Mother    • Cancer Father    • Liver disease Sister    • Diabetes Sister    • Liver disease Brother    • Diabetes Brother    • Diabetes Daughter    • Cancer Other    • Liver disease Other    • Breast cancer Neg Hx      Past Surgical History:   Procedure Laterality Date   • CHOLECYSTECTOMY     • COLONOSCOPY     • DILATATION AND CURETTAGE     • HEMORRHOIDECTOMY     • HERNIA REPAIR     • SHOULDER SURGERY     • SKIN CANCER EXCISION      on Nose   • TONSILLECTOMY       Social History     Substance and Sexual Activity   Alcohol Use No     Social History     Tobacco Use   Smoking Status Never Smoker   Smokeless Tobacco Never Used   Tobacco Comment    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   Respiratory:        Patient does not complain of shortness of air at this time but reports having shortness of air at times when she is having pain.  Her daughter reports that her mother's oxygen saturations will sometimes drop into the 80s when she is having pain or anxiety.    Cardiovascular: Negative for chest pain.   Gastrointestinal:        Gastroesophageal reflux disease   Musculoskeletal: Positive for arthralgias.        Generalized arthritis  "pain but patient reports this is most recently been in her hips bilaterally.   Neurological: Positive for dizziness.   Psychiatric/Behavioral: The patient is nervous/anxious.         Depression Assessment Review:  PHQ-9 Total Score:    Vital Signs & Measurements Taken This Encounter  /79 (BP Location: Right arm, Patient Position: Sitting, Cuff Size: Adult)   Pulse 101   Temp 97.3 °F (36.3 °C) (Temporal)   Resp 18   Ht 152.4 cm (60\")   Wt 69.4 kg (153 lb)   SpO2 94%   BMI 29.88 kg/m²    SpO2 Percentage    02/07/22 1428   SpO2: 94%        Physical Exam  Vitals reviewed.   Constitutional:       General: She is not in acute distress.  HENT:      Head: Normocephalic and atraumatic.   Cardiovascular:      Rate and Rhythm: Normal rate and regular rhythm.   Pulmonary:      Effort: Pulmonary effort is normal. No respiratory distress.      Breath sounds: Normal breath sounds.   Abdominal:      Palpations: Abdomen is soft.      Tenderness: There is no abdominal tenderness.   Musculoskeletal:         General: No swelling.   Skin:     General: Skin is warm and dry.      Coloration: Skin is not jaundiced.   Neurological:      Mental Status: She is alert.   Psychiatric:         Mood and Affect: Mood normal.         Behavior: Behavior normal.              Assessment & Plan  Patient Active Problem List   Diagnosis   • Atopic rhinitis   • Dizziness   • Generalized anxiety disorder   • Generalized osteoarthritis   • Essential hypertension   • Auditory vertigo   • Mitral and aortic valve disease   • Stomatitis   • Seasonal allergic rhinitis   • Varicose veins of both lower extremities   • Meniere's disease of both ears   • S/P hemorrhoidectomy   • Abnormal EKG   • Moderate obesity   • Dyslipidemia   • Thyroid cyst   • Age-related osteoporosis without current pathological fracture       ICD-10-CM ICD-9-CM   1. Age-related osteoporosis without current pathological fracture  M81.0 733.01   2. Gastroesophageal reflux disease " without esophagitis  K21.9 530.81   3. Generalized anxiety disorder  F41.1 300.02   4. Dizziness  R42 780.4     Orders Placed This Encounter   Procedures   • Urine Drug Screen - Urine, Clean Catch     Order Specific Question:   Release to patient     Answer:   Immediate       Meds Ordered During Visit:  New Medications Ordered This Visit   Medications   • calcium-vitamin D (Oscal 500/200 D-3) 500-200 MG-UNIT per tablet     Sig: Take 2 tablets by mouth Daily.     Dispense:  60 tablet     Refill:  11   • denosumab (PROLIA) 60 MG/ML solution prefilled syringe syringe     Sig: Inject 1 mL under the skin into the appropriate area as directed 1 (One) Time for 1 dose.     Dispense:  1 mL     Refill:  1   • diazePAM (Valium) 2 MG tablet     Sig: Take 1 tablet by mouth Every 12 (Twelve) Hours As Needed for Anxiety.     Dispense:  60 tablet     Refill:  0   • esomeprazole (nexIUM) 40 MG capsule     Sig: Take 1 capsule by mouth Every Morning Before Breakfast.     Dispense:  30 capsule     Refill:  5     I discussed DEXA findings with patient and her daughter.  I recommended calcium with vitamin D supplementation, and Prolia injections.  Patient is agreeable to this.  I sent in prescriptions as above.  I am agreeable to restarting low-dose Valium as needed for anxiety or dizziness given that patient had such good results with this previously and tolerated it well.  I reviewed PDMP today which shows that she has not had hydrocodone prescription prescribed this year.  I explained to patient and her daughter that she will need to stay off of opiates if she is going to restart benzodiazepine therapy.  They are agreeable to this.  I encouraged patient to take Tylenol arthritis as needed for joint pain.  Obtain a controlled substance contract today.  Obtain UDS today.  Continue other meds as previously prescribed.  I encouraged patient to take Nexium regularly and updated this prescription for her today.  Patient's daughter is  concerned of patient's intermittent low oxygen saturations.  This is not been documented in office documentation.  Patient's daughter notes that it is when patient is anxious or having pain.  I recommended keeping a log of this after restarting Valium therapy.  If her symptoms continue despite starting the medicines as above, we may need to do an ABG.  We will discuss this further at next appointment or certainly sooner if needed.    Return in about 1 month (around 3/7/2022), or if symptoms worsen or fail to improve, for Recheck.          Referring Provider (if known): No ref. provider found      This document has been electronically signed by Abbie Hillman DO  February 8, 2022 10:49 EST    Abbie Hillman DO, FACOI  [address] [place] [address] [phone] (office)    Part of this note may be an electronic transcription/translation of spoken language to printed text using the Dragon Dictation System.

## 2022-03-08 ENCOUNTER — OFFICE VISIT (OUTPATIENT)
Dept: FAMILY MEDICINE CLINIC | Facility: CLINIC | Age: 77
End: 2022-03-08

## 2022-03-08 VITALS
WEIGHT: 150 LBS | DIASTOLIC BLOOD PRESSURE: 83 MMHG | RESPIRATION RATE: 18 BRPM | BODY MASS INDEX: 29.45 KG/M2 | SYSTOLIC BLOOD PRESSURE: 119 MMHG | HEIGHT: 60 IN | HEART RATE: 103 BPM | TEMPERATURE: 97.5 F | OXYGEN SATURATION: 95 %

## 2022-03-08 DIAGNOSIS — H81.03 MENIERE'S DISEASE OF BOTH EARS: Primary | Chronic | ICD-10-CM

## 2022-03-08 DIAGNOSIS — I10 ESSENTIAL HYPERTENSION: Chronic | ICD-10-CM

## 2022-03-08 DIAGNOSIS — R42 DIZZINESS: ICD-10-CM

## 2022-03-08 DIAGNOSIS — M15.9 GENERALIZED OSTEOARTHRITIS: Chronic | ICD-10-CM

## 2022-03-08 DIAGNOSIS — F41.1 GENERALIZED ANXIETY DISORDER: Chronic | ICD-10-CM

## 2022-03-08 DIAGNOSIS — M81.0 AGE-RELATED OSTEOPOROSIS WITHOUT CURRENT PATHOLOGICAL FRACTURE: Chronic | ICD-10-CM

## 2022-03-08 PROCEDURE — 99214 OFFICE O/P EST MOD 30 MIN: CPT | Performed by: INTERNAL MEDICINE

## 2022-03-08 RX ORDER — ALBUTEROL SULFATE 90 UG/1
2 AEROSOL, METERED RESPIRATORY (INHALATION) EVERY 4 HOURS
Qty: 18 G | Refills: 1 | Status: SHIPPED | OUTPATIENT
Start: 2022-03-08 | End: 2022-09-09

## 2022-03-08 RX ORDER — DIAZEPAM 5 MG/1
5 TABLET ORAL EVERY 12 HOURS PRN
Qty: 60 TABLET | Refills: 0 | Status: SHIPPED | OUTPATIENT
Start: 2022-03-08 | End: 2022-04-14 | Stop reason: SDUPTHER

## 2022-03-08 RX ORDER — MELOXICAM 15 MG/1
15 TABLET ORAL DAILY
Qty: 30 TABLET | Refills: 5 | Status: SHIPPED | OUTPATIENT
Start: 2022-03-08 | End: 2023-01-24 | Stop reason: ALTCHOICE

## 2022-03-08 RX ORDER — SENNOSIDES 8.6 MG
650 CAPSULE ORAL 4 TIMES DAILY PRN
Qty: 120 TABLET | Refills: 5 | Status: SHIPPED | OUTPATIENT
Start: 2022-03-08

## 2022-03-08 NOTE — PROGRESS NOTES
Patient Name: Edgar Cotto Today's Date: 3/8/2022   Patient MRN / CSN: [id number] / [id number] Date of Encounter: 3/8/2022   Patient Age / : 77 y.o. / 1945 Encounter Provider: Abbie Hillman DO   Referring Physician: No ref. provider found          Edgar is a 77 y.o. female who is being seen today for Hypertension      Edgar presents today for follow up on Osteoporosis. She did not try Prolia due to cost and is concerned of its potential side effects. She also declines fosamax therapy. She has started calcium + D and is tolerating it well.     Edgar has Meniere's Disease. She reports frequent dizziness from this recently. Valium helps but is not controlling her symptoms well at the current 2 mg dose. She reports taking the 5 mg dose with better relief and good tolerance previously. This was stopped due to her being on opiates at that time. She is no longer taking opiates.     Edgar complains of arthritis pain, not relieved with tylenol. She reports back pain has seemed worse lately. She has taken aleve on occasion with good tolerance and some relief but did not know if she could take it regularly. She does have GERD, which has recently been pretty well controlled, unless she eats spicy foods.     Hypertension  This is a chronic problem. Pertinent negatives include no chest pain or shortness of breath. Current antihypertension treatment includes angiotensin blockers. The current treatment provides significant improvement. There are no compliance problems.        Allergies include:Z-mary [azithromycin], Amoxicillin, Cortisone, Fish-derived products, Iodinated diagnostic agents, Iodine, Lidocaine, Morphine, Niacin, Nystatin, Other, Sulfa antibiotics, and Verapamil  Current Outpatient Medications   Medication Sig Dispense Refill   • albuterol sulfate  (90 Base) MCG/ACT inhaler Inhale 2 puffs Every 4 (Four) Hours. 18 g 1   • aspirin 81 MG tablet Take 81 mg by mouth Daily.     • Azelastine HCl  137 MCG/SPRAY solution 1 spray into the nostril(s) as directed by provider 2 (two) times a day. 30 mL 0   • calcium-vitamin D (Oscal 500/200 D-3) 500-200 MG-UNIT per tablet Take 2 tablets by mouth Daily. 60 tablet 11   • clotrimazole-betamethasone (Lotrisone) 1-0.05 % cream Apply 1 application topically to the appropriate area as directed 2 (Two) Times a Day. 45 g 1   • esomeprazole (nexIUM) 40 MG capsule Take 1 capsule by mouth Every Morning Before Breakfast. 30 capsule 5   • fluticasone (Flonase) 50 MCG/ACT nasal spray 2 sprays into the nostril(s) as directed by provider Daily. 15.8 mL 0   • meclizine (ANTIVERT) 25 MG tablet Take 1 tablet by mouth Every 8 (Eight) Hours As Needed for Dizziness. 270 tablet 1   • nystatin (MYCOSTATIN) 100,000 unit/mL suspension Swish and swallow 5 mL 4 (Four) Times a Day. 120 mL 1   • psyllium (METAMUCIL) 58.6 % packet Take 1 packet by mouth Daily. 162 each 3   • rosuvastatin (CRESTOR) 5 MG tablet TAKE ONE TABLET BY MOUTH EVERY OTHER DAY 15 tablet 3   • valsartan (DIOVAN) 40 MG tablet Take 0.5 tablets by mouth 2 (Two) Times a Day. 90 tablet 1   • acetaminophen (Tylenol 8 Hour Arthritis Pain) 650 MG 8 hr tablet Take 1 tablet by mouth 4 (Four) Times a Day As Needed for Mild Pain . 120 tablet 5   • diazePAM (Valium) 5 MG tablet Take 1 tablet by mouth Every 12 (Twelve) Hours As Needed for Anxiety (Dizziness). 60 tablet 0   • meloxicam (Mobic) 15 MG tablet Take 1 tablet by mouth Daily. Take with food. 30 tablet 5     No current facility-administered medications for this visit.     Past Medical History:   Diagnosis Date   • Abdominal distension    • Allergic rhinitis    • Anxiety    • Arthritis    • Closed nondisplaced fracture of surgical neck of left humerus with routine healing 3/9/2021   • Cough    • Dry eyes    • Dyslipidemia    • Dysuria    • Hyperlipidemia    • Hypertension    • Meniere's disease    • Mitral and aortic valve disease    • Oral candidiasis    • Osteoarthritis    •  "Stomatitis    • URI, acute    • Vaginal candidiasis    • Vocal cord dysfunction      Family History   Problem Relation Age of Onset   • Diabetes Mother    • Cancer Father    • Liver disease Sister    • Diabetes Sister    • Liver disease Brother    • Diabetes Brother    • Diabetes Daughter    • Cancer Other    • Liver disease Other    • Breast cancer Neg Hx      Past Surgical History:   Procedure Laterality Date   • CHOLECYSTECTOMY     • COLONOSCOPY     • DILATATION AND CURETTAGE     • HEMORRHOIDECTOMY     • HERNIA REPAIR     • SHOULDER SURGERY     • SKIN CANCER EXCISION      on Nose   • TONSILLECTOMY       Social History     Substance and Sexual Activity   Alcohol Use No     Social History     Tobacco Use   Smoking Status Never Smoker   Smokeless Tobacco Never Used   Tobacco Comment    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   Respiratory: Negative for shortness of breath.    Cardiovascular: Negative for chest pain.   Gastrointestinal: Negative for abdominal pain.        Occasional heartburn with spicy foods   Musculoskeletal: Positive for arthralgias and back pain.   Neurological: Positive for dizziness and light-headedness.        Depression Assessment Review:  PHQ-9 Total Score: 0  Vital Signs & Measurements Taken This Encounter  /83 (BP Location: Left arm, Patient Position: Sitting, Cuff Size: Adult)   Pulse 103   Temp 97.5 °F (36.4 °C) (Temporal)   Resp 18   Ht 152.4 cm (60\")   Wt 68 kg (150 lb)   SpO2 95%   BMI 29.29 kg/m²    SpO2 Percentage    03/08/22 1001   SpO2: 95%        Physical Exam  Vitals reviewed.   Constitutional:       General: She is not in acute distress.  HENT:      Head: Normocephalic and atraumatic.   Eyes:      Extraocular Movements: Extraocular movements intact.      Conjunctiva/sclera: Conjunctivae normal.      Pupils: Pupils are equal, " round, and reactive to light.   Cardiovascular:      Rate and Rhythm: Regular rhythm. Tachycardia present.   Pulmonary:      Effort: Pulmonary effort is normal. No respiratory distress.      Breath sounds: Normal breath sounds. No wheezing or rhonchi.   Skin:     General: Skin is warm and dry.      Coloration: Skin is not jaundiced.   Neurological:      Mental Status: She is alert.   Psychiatric:         Mood and Affect: Mood normal.         Behavior: Behavior normal.              Assessment & Plan  Patient Active Problem List   Diagnosis   • Atopic rhinitis   • Dizziness   • Generalized anxiety disorder   • Generalized osteoarthritis   • Essential hypertension   • Auditory vertigo   • Mitral and aortic valve disease   • Stomatitis   • Seasonal allergic rhinitis   • Varicose veins of both lower extremities   • Meniere's disease of both ears   • S/P hemorrhoidectomy   • Abnormal EKG   • Moderate obesity   • Dyslipidemia   • Thyroid cyst   • Age-related osteoporosis without current pathological fracture       ICD-10-CM ICD-9-CM   1. Meniere's disease of both ears  H81.03 386.00   2. Dizziness  R42 780.4   3. Generalized anxiety disorder  F41.1 300.02   4. Age-related osteoporosis without current pathological fracture  M81.0 733.01   5. Generalized osteoarthritis  M15.9 715.00   6. Essential hypertension  I10 401.9     Orders Placed This Encounter   Procedures   • Miscellaneous DME     Order Specific Question:   Type of DME     Answer:   Hospital bed with gel mattress     Order Specific Question:   Length of Need (99 Months = Lifetime)     Answer:   99 Months = Lifetime       Meds Ordered During Visit:  New Medications Ordered This Visit   Medications   • diazePAM (Valium) 5 MG tablet     Sig: Take 1 tablet by mouth Every 12 (Twelve) Hours As Needed for Anxiety (Dizziness).     Dispense:  60 tablet     Refill:  0   • meloxicam (Mobic) 15 MG tablet     Sig: Take 1 tablet by mouth Daily. Take with food.     Dispense:  30  tablet     Refill:  5   • acetaminophen (Tylenol 8 Hour Arthritis Pain) 650 MG 8 hr tablet     Sig: Take 1 tablet by mouth 4 (Four) Times a Day As Needed for Mild Pain .     Dispense:  120 tablet     Refill:  5   • albuterol sulfate  (90 Base) MCG/ACT inhaler     Sig: Inhale 2 puffs Every 4 (Four) Hours.     Dispense:  18 g     Refill:  1     I agreed to increase Valium to 5 mg per previous dose. I reviewed PDMP today.  I discussed osteoporosis treatments, including fosamax and prolia, with patient and her daughter. Patient prefers to hold on prescription therapy for OP at this time. I encouraged her to continue calcium + d.   Mobic discussed with patient and her daughter today.   Medicine refills updated today as requested.       Return in about 1 month (around 4/8/2022), or if symptoms worsen or fail to improve, for Recheck.          Referring Provider (if known): No ref. provider found      This document has been electronically signed by Abbie Hillman DO  March 8, 2022 17:11 EST    Abbie Hillman DO, FACOI  [address] [place] [address]  [phone] (office)    Part of this note may be an electronic transcription/translation of spoken language to printed text using the Dragon Dictation System.

## 2022-04-14 ENCOUNTER — OFFICE VISIT (OUTPATIENT)
Dept: FAMILY MEDICINE CLINIC | Facility: CLINIC | Age: 77
End: 2022-04-14

## 2022-04-14 VITALS
TEMPERATURE: 97.3 F | BODY MASS INDEX: 29.64 KG/M2 | HEART RATE: 86 BPM | RESPIRATION RATE: 16 BRPM | HEIGHT: 60 IN | OXYGEN SATURATION: 96 % | SYSTOLIC BLOOD PRESSURE: 122 MMHG | DIASTOLIC BLOOD PRESSURE: 77 MMHG | WEIGHT: 151 LBS

## 2022-04-14 DIAGNOSIS — R59.0 CERVICAL LYMPHADENOPATHY: ICD-10-CM

## 2022-04-14 DIAGNOSIS — Z12.31 ENCOUNTER FOR SCREENING MAMMOGRAM FOR MALIGNANT NEOPLASM OF BREAST: ICD-10-CM

## 2022-04-14 DIAGNOSIS — E78.5 DYSLIPIDEMIA: ICD-10-CM

## 2022-04-14 DIAGNOSIS — H81.03 MENIERE'S DISEASE OF BOTH EARS: Chronic | ICD-10-CM

## 2022-04-14 DIAGNOSIS — M15.9 GENERALIZED OSTEOARTHRITIS: Primary | Chronic | ICD-10-CM

## 2022-04-14 DIAGNOSIS — R42 DIZZINESS: ICD-10-CM

## 2022-04-14 DIAGNOSIS — I10 ESSENTIAL HYPERTENSION: ICD-10-CM

## 2022-04-14 DIAGNOSIS — E04.1 THYROID CYST: Chronic | ICD-10-CM

## 2022-04-14 DIAGNOSIS — F41.1 GENERALIZED ANXIETY DISORDER: Chronic | ICD-10-CM

## 2022-04-14 PROCEDURE — 84443 ASSAY THYROID STIM HORMONE: CPT | Performed by: INTERNAL MEDICINE

## 2022-04-14 PROCEDURE — 80061 LIPID PANEL: CPT | Performed by: INTERNAL MEDICINE

## 2022-04-14 PROCEDURE — 82607 VITAMIN B-12: CPT | Performed by: INTERNAL MEDICINE

## 2022-04-14 PROCEDURE — 99214 OFFICE O/P EST MOD 30 MIN: CPT | Performed by: INTERNAL MEDICINE

## 2022-04-14 PROCEDURE — 85027 COMPLETE CBC AUTOMATED: CPT | Performed by: INTERNAL MEDICINE

## 2022-04-14 PROCEDURE — 80053 COMPREHEN METABOLIC PANEL: CPT | Performed by: INTERNAL MEDICINE

## 2022-04-14 PROCEDURE — 84439 ASSAY OF FREE THYROXINE: CPT | Performed by: INTERNAL MEDICINE

## 2022-04-14 RX ORDER — DIAZEPAM 5 MG/1
5 TABLET ORAL EVERY 12 HOURS PRN
Qty: 60 TABLET | Refills: 2 | Status: SHIPPED | OUTPATIENT
Start: 2022-04-14 | End: 2022-07-19 | Stop reason: SDUPTHER

## 2022-04-14 NOTE — PROGRESS NOTES
Patient Name: Edgar Cotto Today's Date: 2022   Patient MRN / CSN: [id number] / [id number] Date of Encounter: 2022   Patient Age / : 77 y.o. / 1945 Encounter Provider: Abbie Hillman DO   Referring Physician: No ref. provider found          Edgar is a 77 y.o. female who is being seen today for Osteoarthritis (Follow up, medication refill.)      Mrs. Cotto presents today to follow-up on Ménière's disease.  She reports still having dizziness at times but it is better controlled with the current regimen including the increased dose of Valium.  She reports tolerating this regimen well.  She is also using meclizine as needed.  She request a refill on the Valium today.    Mrs. Cotto has hypertension.  Her blood pressure is well controlled with valsartan.  She reports tolerating this well.  She denies any chest pain or shortness of air.    Mrs. Cotto has hyperlipidemia.  She is doing well with Crestor therapy.  She is agreeable to updating her labs today.    Osteoarthritis  Presents for follow-up visit. Affected locations include the right hip, left hip and neck (Thoracic and lumbar regions). Pertinent negatives include no fever. Her past medical history is significant for osteoarthritis. Compliance problems: Patient reports Mobic and Tylenol are helping and she is tolerating this regimen well.        Allergies include:Z-mary [azithromycin], Amoxicillin, Cortisone, Fish-derived products, Iodinated diagnostic agents, Iodine, Lidocaine, Morphine, Niacin, Nystatin, Other, Sulfa antibiotics, and Verapamil  Current Outpatient Medications   Medication Sig Dispense Refill   • acetaminophen (Tylenol 8 Hour Arthritis Pain) 650 MG 8 hr tablet Take 1 tablet by mouth 4 (Four) Times a Day As Needed for Mild Pain . 120 tablet 5   • albuterol sulfate  (90 Base) MCG/ACT inhaler Inhale 2 puffs Every 4 (Four) Hours. 18 g 1   • aspirin 81 MG tablet Take 81 mg by mouth Daily.     • Azelastine  HCl 137 MCG/SPRAY solution 1 spray into the nostril(s) as directed by provider 2 (two) times a day. 30 mL 0   • calcium-vitamin D (Oscal 500/200 D-3) 500-200 MG-UNIT per tablet Take 2 tablets by mouth Daily. 60 tablet 11   • clotrimazole-betamethasone (Lotrisone) 1-0.05 % cream Apply 1 application topically to the appropriate area as directed 2 (Two) Times a Day. 45 g 1   • diazePAM (Valium) 5 MG tablet Take 1 tablet by mouth Every 12 (Twelve) Hours As Needed for Anxiety (Dizziness). 60 tablet 2   • esomeprazole (nexIUM) 40 MG capsule Take 1 capsule by mouth Every Morning Before Breakfast. 30 capsule 5   • fluticasone (Flonase) 50 MCG/ACT nasal spray 2 sprays into the nostril(s) as directed by provider Daily. 15.8 mL 0   • meclizine (ANTIVERT) 25 MG tablet Take 1 tablet by mouth Every 8 (Eight) Hours As Needed for Dizziness. 270 tablet 1   • meloxicam (Mobic) 15 MG tablet Take 1 tablet by mouth Daily. Take with food. 30 tablet 5   • nystatin (MYCOSTATIN) 100,000 unit/mL suspension Swish and swallow 5 mL 4 (Four) Times a Day. 120 mL 1   • psyllium (METAMUCIL) 58.6 % packet Take 1 packet by mouth Daily. 162 each 3   • rosuvastatin (CRESTOR) 5 MG tablet TAKE ONE TABLET BY MOUTH EVERY OTHER DAY 15 tablet 3   • valsartan (DIOVAN) 40 MG tablet Take 0.5 tablets by mouth 2 (Two) Times a Day. 90 tablet 1     No current facility-administered medications for this visit.     Past Medical History:   Diagnosis Date   • Abdominal distension    • Allergic rhinitis    • Anxiety    • Arthritis    • Closed nondisplaced fracture of surgical neck of left humerus with routine healing 3/9/2021   • Cough    • Dry eyes    • Dyslipidemia    • Dysuria    • Hyperlipidemia    • Hypertension    • Meniere's disease    • Mitral and aortic valve disease    • Oral candidiasis    • Osteoarthritis    • Stomatitis    • URI, acute    • Vaginal candidiasis    • Vocal cord dysfunction      Family History   Problem Relation Age of Onset   • Diabetes Mother  "   • Cancer Father    • Liver disease Sister    • Diabetes Sister    • Liver disease Brother    • Diabetes Brother    • Diabetes Daughter    • Cancer Other    • Liver disease Other    • Breast cancer Neg Hx      Past Surgical History:   Procedure Laterality Date   • CHOLECYSTECTOMY     • COLONOSCOPY     • DILATATION AND CURETTAGE     • HEMORRHOIDECTOMY     • HERNIA REPAIR     • SHOULDER SURGERY     • SKIN CANCER EXCISION      on Nose   • TONSILLECTOMY       Social History     Substance and Sexual Activity   Alcohol Use No     Social History     Tobacco Use   Smoking Status Never Smoker   Smokeless Tobacco Never Used   Tobacco Comment    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   Respiratory: Negative for shortness of breath.    Cardiovascular: Negative for chest pain.   Gastrointestinal: Negative for abdominal pain and blood in stool.   Genitourinary: Negative for hematuria.   Musculoskeletal: Positive for arthralgias, back pain and neck pain.   Neurological: Positive for dizziness.        Depression Assessment Review:  PHQ-9 Total Score:    Vital Signs & Measurements Taken This Encounter  /77 (BP Location: Left arm, Patient Position: Sitting, Cuff Size: Adult)   Pulse 86   Temp 97.3 °F (36.3 °C) (Temporal)   Resp 16   Ht 152.4 cm (60\")   Wt 68.5 kg (151 lb)   SpO2 96%   BMI 29.49 kg/m²    SpO2 Percentage    04/14/22 1013   SpO2: 96%        BMI is above normal parameters. Recommendations: BMI is acceptable for patient age.        Physical Exam  Vitals reviewed.   Constitutional:       General: She is not in acute distress.  HENT:      Head: Normocephalic and atraumatic.      Right Ear: Tympanic membrane normal.      Left Ear: Tympanic membrane normal.      Mouth/Throat:      Mouth: Mucous membranes are moist.      Pharynx: No oropharyngeal exudate or posterior " oropharyngeal erythema.   Eyes:      Extraocular Movements: Extraocular movements intact.      Conjunctiva/sclera: Conjunctivae normal.      Pupils: Pupils are equal, round, and reactive to light.   Neck:      Comments: Firm lymph node palpated in left anterior region, slightly tender. This nodule feels about 2 cm in diameter.   Cardiovascular:      Rate and Rhythm: Normal rate and regular rhythm.   Pulmonary:      Effort: Pulmonary effort is normal. No respiratory distress.      Breath sounds: Normal breath sounds.   Musculoskeletal:         General: No swelling.      Cervical back: Neck supple.   Lymphadenopathy:      Cervical: Cervical adenopathy present.   Skin:     General: Skin is warm and dry.      Coloration: Skin is not jaundiced.   Neurological:      Mental Status: She is alert.   Psychiatric:         Mood and Affect: Mood normal.         Behavior: Behavior normal.          Fall Risk Assessment:  Fall Risk Assessment was completed, and patient is at low risk for falls. No falls in > 1 year. Patient is using grab bars in bathroom and stair handrails.    Assessment & Plan  Patient Active Problem List   Diagnosis   • Atopic rhinitis   • Dizziness   • Generalized anxiety disorder   • Generalized osteoarthritis   • Essential hypertension   • Auditory vertigo   • Mitral and aortic valve disease   • Stomatitis   • Seasonal allergic rhinitis   • Varicose veins of both lower extremities   • Meniere's disease of both ears   • S/P hemorrhoidectomy   • Abnormal EKG   • Moderate obesity   • Dyslipidemia   • Thyroid cyst   • Age-related osteoporosis without current pathological fracture   • Cervical lymphadenopathy       ICD-10-CM ICD-9-CM   1. Generalized osteoarthritis  M15.9 715.00   2. Meniere's disease of both ears  H81.03 386.00   3. Generalized anxiety disorder  F41.1 300.02   4. Dizziness  R42 780.4   5. Essential hypertension  I10 401.9   6. Dyslipidemia  E78.5 272.4   7. Encounter for screening mammogram for  malignant neoplasm of breast  Z12.31 V76.12   8. Thyroid cyst  E04.1 246.2   9. Cervical lymphadenopathy  R59.0 785.6     Orders Placed This Encounter   Procedures   • Mammo Screening Digital Tomosynthesis Bilateral With CAD     Standing Status:   Future     Standing Expiration Date:   4/14/2023     Scheduling Instructions:      Schedule in July per pt request     Order Specific Question:   Reason for Exam:     Answer:   Screening for breast CA   • US Head Neck Soft Tissue     Standing Status:   Future     Standing Expiration Date:   4/14/2023     Order Specific Question:   Reason for Exam:     Answer:   firm left anterior cervical lymphadenopathy wiht h/o thyroid cyst     Order Specific Question:   Release to patient     Answer:   Immediate   • Vitamin B12     Order Specific Question:   Release to patient     Answer:   Immediate   • CBC (No Diff)     Order Specific Question:   Release to patient     Answer:   Immediate   • Comprehensive Metabolic Panel     Order Specific Question:   Release to patient     Answer:   Immediate   • Lipid Panel     Order Specific Question:   Release to patient     Answer:   Immediate   • TSH     Order Specific Question:   Release to patient     Answer:   Immediate   • T4, free     Order Specific Question:   Release to patient     Answer:   Immediate       Meds Ordered During Visit:  New Medications Ordered This Visit   Medications   • diazePAM (Valium) 5 MG tablet     Sig: Take 1 tablet by mouth Every 12 (Twelve) Hours As Needed for Anxiety (Dizziness).     Dispense:  60 tablet     Refill:  2       We will continue current medicine regimen.  I updated via prescription today as requested.  I reviewed PDMP today.  We will update labs today as above.  I am concerned of patient's left anterior cervical lymphadenopathy found on today's exam.  She has a history of thyroid cyst with her last thyroid ultrasound being done in February 2020.  That thyroid ultrasound was unremarkable, without any  thyroid nodules seen.  I recommend repeating an ultrasound of the head and neck at this time.  Patient is agreeable.  We will schedule this soon.  I discussed updating mammogram.  Patient is agreeable to this but prefers to have it scheduled in the warmer months.    Return in about 3 months (around 7/14/2022), or if symptoms worsen or fail to improve, for Recheck.          Referring Provider (if known): No ref. provider found      This document has been electronically signed by Abbie Hillman DO  April 14, 2022 11:01 EDT    Abbie Hillman DO, FACOI  [address] [place] [address]  [phone] (office)    Part of this note may be an electronic transcription/translation of spoken language to printed text using the Dragon Dictation System.

## 2022-04-14 NOTE — PROGRESS NOTES
Venipuncture Blood Specimen Collection  Venipuncture performed in right arm by Kayli Garcia MA with good hemostasis. Patient tolerated the procedure well without complications.   04/14/22   Kayli Garcia MA

## 2022-04-15 LAB
ALBUMIN SERPL-MCNC: 4.3 G/DL (ref 3.5–5.2)
ALBUMIN/GLOB SERPL: 1.6 G/DL
ALP SERPL-CCNC: 61 U/L (ref 39–117)
ALT SERPL W P-5'-P-CCNC: 13 U/L (ref 1–33)
ANION GAP SERPL CALCULATED.3IONS-SCNC: 11.7 MMOL/L (ref 5–15)
AST SERPL-CCNC: 26 U/L (ref 1–32)
BILIRUB SERPL-MCNC: 0.4 MG/DL (ref 0–1.2)
BUN SERPL-MCNC: 18 MG/DL (ref 8–23)
BUN/CREAT SERPL: 20 (ref 7–25)
CALCIUM SPEC-SCNC: 9.7 MG/DL (ref 8.6–10.5)
CHLORIDE SERPL-SCNC: 102 MMOL/L (ref 98–107)
CHOLEST SERPL-MCNC: 167 MG/DL (ref 0–200)
CO2 SERPL-SCNC: 23.3 MMOL/L (ref 22–29)
CREAT SERPL-MCNC: 0.9 MG/DL (ref 0.57–1)
DEPRECATED RDW RBC AUTO: 42.8 FL (ref 37–54)
EGFRCR SERPLBLD CKD-EPI 2021: 65.2 ML/MIN/1.73
ERYTHROCYTE [DISTWIDTH] IN BLOOD BY AUTOMATED COUNT: 12 % (ref 12.3–15.4)
GLOBULIN UR ELPH-MCNC: 2.7 GM/DL
GLUCOSE SERPL-MCNC: 95 MG/DL (ref 65–99)
HCT VFR BLD AUTO: 43.1 % (ref 34–46.6)
HDLC SERPL-MCNC: 54 MG/DL (ref 40–60)
HGB BLD-MCNC: 14.3 G/DL (ref 12–15.9)
LDLC SERPL CALC-MCNC: 85 MG/DL (ref 0–100)
LDLC/HDLC SERPL: 1.48 {RATIO}
MCH RBC QN AUTO: 32 PG (ref 26.6–33)
MCHC RBC AUTO-ENTMCNC: 33.2 G/DL (ref 31.5–35.7)
MCV RBC AUTO: 96.4 FL (ref 79–97)
PLATELET # BLD AUTO: 177 10*3/MM3 (ref 140–450)
PMV BLD AUTO: 11.9 FL (ref 6–12)
POTASSIUM SERPL-SCNC: 5 MMOL/L (ref 3.5–5.2)
PROT SERPL-MCNC: 7 G/DL (ref 6–8.5)
RBC # BLD AUTO: 4.47 10*6/MM3 (ref 3.77–5.28)
SODIUM SERPL-SCNC: 137 MMOL/L (ref 136–145)
T4 FREE SERPL-MCNC: 1.28 NG/DL (ref 0.93–1.7)
TRIGL SERPL-MCNC: 165 MG/DL (ref 0–150)
TSH SERPL DL<=0.05 MIU/L-ACNC: 0.98 UIU/ML (ref 0.27–4.2)
VIT B12 BLD-MCNC: 624 PG/ML (ref 211–946)
VLDLC SERPL-MCNC: 28 MG/DL (ref 5–40)
WBC NRBC COR # BLD: 7.17 10*3/MM3 (ref 3.4–10.8)

## 2022-04-27 ENCOUNTER — HOSPITAL ENCOUNTER (OUTPATIENT)
Dept: ULTRASOUND IMAGING | Facility: HOSPITAL | Age: 77
Discharge: HOME | End: 2022-04-27
Admitting: INTERNAL MEDICINE

## 2022-04-27 DIAGNOSIS — E04.1 THYROID CYST: Chronic | ICD-10-CM

## 2022-04-27 DIAGNOSIS — R59.0 CERVICAL LYMPHADENOPATHY: ICD-10-CM

## 2022-04-27 PROCEDURE — 76536 US EXAM OF HEAD AND NECK: CPT

## 2022-04-27 PROCEDURE — 76536 US EXAM OF HEAD AND NECK: CPT | Performed by: RADIOLOGY

## 2022-04-29 ENCOUNTER — TELEPHONE (OUTPATIENT)
Dept: FAMILY MEDICINE CLINIC | Facility: CLINIC | Age: 77
End: 2022-04-29

## 2022-05-02 ENCOUNTER — TELEPHONE (OUTPATIENT)
Dept: FAMILY MEDICINE CLINIC | Facility: CLINIC | Age: 77
End: 2022-05-02

## 2022-05-02 NOTE — TELEPHONE ENCOUNTER
Caller: Ava Andersen    Relationship: Emergency Contact    Best call back number: [phone]    What test was performed: THYROID UNLTRSOUND     When was the test performed: 4/27/22    Additional notes:

## 2022-05-02 NOTE — TELEPHONE ENCOUNTER
Please let patient know that ultrasound showed no masses. If symptoms are persistent, we will do ct neck soft tissues.

## 2022-05-18 ENCOUNTER — APPOINTMENT (OUTPATIENT)
Dept: MAMMOGRAPHY | Facility: HOSPITAL | Age: 77
End: 2022-05-18

## 2022-05-26 NOTE — PROGRESS NOTES
"Subjective     Chief Complaint   Patient presents with   • Hip Pain     right       Edgar Cotto is a 73 y.o. female.     History of Present Illness mechanical nontraumatic minimal radiating right hip low back discomfort.  No history of remote trauma either.  OTC NSAID and Tylenol minimal help.  Has not been ill.  Compliant with routine medications.  Has had no GI  symptoms.  No rashes noted.    The following portions of the patient's history were reviewed and updated as appropriate: allergies, past family history, past medical history, past social history, past surgical history and problem list.    Review of Systems see the history of present illness    Objective   Physical Exam   Constitutional: She is oriented to person, place, and time. She appears well-developed and well-nourished.   HENT:   Head: Normocephalic and atraumatic.   Neck: Normal range of motion. Neck supple.   Cardiovascular: Normal rate, regular rhythm and normal heart sounds.    Pulmonary/Chest: Effort normal and breath sounds normal.   Abdominal: Soft. There is no tenderness.   Musculoskeletal: Normal range of motion.   Gait and station normal.  Slight tenderness with internal and external rotation right hip.  Good range of motion lumbar spine although tender soft tissue right paralumbar.   Neurological: She is alert and oriented to person, place, and time. She has normal reflexes.   Skin: Skin is warm and dry. No rash noted.   Psychiatric: She has a normal mood and affect. Her behavior is normal.     /90 (BP Location: Right arm, Patient Position: Sitting)   Pulse 81   Temp 97.8 °F (36.6 °C) (Oral)   Ht 152.4 cm (60\")   Wt 75.9 kg (167 lb 6.4 oz)   BMI 32.69 kg/m²   Assessment/Plan   Edgar was seen today for hip pain.    Diagnoses and all orders for this visit:    Chronic bilateral low back pain without sciatica  -     XR Hips Bilateral With or Without Pelvis 2 View; Future  -     XR Spine Lumbar 2 or 3 View; " Marry Beach   Future    Screening mammogram, encounter for  -     Mammo screening digital tomosynthesis bilateral w CAD; Future    Generalized osteoarthritis  -     XR Hips Bilateral With or Without Pelvis 2 View; Future  -     XR Spine Lumbar 2 or 3 View; Future      I believe this all represents some degree of arthritis.  Will check x-rays for reassurance.  Continue OTC analgesics.  Most importantly stay mobile maintain good range of motion.  Continue with daily walking as possible for exercise.  Continue other medications as reconciled ordered.  Will notify you of results.  Follow-up pending.

## 2022-07-11 ENCOUNTER — HOSPITAL ENCOUNTER (OUTPATIENT)
Dept: MAMMOGRAPHY | Facility: HOSPITAL | Age: 77
Discharge: HOME | End: 2022-07-11
Admitting: INTERNAL MEDICINE

## 2022-07-11 DIAGNOSIS — Z12.31 ENCOUNTER FOR SCREENING MAMMOGRAM FOR MALIGNANT NEOPLASM OF BREAST: ICD-10-CM

## 2022-07-11 PROCEDURE — 77063 BREAST TOMOSYNTHESIS BI: CPT | Performed by: RADIOLOGY

## 2022-07-11 PROCEDURE — 77063 BREAST TOMOSYNTHESIS BI: CPT

## 2022-07-11 PROCEDURE — 77067 SCR MAMMO BI INCL CAD: CPT

## 2022-07-11 PROCEDURE — 77067 SCR MAMMO BI INCL CAD: CPT | Performed by: RADIOLOGY

## 2022-07-13 ENCOUNTER — TELEPHONE (OUTPATIENT)
Dept: FAMILY MEDICINE CLINIC | Facility: CLINIC | Age: 77
End: 2022-07-13

## 2022-07-13 NOTE — TELEPHONE ENCOUNTER
Called pt back and reviewed these results. Verbalized understanding and has no further questions.

## 2022-07-19 ENCOUNTER — OFFICE VISIT (OUTPATIENT)
Dept: FAMILY MEDICINE CLINIC | Facility: CLINIC | Age: 77
End: 2022-07-19

## 2022-07-19 VITALS
OXYGEN SATURATION: 93 % | BODY MASS INDEX: 28.94 KG/M2 | WEIGHT: 148.2 LBS | SYSTOLIC BLOOD PRESSURE: 131 MMHG | HEART RATE: 80 BPM | DIASTOLIC BLOOD PRESSURE: 87 MMHG | TEMPERATURE: 98 F

## 2022-07-19 DIAGNOSIS — J30.2 SEASONAL ALLERGIC RHINITIS, UNSPECIFIED TRIGGER: ICD-10-CM

## 2022-07-19 DIAGNOSIS — R42 DIZZINESS: ICD-10-CM

## 2022-07-19 DIAGNOSIS — L57.0 AK (ACTINIC KERATOSIS): ICD-10-CM

## 2022-07-19 DIAGNOSIS — F41.1 GENERALIZED ANXIETY DISORDER: Chronic | ICD-10-CM

## 2022-07-19 DIAGNOSIS — L89.321 PRESSURE INJURY OF LEFT BUTTOCK, STAGE 1: ICD-10-CM

## 2022-07-19 DIAGNOSIS — R06.09 DYSPNEA ON EXERTION: ICD-10-CM

## 2022-07-19 DIAGNOSIS — L89.311 PRESSURE INJURY OF RIGHT BUTTOCK, STAGE 1: ICD-10-CM

## 2022-07-19 DIAGNOSIS — H81.03 MENIERE'S DISEASE OF BOTH EARS: Chronic | ICD-10-CM

## 2022-07-19 DIAGNOSIS — I10 ESSENTIAL HYPERTENSION: Primary | Chronic | ICD-10-CM

## 2022-07-19 DIAGNOSIS — E78.5 DYSLIPIDEMIA: ICD-10-CM

## 2022-07-19 PROBLEM — R09.02 HYPOXIA: Status: ACTIVE | Noted: 2022-07-19

## 2022-07-19 PROCEDURE — 99214 OFFICE O/P EST MOD 30 MIN: CPT | Performed by: INTERNAL MEDICINE

## 2022-07-19 RX ORDER — FLUTICASONE PROPIONATE 50 MCG
2 SPRAY, SUSPENSION (ML) NASAL DAILY
Qty: 15.8 ML | Refills: 5 | Status: SHIPPED | OUTPATIENT
Start: 2022-07-19 | End: 2023-01-24 | Stop reason: SDUPTHER

## 2022-07-19 RX ORDER — DIAZEPAM 5 MG/1
5 TABLET ORAL EVERY 12 HOURS PRN
Qty: 60 TABLET | Refills: 2 | Status: SHIPPED | OUTPATIENT
Start: 2022-07-19 | End: 2023-01-24 | Stop reason: SDUPTHER

## 2022-07-19 NOTE — PROGRESS NOTES
Patient Name: Edgar Cotto Today's Date: 2022   Patient MRN / CSN: [id number] / [id number] Date of Encounter: 2022   Patient Age / : 77 y.o. / 1945 Encounter Provider: Abbie Hillman DO   Referring Physician: No ref. provider found          Edgar is a 77 y.o. female who is being seen today for Follow-up, Skin Problem, Shortness of Breath, and wound on buttocks      Edgar presents today for follow-up.  She has noticed some dyspnea on exertion at home which is relieved by rest.  She denies chest pain but does note some fatigue.  She has a pulse oximetry at home and reports that her oxygen level sometimes drops into the 80s.  She does not wear home oxygen.  She has not had recent pulmonary function test but is agreeable to updating these.    Edgar has Ménière's disease and generalized anxiety.  She is doing well with her current medicine regimen, including meclizine and Valium.  She reports tolerating this regimen well.  She request a refill on Valium today.      Allergies include:Z-mary [azithromycin], Amoxicillin, Cortisone, Fish-derived products, Iodinated diagnostic agents, Iodine, Lidocaine, Morphine, Niacin, Nystatin, Other, Sulfa antibiotics, and Verapamil  Current Outpatient Medications   Medication Sig Dispense Refill   • acetaminophen (Tylenol 8 Hour Arthritis Pain) 650 MG 8 hr tablet Take 1 tablet by mouth 4 (Four) Times a Day As Needed for Mild Pain . 120 tablet 5   • albuterol sulfate  (90 Base) MCG/ACT inhaler Inhale 2 puffs Every 4 (Four) Hours. 18 g 1   • aspirin 81 MG tablet Take 81 mg by mouth Daily.     • Azelastine HCl 137 MCG/SPRAY solution 1 spray into the nostril(s) as directed by provider 2 (two) times a day. 30 mL 0   • calcium-vitamin D (Oscal 500/200 D-3) 500-200 MG-UNIT per tablet Take 2 tablets by mouth Daily. 60 tablet 11   • diazePAM (Valium) 5 MG tablet Take 1 tablet by mouth Every 12 (Twelve) Hours As Needed for Anxiety (Dizziness). 60 tablet 2   •  fluticasone (Flonase) 50 MCG/ACT nasal spray 2 sprays into the nostril(s) as directed by provider Daily. 15.8 mL 5   • meclizine (ANTIVERT) 25 MG tablet Take 1 tablet by mouth Every 8 (Eight) Hours As Needed for Dizziness. 270 tablet 1   • meloxicam (Mobic) 15 MG tablet Take 1 tablet by mouth Daily. Take with food. 30 tablet 5   • nystatin (MYCOSTATIN) 100,000 unit/mL suspension Swish and swallow 5 mL 4 (Four) Times a Day. 120 mL 1   • psyllium (METAMUCIL) 58.6 % packet Take 1 packet by mouth Daily. 162 each 3   • valsartan (DIOVAN) 40 MG tablet Take 0.5 tablets by mouth 2 (Two) Times a Day. 90 tablet 1   • esomeprazole (nexIUM) 40 MG capsule Take 1 capsule by mouth Every Morning Before Breakfast. 30 capsule 5     No current facility-administered medications for this visit.     Past Medical History:   Diagnosis Date   • Abdominal distension    • Allergic rhinitis    • Anxiety    • Arthritis    • Closed nondisplaced fracture of surgical neck of left humerus with routine healing 3/9/2021   • Cough    • Dry eyes    • Dyslipidemia    • Dysuria    • Hyperlipidemia    • Hypertension    • Meniere's disease    • Mitral and aortic valve disease    • Oral candidiasis    • Osteoarthritis    • Stomatitis    • URI, acute    • Vaginal candidiasis    • Vocal cord dysfunction      Family History   Problem Relation Age of Onset   • Diabetes Mother    • Cancer Father    • Liver disease Sister    • Diabetes Sister    • Liver disease Brother    • Diabetes Brother    • Diabetes Daughter    • Cancer Other    • Liver disease Other    • Breast cancer Neg Hx      Past Surgical History:   Procedure Laterality Date   • CHOLECYSTECTOMY     • COLONOSCOPY     • DILATATION AND CURETTAGE     • HEMORRHOIDECTOMY     • HERNIA REPAIR     • SHOULDER SURGERY     • SKIN CANCER EXCISION      on Nose   • TONSILLECTOMY       Social History     Substance and Sexual Activity   Alcohol Use No     Social History     Tobacco Use   Smoking Status Never Smoker    Smokeless Tobacco Never Used   Tobacco Comment    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   HENT: Positive for congestion.         Relieved by Flonase.  Patient reports tolerating Flonase well.   Respiratory: Positive for shortness of breath.         Some shortness of air with exertion   Cardiovascular: Negative for chest pain.   Gastrointestinal: Negative for abdominal pain.   Musculoskeletal: Positive for arthralgias and myalgias.   Skin:        Skin lesion on forehead and neck, new and irritated. Wound on buttocks bilaterally.    Psychiatric/Behavioral: The patient is nervous/anxious.         Depression Assessment Review:  PHQ-9 Total Score:    Vital Signs & Measurements Taken This Encounter  /87 (BP Location: Left arm, Patient Position: Sitting, Cuff Size: Adult)   Pulse 80   Temp 98 °F (36.7 °C) (Temporal)   Wt 67.2 kg (148 lb 3.2 oz)   SpO2 93%   BMI 28.94 kg/m²    SpO2 Percentage    07/19/22 0854   SpO2: 93%          Physical Exam  Vitals reviewed.   Constitutional:       General: She is not in acute distress.  HENT:      Head: Normocephalic and atraumatic.   Eyes:      General: No scleral icterus.     Extraocular Movements: Extraocular movements intact.      Conjunctiva/sclera: Conjunctivae normal.      Pupils: Pupils are equal, round, and reactive to light.   Cardiovascular:      Rate and Rhythm: Normal rate and regular rhythm.   Pulmonary:      Effort: Pulmonary effort is normal. No respiratory distress.      Breath sounds: Normal breath sounds.   Musculoskeletal:         General: No swelling.   Skin:     General: Skin is warm and dry.      Coloration: Skin is not jaundiced.      Comments: Erythematous, scaly skin lesion on forehead.    Patient was examined in the presence of Mame Ding RN.  Stage I pressure wound to medial buttocks bilaterally.    Neurological:      Mental Status: She is alert.   Psychiatric:         Mood and Affect: Mood normal.         Behavior: Behavior normal.              Assessment & Plan  Patient Active Problem List   Diagnosis   • Atopic rhinitis   • Dizziness   • Generalized anxiety disorder   • Generalized osteoarthritis   • Essential hypertension   • Auditory vertigo   • Mitral and aortic valve disease   • Stomatitis   • Seasonal allergic rhinitis   • Varicose veins of both lower extremities   • Meniere's disease of both ears   • S/P hemorrhoidectomy   • Abnormal EKG   • Moderate obesity   • Dyslipidemia   • Thyroid cyst   • Age-related osteoporosis without current pathological fracture   • Cervical lymphadenopathy   • CABAN (dyspnea on exertion)   • Hypoxia   • Pressure injury of right buttock, stage 1   • Pressure injury of left buttock, stage 1       ICD-10-CM ICD-9-CM   1. Essential hypertension  I10 401.9   2. Meniere's disease of both ears  H81.03 386.00   3. Generalized anxiety disorder  F41.1 300.02   4. Dizziness  R42 780.4   5. Seasonal allergic rhinitis, unspecified trigger  J30.2 477.9   6. AK (actinic keratosis)  L57.0 702.0   7. Dyspnea on exertion  R06.00 786.09   8. Dyslipidemia  E78.5 272.4   9. Pressure injury of right buttock, stage 1  L89.311 707.05     707.21   10. Pressure injury of left buttock, stage 1  L89.321 707.05     707.21     Orders Placed This Encounter   Procedures   • CBC (No Diff)     Standing Status:   Future     Standing Expiration Date:   7/19/2023     Order Specific Question:   Release to patient     Answer:   Immediate   • Comprehensive Metabolic Panel     Standing Status:   Future     Standing Expiration Date:   7/19/2023     Order Specific Question:   Release to patient     Answer:   Immediate   • Lipid Panel     Standing Status:   Future     Standing Expiration Date:   7/19/2023     Order Specific Question:   Release to patient     Answer:   Immediate   • TSH     Standing Status:   Future      Standing Expiration Date:   2023     Order Specific Question:   Release to patient     Answer:   Immediate   • CK     Standing Status:   Future     Standing Expiration Date:   2023     Order Specific Question:   Release to patient     Answer:   Immediate   • Ambulatory Referral to Dermatology     Referral Priority:   Routine     Referral Type:   Consultation     Referral Reason:   Specialty Services Required     Requested Specialty:   Dermatology     Number of Visits Requested:   1   • Adult Transthoracic Echo Complete W/ Cont if Necessary Per Protocol     Standing Status:   Future     Standing Expiration Date:   2023     Order Specific Question:   Reason for exam?     Answer:   Dyspnea     Order Specific Question:   Release to patient     Answer:   Immediate   • Full Pulmonary Function Test With Bronchodilator     Standing Status:   Future     Standing Expiration Date:   2023     Order Specific Question:   PFT Procedures to Be Performed     Answer:   Spirometry Pre & Post Bronchodilator     Order Specific Question:   PFT Procedures to Be Performed     Answer:   Lung Volumes     Order Specific Question:   Bronchodilator Type:     Answer:   COR/AMARI/LUCIA/DEMETRI/PAD - albuterol (PROVENTIL) nebulizer solution 0.083% 2.5 mg/3 mL     Order Specific Question:   Release to patient     Answer:   Immediate     Order Specific Question:   Is there a suspicion that the patient has TB?     Answer:   no       Meds Ordered During Visit:  New Medications Ordered This Visit   Medications   • diazePAM (Valium) 5 MG tablet     Sig: Take 1 tablet by mouth Every 12 (Twelve) Hours As Needed for Anxiety (Dizziness).     Dispense:  60 tablet     Refill:  2   • fluticasone (Flonase) 50 MCG/ACT nasal spray     Si sprays into the nostril(s) as directed by provider Daily.     Dispense:  15.8 mL     Refill:  5     I recommended stopping crestor due to myalgias and arthralgias. We will repeat labs in 3 months, prior to her  next appointment.  Medicine refills were updated today as requested.  PDMP was reviewed.  I discussed wound care with patient today.  She is using a topical cream that she reports is helping and I encouraged her to continue this.  I also encouraged her to sit on soft surfaces and change her position every hour while awake.  6-minute walk test was done in the office today with patient's oxygenation maintaining 88% or higher.  She most consistently showed O2 saturations of 91 to 92% during this test.  She currently does not qualify for home oxygen therapy.  However I do recommend updating PFTs and echocardiogram.  We will also refer to dermatology.    Return in about 3 months (around 10/19/2022), or if symptoms worsen or fail to improve, for Labs Prior, Recheck.          Referring Provider (if known): No ref. provider found      This document has been electronically signed by Abbie Hillman DO  July 19, 2022 14:43 EDT    Abbie Hillman DO, FACOI  [address] [place] [address]  [phone] (office)    Part of this note may be an electronic transcription/translation of spoken language to printed text using the Dragon Dictation System.

## 2022-08-03 ENCOUNTER — HOSPITAL ENCOUNTER (OUTPATIENT)
Dept: CARDIOLOGY | Facility: HOSPITAL | Age: 77
Discharge: HOME | End: 2022-08-03

## 2022-08-03 ENCOUNTER — HOSPITAL ENCOUNTER (OUTPATIENT)
Dept: RESPIRATORY THERAPY | Facility: HOSPITAL | Age: 77
Discharge: HOME | End: 2022-08-03

## 2022-08-03 DIAGNOSIS — R06.09 DYSPNEA ON EXERTION: ICD-10-CM

## 2022-08-03 DIAGNOSIS — I10 ESSENTIAL HYPERTENSION: ICD-10-CM

## 2022-08-03 LAB
BH CV ECHO MEAS - ACS: 2 CM
BH CV ECHO MEAS - AI P1/2T: 338.2 MSEC
BH CV ECHO MEAS - AO MAX PG: 9.2 MMHG
BH CV ECHO MEAS - AO MEAN PG: 4 MMHG
BH CV ECHO MEAS - AO ROOT DIAM: 3.2 CM
BH CV ECHO MEAS - AO V2 MAX: 152 CM/SEC
BH CV ECHO MEAS - AO V2 VTI: 24.2 CM
BH CV ECHO MEAS - EDV(CUBED): 52.5 ML
BH CV ECHO MEAS - EDV(MOD-SP4): 39.1 ML
BH CV ECHO MEAS - EF(MOD-SP4): 61.4 %
BH CV ECHO MEAS - ESV(CUBED): 17.5 ML
BH CV ECHO MEAS - ESV(MOD-SP4): 15.1 ML
BH CV ECHO MEAS - FS: 30.7 %
BH CV ECHO MEAS - IVS/LVPW: 1.29 CM
BH CV ECHO MEAS - IVSD: 1.11 CM
BH CV ECHO MEAS - LA DIMENSION: 2.6 CM
BH CV ECHO MEAS - LAT PEAK E' VEL: 10.2 CM/SEC
BH CV ECHO MEAS - LV DIASTOLIC VOL/BSA (35-75): 23.8 CM2
BH CV ECHO MEAS - LV MASS(C)D: 111.8 GRAMS
BH CV ECHO MEAS - LV SYSTOLIC VOL/BSA (12-30): 9.2 CM2
BH CV ECHO MEAS - LVIDD: 3.7 CM
BH CV ECHO MEAS - LVIDS: 2.6 CM
BH CV ECHO MEAS - LVOT AREA: 2.8 CM2
BH CV ECHO MEAS - LVOT DIAM: 1.9 CM
BH CV ECHO MEAS - LVPWD: 0.86 CM
BH CV ECHO MEAS - MED PEAK E' VEL: 6.1 CM/SEC
BH CV ECHO MEAS - MV A MAX VEL: 90.8 CM/SEC
BH CV ECHO MEAS - MV E MAX VEL: 62.4 CM/SEC
BH CV ECHO MEAS - MV E/A: 0.69
BH CV ECHO MEAS - PA ACC TIME: 0.09 SEC
BH CV ECHO MEAS - PA PR(ACCEL): 39.4 MMHG
BH CV ECHO MEAS - RAP SYSTOLE: 10 MMHG
BH CV ECHO MEAS - RVSP: 35 MMHG
BH CV ECHO MEAS - SI(MOD-SP4): 14.6 ML/M2
BH CV ECHO MEAS - SV(MOD-SP4): 24 ML
BH CV ECHO MEAS - TAPSE (>1.6): 1.46 CM
BH CV ECHO MEAS - TR MAX PG: 25 MMHG
BH CV ECHO MEAS - TR MAX VEL: 250 CM/SEC
BH CV ECHO MEASUREMENTS AVERAGE E/E' RATIO: 7.66
LEFT ATRIUM VOLUME INDEX: 6.2 ML/M2
MAXIMAL PREDICTED HEART RATE: 141 BPM
STRESS TARGET HR: 120 BPM

## 2022-08-03 PROCEDURE — 94060 EVALUATION OF WHEEZING: CPT

## 2022-08-03 PROCEDURE — 94664 DEMO&/EVAL PT USE INHALER: CPT

## 2022-08-03 PROCEDURE — 94640 AIRWAY INHALATION TREATMENT: CPT

## 2022-08-03 PROCEDURE — 93306 TTE W/DOPPLER COMPLETE: CPT

## 2022-08-03 PROCEDURE — 93306 TTE W/DOPPLER COMPLETE: CPT | Performed by: INTERNAL MEDICINE

## 2022-08-03 PROCEDURE — 94726 PLETHYSMOGRAPHY LUNG VOLUMES: CPT

## 2022-08-03 RX ORDER — ALBUTEROL SULFATE 2.5 MG/3ML
2.5 SOLUTION RESPIRATORY (INHALATION) ONCE
Status: COMPLETED | OUTPATIENT
Start: 2022-08-03 | End: 2022-08-03

## 2022-08-03 RX ADMIN — ALBUTEROL SULFATE 2.5 MG: 2.5 SOLUTION RESPIRATORY (INHALATION) at 09:40

## 2022-08-05 ENCOUNTER — TELEPHONE (OUTPATIENT)
Dept: FAMILY MEDICINE CLINIC | Facility: CLINIC | Age: 77
End: 2022-08-05

## 2022-08-05 DIAGNOSIS — J98.4 RESTRICTIVE LUNG DISEASE: Primary | ICD-10-CM

## 2022-08-05 NOTE — TELEPHONE ENCOUNTER
Please see results notes for further details and call patient to inform her of these findings and further recommendations.

## 2022-08-05 NOTE — TELEPHONE ENCOUNTER
Caller: Edgar Cotto    Relationship: Self    Best call back number:[phone]    What test was performed:   PULMONARY FUNCTION TEST 08/03/2022  ECHOCARDIOGRAM 08/03/022    Additional notes:   PATIENT WOULD LIKE A CALL BACK REGARDING THE RESULTS OF HER TEST LISTED ABOVE

## 2022-08-08 DIAGNOSIS — J98.4 RESTRICTIVE LUNG DISEASE: Primary | ICD-10-CM

## 2022-09-09 ENCOUNTER — PATIENT ROUNDING (BHMG ONLY) (OUTPATIENT)
Dept: PULMONOLOGY | Facility: CLINIC | Age: 77
End: 2022-09-09

## 2022-09-09 ENCOUNTER — OFFICE VISIT (OUTPATIENT)
Dept: PULMONOLOGY | Facility: CLINIC | Age: 77
End: 2022-09-09

## 2022-09-09 VITALS
HEART RATE: 83 BPM | TEMPERATURE: 97.5 F | BODY MASS INDEX: 28.66 KG/M2 | WEIGHT: 146 LBS | HEIGHT: 60 IN | SYSTOLIC BLOOD PRESSURE: 122 MMHG | DIASTOLIC BLOOD PRESSURE: 76 MMHG | OXYGEN SATURATION: 95 %

## 2022-09-09 DIAGNOSIS — E66.3 OVERWEIGHT: ICD-10-CM

## 2022-09-09 DIAGNOSIS — R06.02 SHORTNESS OF BREATH: ICD-10-CM

## 2022-09-09 DIAGNOSIS — J98.4 RESTRICTIVE LUNG DISEASE: Primary | ICD-10-CM

## 2022-09-09 DIAGNOSIS — R09.02 HYPOXIA: ICD-10-CM

## 2022-09-09 PROCEDURE — 94618 PULMONARY STRESS TESTING: CPT | Performed by: NURSE PRACTITIONER

## 2022-09-09 PROCEDURE — 99214 OFFICE O/P EST MOD 30 MIN: CPT | Performed by: NURSE PRACTITIONER

## 2022-09-09 RX ORDER — ALBUTEROL SULFATE 2.5 MG/3ML
2.5 SOLUTION RESPIRATORY (INHALATION) 4 TIMES DAILY PRN
Qty: 360 ML | Refills: 5 | Status: SHIPPED | OUTPATIENT
Start: 2022-09-09

## 2022-09-09 NOTE — PROGRESS NOTES
"Chief Complaint  Cough and Shortness of Breath    Subjective        Edgar Cotto presents to [facility] PULMONARY & [facility]IT[facility] CARE MEDICINE  History of Present Illness     Ms. Cotto is a 77 year old female with a medical history significant for anxiety, arthritis, dyslipidemia, hypertension, and meniere's disease.    She presents today for evaluation of shortness of breath and cough.  She reports that her shortness of breath is worse with exertion.  She tells me that she has noticed that her oxygen sometimes drops into the 80s during exertion.  She also reports that her cough is productive.  She does tell me that she has issues with sinus problems and allergies.  She is a life long non smoker.  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        Objective   Vital Signs:  /76   Pulse 83   Temp 97.5 °F (36.4 °C) (Temporal)   Ht 152.4 cm (60\")   Wt 66.2 kg (146 lb)   SpO2 95%   BMI 28.51 kg/m²   Estimated body mass index is 28.51 kg/m² as calculated from the following:    Height as of this encounter: 152.4 cm (60\").    Weight as of this encounter: 66.2 kg (146 lb).    BMI is >= 25 and <30. (Overweight) The following options were offered after discussion;: exercise counseling/recommendations and nutrition counseling/recommendations      Physical Exam     GENERAL APPEARANCE: Well developed, well nourished, alert and cooperative, and appears to be in no acute distress.    HEAD: normocephalic. Atraumatic.    EYES: PERRL, EOMI. Vision is grossly intact.    THROAT: Oral cavity and pharynx normal. No inflammation, swelling, exudate, or lesions.     NECK: Neck supple.  No thyromegaly.    CARDIAC: Normal S1 and S2. No S3, S4 or murmurs. Rhythm is regular.     RESPIRATORY:Bilateral air entry positive. Bilateral diminished breath sounds. No wheezing, crackles or rhonchi noted.    GI: Positive bowel sounds. Soft, nondistended, nontender. "     MUSCULOSKELETAL: No significant deformity or joint abnormality. No edema. Peripheral pulses intact. No varicosities.    NEUROLOGICAL: Strength and sensation symmetric and intact throughout.     PSYCHIATRIC: The mental examination revealed the patient was oriented to person, place, and time.     Result Review :  The following data was reviewed by: JOSSELINE Barrett on 09/09/2022:  Common labs    Common Labsle 10/25/21 10/25/21 4/14/22 4/14/22 4/14/22    1016 1016 1103 1103 1103   Glucose 107 (A)   95    BUN 15   18    Creatinine 0.79   0.90    eGFR Non African Am 70       Sodium 139   137    Potassium 4.4   5.0    Chloride 102   102    Calcium 9.4   9.7    Albumin 4.20   4.30    Total Bilirubin 0.5   0.4    Alkaline Phosphatase 66   61    AST (SGOT) 27   26    ALT (SGPT) 13   13    WBC   7.17     Hemoglobin   14.3     Hematocrit   43.1     Platelets   177     Total Cholesterol  150   167   Triglycerides  115   165 (A)   HDL Cholesterol  60   54   LDL Cholesterol   70   85   (A) Abnormal value            Data reviewed: PFT          Assessment and Plan   Diagnoses and all orders for this visit:    1. Restrictive lung disease (Primary)  -     Home Nebulizer  -     CT chest hi resolution; Future  -     Oxygen Therapy    2. Shortness of breath  -     Home Nebulizer  -     CT chest hi resolution; Future    3. Hypoxia  -     Oxygen Therapy    4. Overweight    Other orders  -     albuterol (PROVENTIL) (2.5 MG/3ML) 0.083% nebulizer solution; Take 2.5 mg by nebulization 4 (Four) Times a Day As Needed for Wheezing.  Dispense: 360 mL; Refill: 5         PFT done in August 2022. No obstruction was noted.  She did have severe restriction.  Ordered hi res CT Chest to further evaluate lung parenchyma.    Her daughter tells me that she is unable to comprehend how to take inhalers.  Will start her on albuterol neb treatments.   Provided her with an order for a nebulizer machine.      6 MWT was completed in office.  Oxygen  saturation dropped to 85%.  Placed order for supplemental oxygen at 2L at night and as needed.          Follow Up   Return in about 3 months (around 12/9/2022).  Patient was given instructions and counseling regarding her condition or for health maintenance advice. Please see specific information pulled into the AVS if appropriate.

## 2022-09-09 NOTE — PROGRESS NOTES
September 9, 2022    Hello, may I speak with Edgar Cotto?    My name is Ibis Estrada      I am  with [facility]  [address]  [facility] [address] [phone].    Before we get started may I verify your date of birth? 1945    I am calling to officially welcome you to our practice and ask about your recent visit. Is this a good time to talk? yes    Tell me about your visit with us. What things went well?  Spoke with patients daughter. They stated everyone was very sweet and kind. They were pleased with the care they received.       We're always looking for ways to make our patients' experiences even better. Do you have recommendations on ways we may improve?  no    Overall were you satisfied with your first visit to our practice? yes       I appreciate you taking the time to speak with me today. Is there anything else I can do for you? no      Thank you, and have a great day.

## 2022-09-12 DIAGNOSIS — R09.02 HYPOXIA: Primary | ICD-10-CM

## 2022-09-13 ENCOUNTER — TELEPHONE (OUTPATIENT)
Dept: PULMONOLOGY | Facility: CLINIC | Age: 77
End: 2022-09-13

## 2022-09-13 NOTE — TELEPHONE ENCOUNTER
----- Message from Ramonita White sent at 9/12/2022 10:26 AM EDT -----  Regarding: POC  Ava (daughter) called stating that [facility] told her that her insurance would not cover a POC. Ava said she called insurance and they said they would. States she is unable to lift the oxygen tank they have given her.

## 2022-09-13 NOTE — TELEPHONE ENCOUNTER
I called and spoke with Sujatha (Allan). She said they did not have an order for her POC. Devi entered the order and I faxed to [facility].

## 2022-09-23 ENCOUNTER — OFFICE VISIT (OUTPATIENT)
Dept: FAMILY MEDICINE CLINIC | Facility: CLINIC | Age: 77
End: 2022-09-23

## 2022-09-23 VITALS
WEIGHT: 148 LBS | BODY MASS INDEX: 29.06 KG/M2 | TEMPERATURE: 97.8 F | SYSTOLIC BLOOD PRESSURE: 130 MMHG | HEIGHT: 60 IN | HEART RATE: 69 BPM | OXYGEN SATURATION: 96 % | RESPIRATION RATE: 18 BRPM | DIASTOLIC BLOOD PRESSURE: 82 MMHG

## 2022-09-23 DIAGNOSIS — H65.194 OTHER RECURRENT ACUTE NONSUPPURATIVE OTITIS MEDIA OF RIGHT EAR: Primary | ICD-10-CM

## 2022-09-23 PROCEDURE — 99213 OFFICE O/P EST LOW 20 MIN: CPT | Performed by: NURSE PRACTITIONER

## 2022-09-23 RX ORDER — AMOXICILLIN AND CLAVULANATE POTASSIUM 875; 125 MG/1; MG/1
1 TABLET, FILM COATED ORAL 2 TIMES DAILY
Qty: 20 TABLET | Refills: 0 | Status: SHIPPED | OUTPATIENT
Start: 2022-09-23 | End: 2022-10-03

## 2022-09-23 NOTE — PROGRESS NOTES
"Subjective   Edgar Cotto is a 77 y.o. female.     Chief Complaint   Patient presents with   • Earache       History of Present Illness  She presents with c/o earache in both ears for the past 3-4 days. She feels like her head is clogged up and c/o pain behind her ears. She denies fever or chills. She has taken mucinex.        The following portions of the patient's history were reviewed and updated as appropriate: allergies, current medications, past family history, past medical history, past social history, past surgical history and problem list.    Review of Systems   Constitutional: Negative for fatigue, fever and unexpected weight change.   HENT: Positive for ear pain and hearing loss. Negative for rhinorrhea and sore throat.    Eyes: Negative for visual disturbance.   Respiratory: Positive for cough and wheezing (chronic). Negative for chest tightness and shortness of breath.    Cardiovascular: Negative for chest pain, palpitations and leg swelling.   Gastrointestinal: Negative for abdominal pain, blood in stool, constipation, diarrhea, nausea and vomiting.   Endocrine: Negative for cold intolerance and heat intolerance.   Genitourinary: Negative for dysuria.   Musculoskeletal: Negative for arthralgias and myalgias.   Skin: Negative for color change.   Allergic/Immunologic: Negative for environmental allergies.   Hematological: Negative for adenopathy.   Psychiatric/Behavioral: Negative for suicidal ideas. The patient is not nervous/anxious.        Objective     /82 (BP Location: Left arm, Patient Position: Sitting, Cuff Size: Adult)   Pulse 69   Temp 97.8 °F (36.6 °C) (Temporal)   Resp 18   Ht 152.4 cm (60\")   Wt 67.1 kg (148 lb)   SpO2 96%   BMI 28.90 kg/m²     Physical Exam  Vitals reviewed.   Constitutional:       General: She is not in acute distress.     Appearance: Normal appearance. She is well-developed. She is not diaphoretic.   HENT:      Head: Normocephalic and atraumatic.      " Right Ear: Hearing, ear canal and external ear normal. Tympanic membrane is erythematous and bulging.      Left Ear: Hearing, tympanic membrane, ear canal and external ear normal.      Nose: Nose normal.      Right Sinus: No maxillary sinus tenderness or frontal sinus tenderness.      Left Sinus: No maxillary sinus tenderness or frontal sinus tenderness.      Mouth/Throat:      Pharynx: Uvula midline.   Eyes:      General: Lids are normal.      Conjunctiva/sclera: Conjunctivae normal.   Neck:      Trachea: Trachea normal. No tracheal tenderness or tracheal deviation.   Cardiovascular:      Rate and Rhythm: Normal rate and regular rhythm.      Heart sounds: Normal heart sounds, S1 normal and S2 normal. No murmur heard.    No friction rub. No gallop.   Pulmonary:      Effort: Pulmonary effort is normal. No respiratory distress.      Breath sounds: Normal breath sounds.   Abdominal:      General: Bowel sounds are normal. There is no distension.      Palpations: Abdomen is soft.      Tenderness: There is no abdominal tenderness.   Lymphadenopathy:      Cervical: No cervical adenopathy.   Skin:     General: Skin is warm and dry.   Neurological:      Mental Status: She is alert and oriented to person, place, and time.   Psychiatric:         Behavior: Behavior normal.         Thought Content: Thought content normal.         Judgment: Judgment normal.         Current Outpatient Medications   Medication Sig Dispense Refill   • acetaminophen (Tylenol 8 Hour Arthritis Pain) 650 MG 8 hr tablet Take 1 tablet by mouth 4 (Four) Times a Day As Needed for Mild Pain . 120 tablet 5   • albuterol (PROVENTIL) (2.5 MG/3ML) 0.083% nebulizer solution Take 2.5 mg by nebulization 4 (Four) Times a Day As Needed for Wheezing. 360 mL 5   • aspirin 81 MG tablet Take 81 mg by mouth Daily.     • Azelastine HCl 137 MCG/SPRAY solution 1 spray into the nostril(s) as directed by provider 2 (two) times a day. 30 mL 0   • calcium-vitamin D (Oscal  500/200 D-3) 500-200 MG-UNIT per tablet Take 2 tablets by mouth Daily. 60 tablet 11   • diazePAM (Valium) 5 MG tablet Take 1 tablet by mouth Every 12 (Twelve) Hours As Needed for Anxiety (Dizziness). 60 tablet 2   • esomeprazole (nexIUM) 40 MG capsule Take 1 capsule by mouth Every Morning Before Breakfast. 30 capsule 5   • fluticasone (Flonase) 50 MCG/ACT nasal spray 2 sprays into the nostril(s) as directed by provider Daily. 15.8 mL 5   • meclizine (ANTIVERT) 25 MG tablet Take 1 tablet by mouth Every 8 (Eight) Hours As Needed for Dizziness. 270 tablet 1   • meloxicam (Mobic) 15 MG tablet Take 1 tablet by mouth Daily. Take with food. 30 tablet 5   • nystatin (MYCOSTATIN) 100,000 unit/mL suspension Swish and swallow 5 mL 4 (Four) Times a Day. 120 mL 1   • psyllium (METAMUCIL) 58.6 % packet Take 1 packet by mouth Daily. 162 each 3   • valsartan (DIOVAN) 40 MG tablet Take 0.5 tablets by mouth 2 (Two) Times a Day. 90 tablet 1   • amoxicillin-clavulanate (Augmentin) 875-125 MG per tablet Take 1 tablet by mouth 2 (Two) Times a Day for 10 days. 20 tablet 0     No current facility-administered medications for this visit.            Assessment & Plan     Problem List Items Addressed This Visit    None     Visit Diagnoses     Other recurrent acute nonsuppurative otitis media of right ear    -  Primary    Relevant Medications    amoxicillin-clavulanate (Augmentin) 875-125 MG per tablet            ICD-10-CM ICD-9-CM   1. Other recurrent acute nonsuppurative otitis media of right ear  H65.194 381.00       Plan: Augmentin ordered for otitis media. Keep scheduled follow up with Dr. Hillman.     @Body mass index is 28.9 kg/m².              Understands disease processes and need for medications.  Understands reasons for urgent and emergent care.  Patient (& family) verbalized agreement for treatment plan.   Emotional support and active listening provided.  Patient provided time to verbalize feelings.           BMI is >= 25 and <30.  (Overweight) The following options were offered after discussion;: weight loss educational material (shared in after visit summary)      This document has been electronically signed by JOSSELINE Wharton   September 23, 2022 09:41 EDT

## 2022-10-12 ENCOUNTER — HOSPITAL ENCOUNTER (OUTPATIENT)
Dept: CT IMAGING | Facility: HOSPITAL | Age: 77
Discharge: HOME | End: 2022-10-12
Admitting: NURSE PRACTITIONER

## 2022-10-12 DIAGNOSIS — J98.4 RESTRICTIVE LUNG DISEASE: ICD-10-CM

## 2022-10-12 DIAGNOSIS — R06.02 SHORTNESS OF BREATH: ICD-10-CM

## 2022-10-12 PROCEDURE — 71250 CT THORAX DX C-: CPT | Performed by: RADIOLOGY

## 2022-10-12 PROCEDURE — 71250 CT THORAX DX C-: CPT

## 2022-10-14 ENCOUNTER — CLINICAL SUPPORT (OUTPATIENT)
Dept: FAMILY MEDICINE CLINIC | Facility: CLINIC | Age: 77
End: 2022-10-14

## 2022-10-14 ENCOUNTER — DOCUMENTATION (OUTPATIENT)
Dept: PULMONOLOGY | Facility: CLINIC | Age: 77
End: 2022-10-14

## 2022-10-14 DIAGNOSIS — E78.5 DYSLIPIDEMIA: ICD-10-CM

## 2022-10-14 DIAGNOSIS — I10 ESSENTIAL HYPERTENSION: Chronic | ICD-10-CM

## 2022-10-14 LAB
ALBUMIN SERPL-MCNC: 4.1 G/DL (ref 3.5–5.2)
ALBUMIN/GLOB SERPL: 1.4 G/DL
ALP SERPL-CCNC: 70 U/L (ref 39–117)
ALT SERPL W P-5'-P-CCNC: 12 U/L (ref 1–33)
ANION GAP SERPL CALCULATED.3IONS-SCNC: 7.8 MMOL/L (ref 5–15)
AST SERPL-CCNC: 17 U/L (ref 1–32)
BILIRUB SERPL-MCNC: 0.6 MG/DL (ref 0–1.2)
BUN SERPL-MCNC: 15 MG/DL (ref 8–23)
BUN/CREAT SERPL: 16.9 (ref 7–25)
CALCIUM SPEC-SCNC: 9.8 MG/DL (ref 8.6–10.5)
CHLORIDE SERPL-SCNC: 104 MMOL/L (ref 98–107)
CHOLEST SERPL-MCNC: 217 MG/DL (ref 0–200)
CK SERPL-CCNC: 43 U/L (ref 20–180)
CO2 SERPL-SCNC: 28.2 MMOL/L (ref 22–29)
CREAT SERPL-MCNC: 0.89 MG/DL (ref 0.57–1)
DEPRECATED RDW RBC AUTO: 39 FL (ref 37–54)
EGFRCR SERPLBLD CKD-EPI 2021: 66 ML/MIN/1.73
ERYTHROCYTE [DISTWIDTH] IN BLOOD BY AUTOMATED COUNT: 11.4 % (ref 12.3–15.4)
GLOBULIN UR ELPH-MCNC: 2.9 GM/DL
GLUCOSE SERPL-MCNC: 105 MG/DL (ref 65–99)
HCT VFR BLD AUTO: 44.6 % (ref 34–46.6)
HDLC SERPL-MCNC: 61 MG/DL (ref 40–60)
HGB BLD-MCNC: 15 G/DL (ref 12–15.9)
LDLC SERPL CALC-MCNC: 121 MG/DL (ref 0–100)
LDLC/HDLC SERPL: 1.9 {RATIO}
MCH RBC QN AUTO: 31.2 PG (ref 26.6–33)
MCHC RBC AUTO-ENTMCNC: 33.6 G/DL (ref 31.5–35.7)
MCV RBC AUTO: 92.7 FL (ref 79–97)
PLATELET # BLD AUTO: 155 10*3/MM3 (ref 140–450)
PMV BLD AUTO: 12 FL (ref 6–12)
POTASSIUM SERPL-SCNC: 5.3 MMOL/L (ref 3.5–5.2)
PROT SERPL-MCNC: 7 G/DL (ref 6–8.5)
RBC # BLD AUTO: 4.81 10*6/MM3 (ref 3.77–5.28)
SODIUM SERPL-SCNC: 140 MMOL/L (ref 136–145)
TRIGL SERPL-MCNC: 202 MG/DL (ref 0–150)
TSH SERPL DL<=0.05 MIU/L-ACNC: 1.66 UIU/ML (ref 0.27–4.2)
VLDLC SERPL-MCNC: 35 MG/DL (ref 5–40)
WBC NRBC COR # BLD: 5.96 10*3/MM3 (ref 3.4–10.8)

## 2022-10-14 PROCEDURE — 82550 ASSAY OF CK (CPK): CPT | Performed by: INTERNAL MEDICINE

## 2022-10-14 PROCEDURE — 36415 COLL VENOUS BLD VENIPUNCTURE: CPT | Performed by: NURSE PRACTITIONER

## 2022-10-14 PROCEDURE — 80061 LIPID PANEL: CPT | Performed by: INTERNAL MEDICINE

## 2022-10-14 PROCEDURE — 84443 ASSAY THYROID STIM HORMONE: CPT | Performed by: INTERNAL MEDICINE

## 2022-10-14 PROCEDURE — 85027 COMPLETE CBC AUTOMATED: CPT | Performed by: INTERNAL MEDICINE

## 2022-10-14 PROCEDURE — 80053 COMPREHEN METABOLIC PANEL: CPT | Performed by: INTERNAL MEDICINE

## 2022-10-14 NOTE — PROGRESS NOTES
Spoke with the patient's daughter about CT chest results.  Answered all questions to her satisfaction.

## 2022-10-19 ENCOUNTER — OFFICE VISIT (OUTPATIENT)
Dept: FAMILY MEDICINE CLINIC | Facility: CLINIC | Age: 77
End: 2022-10-19

## 2022-10-19 VITALS
BODY MASS INDEX: 29.33 KG/M2 | WEIGHT: 150.2 LBS | SYSTOLIC BLOOD PRESSURE: 122 MMHG | TEMPERATURE: 97.8 F | OXYGEN SATURATION: 95 % | HEART RATE: 82 BPM | DIASTOLIC BLOOD PRESSURE: 76 MMHG

## 2022-10-19 DIAGNOSIS — K12.1 STOMATITIS: ICD-10-CM

## 2022-10-19 DIAGNOSIS — F41.1 GENERALIZED ANXIETY DISORDER: Chronic | ICD-10-CM

## 2022-10-19 DIAGNOSIS — E78.5 DYSLIPIDEMIA: Primary | Chronic | ICD-10-CM

## 2022-10-19 DIAGNOSIS — H81.03 MENIERE'S DISEASE OF BOTH EARS: Chronic | ICD-10-CM

## 2022-10-19 DIAGNOSIS — I10 ESSENTIAL HYPERTENSION: Chronic | ICD-10-CM

## 2022-10-19 DIAGNOSIS — J98.4 RESTRICTIVE LUNG DISEASE: Chronic | ICD-10-CM

## 2022-10-19 PROBLEM — L89.311 PRESSURE INJURY OF RIGHT BUTTOCK, STAGE 1: Status: RESOLVED | Noted: 2022-07-19 | Resolved: 2022-10-19

## 2022-10-19 PROBLEM — L89.321 PRESSURE INJURY OF LEFT BUTTOCK, STAGE 1: Status: RESOLVED | Noted: 2022-07-19 | Resolved: 2022-10-19

## 2022-10-19 LAB
ANION GAP SERPL CALCULATED.3IONS-SCNC: 12.4 MMOL/L (ref 5–15)
BUN SERPL-MCNC: 18 MG/DL (ref 8–23)
BUN/CREAT SERPL: 20.7 (ref 7–25)
CALCIUM SPEC-SCNC: 9.7 MG/DL (ref 8.6–10.5)
CHLORIDE SERPL-SCNC: 101 MMOL/L (ref 98–107)
CO2 SERPL-SCNC: 26.6 MMOL/L (ref 22–29)
CREAT SERPL-MCNC: 0.87 MG/DL (ref 0.57–1)
EGFRCR SERPLBLD CKD-EPI 2021: 67.9 ML/MIN/1.73
GLUCOSE SERPL-MCNC: 96 MG/DL (ref 65–99)
POTASSIUM SERPL-SCNC: 4.6 MMOL/L (ref 3.5–5.2)
SODIUM SERPL-SCNC: 140 MMOL/L (ref 136–145)

## 2022-10-19 PROCEDURE — 99214 OFFICE O/P EST MOD 30 MIN: CPT | Performed by: INTERNAL MEDICINE

## 2022-10-19 PROCEDURE — 80048 BASIC METABOLIC PNL TOTAL CA: CPT | Performed by: INTERNAL MEDICINE

## 2022-10-19 RX ORDER — ALBUTEROL SULFATE 90 UG/1
2 AEROSOL, METERED RESPIRATORY (INHALATION) EVERY 4 HOURS PRN
Qty: 18 G | Refills: 5 | Status: SHIPPED | OUTPATIENT
Start: 2022-10-19

## 2022-10-19 RX ORDER — ROSUVASTATIN CALCIUM 5 MG/1
5 TABLET, COATED ORAL
Qty: 45 TABLET | Refills: 3 | Status: SHIPPED | OUTPATIENT
Start: 2022-10-19

## 2022-10-19 NOTE — PROGRESS NOTES
Venipuncture Blood Specimen Collection  Venipuncture performed in RIGHT ARM by Mame Dempsey RN with good hemostasis. Patient tolerated the procedure well without complications.   10/19/22   Mame Dempsey RN

## 2022-10-19 NOTE — PROGRESS NOTES
Patient Name: Edgar Cotto Today's Date: 10/19/2022   Patient MRN / CSN: [id number] / [id number] Date of Encounter: 10/19/2022   Patient Age / : 77 y.o. / 1945 Encounter Provider: Abbie Hillman DO   Referring Physician: No ref. provider found          Edgar is a 77 y.o. female who is being seen today for Follow-up, Hyperlipidemia, and Results      History of Present Illness  Edgar presents today for follow-up.  She has been diagnosed with restrictive lung disease and saw pulmonology since last visit.  She was started on oxygen to use with exertion and at night.  She is using albuterol nebulizers as needed for wheezing.  She also has incentive spirometry that she is using multiple times throughout the day.  She would like an albuterol inhaler to keep in her purse to use as needed.  Overall, she reports her dyspnea on exertion is stable with the current regimen.  Hyperlipidemia  This is a chronic problem. The current episode started more than 1 year ago. The problem is uncontrolled. Associated symptoms include shortness of breath. Pertinent negatives include no chest pain. Treatments tried: In July, I stopped crestor due to patient reporting myalgias and arthralgias. She was only taking crestor 5 mg qod. She reports no change in pain since being off this medicine. LDL increased from 85 to 121mg/dl.       Allergies include:Z-mary [azithromycin], Amoxicillin, Cortisone, Fish-derived products, Iodinated diagnostic agents, Iodine, Lidocaine, Morphine, Niacin, Nystatin, Other, Sulfa antibiotics, and Verapamil  Current Outpatient Medications   Medication Sig Dispense Refill   • acetaminophen (Tylenol 8 Hour Arthritis Pain) 650 MG 8 hr tablet Take 1 tablet by mouth 4 (Four) Times a Day As Needed for Mild Pain . 120 tablet 5   • albuterol (PROVENTIL) (2.5 MG/3ML) 0.083% nebulizer solution Take 2.5 mg by nebulization 4 (Four) Times a Day As Needed for Wheezing. 360 mL 5   • aspirin 81 MG tablet Take 81 mg  by mouth Daily.     • Azelastine HCl 137 MCG/SPRAY solution 1 spray into the nostril(s) as directed by provider 2 (two) times a day. 30 mL 0   • calcium-vitamin D (Oscal 500/200 D-3) 500-200 MG-UNIT per tablet Take 2 tablets by mouth Daily. 60 tablet 11   • diazePAM (Valium) 5 MG tablet Take 1 tablet by mouth Every 12 (Twelve) Hours As Needed for Anxiety (Dizziness). 60 tablet 2   • esomeprazole (nexIUM) 40 MG capsule Take 1 capsule by mouth Every Morning Before Breakfast. 30 capsule 5   • fluticasone (Flonase) 50 MCG/ACT nasal spray 2 sprays into the nostril(s) as directed by provider Daily. 15.8 mL 5   • meclizine (ANTIVERT) 25 MG tablet Take 1 tablet by mouth Every 8 (Eight) Hours As Needed for Dizziness. 270 tablet 1   • meloxicam (Mobic) 15 MG tablet Take 1 tablet by mouth Daily. Take with food. 30 tablet 5   • nystatin (MYCOSTATIN) 100,000 unit/mL suspension Swish and swallow 5 mL 4 (Four) Times a Day. 120 mL 1   • psyllium (METAMUCIL) 58.6 % packet Take 1 packet by mouth Daily. 162 each 3   • valsartan (DIOVAN) 40 MG tablet Take 0.5 tablets by mouth 2 (Two) Times a Day. 90 tablet 1   • albuterol sulfate  (90 Base) MCG/ACT inhaler Inhale 2 puffs Every 4 (Four) Hours As Needed for Wheezing. 18 g 5   • rosuvastatin (Crestor) 5 MG tablet Take 1 tablet by mouth Every Other Day. 45 tablet 3     No current facility-administered medications for this visit.     Past Medical History:   Diagnosis Date   • Abdominal distension    • Allergic rhinitis    • Anxiety    • Arthritis    • Closed nondisplaced fracture of surgical neck of left humerus with routine healing 3/9/2021   • Cough    • Dry eyes    • Dyslipidemia    • Dysuria    • Hyperlipidemia    • Hypertension    • Meniere's disease    • Mitral and aortic valve disease    • Oral candidiasis    • Osteoarthritis    • Stomatitis    • URI, acute    • Vaginal candidiasis    • Vocal cord dysfunction      Family History   Problem Relation Age of Onset   • Diabetes  Mother    • Cancer Father    • Liver disease Sister    • Diabetes Sister    • Liver disease Brother    • Diabetes Brother    • Diabetes Daughter    • Cancer Other    • Liver disease Other    • Breast cancer Neg Hx      Past Surgical History:   Procedure Laterality Date   • CHOLECYSTECTOMY     • COLONOSCOPY     • DILATATION AND CURETTAGE     • HEMORRHOIDECTOMY     • HERNIA REPAIR     • SHOULDER SURGERY     • SKIN CANCER EXCISION      on Nose   • TONSILLECTOMY       Social History     Substance and Sexual Activity   Alcohol Use No     Social History     Tobacco Use   Smoking Status Never   Smokeless Tobacco Never   Tobacco Comments    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   Respiratory: Positive for shortness of breath.         Dyspnea on exertion    Cardiovascular: Negative for chest pain.   Gastrointestinal: Negative for abdominal pain.        Depression Assessment Review:  PHQ-9 Total Score:    Vital Signs & Measurements Taken This Encounter  /76 (BP Location: Right arm, Patient Position: Sitting, Cuff Size: Adult)   Pulse 82   Temp 97.8 °F (36.6 °C) (Temporal)   Wt 68.1 kg (150 lb 3.2 oz)   SpO2 95% Comment: 2L o2  BMI 29.33 kg/m²    SpO2 Percentage    10/19/22 0904   SpO2: 95%  Comment: 2L o2      Physical Exam  Vitals reviewed.   Constitutional:       General: She is not in acute distress.  HENT:      Head: Normocephalic and atraumatic.      Right Ear: Tympanic membrane normal.      Left Ear: Tympanic membrane normal.   Eyes:      General: No scleral icterus.     Extraocular Movements: Extraocular movements intact.      Conjunctiva/sclera: Conjunctivae normal.      Pupils: Pupils are equal, round, and reactive to light.   Cardiovascular:      Rate and Rhythm: Normal rate and regular rhythm.   Pulmonary:      Effort: Pulmonary effort is normal. No respiratory  distress.      Breath sounds: Normal breath sounds. No wheezing or rhonchi.   Musculoskeletal:         General: No swelling.      Cervical back: Neck supple. No tenderness.   Lymphadenopathy:      Cervical: No cervical adenopathy.   Skin:     General: Skin is warm and dry.      Coloration: Skin is not jaundiced.   Neurological:      Mental Status: She is alert.   Psychiatric:         Mood and Affect: Mood normal.         Behavior: Behavior normal.              Assessment & Plan  Patient Active Problem List   Diagnosis   • Atopic rhinitis   • Dizziness   • Generalized anxiety disorder   • Generalized osteoarthritis   • Essential hypertension   • Auditory vertigo   • Mitral and aortic valve disease   • Stomatitis   • Seasonal allergic rhinitis   • Varicose veins of both lower extremities   • Meniere's disease of both ears   • S/P hemorrhoidectomy   • Abnormal EKG   • Moderate obesity   • Dyslipidemia   • Thyroid cyst   • Age-related osteoporosis without current pathological fracture   • Cervical lymphadenopathy   • CABAN (dyspnea on exertion)   • Hypoxia   • Restrictive lung disease       ICD-10-CM ICD-9-CM   1. Dyslipidemia  E78.5 272.4   2. Restrictive lung disease  J98.4 518.89   3. Stomatitis  K12.1 528.00   4. Meniere's disease of both ears  H81.03 386.00   5. Generalized anxiety disorder  F41.1 300.02   6. Essential hypertension  I10 401.9     Orders Placed This Encounter   Procedures   • Basic Metabolic Panel     Order Specific Question:   Release to patient     Answer:   Routine Release   • Comprehensive Metabolic Panel     Standing Status:   Future     Standing Expiration Date:   10/19/2023     Order Specific Question:   Release to patient     Answer:   Routine Release   • CBC (No Diff)     Standing Status:   Future     Standing Expiration Date:   10/19/2023     Order Specific Question:   Release to patient     Answer:   Routine Release   • Lipid Panel     Standing Status:   Future     Standing Expiration Date:    10/19/2023   • TSH     Standing Status:   Future     Standing Expiration Date:   10/19/2023     Order Specific Question:   Release to patient     Answer:   Routine Release   • CK     Standing Status:   Future     Standing Expiration Date:   10/19/2023     Order Specific Question:   Release to patient     Answer:   Routine Release       Meds Ordered During Visit:  New Medications Ordered This Visit   Medications   • nystatin (MYCOSTATIN) 100,000 unit/mL suspension     Sig: Swish and swallow 5 mL 4 (Four) Times a Day.     Dispense:  120 mL     Refill:  1   • rosuvastatin (Crestor) 5 MG tablet     Sig: Take 1 tablet by mouth Every Other Day.     Dispense:  45 tablet     Refill:  3   • albuterol sulfate  (90 Base) MCG/ACT inhaler     Sig: Inhale 2 puffs Every 4 (Four) Hours As Needed for Wheezing.     Dispense:  18 g     Refill:  5      I reviewed recent labs with patient today.  I recommended restarting Crestor 5 mg every other day.  We will update labs just prior to her next appointment as above.  I updated medicine refills today as requested.  I encouraged her to continue oxygen therapy as prescribed.  I reviewed PDMP, dermatology, and pulmonology notes today.  I encouraged patient to follow-up with her specialists  arranged.    Return in about 3 months (around 1/19/2023), or if symptoms worsen or fail to improve, for Labs Prior.          Referring Provider (if known): No ref. provider found      This document has been electronically signed by Abbie Hillman DO  October 19, 2022 10:38 EDT    Abbie Hillman DO, FACOI  [address] [place] [address]  [phone] (office)    Part of this note may be an electronic transcription/translation of spoken language to printed text using the Dragon Dictation System.

## 2022-11-04 ENCOUNTER — TELEPHONE (OUTPATIENT)
Dept: FAMILY MEDICINE CLINIC | Facility: CLINIC | Age: 77
End: 2022-11-04

## 2022-11-04 DIAGNOSIS — I10 ESSENTIAL HYPERTENSION: ICD-10-CM

## 2022-11-04 NOTE — TELEPHONE ENCOUNTER
Caller: Ava Andersen    Relationship: Emergency Contact    Best call back number:  [phone]    Requested Prescriptions:   Requested Prescriptions      No prescriptions requested or ordered in this encounter      valsartan (DIOVAN) 40 MG tablet    Pharmacy where request should be sent:       [id number] - [place], [place] - [address] AT [facility] [address] - [phone] PH - [phone] FX        Does the patient have less than a 3 day supply:  [] Yes  [x] No    Gayatri Gupta Rep   11/04/22 10:27 EDT

## 2022-11-07 RX ORDER — VALSARTAN 40 MG/1
20 TABLET ORAL 2 TIMES DAILY
Qty: 90 TABLET | Refills: 1 | Status: SHIPPED | OUTPATIENT
Start: 2022-11-07 | End: 2023-04-24 | Stop reason: SDUPTHER

## 2022-12-14 ENCOUNTER — OFFICE VISIT (OUTPATIENT)
Dept: PULMONOLOGY | Facility: CLINIC | Age: 77
End: 2022-12-14

## 2022-12-14 VITALS
TEMPERATURE: 97.7 F | SYSTOLIC BLOOD PRESSURE: 130 MMHG | DIASTOLIC BLOOD PRESSURE: 88 MMHG | HEIGHT: 60 IN | BODY MASS INDEX: 29.84 KG/M2 | OXYGEN SATURATION: 98 % | HEART RATE: 101 BPM | WEIGHT: 152 LBS

## 2022-12-14 DIAGNOSIS — R09.02 HYPOXIA: ICD-10-CM

## 2022-12-14 DIAGNOSIS — E66.3 OVERWEIGHT: ICD-10-CM

## 2022-12-14 DIAGNOSIS — J84.9 ILD (INTERSTITIAL LUNG DISEASE): Primary | ICD-10-CM

## 2022-12-14 DIAGNOSIS — J98.4 RESTRICTIVE LUNG DISEASE: Chronic | ICD-10-CM

## 2022-12-14 PROCEDURE — 99214 OFFICE O/P EST MOD 30 MIN: CPT | Performed by: NURSE PRACTITIONER

## 2022-12-14 RX ORDER — NINTEDANIB 150 MG/1
CAPSULE ORAL
Qty: 60 CAPSULE | Status: CANCELLED | OUTPATIENT
Start: 2022-12-14

## 2022-12-14 NOTE — PROGRESS NOTES
"Chief Complaint  Restrictive Lung Disease    Subjective        Edgar Cotto presents to [facility]  History of Present Illness     Ms. Cotto is a 77 year old female with a medical history significant for anxiety, arthritis, hyperlipidemia, hypertension, meniere's disease, osteoarthritis, and ILD.    She presents today for follow up on restrictive lung disease.  She states that her breathing has been at baseline.  She is complaint with her inhalers and oxygen use.  She recently underwent CT Chest.    Objective   Vital Signs:  /88   Pulse 101   Temp 97.7 °F (36.5 °C) (Temporal)   Ht 152.4 cm (60\")   Wt 68.9 kg (152 lb)   SpO2 98%   BMI 29.69 kg/m²   Estimated body mass index is 29.69 kg/m² as calculated from the following:    Height as of this encounter: 152.4 cm (60\").    Weight as of this encounter: 68.9 kg (152 lb).    BMI is >= 25 and <30. (Overweight) The following options were offered after discussion;: exercise counseling/recommendations and nutrition counseling/recommendations      Physical Exam     GENERAL APPEARANCE: Well developed, well nourished, alert and cooperative, and appears to be in no acute distress.    HEAD: normocephalic. Atraumatic.    EYES: PERRL, EOMI. Vision is grossly intact.    THROAT: Oral cavity and pharynx normal. No inflammation, swelling, exudate, or lesions.     NECK: Neck supple.  No thyromegaly.    CARDIAC: Normal S1 and S2. No S3, S4 or murmurs. Rhythm is regular.     RESPIRATORY:Bilateral air entry positive. Bilateral diminished breath sounds. No wheezing, crackles or rhonchi noted.    GI: Positive bowel sounds. Soft, nondistended, nontender.     MUSCULOSKELETAL: No significant deformity or joint abnormality. No edema. Peripheral pulses intact. No varicosities.    NEUROLOGICAL: Strength and sensation symmetric and intact throughout.     PSYCHIATRIC: The mental examination revealed the patient was oriented " to person, place, and time.     Result Review :  The following data was reviewed by: JOSSELINE Barrett on 12/14/2022:  Common labs    Common Labs 4/14/22 4/14/22 4/14/22 10/14/22 10/14/22 10/14/22 10/19/22    1103 1103 1103 0917 0917 0917    Glucose  95   105 (A)  96   BUN  18   15  18   Creatinine  0.90   0.89  0.87   Sodium  137   140  140   Potassium  5.0   5.3 (A)  4.6   Chloride  102   104  101   Calcium  9.7   9.8  9.7   Albumin  4.30   4.10     Total Bilirubin  0.4   0.6     Alkaline Phosphatase  61   70     AST (SGOT)  26   17     ALT (SGPT)  13   12     WBC 7.17   5.96      Hemoglobin 14.3   15.0      Hematocrit 43.1   44.6      Platelets 177   155      Total Cholesterol   167   217 (A)    Triglycerides   165 (A)   202 (A)    HDL Cholesterol   54   61 (A)    LDL Cholesterol    85   121 (A)    (A) Abnormal value                     Assessment and Plan   Diagnoses and all orders for this visit:    1. ILD (interstitial lung disease) (HCC) (Primary)    2. Restrictive lung disease    3. Overweight    4. Hypoxia               Severe restriction is noted on PFT.  Hi resolution CT chest was obtained and shows fairly extensive fibrotic change.  She does have a history of rheumatoid arthritis, which is likely the cause of fibrosis.    Discussed initiation of OFEV with her.  Provided her with patient education packet.    She is currently on neb treatments as needed.  She also has a rescue inhaler that she uses as needed.      She is complaint with use of supplemental oxygen at 2 L during the night and as needed with ambulation.          Follow Up   Return in about 3 months (around 3/14/2023).  Patient was given instructions and counseling regarding her condition or for health maintenance advice. Please see specific information pulled into the AVS if appropriate.

## 2022-12-16 ENCOUNTER — DOCUMENTATION (OUTPATIENT)
Dept: PULMONOLOGY | Facility: CLINIC | Age: 77
End: 2022-12-16

## 2022-12-16 ENCOUNTER — TELEPHONE (OUTPATIENT)
Dept: FAMILY MEDICINE CLINIC | Facility: CLINIC | Age: 77
End: 2022-12-16

## 2022-12-16 DIAGNOSIS — H81.03 MENIERE'S DISEASE OF BOTH EARS: ICD-10-CM

## 2022-12-16 RX ORDER — MECLIZINE HYDROCHLORIDE 25 MG/1
25 TABLET ORAL EVERY 8 HOURS PRN
Qty: 270 TABLET | Refills: 1 | Status: SHIPPED | OUTPATIENT
Start: 2022-12-16 | End: 2023-01-24 | Stop reason: SDUPTHER

## 2022-12-16 NOTE — PROGRESS NOTES
Patient called back after appointment to let me know that she changed her mind about starting OFEV and that she does not want to move forward with this at this time.

## 2022-12-16 NOTE — TELEPHONE ENCOUNTER
Caller: NathalyLillyAva    Relationship: Emergency Contact    Best call back number: [phone]    Requested Prescriptions:   Requested Prescriptions      No prescriptions requested or ordered in this encounter      meclizine (ANTIVERT) 25 MG tablet    Pharmacy where request should be sent:      [id number] - [place], [place] - [address] AT [facility] [address] - [phone] PH - [phone] FX       Does the patient have less than a 3 day supply:  [x] Yes  [] No    Would you like a call back once the refill request has been completed: [] Yes [x] No    If the office needs to give you a call back, can they leave a voicemail: [] Yes [x] No    Gayatri Gupta Rep   12/16/22 08:39 EST

## 2023-01-16 ENCOUNTER — CLINICAL SUPPORT (OUTPATIENT)
Dept: FAMILY MEDICINE CLINIC | Facility: CLINIC | Age: 78
End: 2023-01-16
Payer: MEDICARE

## 2023-01-16 DIAGNOSIS — I10 ESSENTIAL HYPERTENSION: Chronic | ICD-10-CM

## 2023-01-16 DIAGNOSIS — E78.5 DYSLIPIDEMIA: Chronic | ICD-10-CM

## 2023-01-16 PROCEDURE — 36415 COLL VENOUS BLD VENIPUNCTURE: CPT | Performed by: INTERNAL MEDICINE

## 2023-01-16 PROCEDURE — 85027 COMPLETE CBC AUTOMATED: CPT | Performed by: INTERNAL MEDICINE

## 2023-01-16 PROCEDURE — 84443 ASSAY THYROID STIM HORMONE: CPT | Performed by: INTERNAL MEDICINE

## 2023-01-16 PROCEDURE — 80053 COMPREHEN METABOLIC PANEL: CPT | Performed by: INTERNAL MEDICINE

## 2023-01-16 PROCEDURE — 82550 ASSAY OF CK (CPK): CPT | Performed by: INTERNAL MEDICINE

## 2023-01-16 PROCEDURE — 80061 LIPID PANEL: CPT | Performed by: INTERNAL MEDICINE

## 2023-01-16 NOTE — PROGRESS NOTES
Venipuncture Blood Specimen Collection  Venipuncture performed in right arm by Kayli Garcia MA with good hemostasis. Patient tolerated the procedure well without complications.   01/16/23   Kayli Garcia MA

## 2023-01-17 LAB
ALBUMIN SERPL-MCNC: 4.2 G/DL (ref 3.5–5.2)
ALBUMIN/GLOB SERPL: 1.6 G/DL
ALP SERPL-CCNC: 67 U/L (ref 39–117)
ALT SERPL W P-5'-P-CCNC: 12 U/L (ref 1–33)
ANION GAP SERPL CALCULATED.3IONS-SCNC: 6 MMOL/L (ref 5–15)
AST SERPL-CCNC: 23 U/L (ref 1–32)
BILIRUB SERPL-MCNC: 0.5 MG/DL (ref 0–1.2)
BUN SERPL-MCNC: 16 MG/DL (ref 8–23)
BUN/CREAT SERPL: 18.8 (ref 7–25)
CALCIUM SPEC-SCNC: 9.2 MG/DL (ref 8.6–10.5)
CHLORIDE SERPL-SCNC: 104 MMOL/L (ref 98–107)
CHOLEST SERPL-MCNC: 176 MG/DL (ref 0–200)
CK SERPL-CCNC: 51 U/L (ref 20–180)
CO2 SERPL-SCNC: 31 MMOL/L (ref 22–29)
CREAT SERPL-MCNC: 0.85 MG/DL (ref 0.57–1)
DEPRECATED RDW RBC AUTO: 42.1 FL (ref 37–54)
EGFRCR SERPLBLD CKD-EPI 2021: 69.8 ML/MIN/1.73
ERYTHROCYTE [DISTWIDTH] IN BLOOD BY AUTOMATED COUNT: 12.1 % (ref 12.3–15.4)
GLOBULIN UR ELPH-MCNC: 2.6 GM/DL
GLUCOSE SERPL-MCNC: 101 MG/DL (ref 65–99)
HCT VFR BLD AUTO: 42.4 % (ref 34–46.6)
HDLC SERPL-MCNC: 53 MG/DL (ref 40–60)
HGB BLD-MCNC: 14.2 G/DL (ref 12–15.9)
LDLC SERPL CALC-MCNC: 90 MG/DL (ref 0–100)
LDLC/HDLC SERPL: 1.58 {RATIO}
MCH RBC QN AUTO: 31.6 PG (ref 26.6–33)
MCHC RBC AUTO-ENTMCNC: 33.5 G/DL (ref 31.5–35.7)
MCV RBC AUTO: 94.2 FL (ref 79–97)
PLATELET # BLD AUTO: 145 10*3/MM3 (ref 140–450)
PMV BLD AUTO: 11.9 FL (ref 6–12)
POTASSIUM SERPL-SCNC: 4.6 MMOL/L (ref 3.5–5.2)
PROT SERPL-MCNC: 6.8 G/DL (ref 6–8.5)
RBC # BLD AUTO: 4.5 10*6/MM3 (ref 3.77–5.28)
SODIUM SERPL-SCNC: 141 MMOL/L (ref 136–145)
TRIGL SERPL-MCNC: 195 MG/DL (ref 0–150)
TSH SERPL DL<=0.05 MIU/L-ACNC: 1.65 UIU/ML (ref 0.27–4.2)
VLDLC SERPL-MCNC: 33 MG/DL (ref 5–40)
WBC NRBC COR # BLD: 6.51 10*3/MM3 (ref 3.4–10.8)

## 2023-01-24 ENCOUNTER — OFFICE VISIT (OUTPATIENT)
Dept: FAMILY MEDICINE CLINIC | Facility: CLINIC | Age: 78
End: 2023-01-24
Payer: MEDICARE

## 2023-01-24 VITALS
DIASTOLIC BLOOD PRESSURE: 82 MMHG | BODY MASS INDEX: 31.21 KG/M2 | SYSTOLIC BLOOD PRESSURE: 122 MMHG | HEART RATE: 77 BPM | TEMPERATURE: 97.5 F | WEIGHT: 159.8 LBS | OXYGEN SATURATION: 99 %

## 2023-01-24 DIAGNOSIS — H81.03 MENIERE'S DISEASE OF BOTH EARS: Chronic | ICD-10-CM

## 2023-01-24 DIAGNOSIS — J98.4 RESTRICTIVE LUNG DISEASE: Chronic | ICD-10-CM

## 2023-01-24 DIAGNOSIS — M25.559 HIP PAIN: ICD-10-CM

## 2023-01-24 DIAGNOSIS — J30.2 SEASONAL ALLERGIC RHINITIS, UNSPECIFIED TRIGGER: ICD-10-CM

## 2023-01-24 DIAGNOSIS — M51.36 LUMBAR DEGENERATIVE DISC DISEASE: ICD-10-CM

## 2023-01-24 DIAGNOSIS — K21.9 GASTROESOPHAGEAL REFLUX DISEASE WITHOUT ESOPHAGITIS: ICD-10-CM

## 2023-01-24 DIAGNOSIS — R42 DIZZINESS: ICD-10-CM

## 2023-01-24 DIAGNOSIS — Z00.00 HEALTH CARE MAINTENANCE: ICD-10-CM

## 2023-01-24 DIAGNOSIS — Z51.81 MEDICATION MONITORING ENCOUNTER: ICD-10-CM

## 2023-01-24 DIAGNOSIS — F41.1 GENERALIZED ANXIETY DISORDER: Chronic | ICD-10-CM

## 2023-01-24 DIAGNOSIS — Z00.00 ENCOUNTER FOR WELLNESS EXAMINATION: Primary | ICD-10-CM

## 2023-01-24 DIAGNOSIS — M41.86 OTHER FORM OF SCOLIOSIS OF LUMBAR SPINE: ICD-10-CM

## 2023-01-24 PROCEDURE — 1170F FXNL STATUS ASSESSED: CPT | Performed by: INTERNAL MEDICINE

## 2023-01-24 PROCEDURE — G0439 PPPS, SUBSEQ VISIT: HCPCS | Performed by: INTERNAL MEDICINE

## 2023-01-24 PROCEDURE — 1159F MED LIST DOCD IN RCRD: CPT | Performed by: INTERNAL MEDICINE

## 2023-01-24 PROCEDURE — 99214 OFFICE O/P EST MOD 30 MIN: CPT | Performed by: INTERNAL MEDICINE

## 2023-01-24 RX ORDER — DIAZEPAM 5 MG/1
5 TABLET ORAL EVERY 12 HOURS PRN
Qty: 60 TABLET | Refills: 2 | Status: SHIPPED | OUTPATIENT
Start: 2023-01-24

## 2023-01-24 RX ORDER — FLUTICASONE PROPIONATE 50 MCG
2 SPRAY, SUSPENSION (ML) NASAL DAILY
Qty: 48 G | Refills: 5 | Status: SHIPPED | OUTPATIENT
Start: 2023-01-24

## 2023-01-24 RX ORDER — ESOMEPRAZOLE MAGNESIUM 40 MG/1
40 CAPSULE, DELAYED RELEASE ORAL
Qty: 90 CAPSULE | Refills: 3 | Status: SHIPPED | OUTPATIENT
Start: 2023-01-24 | End: 2023-01-31 | Stop reason: CLARIF

## 2023-01-24 RX ORDER — MECLIZINE HYDROCHLORIDE 25 MG/1
25 TABLET ORAL EVERY 8 HOURS PRN
Qty: 270 TABLET | Refills: 3 | Status: SHIPPED | OUTPATIENT
Start: 2023-01-24

## 2023-01-24 NOTE — PROGRESS NOTES
The ABCs of the Annual Wellness Visit  Subsequent Medicare Wellness Visit    Subjective    Edgar Cotto is a 77 y.o. female who presents for a Subsequent Medicare Wellness Visit.    The following portions of the patient's history were reviewed and   updated as appropriate: allergies, current medications, past family history, past medical history, past social history, past surgical history and problem list.    Compared to one year ago, the patient feels her physical   health is the same.    Compared to one year ago, the patient feels her mental   health is the same.    Recent Hospitalizations:  She was not admitted to the hospital during the last year.       Current Medical Providers:  Patient Care Team:  Abbie Hillman DO as PCP - General (Family Medicine)  Zina Diaz PA as Physician Assistant (Gynecology)  Elif Terry OD (Optometry)  Momo Vega MD as Consulting Physician (Otolaryngology)    Outpatient Medications Prior to Visit   Medication Sig Dispense Refill   • acetaminophen (Tylenol 8 Hour Arthritis Pain) 650 MG 8 hr tablet Take 1 tablet by mouth 4 (Four) Times a Day As Needed for Mild Pain . 120 tablet 5   • albuterol (PROVENTIL) (2.5 MG/3ML) 0.083% nebulizer solution Take 2.5 mg by nebulization 4 (Four) Times a Day As Needed for Wheezing. 360 mL 5   • albuterol sulfate  (90 Base) MCG/ACT inhaler Inhale 2 puffs Every 4 (Four) Hours As Needed for Wheezing. 18 g 5   • Azelastine HCl 137 MCG/SPRAY solution 1 spray into the nostril(s) as directed by provider 2 (two) times a day. 30 mL 0   • calcium-vitamin D (Oscal 500/200 D-3) 500-200 MG-UNIT per tablet Take 2 tablets by mouth Daily. 60 tablet 11   • nystatin (MYCOSTATIN) 100,000 unit/mL suspension Swish and swallow 5 mL 4 (Four) Times a Day. 120 mL 1   • psyllium (METAMUCIL) 58.6 % packet Take 1 packet by mouth Daily. 162 each 3   • rosuvastatin (Crestor) 5 MG tablet Take 1 tablet by mouth Every Other Day. 45  tablet 3   • valsartan (DIOVAN) 40 MG tablet Take 0.5 tablets by mouth 2 (Two) Times a Day. 90 tablet 1   • aspirin 81 MG tablet Take 81 mg by mouth Daily.     • diazePAM (Valium) 5 MG tablet Take 1 tablet by mouth Every 12 (Twelve) Hours As Needed for Anxiety (Dizziness). 60 tablet 2   • esomeprazole (nexIUM) 40 MG capsule Take 1 capsule by mouth Every Morning Before Breakfast. 30 capsule 5   • fluticasone (Flonase) 50 MCG/ACT nasal spray 2 sprays into the nostril(s) as directed by provider Daily. 15.8 mL 5   • meclizine (ANTIVERT) 25 MG tablet Take 1 tablet by mouth Every 8 (Eight) Hours As Needed for Dizziness. 270 tablet 1   • meloxicam (Mobic) 15 MG tablet Take 1 tablet by mouth Daily. Take with food. 30 tablet 5     No facility-administered medications prior to visit.       No opioid medication identified on active medication list. I have reviewed chart for other potential  high risk medication/s and harmful drug interactions in the elderly.          Aspirin is on active medication list. Aspirin use is not indicated based on review of current medical condition/s. Risk of harm outweighs potential benefits. Patient instructed to discontinue this medication.  .      Patient Active Problem List   Diagnosis   • Atopic rhinitis   • Dizziness   • Generalized anxiety disorder   • Generalized osteoarthritis   • Essential hypertension   • Auditory vertigo   • Mitral and aortic valve disease   • Stomatitis   • Seasonal allergic rhinitis   • Varicose veins of both lower extremities   • Meniere's disease of both ears   • S/P hemorrhoidectomy   • Abnormal EKG   • Moderate obesity   • Dyslipidemia   • Thyroid cyst   • Age-related osteoporosis without current pathological fracture   • Cervical lymphadenopathy   • CABAN (dyspnea on exertion)   • Hypoxia   • Restrictive lung disease     Advance Care Planning  Advance Directive is not on file.  ACP discussion was held with the patient during this visit. Patient does not have an  "advance directive, declines further assistance.     Objective    Vitals:    01/24/23 1008   BP: 122/82   BP Location: Left arm   Patient Position: Sitting   Cuff Size: Adult   Pulse: 77   Temp: 97.5 °F (36.4 °C)   TempSrc: Temporal   SpO2: 99%  Comment: 2LNC   Weight: 72.5 kg (159 lb 12.8 oz)   PainSc: 0-No pain   PainLoc: Back     Estimated body mass index is 31.21 kg/m² as calculated from the following:    Height as of 12/14/22: 152.4 cm (60\").    Weight as of this encounter: 72.5 kg (159 lb 12.8 oz).    BMI is >= 30 and <35. (Class 1 Obesity). The following options were offered after discussion;: nutrition counseling/recommendations        Does the patient have evidence of cognitive impairment? 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 Assessment completed on:1/24/2023    Depression Screening:  PHQ-2/PHQ-9 Depression Screening 1/24/2023   Little Interest or Pleasure in Doing Things 0-->not at all   Feeling Down, Depressed or Hopeless 0-->not at all   PHQ-9: Brief Depression Severity Measure Score 0       Health Habits and Functional and Cognitive Screening:  Functional & Cognitive Status 1/24/2023   Do you have difficulty preparing food and eating? Yes   Do you have difficulty bathing yourself, getting dressed or grooming yourself? Yes   Do you have difficulty using the toilet? No   Do you have difficulty moving around from place to place? No   Do you have trouble with steps or getting out of a bed or a chair? Yes   Current " Diet Frequent Junk Food   Dental Exam Not up to date        Dental Exam Comment -   Eye Exam Up to date   Exercise (times per week) 7 times per week   Current Exercises Include Other   Current Exercise Activities Include -   Do you need help using the phone?  No   Are you deaf or do you have serious difficulty hearing?  Yes   Do you need help with transportation? No   Do you need help shopping? Yes   Do you need help preparing meals?  Yes   Do you need help with housework?  Yes   Do you need help with laundry? No   Do you need help taking your medications? No   Do you need help managing money? No   Do you ever drive or ride in a car without wearing a seat belt? No   Have you felt unusual stress, anger or loneliness in the last month? Yes   Who do you live with? Child   If you need help, do you have trouble finding someone available to you? No   Have you been bothered in the last four weeks by sexual problems? No   Do you have difficulty concentrating, remembering or making decisions? No       Age-appropriate Screening Schedule:  Refer to the list below for future screening recommendations based on patient's age, sex and/or medical conditions. Orders for these recommended tests are listed in the plan section. The patient has been provided with a written plan.    Health Maintenance   Topic Date Due   • DXA SCAN  11/08/2023   • LIPID PANEL  01/16/2024   • MAMMOGRAM  07/11/2024   • INFLUENZA VACCINE  Discontinued   • TDAP/TD VACCINES  Discontinued   • ZOSTER VACCINE  Discontinued                CMS Preventative Services Quick Reference  Risk Factors Identified During Encounter  Immunizations Discussed/Encouraged: Flu, pneumonia and COVID19 vaccines discussed. Patient declines vaccines.     Age appropriate screenings were discussed with patient today. I reviewed recent metabolic screenings and lab results with patient today. She is up to date on mammogram and colonoscopy.      The above risks/problems have been discussed  with the patient.  Pertinent information has been shared with the patient in the After Visit Summary.  An After Visit Summary and PPPS were made available to the patient.    Follow Up:   Next Medicare Wellness visit to be scheduled in 1 year.       Additional E&M Note during same encounter follows:  Patient has multiple medical problems which are significant and separately identifiable that require additional work above and beyond the Medicare Wellness Visit.      Chief Complaint  Medicare Wellness-subsequent    Subjective        HPI  Edgar Cotto is also being seen today for worsening arthritis pain. She complains of worsening lumbar and bilateral hip pain.  Mobic and Tylenol arthritis or not controlling her pain.  Pain is worse at night.  Previous CT findings done in summer 2021 showed lumbar degenerative disc disease with scoliosis.    Review of Systems   Constitutional: Negative for fever.   Respiratory: Negative for shortness of breath.    Cardiovascular: Negative for chest pain.   Gastrointestinal: Negative for abdominal pain and blood in stool.   Genitourinary: Negative for hematuria.   Musculoskeletal: Positive for arthralgias and back pain.   Neurological: Positive for dizziness.        Alleviated with Valium and Antivert as needed.  Patient reports tolerating this regimen well.       Objective   Vital Signs:  /82 (BP Location: Left arm, Patient Position: Sitting, Cuff Size: Adult)   Pulse 77   Temp 97.5 °F (36.4 °C) (Temporal)   Wt 72.5 kg (159 lb 12.8 oz)   SpO2 99% Comment: 2LNC  BMI 31.21 kg/m²     Physical Exam  Vitals reviewed.   Constitutional:       General: She is not in acute distress.  HENT:      Head: Normocephalic and atraumatic.      Right Ear: Tympanic membrane normal.      Left Ear: Tympanic membrane normal.   Eyes:      General: No scleral icterus.     Extraocular Movements: Extraocular movements intact.      Conjunctiva/sclera: Conjunctivae normal.      Pupils: Pupils are  equal, round, and reactive to light.   Cardiovascular:      Rate and Rhythm: Normal rate and regular rhythm.   Pulmonary:      Effort: Pulmonary effort is normal. No respiratory distress.      Breath sounds: Normal breath sounds. No wheezing or rhonchi.   Musculoskeletal:         General: No swelling.      Cervical back: Neck supple. No tenderness.      Comments: Ambulating with Rollator today.   Lymphadenopathy:      Cervical: No cervical adenopathy.   Skin:     General: Skin is warm and dry.      Coloration: Skin is not jaundiced.   Neurological:      Mental Status: She is alert.   Psychiatric:         Mood and Affect: Mood normal.         Behavior: Behavior normal.                   Assessment and Plan   Diagnoses and all orders for this visit:    1. Encounter for wellness examination (Primary)    2. Health care maintenance    3. Meniere's disease of both ears  -     diazePAM (Valium) 5 MG tablet; Take 1 tablet by mouth Every 12 (Twelve) Hours As Needed for Anxiety (Dizziness).  Dispense: 60 tablet; Refill: 2  -     meclizine (ANTIVERT) 25 MG tablet; Take 1 tablet by mouth Every 8 (Eight) Hours As Needed for Dizziness.  Dispense: 270 tablet; Refill: 3    4. Generalized anxiety disorder  -     diazePAM (Valium) 5 MG tablet; Take 1 tablet by mouth Every 12 (Twelve) Hours As Needed for Anxiety (Dizziness).  Dispense: 60 tablet; Refill: 2    5. Dizziness  -     diazePAM (Valium) 5 MG tablet; Take 1 tablet by mouth Every 12 (Twelve) Hours As Needed for Anxiety (Dizziness).  Dispense: 60 tablet; Refill: 2    6. Seasonal allergic rhinitis, unspecified trigger  -     fluticasone (Flonase) 50 MCG/ACT nasal spray; 2 sprays into the nostril(s) as directed by provider Daily.  Dispense: 48 g; Refill: 5    7. Other form of scoliosis of lumbar spine  -     XR Spine Lumbar 2 or 3 View; Future  -     diclofenac (VOLTAREN) 50 MG EC tablet; Take 1 tablet by mouth 2 (Two) Times a Day As Needed (pain).  Dispense: 60 tablet; Refill:  "5    8. Lumbar degenerative disc disease  -     XR Spine Lumbar 2 or 3 View; Future  -     diclofenac (VOLTAREN) 50 MG EC tablet; Take 1 tablet by mouth 2 (Two) Times a Day As Needed (pain).  Dispense: 60 tablet; Refill: 5    9. Hip pain  -     XR Hip With or Without Pelvis 2 - 3 View Right; Future  -     XR Hip With or Without Pelvis 2 - 3 View Left; Future  -     diclofenac (VOLTAREN) 50 MG EC tablet; Take 1 tablet by mouth 2 (Two) Times a Day As Needed (pain).  Dispense: 60 tablet; Refill: 5    10. Gastroesophageal reflux disease without esophagitis  -     esomeprazole (nexIUM) 40 MG capsule; Take 1 capsule by mouth Every Morning Before Breakfast.  Dispense: 90 capsule; Refill: 3    11. Medication monitoring encounter  -     Urine Drug Screen - Urine, Clean Catch; Future    12. Restrictive lung disease      I reviewed PDMP today.  We will update UDS today as well.  I updated refills today as requested.  I recommended changing Mobic to Voltaren for better pain control.  Also, I recommended updating lumbar and hip x-rays.  Patient is agreeable to doing this soon.  I reviewed recent pulmonary notes including CT scan report, PFTs.  I encourage patient to use her \"pickle\" and incentive spirometry 3-4 times daily.         Follow Up   Return in about 3 months (around 4/24/2023), or if symptoms worsen or fail to improve.  Patient was given instructions and counseling regarding her condition or for health maintenance advice. Please see specific information pulled into the AVS if appropriate.       This document has been electronically signed by Abbie Hillman DO  January 24, 2023 11:05 EST    "

## 2023-01-27 ENCOUNTER — TELEPHONE (OUTPATIENT)
Dept: FAMILY MEDICINE CLINIC | Facility: CLINIC | Age: 78
End: 2023-01-27
Payer: MEDICARE

## 2023-01-27 NOTE — TELEPHONE ENCOUNTER
Esomeprazole Magnesium 40mg no longer on patients formulary.    Preferred medications:  Lansoprazole, Omeprazole, Pantoprazole sodium.

## 2023-01-30 DIAGNOSIS — Z51.81 MEDICATION MONITORING ENCOUNTER: ICD-10-CM

## 2023-01-31 RX ORDER — PANTOPRAZOLE SODIUM 40 MG/1
40 TABLET, DELAYED RELEASE ORAL DAILY
Qty: 30 TABLET | Refills: 5 | Status: SHIPPED | OUTPATIENT
Start: 2023-01-31 | End: 2023-03-09

## 2023-01-31 NOTE — TELEPHONE ENCOUNTER
Called [phone] spoke with patient notified of medication changes as well as daughter they both verbalized understanding

## 2023-02-17 ENCOUNTER — HOSPITAL ENCOUNTER (OUTPATIENT)
Dept: GENERAL RADIOLOGY | Facility: HOSPITAL | Age: 78
Discharge: HOME | End: 2023-02-17
Payer: MEDICARE

## 2023-02-17 DIAGNOSIS — M41.86 OTHER FORM OF SCOLIOSIS OF LUMBAR SPINE: ICD-10-CM

## 2023-02-17 DIAGNOSIS — M51.36 LUMBAR DEGENERATIVE DISC DISEASE: ICD-10-CM

## 2023-02-17 DIAGNOSIS — M25.559 HIP PAIN: ICD-10-CM

## 2023-02-17 PROCEDURE — 73523 X-RAY EXAM HIPS BI 5/> VIEWS: CPT | Performed by: RADIOLOGY

## 2023-02-17 PROCEDURE — 73523 X-RAY EXAM HIPS BI 5/> VIEWS: CPT

## 2023-02-17 PROCEDURE — 72100 X-RAY EXAM L-S SPINE 2/3 VWS: CPT

## 2023-02-17 PROCEDURE — 72100 X-RAY EXAM L-S SPINE 2/3 VWS: CPT | Performed by: RADIOLOGY

## 2023-02-20 ENCOUNTER — TELEPHONE (OUTPATIENT)
Dept: FAMILY MEDICINE CLINIC | Facility: CLINIC | Age: 78
End: 2023-02-20
Payer: MEDICARE

## 2023-02-20 DIAGNOSIS — R53.81 PHYSICAL DECONDITIONING: Primary | ICD-10-CM

## 2023-02-20 NOTE — TELEPHONE ENCOUNTER
Please let patient know that I reviewed her x-ray reports which showed degenerative arthritis changes in the lumbar spine and osteoarthritis of the hips bilaterally.

## 2023-02-20 NOTE — TELEPHONE ENCOUNTER
Caller: Ava Andersen    Relationship: Emergency Contact  Best call back number: [phone]     Requested Prescriptions:   Requested Prescriptions      No prescriptions requested or ordered in this encounter      POTTY CHAIR AND   TABLE THAT SLIDES UNDER HER HOSPITAL BED SO SHE CAN PUT HER THINGS ON IT     Pharmacy where request should be sent:  [facility] [place]

## 2023-03-09 ENCOUNTER — OFFICE VISIT (OUTPATIENT)
Dept: FAMILY MEDICINE CLINIC | Facility: CLINIC | Age: 78
End: 2023-03-09
Payer: MEDICARE

## 2023-03-09 VITALS
OXYGEN SATURATION: 98 % | HEART RATE: 86 BPM | DIASTOLIC BLOOD PRESSURE: 76 MMHG | SYSTOLIC BLOOD PRESSURE: 112 MMHG | WEIGHT: 159 LBS | TEMPERATURE: 97.7 F | BODY MASS INDEX: 31.05 KG/M2

## 2023-03-09 DIAGNOSIS — J01.01 ACUTE RECURRENT MAXILLARY SINUSITIS: Primary | ICD-10-CM

## 2023-03-09 PROCEDURE — 99213 OFFICE O/P EST LOW 20 MIN: CPT | Performed by: INTERNAL MEDICINE

## 2023-03-09 PROCEDURE — 1160F RVW MEDS BY RX/DR IN RCRD: CPT | Performed by: INTERNAL MEDICINE

## 2023-03-09 PROCEDURE — 1159F MED LIST DOCD IN RCRD: CPT | Performed by: INTERNAL MEDICINE

## 2023-03-09 PROCEDURE — 3074F SYST BP LT 130 MM HG: CPT | Performed by: INTERNAL MEDICINE

## 2023-03-09 PROCEDURE — 3078F DIAST BP <80 MM HG: CPT | Performed by: INTERNAL MEDICINE

## 2023-03-09 RX ORDER — DOXYCYCLINE HYCLATE 100 MG/1
100 CAPSULE ORAL 2 TIMES DAILY
Qty: 20 CAPSULE | Refills: 0 | Status: SHIPPED | OUTPATIENT
Start: 2023-03-09

## 2023-03-09 NOTE — PROGRESS NOTES
Patient Name: Edgar Cotto Today's Date: 3/9/2023   Patient MRN / CSN: [id number] / [id number] Date of Encounter: 3/9/2023   Patient Age / : 78 y.o. / 1945 Encounter Provider: Abbie Hillman DO   Referring Physician: No ref. provider found          Edgar is a 78 y.o. female who is being seen today for Earache      Earache   There is pain in the right ear. This is a new problem. The current episode started 1 to 4 weeks ago. There has been no fever. Associated symptoms include coughing. Pertinent negatives include no sore throat. Associated symptoms comments: She has had pressure in her face and postnasal drainage. Treatments tried: She has been taking Flonase and albuterol without relief.       Allergies include:Z-mary [azithromycin], Amoxicillin, Cortisone, Fish-derived products, Iodinated contrast media, Iodine, Lidocaine, Morphine, Niacin, Nystatin, Other, Sulfa antibiotics, and Verapamil  Current Outpatient Medications   Medication Sig Dispense Refill   • acetaminophen (Tylenol 8 Hour Arthritis Pain) 650 MG 8 hr tablet Take 1 tablet by mouth 4 (Four) Times a Day As Needed for Mild Pain . 120 tablet 5   • albuterol (PROVENTIL) (2.5 MG/3ML) 0.083% nebulizer solution Take 2.5 mg by nebulization 4 (Four) Times a Day As Needed for Wheezing. 360 mL 5   • albuterol sulfate  (90 Base) MCG/ACT inhaler Inhale 2 puffs Every 4 (Four) Hours As Needed for Wheezing. 18 g 5   • Azelastine HCl 137 MCG/SPRAY solution 1 spray into the nostril(s) as directed by provider 2 (two) times a day. 30 mL 0   • diazePAM (Valium) 5 MG tablet Take 1 tablet by mouth Every 12 (Twelve) Hours As Needed for Anxiety (Dizziness). 60 tablet 2   • diclofenac (VOLTAREN) 50 MG EC tablet Take 1 tablet by mouth 2 (Two) Times a Day As Needed (pain). 60 tablet 5   • fluticasone (Flonase) 50 MCG/ACT nasal spray 2 sprays into the nostril(s) as directed by provider Daily. 48 g 5   • meclizine (ANTIVERT) 25 MG tablet Take 1 tablet by  mouth Every 8 (Eight) Hours As Needed for Dizziness. 270 tablet 3   • psyllium (METAMUCIL) 58.6 % packet Take 1 packet by mouth Daily. 162 each 3   • rosuvastatin (Crestor) 5 MG tablet Take 1 tablet by mouth Every Other Day. 45 tablet 3   • valsartan (DIOVAN) 40 MG tablet Take 0.5 tablets by mouth 2 (Two) Times a Day. 90 tablet 1   • doxycycline (VIBRAMYCIN) 100 MG capsule Take 1 capsule by mouth 2 (Two) Times a Day. 20 capsule 0     No current facility-administered medications for this visit.     Past Medical History:   Diagnosis Date   • Abdominal distension    • Allergic rhinitis    • Anxiety    • Arthritis    • Closed nondisplaced fracture of surgical neck of left humerus with routine healing 3/9/2021   • Cough    • Dry eyes    • Dyslipidemia    • Dysuria    • Hyperlipidemia    • Hypertension    • Meniere's disease    • Mitral and aortic valve disease    • Oral candidiasis    • Osteoarthritis    • Stomatitis    • URI, acute    • Vaginal candidiasis    • Vocal cord dysfunction      Family History   Problem Relation Age of Onset   • Diabetes Mother    • Cancer Father    • Liver disease Sister    • Diabetes Sister    • Liver disease Brother    • Diabetes Brother    • Diabetes Daughter    • Cancer Other    • Liver disease Other    • Breast cancer Neg Hx      Past Surgical History:   Procedure Laterality Date   • CHOLECYSTECTOMY     • COLONOSCOPY     • DILATATION AND CURETTAGE     • HEMORRHOIDECTOMY     • HERNIA REPAIR     • SHOULDER SURGERY     • SKIN CANCER EXCISION      on Nose   • TONSILLECTOMY       Social History     Substance and Sexual Activity   Alcohol Use No     Social History     Tobacco Use   Smoking Status Never   Smokeless Tobacco Never   Tobacco Comments    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   HENT: Positive for ear pain. Negative for sore throat.     Respiratory: Positive for cough and shortness of breath.         O2 drops < 90% when not wearing O2 at home.        Depression Assessment Review:  PHQ-9 Total Score:    Vital Signs & Measurements Taken This Encounter  /76 (BP Location: Left arm, Patient Position: Sitting, Cuff Size: Adult)   Pulse 86   Temp 97.7 °F (36.5 °C) (Temporal)   Wt 72.1 kg (159 lb) Comment: last weight, sick visit  SpO2 98% Comment: 3L  BMI 31.05 kg/m²    SpO2 Percentage    03/09/23 1111   SpO2: 98%  Comment: 3L        Physical Exam  Vitals reviewed.   Constitutional:       General: She is not in acute distress.  HENT:      Head: Normocephalic and atraumatic.      Left Ear: Tympanic membrane normal.      Ears:      Comments: Small mucoid effusion on the right without erythema or bulge.  Maxillary sinus tenderness to palpation bilaterally.     Mouth/Throat:      Mouth: Mucous membranes are moist.      Pharynx: Posterior oropharyngeal erythema present. No oropharyngeal exudate.   Eyes:      General: No scleral icterus.     Extraocular Movements: Extraocular movements intact.      Conjunctiva/sclera: Conjunctivae normal.      Pupils: Pupils are equal, round, and reactive to light.   Cardiovascular:      Rate and Rhythm: Normal rate and regular rhythm.   Pulmonary:      Effort: Pulmonary effort is normal. No respiratory distress.      Breath sounds: Normal breath sounds. No wheezing or rhonchi.   Musculoskeletal:         General: No swelling.      Cervical back: Neck supple. Tenderness present.   Lymphadenopathy:      Cervical: Cervical adenopathy present.   Skin:     General: Skin is warm and dry.      Coloration: Skin is not jaundiced.   Neurological:      Mental Status: She is alert.   Psychiatric:         Mood and Affect: Mood normal.         Behavior: Behavior normal.            Assessment & Plan  Patient Active Problem List   Diagnosis   • Atopic rhinitis   • Dizziness   • Generalized anxiety disorder   • Generalized  osteoarthritis   • Essential hypertension   • Auditory vertigo   • Mitral and aortic valve disease   • Stomatitis   • Seasonal allergic rhinitis   • Varicose veins of both lower extremities   • Meniere's disease of both ears   • S/P hemorrhoidectomy   • Abnormal EKG   • Moderate obesity   • Dyslipidemia   • Thyroid cyst   • Age-related osteoporosis without current pathological fracture   • Cervical lymphadenopathy   • CABAN (dyspnea on exertion)   • Hypoxia   • Restrictive lung disease       ICD-10-CM ICD-9-CM   1. Acute recurrent maxillary sinusitis  J01.01 461.0     No orders of the defined types were placed in this encounter.      Meds Ordered During Visit:  New Medications Ordered This Visit   Medications   • doxycycline (VIBRAMYCIN) 100 MG capsule     Sig: Take 1 capsule by mouth 2 (Two) Times a Day.     Dispense:  20 capsule     Refill:  0     Doxycycline discussed with patient today.  I recommend she continue her current medicine regimen, including albuterol and Flonase.    Return if symptoms worsen or fail to improve, for Next scheduled follow up.          Referring Provider (if known): No ref. provider found      This document has been electronically signed by Abbie Hillman DO  March 9, 2023 12:54 EST    Abbie Hillman DO, FACOI  [address] [place] [address]  [phone] (office)    Part of this note may be an electronic transcription/translation of spoken language to printed text using the Dragon Dictation System.

## 2023-03-16 ENCOUNTER — OFFICE VISIT (OUTPATIENT)
Dept: PULMONOLOGY | Facility: CLINIC | Age: 78
End: 2023-03-16
Payer: MEDICARE

## 2023-03-16 VITALS
DIASTOLIC BLOOD PRESSURE: 84 MMHG | OXYGEN SATURATION: 98 % | TEMPERATURE: 97.1 F | HEIGHT: 60 IN | BODY MASS INDEX: 31.41 KG/M2 | WEIGHT: 160 LBS | SYSTOLIC BLOOD PRESSURE: 122 MMHG | HEART RATE: 90 BPM

## 2023-03-16 DIAGNOSIS — R09.02 HYPOXIA: ICD-10-CM

## 2023-03-16 DIAGNOSIS — J84.9 ILD (INTERSTITIAL LUNG DISEASE): Primary | ICD-10-CM

## 2023-03-16 PROCEDURE — 3074F SYST BP LT 130 MM HG: CPT | Performed by: PHYSICIAN ASSISTANT

## 2023-03-16 PROCEDURE — 3079F DIAST BP 80-89 MM HG: CPT | Performed by: PHYSICIAN ASSISTANT

## 2023-03-16 PROCEDURE — 99214 OFFICE O/P EST MOD 30 MIN: CPT | Performed by: PHYSICIAN ASSISTANT

## 2023-03-16 NOTE — PROGRESS NOTES
Subjective      Chief Complaint  ILD    Subjective      History of Present Illness  Edgar Cotto is a 78 y.o. female who presents today to [facility] PULMONARY & CRITICAL CARE MEDICINE for ILD. This visit is a follow up appointment.     ILD:  Patient's daughter present during exam and provides supplemental history. Patient states that she has been having to use her 2 L supplemental oxygen when exerting herself and while performing various ADL's. She states that she is able to remove her oxygen when at rest and it stays in the upper 90's. She uses her albuterol and nebulizer treatments as needed. Discussed possibility of starting Ofev but not interested right now due to side effects.       Current Outpatient Medications:   •  acetaminophen (Tylenol 8 Hour Arthritis Pain) 650 MG 8 hr tablet, Take 1 tablet by mouth 4 (Four) Times a Day As Needed for Mild Pain ., Disp: 120 tablet, Rfl: 5  •  albuterol (PROVENTIL) (2.5 MG/3ML) 0.083% nebulizer solution, Take 2.5 mg by nebulization 4 (Four) Times a Day As Needed for Wheezing., Disp: 360 mL, Rfl: 5  •  albuterol sulfate  (90 Base) MCG/ACT inhaler, Inhale 2 puffs Every 4 (Four) Hours As Needed for Wheezing., Disp: 18 g, Rfl: 5  •  Azelastine HCl 137 MCG/SPRAY solution, 1 spray into the nostril(s) as directed by provider 2 (two) times a day., Disp: 30 mL, Rfl: 0  •  diazePAM (Valium) 5 MG tablet, Take 1 tablet by mouth Every 12 (Twelve) Hours As Needed for Anxiety (Dizziness)., Disp: 60 tablet, Rfl: 2  •  diclofenac (VOLTAREN) 50 MG EC tablet, Take 1 tablet by mouth 2 (Two) Times a Day As Needed (pain)., Disp: 60 tablet, Rfl: 5  •  doxycycline (VIBRAMYCIN) 100 MG capsule, Take 1 capsule by mouth 2 (Two) Times a Day., Disp: 20 capsule, Rfl: 0  •  fluticasone (Flonase) 50 MCG/ACT nasal spray, 2 sprays into the nostril(s) as directed by provider Daily., Disp: 48 g, Rfl: 5  •  meclizine (ANTIVERT) 25 MG tablet, Take 1 tablet by mouth Every 8  "(Eight) Hours As Needed for Dizziness., Disp: 270 tablet, Rfl: 3  •  psyllium (METAMUCIL) 58.6 % packet, Take 1 packet by mouth Daily., Disp: 162 each, Rfl: 3  •  rosuvastatin (Crestor) 5 MG tablet, Take 1 tablet by mouth Every Other Day., Disp: 45 tablet, Rfl: 3  •  valsartan (DIOVAN) 40 MG tablet, Take 0.5 tablets by mouth 2 (Two) Times a Day., Disp: 90 tablet, Rfl: 1      Allergies   Allergen Reactions   • Z-Ponce [Azithromycin] Swelling   • Amoxicillin Other (See Comments)   • Cortisone Swelling     Face swelling     • Fish-Derived Products      SEAFOOD   • Iodinated Contrast Media    • Iodine Itching   • Lidocaine Other (See Comments)     Shaking, fever when she had mouth numbed at the dentist   • Morphine Mental Status Change   • Niacin Other (See Comments)     Flushing    • Nystatin Other (See Comments)   • Other    • Sulfa Antibiotics Other (See Comments)   • Verapamil Other (See Comments)     Feel funny        Objective     Objective   Vital Signs:  /84   Pulse 90   Temp 97.1 °F (36.2 °C) (Temporal)   Ht 152.4 cm (60\")   Wt 72.6 kg (160 lb)   SpO2 98%   BMI 31.25 kg/m²   Estimated body mass index is 31.25 kg/m² as calculated from the following:    Height as of this encounter: 152.4 cm (60\").    Weight as of this encounter: 72.6 kg (160 lb).    Past Medical History:   Diagnosis Date   • Abdominal distension    • Allergic rhinitis    • Anxiety    • Arthritis    • Closed nondisplaced fracture of surgical neck of left humerus with routine healing 3/9/2021   • Cough    • Dry eyes    • Dyslipidemia    • Dysuria    • Hyperlipidemia    • Hypertension    • Meniere's disease    • Mitral and aortic valve disease    • Oral candidiasis    • Osteoarthritis    • Stomatitis    • URI, acute    • Vaginal candidiasis    • Vocal cord dysfunction      Past Surgical History:   Procedure Laterality Date   • CHOLECYSTECTOMY     • COLONOSCOPY     • DILATATION AND CURETTAGE     • HEMORRHOIDECTOMY     • HERNIA REPAIR     • " SHOULDER SURGERY     • SKIN CANCER EXCISION      on Nose   • TONSILLECTOMY       Social History     Socioeconomic History   • Marital status:    Tobacco Use   • Smoking status: Never   • Smokeless tobacco: Never   • Tobacco comments:     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 She is obese.   HENT:      Head: Normocephalic and atraumatic.      Nose: Nose normal.      Mouth/Throat:      Mouth: Mucous membranes are moist.      Pharynx: Oropharynx is clear.   Eyes:      Extraocular Movements: Extraocular movements intact.      Conjunctiva/sclera: Conjunctivae normal.      Pupils: Pupils are equal, round, and reactive to light.   Cardiovascular:      Rate and Rhythm: Normal rate and regular rhythm.      Pulses: Normal pulses.      Heart sounds: Normal heart sounds. No murmur heard.    No friction rub. No gallop.   Pulmonary:      Effort: Pulmonary effort is normal.      Breath sounds: Decreased breath sounds present. No wheezing, rhonchi or rales.   Musculoskeletal:         General: Normal range of motion.      Cervical back: Normal range of motion.   Skin:     General: Skin is warm and dry.   Neurological:      General: No focal deficit present.      Mental Status: She is alert and oriented to person, place, and time. Mental status is at baseline.   Psychiatric:         Mood and Affect: Mood normal.         Behavior: Behavior normal.         Thought Content: Thought content normal.        Result Review :  The following labs and radiology results have been reviewed.    Lab Review:   No results found for: FEV1, FVC, ORY9WYO, TLC, DLCO  Clinical Support on 01/16/2023   Component Date Value Ref Range Status   • Creatine Kinase 01/16/2023 51  20 - 180 U/L Final   • TSH 01/16/2023 1.650  0.270 -  4.200 uIU/mL Final   • Total Cholesterol 01/16/2023 176  0 - 200 mg/dL Final   • Triglycerides 01/16/2023 195 (H)  0 - 150 mg/dL Final   • HDL Cholesterol 01/16/2023 53  40 - 60 mg/dL Final   • LDL Cholesterol  01/16/2023 90  0 - 100 mg/dL Final   • VLDL Cholesterol 01/16/2023 33  5 - 40 mg/dL Final   • LDL/HDL Ratio 01/16/2023 1.58   Final   • WBC 01/16/2023 6.51  3.40 - 10.80 10*3/mm3 Final   • RBC 01/16/2023 4.50  3.77 - 5.28 10*6/mm3 Final   • Hemoglobin 01/16/2023 14.2  12.0 - 15.9 g/dL Final   • Hematocrit 01/16/2023 42.4  34.0 - 46.6 % Final   • MCV 01/16/2023 94.2  79.0 - 97.0 fL Final   • MCH 01/16/2023 31.6  26.6 - 33.0 pg Final   • MCHC 01/16/2023 33.5  31.5 - 35.7 g/dL Final   • RDW 01/16/2023 12.1 (L)  12.3 - 15.4 % Final   • RDW-SD 01/16/2023 42.1  37.0 - 54.0 fl Final   • MPV 01/16/2023 11.9  6.0 - 12.0 fL Final   • Platelets 01/16/2023 145  140 - 450 10*3/mm3 Final   • Glucose 01/16/2023 101 (H)  65 - 99 mg/dL Final   • BUN 01/16/2023 16  8 - 23 mg/dL Final   • Creatinine 01/16/2023 0.85  0.57 - 1.00 mg/dL Final   • Sodium 01/16/2023 141  136 - 145 mmol/L Final   • Potassium 01/16/2023 4.6  3.5 - 5.2 mmol/L Final   • Chloride 01/16/2023 104  98 - 107 mmol/L Final   • CO2 01/16/2023 31.0 (H)  22.0 - 29.0 mmol/L Final   • Calcium 01/16/2023 9.2  8.6 - 10.5 mg/dL Final   • Total Protein 01/16/2023 6.8  6.0 - 8.5 g/dL Final   • Albumin 01/16/2023 4.2  3.5 - 5.2 g/dL Final   • ALT (SGPT) 01/16/2023 12  1 - 33 U/L Final   • AST (SGOT) 01/16/2023 23  1 - 32 U/L Final   • Alkaline Phosphatase 01/16/2023 67  39 - 117 U/L Final   • Total Bilirubin 01/16/2023 0.5  0.0 - 1.2 mg/dL Final   • Globulin 01/16/2023 2.6  gm/dL Final   • A/G Ratio 01/16/2023 1.6  g/dL Final   • BUN/Creatinine Ratio 01/16/2023 18.8  7.0 - 25.0 Final   • Anion Gap 01/16/2023 6.0  5.0 - 15.0 mmol/L Final   • eGFR 01/16/2023 69.8  >60.0 mL/min/1.73 Final    National Kidney Foundation and American Society of Nephrology (ASN) Task Force  recommended calculation based on the Chronic Kidney Disease Epidemiology Collaboration (CKD-EPI) equation refit without adjustment for race.      Reviewed HRCT from 10/2022    Reviewed PFT from 08/2022    Assessment / Plan         Assessment   Diagnoses and all orders for this visit:    1. ILD (interstitial lung disease) (HCC) (Primary)    2. Hypoxia  -     Miscellaneous DME      · Fibrosis noted on HRCT and restriction on PFT likely due to underlying RA.   · Continue albuterol and nebulizer treatments as needed.   · Discussed Ofev for ILD but not interested due to side effects (specifically dizziness as patient has underlying Meniere's disease).     · Compliant with use of 2 L oxygen at night and as needed. Reports having to use more on exertion and while performing ADL's.  · Educated to keep saturation between 90-95%.   · DME order sent for oxygen supplies.     Follow Up   Return in about 3 months (around 6/16/2023), or if symptoms worsen or fail to improve, for Next scheduled follow up.    Patient was given instructions and counseling regarding her condition or for health maintenance advice. Please see specific information pulled into the AVS if appropriate.       This document has been electronically signed by Bhavani Mai PA-C   March 17, 2023 13:47 EDT    Dictated Utilizing Dragon Dictation: Part of this note may be an electronic transcription/translation of spoken language to printed text using the Dragon Dictation System.

## 2023-04-24 ENCOUNTER — OFFICE VISIT (OUTPATIENT)
Dept: FAMILY MEDICINE CLINIC | Facility: CLINIC | Age: 78
End: 2023-04-24
Payer: MEDICARE

## 2023-04-24 VITALS
SYSTOLIC BLOOD PRESSURE: 114 MMHG | DIASTOLIC BLOOD PRESSURE: 74 MMHG | WEIGHT: 164 LBS | HEART RATE: 82 BPM | TEMPERATURE: 97.3 F | OXYGEN SATURATION: 98 % | BODY MASS INDEX: 32.03 KG/M2

## 2023-04-24 DIAGNOSIS — I10 ESSENTIAL HYPERTENSION: ICD-10-CM

## 2023-04-24 DIAGNOSIS — M25.559 HIP PAIN: ICD-10-CM

## 2023-04-24 DIAGNOSIS — M41.86 OTHER FORM OF SCOLIOSIS OF LUMBAR SPINE: ICD-10-CM

## 2023-04-24 DIAGNOSIS — R42 DIZZINESS: ICD-10-CM

## 2023-04-24 DIAGNOSIS — M51.36 LUMBAR DEGENERATIVE DISC DISEASE: ICD-10-CM

## 2023-04-24 DIAGNOSIS — R10.31 RIGHT LOWER QUADRANT ABDOMINAL PAIN: ICD-10-CM

## 2023-04-24 DIAGNOSIS — F41.1 GENERALIZED ANXIETY DISORDER: Primary | Chronic | ICD-10-CM

## 2023-04-24 DIAGNOSIS — H81.03 MENIERE'S DISEASE OF BOTH EARS: Chronic | ICD-10-CM

## 2023-04-24 DIAGNOSIS — M15.9 GENERALIZED OSTEOARTHRITIS: Chronic | ICD-10-CM

## 2023-04-24 PROBLEM — M41.9 SCOLIOSIS: Status: ACTIVE | Noted: 2023-04-24

## 2023-04-24 PROBLEM — M51.369 LUMBAR DEGENERATIVE DISC DISEASE: Status: ACTIVE | Noted: 2023-04-24

## 2023-04-24 PROCEDURE — 82550 ASSAY OF CK (CPK): CPT | Performed by: INTERNAL MEDICINE

## 2023-04-24 PROCEDURE — 80053 COMPREHEN METABOLIC PANEL: CPT | Performed by: INTERNAL MEDICINE

## 2023-04-24 PROCEDURE — 84443 ASSAY THYROID STIM HORMONE: CPT | Performed by: INTERNAL MEDICINE

## 2023-04-24 PROCEDURE — 80061 LIPID PANEL: CPT | Performed by: INTERNAL MEDICINE

## 2023-04-24 PROCEDURE — 85027 COMPLETE CBC AUTOMATED: CPT | Performed by: INTERNAL MEDICINE

## 2023-04-24 RX ORDER — DIAZEPAM 5 MG/1
5 TABLET ORAL EVERY 12 HOURS PRN
Qty: 60 TABLET | Refills: 2 | Status: SHIPPED | OUTPATIENT
Start: 2023-04-24

## 2023-04-24 RX ORDER — VALSARTAN 40 MG/1
20 TABLET ORAL 2 TIMES DAILY
Qty: 90 TABLET | Refills: 1 | Status: SHIPPED | OUTPATIENT
Start: 2023-04-24

## 2023-04-24 NOTE — PROGRESS NOTES
Venipuncture Blood Specimen Collection  Venipuncture performed in RIGHT ARM by Mame Dempsey RN with good hemostasis. Patient tolerated the procedure well without complications.   04/24/23   Mame Dempsey RN

## 2023-04-24 NOTE — PROGRESS NOTES
Patient Name: Edgar Cotto Today's Date: 2023   Patient MRN / CSN: [id number] / [id number] Date of Encounter: 2023   Patient Age / : 78 y.o. / 1945 Encounter Provider: Abbie Hillman DO   Referring Physician: No ref. provider found          Edgar is a 78 y.o. female who is being seen today for Follow-up, Arthritis, Pain, Hypertension, and Anxiety      Arthritis  Presents for follow-up visit. She complains of pain and stiffness. The symptoms have been worsening. Associated symptoms include pain at night. Pertinent negatives include no dysuria, fever or weight loss. Compliance with medications: Tylenol and diclofenac help and she tolerates the regimen well.    Abdominal Pain  This is a new problem. The current episode started 1 to 4 weeks ago. The problem has been gradually worsening. The pain is located in the RLQ. The pain is moderate. The quality of the pain is cramping. Associated symptoms include nausea. Pertinent negatives include no anorexia, dysuria, fever, hematuria, melena or weight loss. Associated symptoms comments: Patient reports bloating and having abdominal swelling worsening over the past few weeks..   Hypertension  This is a chronic problem. The current episode started more than 1 year ago. The problem is controlled. Associated symptoms include anxiety. Pertinent negatives include no shortness of breath. Current antihypertension treatment includes angiotensin blockers. The current treatment provides significant improvement. There are no compliance problems.    Anxiety  Presents for follow-up visit. Symptoms include excessive worry, nausea and nervous/anxious behavior. Patient reports no shortness of breath. The severity of symptoms is moderate.     Compliance with medications: Patient reports doing well with Valium twice daily as needed for anxiety as well as vertigo.  She reports tolerating this well and it helps her significantly.       Allergies include:Z-mary  [azithromycin], Amoxicillin, Cortisone, Fish-derived products, Iodinated contrast media, Iodine, Lidocaine, Morphine, Niacin, Nystatin, Other, Sulfa antibiotics, and Verapamil  Current Outpatient Medications   Medication Sig Dispense Refill   • acetaminophen (Tylenol 8 Hour Arthritis Pain) 650 MG 8 hr tablet Take 1 tablet by mouth 4 (Four) Times a Day As Needed for Mild Pain . 120 tablet 5   • albuterol (PROVENTIL) (2.5 MG/3ML) 0.083% nebulizer solution Take 2.5 mg by nebulization 4 (Four) Times a Day As Needed for Wheezing. 360 mL 5   • albuterol sulfate  (90 Base) MCG/ACT inhaler Inhale 2 puffs Every 4 (Four) Hours As Needed for Wheezing. 18 g 5   • diazePAM (Valium) 5 MG tablet Take 1 tablet by mouth Every 12 (Twelve) Hours As Needed for Anxiety (Dizziness). 60 tablet 2   • diclofenac (VOLTAREN) 50 MG EC tablet Take 1 tablet by mouth 2 (Two) Times a Day As Needed (pain). 180 tablet 1   • fluticasone (Flonase) 50 MCG/ACT nasal spray 2 sprays into the nostril(s) as directed by provider Daily. 48 g 5   • meclizine (ANTIVERT) 25 MG tablet Take 1 tablet by mouth Every 8 (Eight) Hours As Needed for Dizziness. 270 tablet 3   • psyllium (METAMUCIL) 58.6 % packet Take 1 packet by mouth Daily. 162 each 3   • rosuvastatin (Crestor) 5 MG tablet Take 1 tablet by mouth Every Other Day. 45 tablet 3   • valsartan (DIOVAN) 40 MG tablet Take 0.5 tablets by mouth 2 (Two) Times a Day. 90 tablet 1     No current facility-administered medications for this visit.     Past Medical History:   Diagnosis Date   • Abdominal distension    • Allergic rhinitis    • Anxiety    • Arthritis    • Closed nondisplaced fracture of surgical neck of left humerus with routine healing 3/9/2021   • Cough    • Dry eyes    • Dyslipidemia    • Dysuria    • Hyperlipidemia    • Hypertension    • Meniere's disease    • Mitral and aortic valve disease    • Oral candidiasis    • Osteoarthritis    • Stomatitis    • URI, acute    • Vaginal candidiasis    •  Vocal cord dysfunction      Family History   Problem Relation Age of Onset   • Diabetes Mother    • Cancer Father    • Liver disease Sister    • Diabetes Sister    • Liver disease Brother    • Diabetes Brother    • Diabetes Daughter    • Cancer Other    • Liver disease Other    • Breast cancer Neg Hx      Past Surgical History:   Procedure Laterality Date   • CHOLECYSTECTOMY     • COLONOSCOPY     • DILATATION AND CURETTAGE     • HEMORRHOIDECTOMY     • HERNIA REPAIR     • SHOULDER SURGERY     • SKIN CANCER EXCISION      on Nose   • TONSILLECTOMY       Social History     Substance and Sexual Activity   Alcohol Use No     Social History     Tobacco Use   Smoking Status Never   Smokeless Tobacco Never   Tobacco Comments    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   Respiratory: Negative for shortness of breath.    Gastrointestinal: Positive for abdominal pain and nausea. Negative for anorexia, blood in stool and melena.        Patient reports lower abdominal pain and increased swelling in her abdomen. She denies difficulty eating. She reports her bowels are moving well. She has nausea/vomiting at times without recent change.    Genitourinary: Negative for dysuria and hematuria.   Musculoskeletal: Positive for arthritis, back pain and stiffness.   Psychiatric/Behavioral: The patient is nervous/anxious.         Depression Assessment Review:  PHQ-9 Total Score:    Vital Signs & Measurements Taken This Encounter  /74 (BP Location: Right arm, Patient Position: Sitting, Cuff Size: Adult)   Pulse 82   Temp 97.3 °F (36.3 °C) (Temporal)   Wt 74.4 kg (164 lb)   SpO2 98%   BMI 32.03 kg/m²    SpO2 Percentage    04/24/23 1004   SpO2: 98%        BMI is >= 30 and <35. (Class 1 Obesity). The following options were offered after discussion;: nutrition  counseling/recommendations      Physical Exam  Vitals reviewed.   Constitutional:       General: She is not in acute distress.  HENT:      Head: Normocephalic and atraumatic.   Eyes:      General: No scleral icterus.     Extraocular Movements: Extraocular movements intact.      Conjunctiva/sclera: Conjunctivae normal.      Pupils: Pupils are equal, round, and reactive to light.   Cardiovascular:      Rate and Rhythm: Normal rate and regular rhythm.   Pulmonary:      Effort: Pulmonary effort is normal. No respiratory distress.      Breath sounds: Normal breath sounds. No wheezing or rhonchi.   Abdominal:      Palpations: Abdomen is soft.      Tenderness: There is abdominal tenderness. There is guarding. There is no right CVA tenderness, left CVA tenderness or rebound.      Comments: Tenderness in the right lower quadrant without rebound or rigidity   Musculoskeletal:         General: No swelling.      Cervical back: Neck supple. No tenderness.   Lymphadenopathy:      Cervical: No cervical adenopathy.   Skin:     General: Skin is warm and dry.      Coloration: Skin is not jaundiced.   Neurological:      Mental Status: She is alert.   Psychiatric:         Mood and Affect: Mood normal.         Behavior: Behavior normal.              Assessment & Plan  Patient Active Problem List   Diagnosis   • Atopic rhinitis   • Dizziness   • Generalized anxiety disorder   • Generalized osteoarthritis   • Essential hypertension   • Auditory vertigo   • Mitral and aortic valve disease   • Stomatitis   • Seasonal allergic rhinitis   • Varicose veins of both lower extremities   • Meniere's disease of both ears   • S/P hemorrhoidectomy   • Abnormal EKG   • Moderate obesity   • Dyslipidemia   • Thyroid cyst   • Age-related osteoporosis without current pathological fracture   • Cervical lymphadenopathy   • CABAN (dyspnea on exertion)   • Hypoxia   • Restrictive lung disease   • Scoliosis   • Hip pain   • Lumbar degenerative disc disease        ICD-10-CM ICD-9-CM   1. Generalized anxiety disorder  F41.1 300.02   2. Meniere's disease of both ears  H81.03 386.00   3. Generalized osteoarthritis  M15.9 715.00   4. Dizziness  R42 780.4   5. Essential hypertension  I10 401.9   6. Other form of scoliosis of lumbar spine  M41.86 737.39   7. Lumbar degenerative disc disease  M51.36 722.52   8. Hip pain  M25.559 719.45   9. Right lower quadrant abdominal pain  R10.31 789.03     Orders Placed This Encounter   Procedures   • CT Abdomen Pelvis Without Contrast     Standing Status:   Future     Standing Expiration Date:   4/24/2024     Order Specific Question:   Release to patient     Answer:   Routine Release     Order Specific Question:   Will Oral Contrast be needed for this procedure?     Answer:   No   • CBC (No Diff)     Order Specific Question:   Release to patient     Answer:   Routine Release   • Comprehensive Metabolic Panel     Order Specific Question:   Release to patient     Answer:   Routine Release   • Lipid Panel   • TSH     Order Specific Question:   Release to patient     Answer:   Routine Release   • CK     Order Specific Question:   Release to patient     Answer:   Routine Release       Meds Ordered During Visit:  New Medications Ordered This Visit   Medications   • diazePAM (Valium) 5 MG tablet     Sig: Take 1 tablet by mouth Every 12 (Twelve) Hours As Needed for Anxiety (Dizziness).     Dispense:  60 tablet     Refill:  2   • valsartan (DIOVAN) 40 MG tablet     Sig: Take 0.5 tablets by mouth 2 (Two) Times a Day.     Dispense:  90 tablet     Refill:  1   • diclofenac (VOLTAREN) 50 MG EC tablet     Sig: Take 1 tablet by mouth 2 (Two) Times a Day As Needed (pain).     Dispense:  180 tablet     Refill:  1     I reviewed PDMP today.  I updated prescription refills today as requested.  I recommended CT of the abdomen pelvis given the right lower quadrant abdominal pain.  Patient is agreeable to this but cannot take contrast due to allergy.  We will  update labs today as above.    Return in about 3 months (around 7/24/2023), or if symptoms worsen or fail to improve, for Recheck.          Referring Provider (if known): No ref. provider found      This document has been electronically signed by Abbie Hillman DO  April 24, 2023 12:59 EDT    Abbie Hillman DO, FACOI  [address] [place] [address] [phone] (office)    Part of this note may be an electronic transcription/translation of spoken language to printed text using the Dragon Dictation System.

## 2023-04-25 LAB
ALBUMIN SERPL-MCNC: 4.2 G/DL (ref 3.5–5.2)
ALBUMIN/GLOB SERPL: 1.4 G/DL
ALP SERPL-CCNC: 70 U/L (ref 39–117)
ALT SERPL W P-5'-P-CCNC: 16 U/L (ref 1–33)
ANION GAP SERPL CALCULATED.3IONS-SCNC: 8 MMOL/L (ref 5–15)
AST SERPL-CCNC: 27 U/L (ref 1–32)
BILIRUB SERPL-MCNC: 0.4 MG/DL (ref 0–1.2)
BUN SERPL-MCNC: 13 MG/DL (ref 8–23)
BUN/CREAT SERPL: 17.1 (ref 7–25)
CALCIUM SPEC-SCNC: 9.4 MG/DL (ref 8.6–10.5)
CHLORIDE SERPL-SCNC: 102 MMOL/L (ref 98–107)
CHOLEST SERPL-MCNC: 206 MG/DL (ref 0–200)
CK SERPL-CCNC: 79 U/L (ref 20–180)
CO2 SERPL-SCNC: 30 MMOL/L (ref 22–29)
CREAT SERPL-MCNC: 0.76 MG/DL (ref 0.57–1)
DEPRECATED RDW RBC AUTO: 41.5 FL (ref 37–54)
EGFRCR SERPLBLD CKD-EPI 2021: 79.3 ML/MIN/1.73
ERYTHROCYTE [DISTWIDTH] IN BLOOD BY AUTOMATED COUNT: 12 % (ref 12.3–15.4)
GLOBULIN UR ELPH-MCNC: 3.1 GM/DL
GLUCOSE SERPL-MCNC: 103 MG/DL (ref 65–99)
HCT VFR BLD AUTO: 40.7 % (ref 34–46.6)
HDLC SERPL-MCNC: 59 MG/DL (ref 40–60)
HGB BLD-MCNC: 13.6 G/DL (ref 12–15.9)
LDLC SERPL CALC-MCNC: 102 MG/DL (ref 0–100)
LDLC/HDLC SERPL: 1.6 {RATIO}
MCH RBC QN AUTO: 31.6 PG (ref 26.6–33)
MCHC RBC AUTO-ENTMCNC: 33.4 G/DL (ref 31.5–35.7)
MCV RBC AUTO: 94.7 FL (ref 79–97)
PLATELET # BLD AUTO: 203 10*3/MM3 (ref 140–450)
PMV BLD AUTO: 11.4 FL (ref 6–12)
POTASSIUM SERPL-SCNC: 4.5 MMOL/L (ref 3.5–5.2)
PROT SERPL-MCNC: 7.3 G/DL (ref 6–8.5)
RBC # BLD AUTO: 4.3 10*6/MM3 (ref 3.77–5.28)
SODIUM SERPL-SCNC: 140 MMOL/L (ref 136–145)
TRIGL SERPL-MCNC: 264 MG/DL (ref 0–150)
TSH SERPL DL<=0.05 MIU/L-ACNC: 1.1 UIU/ML (ref 0.27–4.2)
VLDLC SERPL-MCNC: 45 MG/DL (ref 5–40)
WBC NRBC COR # BLD: 8.22 10*3/MM3 (ref 3.4–10.8)

## 2023-05-01 ENCOUNTER — HOSPITAL ENCOUNTER (OUTPATIENT)
Dept: CT IMAGING | Facility: HOSPITAL | Age: 78
Discharge: HOME | End: 2023-05-01
Admitting: INTERNAL MEDICINE
Payer: MEDICARE

## 2023-05-01 DIAGNOSIS — R10.31 RIGHT LOWER QUADRANT ABDOMINAL PAIN: ICD-10-CM

## 2023-05-01 PROCEDURE — 74176 CT ABD & PELVIS W/O CONTRAST: CPT

## 2023-05-01 PROCEDURE — 74176 CT ABD & PELVIS W/O CONTRAST: CPT | Performed by: RADIOLOGY

## 2023-05-31 DIAGNOSIS — J98.4 RESTRICTIVE LUNG DISEASE: Chronic | ICD-10-CM

## 2023-05-31 RX ORDER — ALBUTEROL SULFATE 90 UG/1
AEROSOL, METERED RESPIRATORY (INHALATION)
Qty: 18 G | Refills: 5 | Status: SHIPPED | OUTPATIENT
Start: 2023-05-31

## 2023-06-15 ENCOUNTER — OFFICE VISIT (OUTPATIENT)
Dept: PULMONOLOGY | Facility: CLINIC | Age: 78
End: 2023-06-15
Payer: MEDICARE

## 2023-06-15 VITALS
WEIGHT: 164 LBS | HEART RATE: 88 BPM | RESPIRATION RATE: 18 BRPM | OXYGEN SATURATION: 97 % | TEMPERATURE: 98 F | SYSTOLIC BLOOD PRESSURE: 122 MMHG | HEIGHT: 60 IN | BODY MASS INDEX: 32.2 KG/M2 | DIASTOLIC BLOOD PRESSURE: 75 MMHG

## 2023-06-15 DIAGNOSIS — E66.9 OBESITY (BMI 30-39.9): ICD-10-CM

## 2023-06-15 DIAGNOSIS — J84.9 ILD (INTERSTITIAL LUNG DISEASE): Primary | ICD-10-CM

## 2023-06-15 DIAGNOSIS — J98.4 RESTRICTIVE LUNG DISEASE: Chronic | ICD-10-CM

## 2023-06-15 DIAGNOSIS — R09.02 HYPOXIA: ICD-10-CM

## 2023-06-15 PROCEDURE — 3078F DIAST BP <80 MM HG: CPT | Performed by: NURSE PRACTITIONER

## 2023-06-15 PROCEDURE — 1160F RVW MEDS BY RX/DR IN RCRD: CPT | Performed by: NURSE PRACTITIONER

## 2023-06-15 PROCEDURE — 99214 OFFICE O/P EST MOD 30 MIN: CPT | Performed by: NURSE PRACTITIONER

## 2023-06-15 PROCEDURE — 1159F MED LIST DOCD IN RCRD: CPT | Performed by: NURSE PRACTITIONER

## 2023-06-15 PROCEDURE — 3074F SYST BP LT 130 MM HG: CPT | Performed by: NURSE PRACTITIONER

## 2023-06-15 RX ORDER — ALBUTEROL SULFATE 90 UG/1
2 AEROSOL, METERED RESPIRATORY (INHALATION) EVERY 4 HOURS PRN
Qty: 18 G | Refills: 5 | Status: SHIPPED | OUTPATIENT
Start: 2023-06-15

## 2023-06-15 RX ORDER — ALBUTEROL SULFATE 2.5 MG/3ML
2.5 SOLUTION RESPIRATORY (INHALATION) 4 TIMES DAILY PRN
Qty: 360 ML | Refills: 5 | Status: SHIPPED | OUTPATIENT
Start: 2023-06-15

## 2023-06-15 NOTE — PROGRESS NOTES
"Chief Complaint  ILD (interstitial lung disease)    Subjective        Edgar Cotto presents to [facility] PULMONARY & CRITICAL CARE MEDICINE  History of Present Illness    Ms. Cotto is an 78 year old female with a medical history significant for allergies, anxiety, arthritis, dyslipidemia, hypertension, meniere's disease, and ILD.    She presents today for follow up on ILD.  She states that she is overall doing about the same.  She reports that she has good days and bad days.  She is compliant with supplemental oxygen use at 2 L.  She is also using albuterol as needed.    Objective   Vital Signs:  /75   Pulse 88   Temp 98 °F (36.7 °C) (Temporal)   Resp 18   Ht 152.4 cm (60\")   Wt 74.4 kg (164 lb)   SpO2 97% Comment: 3L  BMI 32.03 kg/m²   Estimated body mass index is 32.03 kg/m² as calculated from the following:    Height as of this encounter: 152.4 cm (60\").    Weight as of this encounter: 74.4 kg (164 lb).               Physical Exam     GENERAL APPEARANCE: Well developed, well nourished, alert and cooperative, and appears to be in no acute distress.    HEAD: normocephalic. Atraumatic.    EYES: PERRL, EOMI. Vision is grossly intact.    THROAT: Oral cavity and pharynx normal. No inflammation, swelling, exudate, or lesions.     NECK: Neck supple.  No thyromegaly.    CARDIAC: Normal S1 and S2. No S3, S4 or murmurs. Rhythm is regular.     RESPIRATORY:Bilateral air entry positive. Bilateral diminished breath sounds. Bilateral crackles in the bases.     GI: Positive bowel sounds. Soft, nondistended, nontender.     MUSCULOSKELETAL: No significant deformity or joint abnormality. No edema. Peripheral pulses intact. No varicosities.    NEUROLOGICAL: Strength and sensation symmetric and intact throughout.     PSYCHIATRIC: The mental examination revealed the patient was oriented to person, place, and time.     Result Review :  The following data was reviewed by: Devi Sanchez, " APRN on 06/15/2023:  Common labs          10/19/2022    10:13 1/16/2023    08:58 4/24/2023    11:20   Common Labs   Glucose 96  101  103    BUN 18  16  13    Creatinine 0.87  0.85  0.76    Sodium 140  141  140    Potassium 4.6  4.6  4.5    Chloride 101  104  102    Calcium 9.7  9.2  9.4    Albumin  4.2  4.2    Total Bilirubin  0.5  0.4    Alkaline Phosphatase  67  70    AST (SGOT)  23  27    ALT (SGPT)  12  16    WBC  6.51  8.22    Hemoglobin  14.2  13.6    Hematocrit  42.4  40.7    Platelets  145  203    Total Cholesterol  176  206    Triglycerides  195  264    HDL Cholesterol  53  59    LDL Cholesterol   90  102                   Assessment and Plan   Diagnoses and all orders for this visit:    1. ILD (interstitial lung disease) (Primary)    2. Restrictive lung disease  Comments:  following with Pulmonology and wearing O2 at night and with exertion  Orders:  -     albuterol sulfate  (90 Base) MCG/ACT inhaler; Inhale 2 puffs Every 4 (Four) Hours As Needed for Wheezing.  Dispense: 18 g; Refill: 5    3. Hypoxia    4. Obesity (BMI 30-39.9)    Other orders  -     albuterol (PROVENTIL) (2.5 MG/3ML) 0.083% nebulizer solution; Take 2.5 mg by nebulization 4 (Four) Times a Day As Needed for Wheezing.  Dispense: 360 mL; Refill: 5            ILD is likely due to underlying RA.  Continue albuterol inhaler and neb treatments as needed.    She did not want to initiate ofev due to the side affects.    Compliant with use of supplemental oxygen at 2 L.  Advised her that she may incrase to 3 L as needed during ambulation.  Educated her to maintain an oxygen saturation around 90-95%.    Provided her with handouts on breathing exercises.      Follow Up   Return in about 6 months (around 12/15/2023).  Patient was given instructions and counseling regarding her condition or for health maintenance advice. Please see specific information pulled into the AVS if appropriate.

## 2023-08-08 ENCOUNTER — OFFICE VISIT (OUTPATIENT)
Dept: FAMILY MEDICINE CLINIC | Facility: CLINIC | Age: 78
End: 2023-08-08
Payer: MEDICARE

## 2023-08-08 VITALS
HEART RATE: 80 BPM | BODY MASS INDEX: 32.61 KG/M2 | WEIGHT: 167 LBS | OXYGEN SATURATION: 95 % | SYSTOLIC BLOOD PRESSURE: 130 MMHG | TEMPERATURE: 97.1 F | DIASTOLIC BLOOD PRESSURE: 84 MMHG

## 2023-08-08 DIAGNOSIS — J98.4 RESTRICTIVE LUNG DISEASE: Primary | Chronic | ICD-10-CM

## 2023-08-08 DIAGNOSIS — R09.02 HYPOXIA: ICD-10-CM

## 2023-08-08 PROCEDURE — 1160F RVW MEDS BY RX/DR IN RCRD: CPT | Performed by: INTERNAL MEDICINE

## 2023-08-08 PROCEDURE — 3075F SYST BP GE 130 - 139MM HG: CPT | Performed by: INTERNAL MEDICINE

## 2023-08-08 PROCEDURE — 3079F DIAST BP 80-89 MM HG: CPT | Performed by: INTERNAL MEDICINE

## 2023-08-08 PROCEDURE — 99213 OFFICE O/P EST LOW 20 MIN: CPT | Performed by: INTERNAL MEDICINE

## 2023-08-08 PROCEDURE — 1159F MED LIST DOCD IN RCRD: CPT | Performed by: INTERNAL MEDICINE

## 2023-08-08 NOTE — PROGRESS NOTES
Patient Name: Edgar Cotto Today's Date: 2023   Patient MRN / CSN: [id number] / [id number] Date of Encounter: 2023   Patient Age / : 78 y.o. / 1945 Encounter Provider: Abbie Hillman DO   Referring Physician: No ref. provider found          Edgar is a 78 y.o. female who is being seen today for Home health referral request and Shortness of Breath (On exertion)      Shortness of Breath  This is a chronic problem. The current episode started more than 1 year ago. The problem has been gradually worsening. Pertinent negatives include no abdominal pain or chest pain. Associated symptoms comments: Edgar has restrictive lung disease, on home O2 2 to 3 L nasal cannula continuous.  She presents with her daughter today who is her primary caregiver.  They report that her symptoms of shortness of air, dyspnea on exertion, have worsened over the past few months.  They are interested in home health evaluation for nursing care at home.  Patient is using albuterol nebs typically at least twice a day in addition to using the flutter valve device and incentive spirometer.  She is continuing to follow with pulmonology as well.     Allergies include:Z-mary [azithromycin], Amoxicillin, Cortisone, Fish-derived products, Iodinated contrast media, Iodine, Lidocaine, Morphine, Niacin, Nystatin, Other, Sulfa antibiotics, and Verapamil  Current Outpatient Medications   Medication Sig Dispense Refill    acetaminophen (Tylenol 8 Hour Arthritis Pain) 650 MG 8 hr tablet Take 1 tablet by mouth 4 (Four) Times a Day As Needed for Mild Pain . 120 tablet 5    albuterol (PROVENTIL) (2.5 MG/3ML) 0.083% nebulizer solution Take 2.5 mg by nebulization 4 (Four) Times a Day As Needed for Wheezing. 360 mL 5    albuterol sulfate  (90 Base) MCG/ACT inhaler Inhale 2 puffs Every 4 (Four) Hours As Needed for Wheezing. 18 g 5    diazePAM (Valium) 5 MG tablet Take 1 tablet by mouth Every 12 (Twelve) Hours As Needed for Anxiety  (Dizziness). 60 tablet 2    diclofenac (VOLTAREN) 50 MG EC tablet Take 1 tablet by mouth 2 (Two) Times a Day As Needed (pain). 180 tablet 1    fluticasone (Flonase) 50 MCG/ACT nasal spray 2 sprays into the nostril(s) as directed by provider Daily. 48 g 5    meclizine (ANTIVERT) 25 MG tablet Take 1 tablet by mouth Every 8 (Eight) Hours As Needed for Dizziness. 270 tablet 3    psyllium (METAMUCIL) 58.6 % packet Take 1 packet by mouth Daily. 162 each 3    rosuvastatin (Crestor) 5 MG tablet Take 1 tablet by mouth Every Other Day. 45 tablet 3    valsartan (DIOVAN) 40 MG tablet Take 0.5 tablets by mouth 2 (Two) Times a Day. 90 tablet 1    O2 (OXYGEN) Inhale 3 L/min 1 (One) Time.       No current facility-administered medications for this visit.     Past Medical History:   Diagnosis Date    Abdominal distension     Allergic rhinitis     Anxiety     Arthritis     Closed nondisplaced fracture of surgical neck of left humerus with routine healing 3/9/2021    Cough     Dry eyes     Dyslipidemia     Dysuria     Hyperlipidemia     Hypertension     Meniere's disease     Mitral and aortic valve disease     Oral candidiasis     Osteoarthritis     Stomatitis     URI, acute     Vaginal candidiasis     Vocal cord dysfunction      Family History   Problem Relation Age of Onset    Diabetes Mother     Cancer Father     Liver disease Sister     Diabetes Sister     Liver disease Brother     Diabetes Brother     Diabetes Daughter     Cancer Other     Liver disease Other     Breast cancer Neg Hx      Past Surgical History:   Procedure Laterality Date    CHOLECYSTECTOMY      COLONOSCOPY      DILATATION AND CURETTAGE      HEMORRHOIDECTOMY      HERNIA REPAIR      SHOULDER SURGERY      SKIN CANCER EXCISION      on Nose    TONSILLECTOMY       Social History     Substance and Sexual Activity   Alcohol Use No     Social History     Tobacco Use   Smoking Status Never   Smokeless Tobacco Never   Tobacco Comments    She previously worked in a [employer]  and is now retired     Social History     Substance and Sexual Activity   Drug Use No     Review of Systems   Constitutional:  Positive for fatigue.   Respiratory:  Positive for shortness of breath.    Cardiovascular:  Negative for chest pain.   Gastrointestinal:  Negative for abdominal pain.        Occasional heartburn when eating spicy foods   Musculoskeletal:  Positive for arthralgias and back pain.      Depression Assessment Review:  PHQ-9 Total Score:    Vital Signs & Measurements Taken This Encounter  /84 (BP Location: Left arm, Patient Position: Sitting, Cuff Size: Adult)   Pulse 80   Temp 97.1 øF (36.2 øC) (Temporal)   Wt 75.8 kg (167 lb)   SpO2 95% Comment: 3L  BMI 32.61 kg/mý    SpO2 Percentage    08/08/23 1337   SpO2: 95%  Comment: 3L          Physical Exam  Vitals reviewed.   Constitutional:       General: She is not in acute distress.  HENT:      Head: Normocephalic and atraumatic.   Eyes:      General: No scleral icterus.     Extraocular Movements: Extraocular movements intact.      Conjunctiva/sclera: Conjunctivae normal.      Pupils: Pupils are equal, round, and reactive to light.   Cardiovascular:      Rate and Rhythm: Normal rate and regular rhythm.   Pulmonary:      Effort: Pulmonary effort is normal. No respiratory distress.      Comments: Decreased breath sounds bilaterally without rales, wheezing or rhonchi  Musculoskeletal:         General: No swelling.      Comments: Ambulates with rollator   Skin:     General: Skin is warm and dry.      Coloration: Skin is not jaundiced.   Neurological:      Mental Status: She is alert.   Psychiatric:         Mood and Affect: Mood normal.         Behavior: Behavior normal.         Thought Content: Thought content normal.         Judgment: Judgment normal.            Assessment & Plan  Patient Active Problem List   Diagnosis    Atopic rhinitis    Dizziness    Generalized anxiety disorder    Generalized osteoarthritis    Essential hypertension     Auditory vertigo    Mitral and aortic valve disease    Stomatitis    Seasonal allergic rhinitis    Varicose veins of both lower extremities    Meniere's disease of both ears    S/P hemorrhoidectomy    Abnormal EKG    Moderate obesity    Dyslipidemia    Thyroid cyst    Age-related osteoporosis without current pathological fracture    Cervical lymphadenopathy    CABAN (dyspnea on exertion)    Hypoxia    Restrictive lung disease    Scoliosis    Hip pain    Lumbar degenerative disc disease       ICD-10-CM ICD-9-CM   1. Restrictive lung disease  J98.4 518.89   2. Hypoxia  R09.02 799.02     Orders Placed This Encounter   Procedures    Ambulatory Referral to Home Health     Referral Priority:   Routine     Referral Type:   Home Health     Referral Reason:   Specialty Services Required     Requested Specialty:   Home Health Services     Number of Visits Requested:   999       Meds Ordered During Visit:  No orders of the defined types were placed in this encounter.    I reviewed pulmonology note today.  I recommended continuing current medicine regimen.  I am happy to place home health referral for patient today.  She ambulates with a rollator and it is taxing for her to leave home, especially with her dyspnea on exertion requiring oxygen therapy.  We will plan on a 6-month office follow-up, or certainly sooner if needed.  I explained to patient and caregiver that we are happy to do telehealth visits in the interim if needed as well.    Return in about 6 months (around 2/8/2024), or if symptoms worsen or fail to improve, for Medicare Wellness.          Referring Provider (if known): No ref. provider found      This document has been electronically signed by Abbie Hillman DO  August 8, 2023 17:20 EDT    Abbie Hillman DO, FACOI  [address] [place] [address]  [phone] (office)    Part of this note may be an electronic transcription/translation of spoken language to printed text using the Dragon Dictation  System.

## 2023-08-14 DIAGNOSIS — K12.1 STOMATITIS: ICD-10-CM

## 2023-08-14 NOTE — TELEPHONE ENCOUNTER
I called and spoke with patient daughter Ava. She states pt still takes this as needed when mouth sores flare up.

## 2023-08-24 ENCOUNTER — TELEMEDICINE (OUTPATIENT)
Dept: FAMILY MEDICINE CLINIC | Facility: CLINIC | Age: 78
End: 2023-08-24
Payer: MEDICARE

## 2023-08-24 ENCOUNTER — TELEPHONE (OUTPATIENT)
Dept: FAMILY MEDICINE CLINIC | Facility: CLINIC | Age: 78
End: 2023-08-24
Payer: MEDICARE

## 2023-08-24 DIAGNOSIS — H65.193 OTHER NON-RECURRENT ACUTE NONSUPPURATIVE OTITIS MEDIA OF BOTH EARS: Primary | ICD-10-CM

## 2023-08-24 PROCEDURE — 1159F MED LIST DOCD IN RCRD: CPT | Performed by: NURSE PRACTITIONER

## 2023-08-24 PROCEDURE — 1160F RVW MEDS BY RX/DR IN RCRD: CPT | Performed by: NURSE PRACTITIONER

## 2023-08-24 PROCEDURE — 99213 OFFICE O/P EST LOW 20 MIN: CPT | Performed by: NURSE PRACTITIONER

## 2023-08-24 RX ORDER — AMOXICILLIN AND CLAVULANATE POTASSIUM 875; 125 MG/1; MG/1
1 TABLET, FILM COATED ORAL 2 TIMES DAILY
Qty: 20 TABLET | Refills: 0 | Status: SHIPPED | OUTPATIENT
Start: 2023-08-24

## 2023-08-24 NOTE — TELEPHONE ENCOUNTER
I called pt daughter/caregiver. She is, as previously stated, unable to come in.  So we have her scheduled for [facility] visit this afternoon but she does not know if she will be able pt logged into her [employer] account.   
Pt has an ear infection and is unable to use ear drops due to meniere's disease (makes it worse). Breanne is requesting an oral abx if appropriate for this patient.   
Requires a visit.
This patient has difficulty getting into the office and one of the reasons home health is seeing her. Can she do a MyChart visit?  
throat

## 2023-08-24 NOTE — PROGRESS NOTES
Subjective   Edgar Cotto is a 78 y.o. female.     No chief complaint on file.      History of Present Illness  She presents via video visit with c/o pain in her left ear. She states she has a sharp pain sometimes. She has to put her finger in her ear and shake it so she can hear better out of it. This started about 3 days ago. She c/o nasal congestion as well. She is at home in the [facility], in [place], [place]. She has been taking allergy medicine. She is using flonase.      The following portions of the patient's history were reviewed and updated as appropriate: allergies, current medications, past family history, past medical history, past social history, past surgical history and problem list.    Review of Systems   Constitutional:  Positive for chills. Negative for fatigue and fever.   HENT:  Positive for congestion, ear pain, hearing loss and sore throat (sometimes). Negative for rhinorrhea.    Respiratory:  Positive for shortness of breath (stable). Negative for cough, chest tightness and wheezing.    Cardiovascular:  Negative for chest pain and palpitations.   Gastrointestinal:  Negative for abdominal pain, blood in stool, constipation, diarrhea, nausea and vomiting.   Genitourinary:  Negative for dysuria and hematuria.   Allergic/Immunologic: Positive for environmental allergies.     Objective     There were no vitals taken for this visit.    Physical Exam  Constitutional:       Appearance: Normal appearance.   Pulmonary:      Effort: Pulmonary effort is normal. No respiratory distress.   Neurological:      Mental Status: She is alert and oriented to person, place, and time.   Psychiatric:         Mood and Affect: Mood normal.         Behavior: Behavior normal.       Current Outpatient Medications   Medication Sig Dispense Refill    acetaminophen (Tylenol 8 Hour Arthritis Pain) 650 MG 8 hr tablet Take 1 tablet by mouth 4 (Four) Times a Day As Needed for Mild Pain . 120 tablet 5    albuterol (PROVENTIL)  (2.5 MG/3ML) 0.083% nebulizer solution Take 2.5 mg by nebulization 4 (Four) Times a Day As Needed for Wheezing. 360 mL 5    albuterol sulfate  (90 Base) MCG/ACT inhaler Inhale 2 puffs Every 4 (Four) Hours As Needed for Wheezing. 18 g 5    amoxicillin-clavulanate (AUGMENTIN) 875-125 MG per tablet Take 1 tablet by mouth 2 (Two) Times a Day. 20 tablet 0    diazePAM (Valium) 5 MG tablet Take 1 tablet by mouth Every 12 (Twelve) Hours As Needed for Anxiety (Dizziness). 60 tablet 2    diclofenac (VOLTAREN) 50 MG EC tablet Take 1 tablet by mouth 2 (Two) Times a Day As Needed (pain). 180 tablet 1    fluticasone (Flonase) 50 MCG/ACT nasal spray 2 sprays into the nostril(s) as directed by provider Daily. 48 g 5    meclizine (ANTIVERT) 25 MG tablet Take 1 tablet by mouth Every 8 (Eight) Hours As Needed for Dizziness. 270 tablet 3    nystatin (MYCOSTATIN) 100,000 unit/mL suspension SWISH AND SWALLOW 5 ML BY MOUTH FOUR TIMES DAILY 120 mL 1    O2 (OXYGEN) Inhale 3 L/min 1 (One) Time.      psyllium (METAMUCIL) 58.6 % packet Take 1 packet by mouth Daily. 162 each 3    rosuvastatin (Crestor) 5 MG tablet Take 1 tablet by mouth Every Other Day. 45 tablet 3    valsartan (DIOVAN) 40 MG tablet Take 0.5 tablets by mouth 2 (Two) Times a Day. 90 tablet 1     No current facility-administered medications for this visit.            Assessment & Plan     Problem List Items Addressed This Visit    None  Visit Diagnoses       Other non-recurrent acute nonsuppurative otitis media of both ears    -  Primary    Relevant Medications    amoxicillin-clavulanate (AUGMENTIN) 875-125 MG per tablet              ICD-10-CM ICD-9-CM   1. Other non-recurrent acute nonsuppurative otitis media of both ears  H65.193 381.00       Plan: She states she usually tolerates augmentin well. Augmentin ordered for otitis media. Keep follow up with Dr. Hillman.     @There is no height or weight on file to calculate BMI.     As a means of preventing the spread of the  COVID 19, this visit was done virtually via video platform through [employer] as consented by the patient. Patient was at home and provider at [facility] office during this visit. The patient consented to this telehealth visit and signed the video visit consent form. This visit included audio and video interaction. There were no technical issues that occurred during this visit.              Understands disease processes and need for medications.  Understands reasons for urgent and emergent care.  Patient (& family) verbalized agreement for treatment plan.   Emotional support and active listening provided.  Patient provided time to verbalize feelings.                  This document has been electronically signed by JOSSELINE Wharton   August 24, 2023 14:05 EDT

## 2023-08-26 DIAGNOSIS — E78.5 DYSLIPIDEMIA: Chronic | ICD-10-CM

## 2023-08-28 RX ORDER — ROSUVASTATIN CALCIUM 5 MG/1
5 TABLET, COATED ORAL
Qty: 45 TABLET | Refills: 3 | Status: SHIPPED | OUTPATIENT
Start: 2023-08-28

## 2023-09-08 DIAGNOSIS — H81.03 MENIERE'S DISEASE OF BOTH EARS: Chronic | ICD-10-CM

## 2023-09-08 DIAGNOSIS — F41.1 GENERALIZED ANXIETY DISORDER: Chronic | ICD-10-CM

## 2023-09-08 DIAGNOSIS — R42 DIZZINESS: ICD-10-CM

## 2023-09-09 RX ORDER — DIAZEPAM 5 MG/1
TABLET ORAL
Qty: 60 TABLET | Refills: 3 | Status: SHIPPED | OUTPATIENT
Start: 2023-09-09

## 2023-11-14 DIAGNOSIS — I10 ESSENTIAL HYPERTENSION: ICD-10-CM

## 2023-11-14 RX ORDER — VALSARTAN 40 MG/1
20 TABLET ORAL DAILY
Qty: 90 TABLET | Refills: 1 | Status: SHIPPED | OUTPATIENT
Start: 2023-11-14

## 2023-12-12 ENCOUNTER — TELEPHONE (OUTPATIENT)
Dept: FAMILY MEDICINE CLINIC | Facility: CLINIC | Age: 78
End: 2023-12-12
Payer: MEDICARE

## 2023-12-12 DIAGNOSIS — M15.9 GENERALIZED OSTEOARTHRITIS: Primary | Chronic | ICD-10-CM

## 2023-12-12 DIAGNOSIS — R06.09 DOE (DYSPNEA ON EXERTION): Chronic | ICD-10-CM

## 2023-12-12 DIAGNOSIS — J98.4 RESTRICTIVE LUNG DISEASE: Chronic | ICD-10-CM

## 2023-12-12 DIAGNOSIS — M41.86 OTHER FORM OF SCOLIOSIS OF LUMBAR SPINE: ICD-10-CM

## 2023-12-12 NOTE — TELEPHONE ENCOUNTER
Requesting wheelchair order for when getting out of the house (such as Doc appts). Pt is on oxygen and dyspnea on exertion which Is not a new issue

## 2023-12-25 DIAGNOSIS — J98.4 RESTRICTIVE LUNG DISEASE: Chronic | ICD-10-CM

## 2023-12-26 RX ORDER — ALBUTEROL SULFATE 90 UG/1
2 AEROSOL, METERED RESPIRATORY (INHALATION) EVERY 4 HOURS PRN
Qty: 18 G | Refills: 5 | Status: SHIPPED | OUTPATIENT
Start: 2023-12-26

## 2024-01-12 DIAGNOSIS — H81.03 MENIERE'S DISEASE OF BOTH EARS: Chronic | ICD-10-CM

## 2024-01-12 DIAGNOSIS — R42 DIZZINESS: ICD-10-CM

## 2024-01-12 DIAGNOSIS — F41.1 GENERALIZED ANXIETY DISORDER: Chronic | ICD-10-CM

## 2024-01-12 NOTE — TELEPHONE ENCOUNTER
Caller: Ava Andersen    Relationship: Emergency Contact    Best call back number: [phone]     Requested Prescriptions:   Requested Prescriptions     Pending Prescriptions Disp Refills    diazePAM (VALIUM) 5 MG tablet 60 tablet 3        Pharmacy where request should be sent: [id number] - [place], [place] - [address] AT [facility] [address] - [phone]  - [phone] FX     Last office visit with prescribing clinician: 8/8/2023   Last telemedicine visit with prescribing clinician: 8/24/2023   Next office visit with prescribing clinician: 2/8/2024      PATIENT ONLY HAS 2 DAYS REMAINING!       Does the patient have less than a 3 day supply:  [x] Yes  [] No    Would you like a call back once the refill request has been completed: [] Yes [x] No    If the office needs to give you a call back, can they leave a voicemail: [x] Yes [] No    Gayatri Sommers Rep   01/12/24 14:15 EST

## 2024-01-13 DIAGNOSIS — F41.1 GENERALIZED ANXIETY DISORDER: Chronic | ICD-10-CM

## 2024-01-13 DIAGNOSIS — H81.03 MENIERE'S DISEASE OF BOTH EARS: Chronic | ICD-10-CM

## 2024-01-13 DIAGNOSIS — R42 DIZZINESS: ICD-10-CM

## 2024-01-13 DIAGNOSIS — Z51.81 MEDICATION MONITORING ENCOUNTER: Primary | ICD-10-CM

## 2024-01-13 RX ORDER — DIAZEPAM 5 MG/1
5 TABLET ORAL EVERY 12 HOURS PRN
Qty: 60 TABLET | Refills: 0 | Status: SHIPPED | OUTPATIENT
Start: 2024-01-13

## 2024-01-13 NOTE — TELEPHONE ENCOUNTER
PDMP reviewed. UDS due at next appt. I will send in a 1 month supply at this time and we will update uds and refill next appt.

## 2024-01-15 RX ORDER — DIAZEPAM 5 MG/1
TABLET ORAL
Qty: 60 TABLET | Refills: 0 | Status: SHIPPED | OUTPATIENT
Start: 2024-01-15

## 2024-01-15 NOTE — TELEPHONE ENCOUNTER
PDMP reviewed.  Patient will need to update UDS at or before next appointment.  I will send in refill today as requested.

## 2024-02-08 ENCOUNTER — TELEMEDICINE (OUTPATIENT)
Dept: FAMILY MEDICINE CLINIC | Facility: CLINIC | Age: 79
End: 2024-02-08
Payer: MEDICARE

## 2024-02-08 VITALS
DIASTOLIC BLOOD PRESSURE: 60 MMHG | BODY MASS INDEX: 32.39 KG/M2 | TEMPERATURE: 97.1 F | OXYGEN SATURATION: 99 % | WEIGHT: 165 LBS | HEART RATE: 85 BPM | SYSTOLIC BLOOD PRESSURE: 102 MMHG | HEIGHT: 60 IN

## 2024-02-08 DIAGNOSIS — R09.02 HYPOXIA: ICD-10-CM

## 2024-02-08 DIAGNOSIS — F41.1 GENERALIZED ANXIETY DISORDER: Chronic | ICD-10-CM

## 2024-02-08 DIAGNOSIS — J98.4 RESTRICTIVE LUNG DISEASE: Chronic | ICD-10-CM

## 2024-02-08 DIAGNOSIS — Z00.00 ENCOUNTER FOR WELLNESS EXAMINATION: Primary | ICD-10-CM

## 2024-02-08 DIAGNOSIS — Z51.81 MEDICATION MONITORING ENCOUNTER: ICD-10-CM

## 2024-02-08 DIAGNOSIS — Z00.00 HEALTH CARE MAINTENANCE: ICD-10-CM

## 2024-02-08 DIAGNOSIS — E78.5 DYSLIPIDEMIA: Chronic | ICD-10-CM

## 2024-02-08 DIAGNOSIS — I10 ESSENTIAL HYPERTENSION: Chronic | ICD-10-CM

## 2024-02-08 DIAGNOSIS — H81.03 MENIERE'S DISEASE OF BOTH EARS: Chronic | ICD-10-CM

## 2024-02-08 DIAGNOSIS — R42 DIZZINESS: ICD-10-CM

## 2024-02-08 LAB
AMPHET+METHAMPHET UR QL: NEGATIVE
AMPHETAMINES UR QL: NEGATIVE
BARBITURATES UR QL SCN: NEGATIVE
BENZODIAZ UR QL SCN: POSITIVE
BUPRENORPHINE SERPL-MCNC: NEGATIVE NG/ML
CANNABINOIDS SERPL QL: NEGATIVE
COCAINE UR QL: NEGATIVE
FENTANYL UR-MCNC: NEGATIVE NG/ML
METHADONE UR QL SCN: NEGATIVE
OPIATES UR QL: NEGATIVE
OXYCODONE UR QL SCN: NEGATIVE
PCP UR QL SCN: NEGATIVE
TRICYCLICS UR QL SCN: NEGATIVE

## 2024-02-08 PROCEDURE — 84439 ASSAY OF FREE THYROXINE: CPT | Performed by: INTERNAL MEDICINE

## 2024-02-08 PROCEDURE — 83735 ASSAY OF MAGNESIUM: CPT | Performed by: INTERNAL MEDICINE

## 2024-02-08 PROCEDURE — 82607 VITAMIN B-12: CPT | Performed by: INTERNAL MEDICINE

## 2024-02-08 PROCEDURE — 82306 VITAMIN D 25 HYDROXY: CPT | Performed by: INTERNAL MEDICINE

## 2024-02-08 PROCEDURE — 82550 ASSAY OF CK (CPK): CPT | Performed by: INTERNAL MEDICINE

## 2024-02-08 PROCEDURE — 85027 COMPLETE CBC AUTOMATED: CPT | Performed by: INTERNAL MEDICINE

## 2024-02-08 PROCEDURE — 80307 DRUG TEST PRSMV CHEM ANLYZR: CPT | Performed by: INTERNAL MEDICINE

## 2024-02-08 PROCEDURE — 80061 LIPID PANEL: CPT | Performed by: INTERNAL MEDICINE

## 2024-02-08 PROCEDURE — 80053 COMPREHEN METABOLIC PANEL: CPT | Performed by: INTERNAL MEDICINE

## 2024-02-08 PROCEDURE — 84443 ASSAY THYROID STIM HORMONE: CPT | Performed by: INTERNAL MEDICINE

## 2024-02-08 RX ORDER — DIAZEPAM 5 MG/1
5 TABLET ORAL EVERY 12 HOURS PRN
Qty: 60 TABLET | Refills: 5 | Status: SHIPPED | OUTPATIENT
Start: 2024-02-08

## 2024-02-08 NOTE — PROGRESS NOTES
Venipuncture Blood Specimen Collection  Venipuncture performed in RIGHT ARM by Mame Dempsey RN with good hemostasis. Patient tolerated the procedure well without complications.   02/08/24   Mame Dempsey RN

## 2024-02-08 NOTE — PROGRESS NOTES
The ABCs of the Annual Wellness Visit  Subsequent Medicare Wellness Visit    Subjective    Edgar Cotto is a 79 y.o. female who presents for a Subsequent Medicare Wellness Visit.    The following portions of the patient's history were reviewed and   updated as appropriate: allergies, current medications, past family history, past medical history, past social history, past surgical history, and problem list.    Compared to one year ago, the patient feels her physical   health is worse.    Compared to one year ago, the patient feels her mental   health is worse due to increased forgetfulness.     Recent Hospitalizations:  She was not admitted to the hospital during the last year.       Current Medical Providers:  Patient Care Team:  Abbie Hillman DO as PCP - General (Family Medicine)  Zina Diaz PA as Physician Assistant (Gynecology)  Elif Terry OD (Optometry)  Momo Vega MD as Consulting Physician (Otolaryngology)    Outpatient Medications Prior to Visit   Medication Sig Dispense Refill    acetaminophen (Tylenol 8 Hour Arthritis Pain) 650 MG 8 hr tablet Take 1 tablet by mouth 4 (Four) Times a Day As Needed for Mild Pain . 120 tablet 5    albuterol (PROVENTIL) (2.5 MG/3ML) 0.083% nebulizer solution Take 2.5 mg by nebulization 4 (Four) Times a Day As Needed for Wheezing. 360 mL 5    albuterol sulfate  (90 Base) MCG/ACT inhaler INHALE 2 PUFFS BY MOUTH EVERY 4 HOURS AS NEEDED FOR WHEEZING 18 g 5    diclofenac (VOLTAREN) 50 MG EC tablet Take 1 tablet by mouth 2 (Two) Times a Day As Needed (pain). 180 tablet 1    fluticasone (Flonase) 50 MCG/ACT nasal spray 2 sprays into the nostril(s) as directed by provider Daily. 48 g 5    meclizine (ANTIVERT) 25 MG tablet Take 1 tablet by mouth Every 8 (Eight) Hours As Needed for Dizziness. 270 tablet 3    nystatin (MYCOSTATIN) 100,000 unit/mL suspension SWISH AND SWALLOW 5 ML BY MOUTH FOUR TIMES DAILY 120 mL 1    O2 (OXYGEN)  Inhale 3 L/min 1 (One) Time.      psyllium (METAMUCIL) 58.6 % packet Take 1 packet by mouth Daily. 162 each 3    rosuvastatin (CRESTOR) 5 MG tablet TAKE 1 TABLET BY MOUTH EVERY OTHER DAY 45 tablet 3    valsartan (DIOVAN) 40 MG tablet TAKE 1/2 TABLET BY MOUTH TWICE DAILY 90 tablet 1    diazePAM (VALIUM) 5 MG tablet Take 1 tablet by mouth Every 12 (Twelve) Hours As Needed for Anxiety. 60 tablet 0    diazePAM (VALIUM) 5 MG tablet TAKE 1 TABLET BY MOUTH EVERY 12 HOURS AS NEEDED FOR ANXIETY OR DIZZINESS 60 tablet 0    amoxicillin-clavulanate (AUGMENTIN) 875-125 MG per tablet Take 1 tablet by mouth 2 (Two) Times a Day. 20 tablet 0     No facility-administered medications prior to visit.       No opioid medication identified on active medication list. I have reviewed chart for other potential  high risk medication/s and harmful drug interactions in the elderly.        Aspirin is not on active medication list.  Aspirin use is not indicated based on review of current medical condition/s. Risk of harm outweighs potential benefits.      Patient Active Problem List   Diagnosis    Atopic rhinitis    Dizziness    Generalized anxiety disorder    Generalized osteoarthritis    Essential hypertension    Auditory vertigo    Mitral and aortic valve disease    Stomatitis    Seasonal allergic rhinitis    Varicose veins of both lower extremities    Meniere's disease of both ears    S/P hemorrhoidectomy    Abnormal EKG    Moderate obesity    Dyslipidemia    Thyroid cyst    Age-related osteoporosis without current pathological fracture    Cervical lymphadenopathy    CABAN (dyspnea on exertion)    Hypoxia    Restrictive lung disease    Scoliosis    Hip pain    Lumbar degenerative disc disease    BMI 32.0-32.9,adult     Advance Care Planning   Advance Care Planning     Advance Directive is not on file.  ACP discussion was held with the patient during this visit. Patient does not have an advance directive, declines further assistance.    "  Objective    Vitals:    24 1355   BP: 102/60   BP Location: Right arm   Patient Position: Sitting   Cuff Size: Adult   Pulse: 85   Temp: 97.1 °F (36.2 °C)   TempSrc: Temporal   SpO2: 99%   Weight: 74.8 kg (165 lb)   Height: 152.4 cm (60\")   PainSc:   6     Estimated body mass index is 32.22 kg/m² as calculated from the following:    Height as of this encounter: 152.4 cm (60\").    Weight as of this encounter: 74.8 kg (165 lb).           Does the patient have evidence of cognitive impairment? 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     Alcohol Consumption:  Social History     Substance and Sexual Activity   Alcohol Use No     Fall Risk Screen:    RANDY Fall Risk Assessment was completed, and patient is at LOW risk for falls.Assessment completed on:2024    Depression Screenin/8/2024     2:03 PM   PHQ-2/PHQ-9 Depression Screening   Little Interest or Pleasure in Doing Things 0-->not at all   Feeling Down, Depressed or Hopeless 0-->not at all   PHQ-9: Brief Depression Severity Measure Score 0       Health Habits and Functional and Cognitive Screenin/8/2024     1:57 PM   Functional & Cognitive Status   Do you have difficulty preparing food and eating? No   Do you have difficulty bathing yourself, getting dressed or grooming yourself? Yes   Do you have difficulty using the toilet? No   Do you have difficulty moving around from place to place? No   Do you have trouble with steps or getting out of a bed or a chair? Yes   Current Diet Well Balanced Diet   Dental Exam Not up to date   Eye Exam Not up to date   Exercise (times per week) 7 times per week   Current Exercises Include Other        Exercise Comment patient steteches her arms and legs every night to keep muscle strength up   Do you need help using the phone?  No   Are " you deaf or do you have serious difficulty hearing?  Yes   Do you need help to go to places out of walking distance? Yes   Do you need help shopping? Yes   Do you need help preparing meals?  Yes   Do you need help with housework?  Yes   Do you need help with laundry? Yes   Do you need help taking your medications? Yes   Do you need help managing money? No   Do you ever drive or ride in a car without wearing a seat belt? No       Age-appropriate Screening Schedule:  Refer to the list below for future screening recommendations based on patient's age, sex and/or medical conditions. Orders for these recommended tests are listed in the plan section. The patient has been provided with a written plan.    Health Maintenance   Topic Date Due    ANNUAL WELLNESS VISIT  01/24/2024    DXA SCAN  02/08/2024 (Originally 11/8/2023)    INFLUENZA VACCINE  03/31/2024 (Originally 8/1/2023)    COVID-19 Vaccine (1) 02/06/2025 (Originally 1945)    RSV Vaccine - Adults (1 - 1-dose 60+ series) 02/08/2025 (Originally 1/28/2003)    LIPID PANEL  04/24/2024    BMI FOLLOWUP  04/24/2024    COLORECTAL CANCER SCREENING  05/08/2025    Pneumococcal Vaccine 65+  Discontinued    TDAP/TD VACCINES  Discontinued    ZOSTER VACCINE  Discontinued                  CMS Preventative Services Quick Reference  Risk Factors Identified During Encounter  Immunizations Discussed/Encouraged: Influenza, COVID19, and RSV (Respiratory Syncytial Virus).  Patient declines vaccines.    Age appropriate screenings were discussed with patient today. We will update metabolic screenings today. Patient declines mammogram and dexa.     The above risks/problems have been discussed with the patient.  Pertinent information has been shared with the patient in the After Visit Summary.  An After Visit Summary and PPPS were made available to the patient.    Follow Up:   Next Medicare Wellness visit to be scheduled in 1 year.       Additional E&M Note during same encounter  "follows:  Patient has multiple medical problems which are significant and separately identifiable that require additional work above and beyond the Medicare Wellness Visit.      Chief Complaint  Medicare Wellness-subsequent, Shortness of Breath, and Hypertension    Subjective        HPI  Edgar Cotto is also being seen today for follow-up on restrictive lung disease with chronic hypoxia, on continuous O2 2 to 3 L nasal cannula, hyperlipidemia, hypertension, anxiety, Ménière's disease, and obesity.  Patient reports doing pretty well since last visit.  She has noticed a slow decline in her breathing.  She reports getting more short of air with exertion than she used to.  She believes this is improved with the oxygen therapy and that she is doing well with current regimen.  She reports tolerating her meds well.  Her blood pressure is well-controlled.  She reports anxiety is also well-controlled.  She does still have occasional dizziness.  She believes her allergies have been worse recently.    Review of Systems   Respiratory:  Positive for shortness of breath.         CABAN slowly worsening   Cardiovascular:  Negative for chest pain.   Gastrointestinal:  Negative for abdominal pain.   Musculoskeletal:  Positive for myalgias.   Psychiatric/Behavioral:  The patient is nervous/anxious.         Valium as needed for anxiety and menieres disease       Objective   Vital Signs:  /60 (BP Location: Right arm, Patient Position: Sitting, Cuff Size: Adult)   Pulse 85   Temp 97.1 °F (36.2 °C) (Temporal)   Ht 152.4 cm (60\")   Wt 74.8 kg (165 lb)   SpO2 99%   BMI 32.22 kg/m²     Physical Exam  Vitals reviewed.   Constitutional:       General: She is not in acute distress.  HENT:      Head: Normocephalic and atraumatic.      Right Ear: Tympanic membrane normal.      Left Ear: Tympanic membrane normal.   Eyes:      General: No scleral icterus.     Extraocular Movements: Extraocular movements intact.      " Conjunctiva/sclera: Conjunctivae normal.      Pupils: Pupils are equal, round, and reactive to light.   Cardiovascular:      Rate and Rhythm: Normal rate and regular rhythm.   Pulmonary:      Effort: Pulmonary effort is normal. No respiratory distress.      Breath sounds: Normal breath sounds. No wheezing or rhonchi.   Musculoskeletal:         General: No swelling.      Cervical back: Neck supple. No tenderness.      Comments: Patient presents in a wheelchair today.   Lymphadenopathy:      Cervical: No cervical adenopathy.   Skin:     General: Skin is warm and dry.      Coloration: Skin is not jaundiced.   Neurological:      Mental Status: She is alert.   Psychiatric:         Mood and Affect: Mood normal.         Behavior: Behavior normal.         Thought Content: Thought content normal.         Judgment: Judgment normal.                 Assessment and Plan   Diagnoses and all orders for this visit:    1. Encounter for wellness examination (Primary)    2. Health care maintenance    3. Medication monitoring encounter  -     Urine Drug Screen - Urine, Clean Catch    4. Dyslipidemia  -     Comprehensive Metabolic Panel  -     Lipid Panel  -     CK    5. Essential hypertension  -     CBC (No Diff)  -     Comprehensive Metabolic Panel  -     Lipid Panel  -     T4, Free  -     TSH  -     Vitamin B12  -     Vitamin D,25-Hydroxy  -     CK  -     Magnesium    6. Generalized anxiety disorder  -     diazePAM (VALIUM) 5 MG tablet; Take 1 tablet by mouth Every 12 (Twelve) Hours As Needed for Anxiety.  Dispense: 60 tablet; Refill: 5    7. Meniere's disease of both ears  -     diazePAM (VALIUM) 5 MG tablet; Take 1 tablet by mouth Every 12 (Twelve) Hours As Needed for Anxiety.  Dispense: 60 tablet; Refill: 5    8. Dizziness  -     diazePAM (VALIUM) 5 MG tablet; Take 1 tablet by mouth Every 12 (Twelve) Hours As Needed for Anxiety.  Dispense: 60 tablet; Refill: 5    9. BMI 32.0-32.9,adult  -     Vitamin D,25-Hydroxy    10.  Restrictive lung disease    11. Hypoxia      I reviewed PDMP today.  We will update UDS and labs today as above.  Updated medicine refill today as requested.  I reviewed last pulmonology note encourage patient to follow-up with her specialist as planned.       Follow Up   Return in about 6 months (around 8/8/2024), or if symptoms worsen or fail to improve, for Recheck.  Patient was given instructions and counseling regarding her condition or for health maintenance advice. Please see specific information pulled into the AVS if appropriate.       This document has been electronically signed by Abbie Hillman DO  February 8, 2024 15:08 EST

## 2024-02-09 LAB
25(OH)D3 SERPL-MCNC: 28.2 NG/ML (ref 30–100)
ALBUMIN SERPL-MCNC: 3.8 G/DL (ref 3.5–5.2)
ALBUMIN/GLOB SERPL: 1.2 G/DL
ALP SERPL-CCNC: 69 U/L (ref 39–117)
ALT SERPL W P-5'-P-CCNC: 13 U/L (ref 1–33)
ANION GAP SERPL CALCULATED.3IONS-SCNC: 10.2 MMOL/L (ref 5–15)
AST SERPL-CCNC: 20 U/L (ref 1–32)
BILIRUB SERPL-MCNC: 0.3 MG/DL (ref 0–1.2)
BUN SERPL-MCNC: 16 MG/DL (ref 8–23)
BUN/CREAT SERPL: 19 (ref 7–25)
CALCIUM SPEC-SCNC: 9.3 MG/DL (ref 8.6–10.5)
CHLORIDE SERPL-SCNC: 101 MMOL/L (ref 98–107)
CHOLEST SERPL-MCNC: 164 MG/DL (ref 0–200)
CK SERPL-CCNC: 37 U/L (ref 20–180)
CO2 SERPL-SCNC: 28.8 MMOL/L (ref 22–29)
CREAT SERPL-MCNC: 0.84 MG/DL (ref 0.57–1)
DEPRECATED RDW RBC AUTO: 40.2 FL (ref 37–54)
EGFRCR SERPLBLD CKD-EPI 2021: 69.9 ML/MIN/1.73
ERYTHROCYTE [DISTWIDTH] IN BLOOD BY AUTOMATED COUNT: 11.8 % (ref 12.3–15.4)
GLOBULIN UR ELPH-MCNC: 3.3 GM/DL
GLUCOSE SERPL-MCNC: 99 MG/DL (ref 65–99)
HCT VFR BLD AUTO: 38.3 % (ref 34–46.6)
HDLC SERPL-MCNC: 53 MG/DL (ref 40–60)
HGB BLD-MCNC: 12.4 G/DL (ref 12–15.9)
LDLC SERPL CALC-MCNC: 75 MG/DL (ref 0–100)
LDLC/HDLC SERPL: 1.28 {RATIO}
MAGNESIUM SERPL-MCNC: 2.2 MG/DL (ref 1.6–2.4)
MCH RBC QN AUTO: 30.7 PG (ref 26.6–33)
MCHC RBC AUTO-ENTMCNC: 32.4 G/DL (ref 31.5–35.7)
MCV RBC AUTO: 94.8 FL (ref 79–97)
PLATELET # BLD AUTO: 191 10*3/MM3 (ref 140–450)
PMV BLD AUTO: 11.4 FL (ref 6–12)
POTASSIUM SERPL-SCNC: 4.4 MMOL/L (ref 3.5–5.2)
PROT SERPL-MCNC: 7.1 G/DL (ref 6–8.5)
RBC # BLD AUTO: 4.04 10*6/MM3 (ref 3.77–5.28)
SODIUM SERPL-SCNC: 140 MMOL/L (ref 136–145)
T4 FREE SERPL-MCNC: 1.13 NG/DL (ref 0.93–1.7)
TRIGL SERPL-MCNC: 216 MG/DL (ref 0–150)
TSH SERPL DL<=0.05 MIU/L-ACNC: 0.81 UIU/ML (ref 0.27–4.2)
VIT B12 BLD-MCNC: 562 PG/ML (ref 211–946)
VLDLC SERPL-MCNC: 36 MG/DL (ref 5–40)
WBC NRBC COR # BLD AUTO: 7.7 10*3/MM3 (ref 3.4–10.8)

## 2024-02-14 ENCOUNTER — TELEMEDICINE (OUTPATIENT)
Dept: PULMONOLOGY | Facility: CLINIC | Age: 79
End: 2024-02-14
Payer: MEDICARE

## 2024-02-14 DIAGNOSIS — J98.4 RESTRICTIVE LUNG DISEASE: Chronic | ICD-10-CM

## 2024-02-14 DIAGNOSIS — J84.9 ILD (INTERSTITIAL LUNG DISEASE): Primary | ICD-10-CM

## 2024-02-14 DIAGNOSIS — E66.9 OBESITY (BMI 30-39.9): ICD-10-CM

## 2024-02-14 DIAGNOSIS — R09.02 HYPOXIA: ICD-10-CM

## 2024-02-14 PROCEDURE — 1160F RVW MEDS BY RX/DR IN RCRD: CPT | Performed by: NURSE PRACTITIONER

## 2024-02-14 PROCEDURE — 1159F MED LIST DOCD IN RCRD: CPT | Performed by: NURSE PRACTITIONER

## 2024-02-14 PROCEDURE — 99214 OFFICE O/P EST MOD 30 MIN: CPT | Performed by: NURSE PRACTITIONER

## 2024-02-15 RX ORDER — ALBUTEROL SULFATE 2.5 MG/3ML
2.5 SOLUTION RESPIRATORY (INHALATION) 4 TIMES DAILY PRN
Qty: 360 ML | Refills: 5 | Status: SHIPPED | OUTPATIENT
Start: 2024-02-15

## 2024-02-15 RX ORDER — ALBUTEROL SULFATE 90 UG/1
2 AEROSOL, METERED RESPIRATORY (INHALATION) EVERY 4 HOURS PRN
Qty: 18 G | Refills: 5 | Status: SHIPPED | OUTPATIENT
Start: 2024-02-15

## 2024-02-21 DIAGNOSIS — R09.02 HYPOXIA: Primary | ICD-10-CM

## 2024-03-08 DIAGNOSIS — M25.559 HIP PAIN: ICD-10-CM

## 2024-03-08 DIAGNOSIS — M41.86 OTHER FORM OF SCOLIOSIS OF LUMBAR SPINE: ICD-10-CM

## 2024-03-08 DIAGNOSIS — M51.36 LUMBAR DEGENERATIVE DISC DISEASE: ICD-10-CM

## 2024-03-16 DIAGNOSIS — J98.4 RESTRICTIVE LUNG DISEASE: Chronic | ICD-10-CM

## 2024-03-18 RX ORDER — ALBUTEROL SULFATE 90 UG/1
2 AEROSOL, METERED RESPIRATORY (INHALATION) EVERY 4 HOURS PRN
Qty: 18 G | Refills: 5 | Status: SHIPPED | OUTPATIENT
Start: 2024-03-18

## 2024-04-09 ENCOUNTER — DOCUMENTATION (OUTPATIENT)
Dept: FAMILY MEDICINE CLINIC | Facility: CLINIC | Age: 79
End: 2024-04-09
Payer: MEDICARE

## 2024-04-09 ENCOUNTER — TELEPHONE (OUTPATIENT)
Dept: FAMILY MEDICINE CLINIC | Facility: CLINIC | Age: 79
End: 2024-04-09
Payer: MEDICARE
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
## 2024-04-09 DIAGNOSIS — R52 PAIN: Primary | ICD-10-CM

## 2024-04-09 NOTE — TELEPHONE ENCOUNTER
States she fell the other night, Able to walk but having Right hip pain. They would like a X-ray sent over to the outpatient diagnostic

## 2024-04-10 ENCOUNTER — HOSPITAL ENCOUNTER (OUTPATIENT)
Dept: GENERAL RADIOLOGY | Facility: HOSPITAL | Age: 79
Discharge: HOME | End: 2024-04-10
Admitting: INTERNAL MEDICINE
Payer: MEDICARE

## 2024-04-10 DIAGNOSIS — R52 PAIN: ICD-10-CM
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
## 2024-04-10 PROCEDURE — 73502 X-RAY EXAM HIP UNI 2-3 VIEWS: CPT

## 2024-04-15 ENCOUNTER — TELEPHONE (OUTPATIENT)
Dept: FAMILY MEDICINE CLINIC | Facility: CLINIC | Age: 79
End: 2024-04-15
Payer: MEDICARE

## 2024-04-15 NOTE — TELEPHONE ENCOUNTER
Called daughter and she states that she is taking those but no help.  She says she cannot get her into the office and wonders if the visit could be done over a video visit?  I told her depending on the type of medication prescribed we may not be able to do so but I would ask you.

## 2024-04-15 NOTE — TELEPHONE ENCOUNTER
Patient should be taken Tylenol arthritis and Voltaren.  If that is not controlling her pain, I recommend she come in for evaluation.

## 2024-04-15 NOTE — TELEPHONE ENCOUNTER
PATIENT DAUGHTER CALLED AND STATES PATIENT IS HAVING A LOT OF PAIN IN HER LOW BACK AND LEG. SHE USES HER WALKER AND HAS TROUBLE GETTING AROUND. THEY ARE REQUESTING A PRESCRIPTION FOR SOMETHING FOR PAIN. PATIENT USES [facility].

## 2024-04-16 ENCOUNTER — OFFICE VISIT (OUTPATIENT)
Dept: FAMILY MEDICINE CLINIC | Facility: CLINIC | Age: 79
End: 2024-04-16
Payer: MEDICARE

## 2024-04-16 VITALS
BODY MASS INDEX: 32.22 KG/M2 | HEIGHT: 60 IN | DIASTOLIC BLOOD PRESSURE: 98 MMHG | SYSTOLIC BLOOD PRESSURE: 138 MMHG | HEART RATE: 94 BPM | TEMPERATURE: 97.5 F | OXYGEN SATURATION: 99 %

## 2024-04-16 DIAGNOSIS — M15.9 GENERALIZED OSTEOARTHRITIS: Chronic | ICD-10-CM

## 2024-04-16 DIAGNOSIS — M51.36 LUMBAR DEGENERATIVE DISC DISEASE: Primary | ICD-10-CM

## 2024-04-16 DIAGNOSIS — R53.81 PHYSICAL DECONDITIONING: ICD-10-CM

## 2024-04-16 DIAGNOSIS — Z51.81 MEDICATION MONITORING ENCOUNTER: ICD-10-CM

## 2024-04-16 PROCEDURE — 1160F RVW MEDS BY RX/DR IN RCRD: CPT | Performed by: INTERNAL MEDICINE

## 2024-04-16 PROCEDURE — 3080F DIAST BP >= 90 MM HG: CPT | Performed by: INTERNAL MEDICINE

## 2024-04-16 PROCEDURE — 3075F SYST BP GE 130 - 139MM HG: CPT | Performed by: INTERNAL MEDICINE

## 2024-04-16 PROCEDURE — 99214 OFFICE O/P EST MOD 30 MIN: CPT | Performed by: INTERNAL MEDICINE

## 2024-04-16 PROCEDURE — 80307 DRUG TEST PRSMV CHEM ANLYZR: CPT | Performed by: INTERNAL MEDICINE

## 2024-04-16 PROCEDURE — 96372 THER/PROPH/DIAG INJ SC/IM: CPT | Performed by: INTERNAL MEDICINE

## 2024-04-16 PROCEDURE — 1159F MED LIST DOCD IN RCRD: CPT | Performed by: INTERNAL MEDICINE

## 2024-04-16 RX ORDER — NALOXONE HYDROCHLORIDE 4 MG/.1ML
SPRAY NASAL
Qty: 2 EACH | Refills: 5 | Status: SHIPPED | OUTPATIENT
Start: 2024-04-16

## 2024-04-16 RX ORDER — KETOROLAC TROMETHAMINE 30 MG/ML
30 INJECTION, SOLUTION INTRAMUSCULAR; INTRAVENOUS ONCE
Status: COMPLETED | OUTPATIENT
Start: 2024-04-16 | End: 2024-04-16

## 2024-04-16 RX ORDER — TRAMADOL HYDROCHLORIDE 50 MG/1
50 TABLET ORAL EVERY 8 HOURS PRN
Qty: 90 TABLET | Refills: 0 | Status: SHIPPED | OUTPATIENT
Start: 2024-04-16

## 2024-04-16 RX ADMIN — KETOROLAC TROMETHAMINE 30 MG: 30 INJECTION, SOLUTION INTRAMUSCULAR; INTRAVENOUS at 11:07

## 2024-04-16 NOTE — PROGRESS NOTES
Patient Name: Edgar Cotto Today's Date: 2024   Patient MRN / CSN: [id number] / [id number] Date of Encounter: 2024   Patient Age / : 79 y.o. / 1945 Encounter Provider: Abbie Hillman DO   Referring Physician: No ref. provider found          Edgar is a 79 y.o. female who is being seen today for Pain (Patient here with complaints of severe pain in legs and back. )      Pain  This is a chronic problem. The current episode started more than 1 year ago. The problem has been rapidly worsening. Associated symptoms include arthralgias and fatigue. Associated symptoms comments: Pain in lumbar region with pain into right leg down to her right foot. She rates pain 12/10. Hip xray last week showed no acute findings. Pain is limiting ambulation and she is mostly wheelchair dependent at this time. She has tried ice, acetaminophen, NSAIDs, rest and position changes for the symptoms. The treatment provided no relief.       Allergies include:Z-mary [azithromycin], Amoxicillin, Cortisone, Fish-derived products, Iodinated contrast media, Iodine, Lidocaine, Morphine, Niacin, Nystatin, Other, Sulfa antibiotics, and Verapamil  Current Outpatient Medications   Medication Sig Dispense Refill    acetaminophen (Tylenol 8 Hour Arthritis Pain) 650 MG 8 hr tablet Take 1 tablet by mouth 4 (Four) Times a Day As Needed for Mild Pain . 120 tablet 5    albuterol (PROVENTIL) (2.5 MG/3ML) 0.083% nebulizer solution Take 2.5 mg by nebulization 4 (Four) Times a Day As Needed for Wheezing. 360 mL 5    albuterol sulfate  (90 Base) MCG/ACT inhaler INHALE 2 PUFFS BY MOUTH EVERY 4 HOURS AS NEEDED FOR WHEEZING 18 g 5    diazePAM (VALIUM) 5 MG tablet Take 1 tablet by mouth Every 12 (Twelve) Hours As Needed for Anxiety. 60 tablet 5    diclofenac (VOLTAREN) 50 MG EC tablet TAKE 1 TABLET BY MOUTH TWICE DAILY AS NEEDED FOR PAIN 180 tablet 1    fluticasone (Flonase) 50 MCG/ACT nasal spray 2 sprays into the nostril(s) as directed  by provider Daily. 48 g 5    meclizine (ANTIVERT) 25 MG tablet Take 1 tablet by mouth Every 8 (Eight) Hours As Needed for Dizziness. 270 tablet 3    nystatin (MYCOSTATIN) 100,000 unit/mL suspension SWISH AND SWALLOW 5 ML BY MOUTH FOUR TIMES DAILY 120 mL 1    O2 (OXYGEN) Inhale 3 L/min 1 (One) Time.      psyllium (METAMUCIL) 58.6 % packet Take 1 packet by mouth Daily. 162 each 3    rosuvastatin (CRESTOR) 5 MG tablet TAKE 1 TABLET BY MOUTH EVERY OTHER DAY 45 tablet 3    naloxone (NARCAN) 4 MG/0.1ML nasal spray Call 911. Don't prime. [place] in 1 nostril for overdose. Repeat in 2-3 minutes in other nostril if no or minimal breathing/responsiveness. 2 each 5    traMADol (ULTRAM) 50 MG tablet Take 1 tablet by mouth Every 8 (Eight) Hours As Needed for Moderate Pain or Severe Pain. 90 tablet 0    valsartan (DIOVAN) 40 MG tablet TAKE 1/2 TABLET BY MOUTH TWICE DAILY 90 tablet 1     No current facility-administered medications for this visit.     Past Medical History:   Diagnosis Date    Abdominal distension     Allergic rhinitis     Anxiety     Arthritis     Closed nondisplaced fracture of surgical neck of left humerus with routine healing 3/9/2021    Cough     Dry eyes     Dyslipidemia     Dysuria     Hyperlipidemia     Hypertension     Meniere's disease     Mitral and aortic valve disease     Oral candidiasis     Osteoarthritis     Stomatitis     URI, acute     Vaginal candidiasis     Vocal cord dysfunction      Family History   Problem Relation Age of Onset    Diabetes Mother     Cancer Father     Liver disease Sister     Diabetes Sister     Liver disease Brother     Diabetes Brother     Diabetes Daughter     Cancer Other     Liver disease Other     Breast cancer Neg Hx      Past Surgical History:   Procedure Laterality Date    CHOLECYSTECTOMY      COLONOSCOPY      DILATATION AND CURETTAGE      HEMORRHOIDECTOMY      HERNIA REPAIR      SHOULDER SURGERY      SKIN CANCER EXCISION      on Nose    TONSILLECTOMY       Social  "History     Substance and Sexual Activity   Alcohol Use No     Social History     Tobacco Use   Smoking Status Never    Passive exposure: Never   Smokeless Tobacco Never   Tobacco Comments    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   Musculoskeletal:  Positive for arthralgias and back pain.   Psychiatric/Behavioral:  The patient is nervous/anxious.         Depression Assessment Review:  PHQ-9 Total Score:    Vital Signs & Measurements Taken This Encounter  /98 (BP Location: Left arm, Patient Position: Sitting, Cuff Size: Adult)   Pulse 94   Temp 97.5 °F (36.4 °C) (Temporal)   Ht 152.4 cm (60\")   SpO2 99%   BMI 32.22 kg/m²    SpO2 Percentage    04/16/24 0957   SpO2: 99%         Physical Exam  Vitals reviewed.   Constitutional:       General: She is not in acute distress.     Comments: Appears uncomfortable due to pain   HENT:      Head: Normocephalic and atraumatic.   Eyes:      General: No scleral icterus.     Extraocular Movements: Extraocular movements intact.      Conjunctiva/sclera: Conjunctivae normal.      Pupils: Pupils are equal, round, and reactive to light.   Cardiovascular:      Rate and Rhythm: Normal rate and regular rhythm.   Pulmonary:      Effort: Pulmonary effort is normal. No respiratory distress.      Breath sounds: Normal breath sounds.   Musculoskeletal:         General: No swelling.      Comments: 5/5 muscle strength demonstrated in the lower extremities.  Patient presents in a wheelchair today.   Skin:     General: Skin is warm and dry.      Coloration: Skin is not jaundiced.   Neurological:      Mental Status: She is alert.   Psychiatric:         Mood and Affect: Mood normal.         Behavior: Behavior normal.         Thought Content: Thought content normal.         Judgment: Judgment normal.           Assessment & Plan  Patient Active " Problem List   Diagnosis    Atopic rhinitis    Dizziness    Generalized anxiety disorder    Generalized osteoarthritis    Essential hypertension    Auditory vertigo    Mitral and aortic valve disease    Stomatitis    Seasonal allergic rhinitis    Varicose veins of both lower extremities    Meniere's disease of both ears    S/P hemorrhoidectomy    Abnormal EKG    Moderate obesity    Dyslipidemia    Thyroid cyst    Age-related osteoporosis without current pathological fracture    Cervical lymphadenopathy    CABAN (dyspnea on exertion)    Hypoxia    Restrictive lung disease    Scoliosis    Hip pain    Lumbar degenerative disc disease    BMI 32.0-32.9,adult    Physical deconditioning       ICD-10-CM ICD-9-CM   1. Lumbar degenerative disc disease  M51.36 722.52   2. Generalized osteoarthritis  M15.9 715.00   3. Physical deconditioning  R53.81 799.3   4. Medication monitoring encounter  Z51.81 V58.83     Orders Placed This Encounter   Procedures    Urine Drug Screen - Urine, Clean Catch     Order Specific Question:   Release to patient     Answer:   Routine Release [[id number]]    Ambulatory Referral to Home Health     Referral Priority:   Routine     Referral Type:   Home Health     Referral Reason:   Specialty Services Required     Requested Specialty:   Home Health Services     Number of Visits Requested:   999       Meds Ordered During Visit:  New Medications Ordered This Visit   Medications    ketorolac (TORADOL) injection 30 mg    traMADol (ULTRAM) 50 MG tablet     Sig: Take 1 tablet by mouth Every 8 (Eight) Hours As Needed for Moderate Pain or Severe Pain.     Dispense:  90 tablet     Refill:  0    naloxone (NARCAN) 4 MG/0.1ML nasal spray     Sig: Call 911. Don't prime. [place] in 1 nostril for overdose. Repeat in 2-3 minutes in other nostril if no or minimal breathing/responsiveness.     Dispense:  2 each     Refill:  5     I reviewed recent xray findings and PDMP report today. UDS is recent but we will update today  given the new prescription. I will send in tramadol for patient to use as needed in addition to diclofenac and tylenol. I have cautioned patient and daughter of the potential for increased overdose risk and/or drowsiness with the combination of tramadol and valium.  I recommended they take both of these medicines only as needed.  We will start with low dose tramadol and titrate as needed for pain control. I will send in narcan to use as needed. I will also request home health with PT and OT.  I believe blood pressure is elevated at this time due to pain.  We will treat the pain and monitor the blood pressure.    Return in about 1 month (around 5/16/2024), or if symptoms worsen or fail to improve, for Recheck-may schedule as telehealth if needed.          Referring Provider (if known): No ref. provider found      This document has been electronically signed by Abbie Hillman DO  April 16, 2024 11:34 EDT    Abbie Hillman DO, FACOI  [address] [place] [address]  [phone] (office)    Part of this note may be an electronic transcription/translation of spoken language to printed text using the Dragon Dictation System.

## 2024-04-16 NOTE — PROGRESS NOTES
Injection  Injection performed in left Deltoid by Heather Symes, MA. Patient tolerated the procedure well without complications.  04/16/24   Heather Symes, MA

## 2024-05-02 ENCOUNTER — TELEPHONE (OUTPATIENT)
Dept: FAMILY MEDICINE CLINIC | Facility: CLINIC | Age: 79
End: 2024-05-02
Payer: MEDICARE

## 2024-05-02 DIAGNOSIS — J06.9 ACUTE URI: Primary | ICD-10-CM

## 2024-05-02 DIAGNOSIS — R05.1 ACUTE COUGH: ICD-10-CM

## 2024-05-02 RX ORDER — DOXYCYCLINE HYCLATE 100 MG/1
100 CAPSULE ORAL 2 TIMES DAILY
Qty: 20 CAPSULE | Refills: 0 | Status: SHIPPED | OUTPATIENT
Start: 2024-05-02

## 2024-05-02 NOTE — TELEPHONE ENCOUNTER
WIN WENT TO SEE HER AND SHE IS COUGHING HER HEAD OFF. ASTER SAID IT IS A PRODUCTIVE YELLOW TINGE AND WANTS TO KNOW IF SOME ANTIBIOTICS CAN BE CALLED IN.

## 2024-05-02 NOTE — TELEPHONE ENCOUNTER
I sent in doxycycline to [facility] [place].  Patient may take this twice daily for 10 days for acute URI.  She should call or come in if symptoms are persistent.  We will plan to follow-up later this month for routine appointment as previously scheduled.

## 2024-05-06 ENCOUNTER — TELEPHONE (OUTPATIENT)
Dept: FAMILY MEDICINE CLINIC | Facility: CLINIC | Age: 79
End: 2024-05-06
Payer: MEDICARE

## 2024-05-15 ENCOUNTER — TELEMEDICINE (OUTPATIENT)
Dept: FAMILY MEDICINE CLINIC | Facility: CLINIC | Age: 79
End: 2024-05-15
Payer: MEDICARE

## 2024-05-15 DIAGNOSIS — R53.81 PHYSICAL DECONDITIONING: ICD-10-CM

## 2024-05-15 DIAGNOSIS — M51.36 LUMBAR DEGENERATIVE DISC DISEASE: Primary | ICD-10-CM

## 2024-05-15 DIAGNOSIS — M15.9 GENERALIZED OSTEOARTHRITIS: Chronic | ICD-10-CM

## 2024-05-15 PROCEDURE — 1160F RVW MEDS BY RX/DR IN RCRD: CPT | Performed by: INTERNAL MEDICINE

## 2024-05-15 PROCEDURE — 99214 OFFICE O/P EST MOD 30 MIN: CPT | Performed by: INTERNAL MEDICINE

## 2024-05-15 PROCEDURE — 1125F AMNT PAIN NOTED PAIN PRSNT: CPT | Performed by: INTERNAL MEDICINE

## 2024-05-15 PROCEDURE — 1159F MED LIST DOCD IN RCRD: CPT | Performed by: INTERNAL MEDICINE

## 2024-05-15 RX ORDER — TRAMADOL HYDROCHLORIDE 50 MG/1
50 TABLET ORAL EVERY 6 HOURS PRN
Qty: 120 TABLET | Refills: 0 | Status: SHIPPED | OUTPATIENT
Start: 2024-05-15

## 2024-05-15 NOTE — PROGRESS NOTES
Patient Name: Edgar Cotto Today's Date: 5/15/2024   Patient MRN / CSN: [id number] / [id number] Date of Encounter: 5/15/2024   Patient Age / : 79 y.o. / 1945 Encounter Provider: Abbie Hillman DO   Referring Physician: No ref. provider found          Edgar is a 79 y.o. female who is being seen today for Back Pain      Back Pain  This is a chronic problem. The problem has been worse since onset. The pain is present in the lumbar spine. The quality of the pain is described as burning. The pain radiates to the right buttock, right knee and right thigh. Associated symptoms include leg pain. Pertinent negatives include no dysuria or fever. She has tried NSAIDs (PT made this pain worse) for the symptoms.       Allergies include:Z-mary [azithromycin], Amoxicillin, Cortisone, Fish-derived products, Iodinated contrast media, Iodine, Lidocaine, Morphine, Niacin, Nystatin, Other, Sulfa antibiotics, and Verapamil  Current Outpatient Medications   Medication Sig Dispense Refill    acetaminophen (Tylenol 8 Hour Arthritis Pain) 650 MG 8 hr tablet Take 1 tablet by mouth 4 (Four) Times a Day As Needed for Mild Pain . 120 tablet 5    albuterol (PROVENTIL) (2.5 MG/3ML) 0.083% nebulizer solution Take 2.5 mg by nebulization 4 (Four) Times a Day As Needed for Wheezing. 360 mL 5    albuterol sulfate  (90 Base) MCG/ACT inhaler INHALE 2 PUFFS BY MOUTH EVERY 4 HOURS AS NEEDED FOR WHEEZING 18 g 5    diazePAM (VALIUM) 5 MG tablet Take 1 tablet by mouth Every 12 (Twelve) Hours As Needed for Anxiety. 60 tablet 5    diclofenac (VOLTAREN) 50 MG EC tablet TAKE 1 TABLET BY MOUTH TWICE DAILY AS NEEDED FOR PAIN 180 tablet 1    fluticasone (Flonase) 50 MCG/ACT nasal spray 2 sprays into the nostril(s) as directed by provider Daily. 48 g 5    meclizine (ANTIVERT) 25 MG tablet Take 1 tablet by mouth Every 8 (Eight) Hours As Needed for Dizziness. 270 tablet 3    naloxone (NARCAN) 4 MG/0.1ML nasal spray Call 911. Don't prime. [place]  in 1 nostril for overdose. Repeat in 2-3 minutes in other nostril if no or minimal breathing/responsiveness. 2 each 5    nystatin (MYCOSTATIN) 100,000 unit/mL suspension SWISH AND SWALLOW 5 ML BY MOUTH FOUR TIMES DAILY 120 mL 1    O2 (OXYGEN) Inhale 3 L/min 1 (One) Time.      psyllium (METAMUCIL) 58.6 % packet Take 1 packet by mouth Daily. 162 each 3    rosuvastatin (CRESTOR) 5 MG tablet TAKE 1 TABLET BY MOUTH EVERY OTHER DAY 45 tablet 3    traMADol (ULTRAM) 50 MG tablet Take 1 tablet by mouth Every 6 (Six) Hours As Needed for Moderate Pain or Severe Pain. 120 tablet 0    valsartan (DIOVAN) 40 MG tablet TAKE 1/2 TABLET BY MOUTH TWICE DAILY 90 tablet 1     No current facility-administered medications for this visit.     Past Medical History:   Diagnosis Date    Abdominal distension     Allergic rhinitis     Anxiety     Arthritis     Closed nondisplaced fracture of surgical neck of left humerus with routine healing 3/9/2021    Cough     Dry eyes     Dyslipidemia     Dysuria     Hyperlipidemia     Hypertension     Meniere's disease     Mitral and aortic valve disease     Oral candidiasis     Osteoarthritis     Stomatitis     URI, acute     Vaginal candidiasis     Vocal cord dysfunction      Family History   Problem Relation Age of Onset    Diabetes Mother     Cancer Father     Liver disease Sister     Diabetes Sister     Liver disease Brother     Diabetes Brother     Diabetes Daughter     Cancer Other     Liver disease Other     Breast cancer Neg Hx      Past Surgical History:   Procedure Laterality Date    CHOLECYSTECTOMY      COLONOSCOPY      DILATATION AND CURETTAGE      HEMORRHOIDECTOMY      HERNIA REPAIR      SHOULDER SURGERY      SKIN CANCER EXCISION      on Nose    TONSILLECTOMY       Social History     Substance and Sexual Activity   Alcohol Use No     Social History     Tobacco Use   Smoking Status Never    Passive exposure: Never   Smokeless Tobacco Never   Tobacco Comments    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   Genitourinary:  Negative for dysuria.   Musculoskeletal:  Positive for arthralgias and back pain.        Depression Assessment Review:  PHQ-9 Total Score:    Vital Signs & Measurements Taken This Encounter  There were no vitals taken for this visit.   There were no vitals filed for this visit.       Physical Exam  Constitutional:       General: She is not in acute distress.  Pulmonary:      Effort: No respiratory distress.      Comments: Wearing O2  Neurological:      Mental Status: She is alert.   Psychiatric:         Mood and Affect: Mood normal.         Behavior: Behavior normal.           Assessment & Plan  Patient Active Problem List   Diagnosis    Atopic rhinitis    Dizziness    Generalized anxiety disorder    Generalized osteoarthritis    Essential hypertension    Auditory vertigo    Mitral and aortic valve disease    Stomatitis    Seasonal allergic rhinitis    Varicose veins of both lower extremities    Meniere's disease of both ears    S/P hemorrhoidectomy    Abnormal EKG    Moderate obesity    Dyslipidemia    Thyroid cyst    Age-related osteoporosis without current pathological fracture    Cervical lymphadenopathy    CABAN (dyspnea on exertion)    Hypoxia    Restrictive lung disease    Scoliosis    Hip pain    Lumbar degenerative disc disease    BMI 32.0-32.9,adult    Physical deconditioning       ICD-10-CM ICD-9-CM   1. Lumbar degenerative disc disease  M51.36 722.52   2. Generalized osteoarthritis  M15.9 715.00   3. Physical deconditioning  R53.81 799.3     No orders of the defined types were placed in this encounter.      Meds Ordered During Visit:  New Medications Ordered This Visit   Medications    traMADol (ULTRAM) 50 MG tablet     Sig: Take 1 tablet by mouth Every 6 (Six) Hours As Needed for Moderate Pain or Severe Pain.     Dispense:  120 tablet      Refill:  0     I reviewed PDMP today.  Patient reports still having severe pain despite tramadol, Tylenol, and diclofenac.  She reports tolerating her current medicine regimen well.  She has not needed Narcan and has not noted any side effects from tramadol.  Despite her current regimen, she reports her pain is still very limiting to her daily activities.  She lives with her daughter, who provides care 24/7.  I offered to start Cymbalta but patient is concerned of possible side effects from it.  I discussed pain management, but patient does not think that she can physically get there at this point and she is concerned of injections due to her allergy history.  I discussed home physical therapy, but patient has tried this and it made her pain worse.  We will increase tramadol to 4 times daily as needed.  We will continue other medicines as previously prescribed at this time.  We will plan on close clinical follow-up.    Return in about 1 month (around 6/15/2024), or if symptoms worsen or fail to improve, for Recheck.     As a means of preventing the spread of the COVID 19, this visit was done virtually via video platform through [employer] Tw[employer] as consented by the patient. Patient was at home and provider at [facility] office during this visit. The patient consented to this telehealth visit and signed the video visit consent form. This visit included audio and video interaction. There were no technical issues that occurred during this visit.       Referring Provider (if known): No ref. provider found      This document has been electronically signed by Abbie Hillman DO  May 15, 2024 13:44 EDT    Abbie Hillman DO, FACOI  [address] [place] [address]  [phone] (office)    Part of this note may be an electronic transcription/translation of spoken language to printed text using the Dragon Dictation System.

## 2024-05-22 DIAGNOSIS — H81.03 MENIERE'S DISEASE OF BOTH EARS: Chronic | ICD-10-CM

## 2024-05-22 DIAGNOSIS — K12.1 STOMATITIS: ICD-10-CM

## 2024-05-22 RX ORDER — MECLIZINE HYDROCHLORIDE 25 MG/1
25 TABLET ORAL EVERY 8 HOURS PRN
Qty: 270 TABLET | Refills: 3 | Status: SHIPPED | OUTPATIENT
Start: 2024-05-22

## 2024-05-23 DIAGNOSIS — I10 ESSENTIAL HYPERTENSION: ICD-10-CM

## 2024-05-23 RX ORDER — VALSARTAN 40 MG/1
20 TABLET ORAL DAILY
Qty: 90 TABLET | Refills: 1 | Status: SHIPPED | OUTPATIENT
Start: 2024-05-23

## 2024-05-28 ENCOUNTER — TELEPHONE (OUTPATIENT)
Dept: FAMILY MEDICINE CLINIC | Facility: CLINIC | Age: 79
End: 2024-05-28
Payer: MEDICARE

## 2024-05-28 DIAGNOSIS — M51.36 LUMBAR DEGENERATIVE DISC DISEASE: ICD-10-CM

## 2024-05-28 DIAGNOSIS — R53.81 PHYSICAL DECONDITIONING: Primary | ICD-10-CM

## 2024-05-28 NOTE — TELEPHONE ENCOUNTER
event monitor at [employer] office, then office peter call for appt to follow up      Patient Education        Learning About Atrial Fibrillation  What is atrial fibrillation? Atrial fibrillation (say \"ZACH-tree-mathew oseiqtg-uexz-WJS-jaime\") is a common type of irregular heartbeat (arrhythmia). Normally, the heart beats in a strong, steady rhythm. In atrial fibrillation, a problem with the heart's electrical system causes the two upper chambers of the heart (called the atria) to quiver, or fibrillate. Atrial fibrillation can be dangerous. This is because if the heartbeat isn't strong and steady, blood can collect, or pool, in the atria. And pooled blood is more likely to form clots. Clots can travel to the brain, block blood flow, and cause a stroke. Atrial fibrillation can also lead to heart failure. This condition also upsets the normal rhythm between the atria and the lower chambers of the heart. (These chambers are called the ventricles.) The ventricles may beat fast and without a regular rhythm. What are the symptoms? Some people feel symptoms when they have episodes of atrial fibrillation. But other people don't notice any symptoms. If you have symptoms, you may feel:  · A fluttering, racing, or pounding feeling in your chest called palpitations. · Weak or tired. · Dizzy or lightheaded. · Short of breath. · Chest pain. · Confused. You may notice signs of atrial fibrillation when you check your pulse. Your pulse may seem uneven or fast.  What can you expect when you have atrial fibrillation? At first, spells of atrial fibrillation may come on suddenly and last a short time. They may go away on their own or with treatment. Over time, the spells may last longer and occur more often. They often don't go away on their own. How is it treated? Treatments can help you feel better and prevent future problems, especially stroke and heart failure.   Your treatment will depend on the cause of your atrial I placed home health referral as requested.  Also, I ordered  consult for patient.   fibrillation, your symptoms, and your risk for stroke. Types of treatment include:  · Heart rate treatment. Medicine may be used to slow your heart rate. Your heartbeat may still be irregular. But these medicines keep your heart from beating too fast. They may also help relieve symptoms. · Heart rhythm treatment. Different treatments may be used to try to stop atrial fibrillation and keep it from returning. They can also relieve symptoms. These treatments include medicine, electrical cardioversion to shock the heart back to a normal rhythm, a procedure called catheter ablation, and heart surgery. · Stroke prevention. You and your doctor can decide how to lower your risk. You may decide to take a blood-thinning medicine called an anticoagulant. What is a heart-healthy lifestyle for atrial fibrillation? You can live well and help manage atrial fibrillation by having a heart-healthy lifestyle. This lifestyle may help reduce how often you have episodes of atrial fibrillation. Don't smoke. Avoid secondhand smoke too. Quitting smoking is the best thing you can do for your heart. Be active. Talk to your doctor about what type and level of exercise is safe for you. Eat a heart-healthy diet. These foods include vegetables, fruits, nuts, beans, lean meat, fish, and whole grains. Limit sodium, alcohol, and sugar. Avoid alcohol if it triggers symptoms. Stay at a healthy weight. Lose weight if you need to. Losing weight can help relieve symptoms. Manage other health problems. These problems include diabetes, high blood pressure, and high cholesterol. If you think you may have a problem with alcohol or drug use, talk to your doctor. Manage stress. Options like yoga, biofeedback, and meditation may help. Where can you learn more? Go to http://john-evaristo.info/  Enter L274 in the search box to learn more about \"Learning About Atrial Fibrillation. \"  Current as of: August 31, 2020               Content Version: 12.8  © 9281-9830 Healthwise, Incorporated. Care instructions adapted under license by [employer] (which disclaims liability or warranty for this information). If you have questions about a medical condition or this instruction, always ask your healthcare professional. [address] any warranty or liability for your use of this information. 6

## 2024-05-28 NOTE — TELEPHONE ENCOUNTER
Daughter Ava called and states she needs more help. States Edgar is in more and more pain and unable to bring her in for a visit. She is requesting a home health evaluation again.

## 2024-05-29 ENCOUNTER — REFERRAL TRIAGE (OUTPATIENT)
Age: 79
End: 2024-05-29
Payer: MEDICARE

## 2024-05-31 ENCOUNTER — PATIENT OUTREACH (OUTPATIENT)
Age: 79
End: 2024-05-31
Payer: MEDICARE

## 2024-05-31 NOTE — OUTREACH NOTE
Social Work Assessment  Questions/Answers      Flowsheet Row Most Recent Value   Referral Source physician   Reason for Consult insurance concerns, other (see comments)  [caregiving and respite resources]   Preferred Language English   People in Home child(eleonora), adult   Current Living Arrangements home   Potentially Unsafe Housing Conditions none   In the past 12 months has the electric, gas, oil, or water company threatened to shut off services in your home? No   Primary Care Provided by child(eleonora)   Provides Primary Care For no one, unable/limited ability to care for self   Family Caregiver if Needed child(eleonora), adult   Family Caregiver Names shyanne Escalante   Quality of Family Relationships supportive   Source of Income pension/care home   Financial/Environmental Concerns other (see comments)   Who Manages Finances if Patient Unable can't afford care giving help   Application for Public Assistance obtained public assistance pending number        SDOH updated and reviewed with the patient during this program:  --     Depression: Not at risk (2/8/2024)    PHQ-2     PHQ-2 Score: 0      --      Disabilities      --      Employment      Financial Resource Strain: Low Risk  (5/31/2024)    Overall Financial Resource Strain (CARDIA)     Difficulty of Paying Living Expenses: Not very hard      --     Food Insecurity: No Food Insecurity (5/31/2024)    Hunger Vital Sign     Worried About Running Out of Food in the Last Year: Never true     Ran Out of Food in the Last Year: Never true      --     Health Literacy: Unknown (5/31/2024)    Education     Preferred Language: English      --     Housing Stability: Unknown (5/31/2024)    Housing Stability Vital Sign     Unable to Pay for Housing in the Last Year: No     Homeless in the Last Year: No      --     Interpersonal Safety: Not At Risk (5/31/2024)    Humiliation, Afraid, Rape, and Kick questionnaire     Fear of Current or Ex-Partner: No     Emotionally Abused: No      Physically Abused: No     Sexually Abused: No      --     Physical Activity: Inactive (5/31/2024)    Exercise Vital Sign     Days of Exercise per Week: 0 days     Minutes of Exercise per Session: 0 min      --      Family and Community Support      --     Stress: Stress Concern Present (5/31/2024)    [employer] - Occupational Stress Questionnaire     Feeling of Stress : To some extent      --     Tobacco Use: Low Risk  (5/15/2024)    Patient History     Smoking Tobacco Use: Never     Smokeless Tobacco Use: Never     Passive Exposure: Never      --     Transportation Needs: Unmet Transportation Needs (5/31/2024)    PRAPARE - Transportation     Lack of Transportation (Medical): Yes     Lack of Transportation (Non-Medical): No      --     Utilities: Not At Risk (5/31/2024)    [facility] Utilities     Threatened with loss of utilities: No      Continuing Care   [facility] MEDICA[facility]    [facility] [address] [facility] [address]    Phone: [phone]    Resource for: Financial Resource Strain, Utilities   Patient Outreach    The  (SW) had a conversation with the patient’s daughter regarding her referral for in-home caregiving and respite care options. They discussed the likelihood that her insurance policy may not cover benefits for in-home and respite care. The SW explained that a home health (HH) referral is in place, which typically offers short-term services such as physical therapy (PT), occupational therapy (OT), and skilled nursing, but does not include personal care services. They reviewed that if private payment is not feasible, the alternative would be to apply for Long-Term Care (LTC) Medicaid and waiver services. The SW offered to provide the MAP-14 form and detailed instructions on how to apply for Medicaid and waiver services. An overview of the various services available through the waiver was also included in the materials to be mailed. The  patient’s daughter expressed gratitude for the support. The SW will follow up next week to confirm receipt of the mailed materials.      Deanna CARRILLO -   Ambulatory Case Management    5/31/2024, 09:35 EDT

## 2024-06-08 ENCOUNTER — HOSPITAL ENCOUNTER (EMERGENCY)
Facility: HOSPITAL | Age: 79
Discharge: HOME | End: 2024-06-08
Attending: STUDENT IN AN ORGANIZED HEALTH CARE EDUCATION/TRAINING PROGRAM | Admitting: STUDENT IN AN ORGANIZED HEALTH CARE EDUCATION/TRAINING PROGRAM
Payer: MEDICARE

## 2024-06-08 ENCOUNTER — APPOINTMENT (OUTPATIENT)
Dept: ULTRASOUND IMAGING | Facility: HOSPITAL | Age: 79
End: 2024-06-08
Payer: MEDICARE

## 2024-06-08 ENCOUNTER — APPOINTMENT (OUTPATIENT)
Dept: CT IMAGING | Facility: HOSPITAL | Age: 79
End: 2024-06-08
Payer: MEDICARE

## 2024-06-08 VITALS
SYSTOLIC BLOOD PRESSURE: 142 MMHG | HEIGHT: 60 IN | HEART RATE: 75 BPM | RESPIRATION RATE: 16 BRPM | OXYGEN SATURATION: 100 % | BODY MASS INDEX: 32.03 KG/M2 | DIASTOLIC BLOOD PRESSURE: 81 MMHG | WEIGHT: 163.14 LBS | TEMPERATURE: 98.3 F

## 2024-06-08 DIAGNOSIS — M48.061 DEGENERATIVE LUMBAR SPINAL STENOSIS: ICD-10-CM

## 2024-06-08 DIAGNOSIS — M54.40 CHRONIC RIGHT-SIDED LOW BACK PAIN WITH SCIATICA, SCIATICA LATERALITY UNSPECIFIED: Primary | ICD-10-CM

## 2024-06-08 DIAGNOSIS — G89.29 CHRONIC RIGHT-SIDED LOW BACK PAIN WITH SCIATICA, SCIATICA LATERALITY UNSPECIFIED: Primary | ICD-10-CM

## 2024-06-08 LAB
ALBUMIN SERPL-MCNC: 3.7 G/DL (ref 3.5–5.2)
ALBUMIN/GLOB SERPL: 1.1 G/DL
ALP SERPL-CCNC: 121 U/L (ref 39–117)
ALT SERPL W P-5'-P-CCNC: 13 U/L (ref 1–33)
ANION GAP SERPL CALCULATED.3IONS-SCNC: 10.4 MMOL/L (ref 5–15)
AST SERPL-CCNC: 19 U/L (ref 1–32)
BASOPHILS # BLD AUTO: 0.08 10*3/MM3 (ref 0–0.2)
BASOPHILS NFR BLD AUTO: 1.2 % (ref 0–1.5)
BILIRUB SERPL-MCNC: 0.5 MG/DL (ref 0–1.2)
BILIRUB UR QL STRIP: NEGATIVE
BUN SERPL-MCNC: 12 MG/DL (ref 8–23)
BUN/CREAT SERPL: 13.5 (ref 7–25)
CALCIUM SPEC-SCNC: 9.4 MG/DL (ref 8.6–10.5)
CHLORIDE SERPL-SCNC: 99 MMOL/L (ref 98–107)
CLARITY UR: CLEAR
CO2 SERPL-SCNC: 28.6 MMOL/L (ref 22–29)
COLOR UR: YELLOW
CREAT SERPL-MCNC: 0.89 MG/DL (ref 0.57–1)
D-LACTATE SERPL-SCNC: 1 MMOL/L (ref 0.5–2)
DEPRECATED RDW RBC AUTO: 44.8 FL (ref 37–54)
EGFRCR SERPLBLD CKD-EPI 2021: 65.2 ML/MIN/1.73
EOSINOPHIL # BLD AUTO: 0.28 10*3/MM3 (ref 0–0.4)
EOSINOPHIL NFR BLD AUTO: 4.1 % (ref 0.3–6.2)
ERYTHROCYTE [DISTWIDTH] IN BLOOD BY AUTOMATED COUNT: 12.5 % (ref 12.3–15.4)
GEN 5 2HR TROPONIN T REFLEX: 8 NG/L
GLOBULIN UR ELPH-MCNC: 3.5 GM/DL
GLUCOSE SERPL-MCNC: 114 MG/DL (ref 65–99)
GLUCOSE UR STRIP-MCNC: NEGATIVE MG/DL
HCT VFR BLD AUTO: 41.1 % (ref 34–46.6)
HGB BLD-MCNC: 13.4 G/DL (ref 12–15.9)
HGB UR QL STRIP.AUTO: NEGATIVE
HOLD SPECIMEN: NORMAL
HOLD SPECIMEN: NORMAL
IMM GRANULOCYTES # BLD AUTO: 0.01 10*3/MM3 (ref 0–0.05)
IMM GRANULOCYTES NFR BLD AUTO: 0.1 % (ref 0–0.5)
KETONES UR QL STRIP: ABNORMAL
LEUKOCYTE ESTERASE UR QL STRIP.AUTO: NEGATIVE
LYMPHOCYTES # BLD AUTO: 1.38 10*3/MM3 (ref 0.7–3.1)
LYMPHOCYTES NFR BLD AUTO: 20.1 % (ref 19.6–45.3)
MCH RBC QN AUTO: 31.9 PG (ref 26.6–33)
MCHC RBC AUTO-ENTMCNC: 32.6 G/DL (ref 31.5–35.7)
MCV RBC AUTO: 97.9 FL (ref 79–97)
MONOCYTES # BLD AUTO: 0.53 10*3/MM3 (ref 0.1–0.9)
MONOCYTES NFR BLD AUTO: 7.7 % (ref 5–12)
NEUTROPHILS NFR BLD AUTO: 4.59 10*3/MM3 (ref 1.7–7)
NEUTROPHILS NFR BLD AUTO: 66.8 % (ref 42.7–76)
NITRITE UR QL STRIP: NEGATIVE
NRBC BLD AUTO-RTO: 0 /100 WBC (ref 0–0.2)
NT-PROBNP SERPL-MCNC: 95 PG/ML (ref 0–1800)
PH UR STRIP.AUTO: 6.5 [PH] (ref 5–8)
PLATELET # BLD AUTO: 187 10*3/MM3 (ref 140–450)
PMV BLD AUTO: 10.4 FL (ref 6–12)
POTASSIUM SERPL-SCNC: 3.8 MMOL/L (ref 3.5–5.2)
PROT SERPL-MCNC: 7.2 G/DL (ref 6–8.5)
PROT UR QL STRIP: NEGATIVE
RBC # BLD AUTO: 4.2 10*6/MM3 (ref 3.77–5.28)
SODIUM SERPL-SCNC: 138 MMOL/L (ref 136–145)
SP GR UR STRIP: 1.01 (ref 1–1.03)
TROPONIN T DELTA: -1 NG/L
TROPONIN T SERPL HS-MCNC: 9 NG/L
UROBILINOGEN UR QL STRIP: ABNORMAL
WBC NRBC COR # BLD AUTO: 6.87 10*3/MM3 (ref 3.4–10.8)
WHOLE BLOOD HOLD COAG: NORMAL
WHOLE BLOOD HOLD SPECIMEN: NORMAL

## 2024-06-08 PROCEDURE — 85025 COMPLETE CBC W/AUTO DIFF WBC: CPT | Performed by: STUDENT IN AN ORGANIZED HEALTH CARE EDUCATION/TRAINING PROGRAM

## 2024-06-08 PROCEDURE — 93010 ELECTROCARDIOGRAM REPORT: CPT | Performed by: INTERNAL MEDICINE

## 2024-06-08 PROCEDURE — 81003 URINALYSIS AUTO W/O SCOPE: CPT | Performed by: STUDENT IN AN ORGANIZED HEALTH CARE EDUCATION/TRAINING PROGRAM

## 2024-06-08 PROCEDURE — 93971 EXTREMITY STUDY: CPT | Performed by: RADIOLOGY

## 2024-06-08 PROCEDURE — 93971 EXTREMITY STUDY: CPT

## 2024-06-08 PROCEDURE — 72131 CT LUMBAR SPINE W/O DYE: CPT

## 2024-06-08 PROCEDURE — 83605 ASSAY OF LACTIC ACID: CPT | Performed by: STUDENT IN AN ORGANIZED HEALTH CARE EDUCATION/TRAINING PROGRAM

## 2024-06-08 PROCEDURE — P9612 CATHETERIZE FOR URINE SPEC: HCPCS

## 2024-06-08 PROCEDURE — 72131 CT LUMBAR SPINE W/O DYE: CPT | Performed by: RADIOLOGY

## 2024-06-08 PROCEDURE — 83880 ASSAY OF NATRIURETIC PEPTIDE: CPT | Performed by: STUDENT IN AN ORGANIZED HEALTH CARE EDUCATION/TRAINING PROGRAM

## 2024-06-08 PROCEDURE — 84484 ASSAY OF TROPONIN QUANT: CPT | Performed by: STUDENT IN AN ORGANIZED HEALTH CARE EDUCATION/TRAINING PROGRAM

## 2024-06-08 PROCEDURE — 93005 ELECTROCARDIOGRAM TRACING: CPT | Performed by: STUDENT IN AN ORGANIZED HEALTH CARE EDUCATION/TRAINING PROGRAM

## 2024-06-08 PROCEDURE — 80053 COMPREHEN METABOLIC PANEL: CPT | Performed by: STUDENT IN AN ORGANIZED HEALTH CARE EDUCATION/TRAINING PROGRAM

## 2024-06-08 PROCEDURE — 99284 EMERGENCY DEPT VISIT MOD MDM: CPT

## 2024-06-08 PROCEDURE — 36415 COLL VENOUS BLD VENIPUNCTURE: CPT

## 2024-06-08 RX ORDER — HYDROCODONE BITARTRATE AND ACETAMINOPHEN 5; 325 MG/1; MG/1
1 TABLET ORAL ONCE
Status: COMPLETED | OUTPATIENT
Start: 2024-06-08 | End: 2024-06-08

## 2024-06-08 RX ADMIN — HYDROCODONE BITARTRATE AND ACETAMINOPHEN 1 TABLET: 5; 325 TABLET ORAL at 09:30

## 2024-06-08 NOTE — ED PROVIDER NOTES
Subjective   History of Present Illness  Patient is an 79-year-old female that presents from home by EMS for complaints of generalized bone pain patient has a known established diagnosis of chronic back pain and review of Chris shows that she is prescribed tramadol as well as diazepam.  Patient states that she is having pain that radiates into her right low back into her right buttock and down to the right knee.  Review of medical records show that she has a significant well-established diagnosis of chronic back pain routinely follows with Dr. Rosita Hillman in outpatient PCP clinic.        Review of Systems   Musculoskeletal:  Positive for back pain.       Past Medical History:   Diagnosis Date    Abdominal distension     Allergic rhinitis     Anxiety     Arthritis     Closed nondisplaced fracture of surgical neck of left humerus with routine healing 3/9/2021    Cough     Dry eyes     Dyslipidemia     Dysuria     Hyperlipidemia     Hypertension     Meniere's disease     Mitral and aortic valve disease     Oral candidiasis     Osteoarthritis     Stomatitis     URI, acute     Vaginal candidiasis     Vocal cord dysfunction        Allergies   Allergen Reactions    Z-Ponce [Azithromycin] Swelling    Amoxicillin Other (See Comments)    Cortisone Swelling     Face swelling      Fish-Derived Products      SEAFOOD    Iodinated Contrast Media     Iodine Itching    Lidocaine Other (See Comments)     Shaking, fever when she had mouth numbed at the dentist    Morphine Mental Status Change    Niacin Other (See Comments)     Flushing     Nystatin Other (See Comments)    Other     Sulfa Antibiotics Other (See Comments)    Verapamil Other (See Comments)     Feel funny        Past Surgical History:   Procedure Laterality Date    CHOLECYSTECTOMY      COLONOSCOPY      DILATATION AND CURETTAGE      HEMORRHOIDECTOMY      HERNIA REPAIR      SHOULDER SURGERY      SKIN CANCER EXCISION      on Nose    TONSILLECTOMY         Family History    Problem Relation Age of Onset    Diabetes Mother     Cancer Father     Liver disease Sister     Diabetes Sister     Liver disease Brother     Diabetes Brother     Diabetes Daughter     Cancer Other     Liver disease Other     Breast cancer Neg Hx        Social History     Socioeconomic History    Marital status:    Tobacco Use    Smoking status: Never     Passive exposure: Never    Smokeless tobacco: Never    Tobacco comments:     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   Constitutional:       General: She is not in acute distress.     Appearance: She is well-developed. She is not diaphoretic.      Comments: Comfortably resting in bed in no acute distress   HENT:      Head: Normocephalic and atraumatic.      Right Ear: External ear normal.      Left Ear: External ear normal.      Nose: Nose normal.   Eyes:      Extraocular Movements: Extraocular movements intact.      Conjunctiva/sclera: Conjunctivae normal.      Pupils: Pupils are equal, round, and reactive to light.   Neck:      Vascular: No JVD.      Trachea: No tracheal deviation.   Cardiovascular:      Rate and Rhythm: Normal rate and regular rhythm.      Heart sounds: Normal heart sounds. No murmur heard.  Pulmonary:      Effort: Pulmonary effort is normal. No respiratory distress.      Breath sounds: Normal breath sounds. No wheezing.      Comments: Wears baseline supplemental oxygen of 2 L  Abdominal:      General: Bowel sounds are normal.      Palpations: Abdomen is soft.      Tenderness: There is no abdominal tenderness.   Musculoskeletal:         General: No deformity.      Cervical back: Normal range of motion and neck supple.      Comments: Patient has chronic lower extremity weakness of 4/5 while laying in bed.  Patient states  that she ambulates with a rolling walker at home  Patient has no physical signs of symptoms of unilateral swelling in the right leg.  Patient can move both extremities but has very poor strength at baseline.   When passively moving the right leg,patient complains of localized back pain on the right side.   Skin:     General: Skin is warm and dry.      Coloration: Skin is not pale.      Findings: No erythema or rash.   Neurological:      Mental Status: She is alert and oriented to person, place, and time.      Cranial Nerves: No cranial nerve deficit.   Psychiatric:         Behavior: Behavior normal.         Thought Content: Thought content normal.         Procedures       Results for orders placed or performed during the hospital encounter of 06/08/24   Comprehensive Metabolic Panel    Specimen: Arm, Left; Blood   Result Value Ref Range    Glucose 114 (H) 65 - 99 mg/dL    BUN 12 8 - 23 mg/dL    Creatinine 0.89 0.57 - 1.00 mg/dL    Sodium 138 136 - 145 mmol/L    Potassium 3.8 3.5 - 5.2 mmol/L    Chloride 99 98 - 107 mmol/L    CO2 28.6 22.0 - 29.0 mmol/L    Calcium 9.4 8.6 - 10.5 mg/dL    Total Protein 7.2 6.0 - 8.5 g/dL    Albumin 3.7 3.5 - 5.2 g/dL    ALT (SGPT) 13 1 - 33 U/L    AST (SGOT) 19 1 - 32 U/L    Alkaline Phosphatase 121 (H) 39 - 117 U/L    Total Bilirubin 0.5 0.0 - 1.2 mg/dL    Globulin 3.5 gm/dL    A/G Ratio 1.1 g/dL    BUN/Creatinine Ratio 13.5 7.0 - 25.0    Anion Gap 10.4 5.0 - 15.0 mmol/L    eGFR 65.2 >60.0 mL/min/1.73   Lactic Acid, Plasma    Specimen: Arm, Left; Blood   Result Value Ref Range    Lactate 1.0 0.5 - 2.0 mmol/L   High Sensitivity Troponin T    Specimen: Arm, Left; Blood   Result Value Ref Range    HS Troponin T 9 <14 ng/L   BNP    Specimen: Arm, Left; Blood   Result Value Ref Range    proBNP 95.0 0.0 - 1,800.0 pg/mL   CBC Auto Differential    Specimen: Arm, Left; Blood   Result Value Ref Range    WBC 6.87 3.40 - 10.80 10*3/mm3    RBC 4.20 3.77 - 5.28 10*6/mm3    Hemoglobin 13.4 12.0 -  15.9 g/dL    Hematocrit 41.1 34.0 - 46.6 %    MCV 97.9 (H) 79.0 - 97.0 fL    MCH 31.9 26.6 - 33.0 pg    MCHC 32.6 31.5 - 35.7 g/dL    RDW 12.5 12.3 - 15.4 %    RDW-SD 44.8 37.0 - 54.0 fl    MPV 10.4 6.0 - 12.0 fL    Platelets 187 140 - 450 10*3/mm3    Neutrophil % 66.8 42.7 - 76.0 %    Lymphocyte % 20.1 19.6 - 45.3 %    Monocyte % 7.7 5.0 - 12.0 %    Eosinophil % 4.1 0.3 - 6.2 %    Basophil % 1.2 0.0 - 1.5 %    Immature Grans % 0.1 0.0 - 0.5 %    Neutrophils, Absolute 4.59 1.70 - 7.00 10*3/mm3    Lymphocytes, Absolute 1.38 0.70 - 3.10 10*3/mm3    Monocytes, Absolute 0.53 0.10 - 0.90 10*3/mm3    Eosinophils, Absolute 0.28 0.00 - 0.40 10*3/mm3    Basophils, Absolute 0.08 0.00 - 0.20 10*3/mm3    Immature Grans, Absolute 0.01 0.00 - 0.05 10*3/mm3    nRBC 0.0 0.0 - 0.2 /100 WBC   Urinalysis With Culture If Indicated - Straight Cath    Specimen: Straight Cath; Urine   Result Value Ref Range    Color, UA Yellow Yellow, Straw    Appearance, UA Clear Clear    pH, UA 6.5 5.0 - 8.0    Specific Gravity, UA 1.015 1.005 - 1.030    Glucose, UA Negative Negative    Ketones, UA 15 mg/dL (1+) (A) Negative    Bilirubin, UA Negative Negative    Blood, UA Negative Negative    Protein, UA Negative Negative    Leuk Esterase, UA Negative Negative    Nitrite, UA Negative Negative    Urobilinogen, UA 1.0 E.U./dL 0.2 - 1.0 E.U./dL   ECG 12 Lead Other; back pain   Result Value Ref Range    QT Interval 360 ms    QTC Interval 438 ms   Green Top (Gel)   Result Value Ref Range    Extra Tube Hold for add-ons.    Lavender Top   Result Value Ref Range    Extra Tube hold for add-on    Gold Top - SST   Result Value Ref Range    Extra Tube Hold for add-ons.    Light Blue Top   Result Value Ref Range    Extra Tube Hold for add-ons.          ED Course  ED Course as of 06/08/24 1011   Sat Jun 08, 2024   1006 *Patient's labs are negative.  Urinalysis is also negative that could potentially have contributed to back pain.  -Patient CT lumbar spine shows  multilevel degenerative disc disease with bulging disc at L3-L4 and L4-L5 causing mild central canal stenosis with some bilateral foraminal stenosis.  Patient's given age of 79 and chronic debility patient would be very low candidate for neurosurgery intervention.  Patient has been informed to follow-up with PCP with potential to be referred to pain management for ongoing pain options and medical management long-term [LK]      ED Course User Index  [LK] Lorrie Momin DO KASPER reviewed by Lorrie Momin DO       Medical Decision Making  Problems Addressed:  Chronic right-sided low back pain with sciatica, sciatica laterality unspecified: complicated acute illness or injury    Amount and/or Complexity of Data Reviewed  Labs: ordered.  Radiology: ordered.  ECG/medicine tests: ordered.    Risk  OTC drugs.  Prescription drug management.        Final diagnoses:   Chronic right-sided low back pain with sciatica, sciatica laterality unspecified   Degenerative lumbar spinal stenosis       ED Disposition  ED Disposition       ED Disposition   Discharge    Condition   Stable    Comment   --               [facility] PAIN MANAGEMENT  [address] [address] [phone]             Medication List      No changes were made to your prescriptions during this visit.            Lorrie Momin DO  06/08/24 1011

## 2024-06-08 NOTE — DISCHARGE INSTRUCTIONS
CT performed showed multilevel degenerative disc disease with mild spinal stenosis at L3-L4 and L4-L5 with bilateral foraminal narrowing contributing to patient's chronic back pain with sciatica on the right.  Patient ultimately may require referral to pain management for ongoing care or physical therapy or home health physical therapy benefits.    -All prescribed chronic medications

## 2024-06-09 LAB
QT INTERVAL: 360 MS
QTC INTERVAL: 438 MS

## 2024-06-10 ENCOUNTER — APPOINTMENT (OUTPATIENT)
Dept: CT IMAGING | Facility: HOSPITAL | Age: 79
End: 2024-06-10
Payer: MEDICARE

## 2024-06-10 ENCOUNTER — HOSPITAL ENCOUNTER (OUTPATIENT)
Facility: HOSPITAL | Age: 79
Setting detail: OBSERVATION
Discharge: NURSING, REHAB OR LONG-TERM CARE | End: 2024-06-12
Attending: STUDENT IN AN ORGANIZED HEALTH CARE EDUCATION/TRAINING PROGRAM | Admitting: STUDENT IN AN ORGANIZED HEALTH CARE EDUCATION/TRAINING PROGRAM
Payer: MEDICARE

## 2024-06-10 ENCOUNTER — APPOINTMENT (OUTPATIENT)
Dept: GENERAL RADIOLOGY | Facility: HOSPITAL | Age: 79
End: 2024-06-10
Payer: MEDICARE
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
## 2024-06-10 DIAGNOSIS — S32.030A COMPRESSION FRACTURE OF L3 VERTEBRA, INITIAL ENCOUNTER: Primary | ICD-10-CM

## 2024-06-10 LAB
ALBUMIN SERPL-MCNC: 3.5 G/DL (ref 3.5–5.2)
ALBUMIN/GLOB SERPL: 1 G/DL
ALP SERPL-CCNC: 120 U/L (ref 39–117)
ALT SERPL W P-5'-P-CCNC: 11 U/L (ref 1–33)
ANION GAP SERPL CALCULATED.3IONS-SCNC: 6.5 MMOL/L (ref 5–15)
APTT PPP: 25.7 SECONDS (ref 26.5–34.5)
AST SERPL-CCNC: 18 U/L (ref 1–32)
BASOPHILS # BLD AUTO: 0.06 10*3/MM3 (ref 0–0.2)
BASOPHILS NFR BLD AUTO: 0.8 % (ref 0–1.5)
BILIRUB SERPL-MCNC: 0.3 MG/DL (ref 0–1.2)
BUN SERPL-MCNC: 16 MG/DL (ref 8–23)
BUN/CREAT SERPL: 16.8 (ref 7–25)
CALCIUM SPEC-SCNC: 9.3 MG/DL (ref 8.6–10.5)
CHLORIDE SERPL-SCNC: 98 MMOL/L (ref 98–107)
CO2 SERPL-SCNC: 28.5 MMOL/L (ref 22–29)
CREAT SERPL-MCNC: 0.95 MG/DL (ref 0.57–1)
DEPRECATED RDW RBC AUTO: 44.7 FL (ref 37–54)
EGFRCR SERPLBLD CKD-EPI 2021: 60.3 ML/MIN/1.73
EOSINOPHIL # BLD AUTO: 0.28 10*3/MM3 (ref 0–0.4)
EOSINOPHIL NFR BLD AUTO: 3.9 % (ref 0.3–6.2)
ERYTHROCYTE [DISTWIDTH] IN BLOOD BY AUTOMATED COUNT: 12.4 % (ref 12.3–15.4)
GLOBULIN UR ELPH-MCNC: 3.4 GM/DL
GLUCOSE SERPL-MCNC: 117 MG/DL (ref 65–99)
HCT VFR BLD AUTO: 40 % (ref 34–46.6)
HGB BLD-MCNC: 12.6 G/DL (ref 12–15.9)
HOLD SPECIMEN: NORMAL
HOLD SPECIMEN: NORMAL
IMM GRANULOCYTES # BLD AUTO: 0.01 10*3/MM3 (ref 0–0.05)
IMM GRANULOCYTES NFR BLD AUTO: 0.1 % (ref 0–0.5)
INR PPP: 1.05 (ref 0.9–1.1)
LYMPHOCYTES # BLD AUTO: 1.51 10*3/MM3 (ref 0.7–3.1)
LYMPHOCYTES NFR BLD AUTO: 20.8 % (ref 19.6–45.3)
MAGNESIUM SERPL-MCNC: 2 MG/DL (ref 1.6–2.4)
MCH RBC QN AUTO: 30.6 PG (ref 26.6–33)
MCHC RBC AUTO-ENTMCNC: 31.5 G/DL (ref 31.5–35.7)
MCV RBC AUTO: 97.1 FL (ref 79–97)
MONOCYTES # BLD AUTO: 0.49 10*3/MM3 (ref 0.1–0.9)
MONOCYTES NFR BLD AUTO: 6.7 % (ref 5–12)
NEUTROPHILS NFR BLD AUTO: 4.92 10*3/MM3 (ref 1.7–7)
NEUTROPHILS NFR BLD AUTO: 67.7 % (ref 42.7–76)
NRBC BLD AUTO-RTO: 0 /100 WBC (ref 0–0.2)
PLATELET # BLD AUTO: 198 10*3/MM3 (ref 140–450)
PMV BLD AUTO: 9.8 FL (ref 6–12)
POTASSIUM SERPL-SCNC: 4.5 MMOL/L (ref 3.5–5.2)
PROT SERPL-MCNC: 6.9 G/DL (ref 6–8.5)
PROTHROMBIN TIME: 13.8 SECONDS (ref 12.1–14.7)
RBC # BLD AUTO: 4.12 10*6/MM3 (ref 3.77–5.28)
SODIUM SERPL-SCNC: 133 MMOL/L (ref 136–145)
WBC NRBC COR # BLD AUTO: 7.27 10*3/MM3 (ref 3.4–10.8)
WHOLE BLOOD HOLD COAG: NORMAL
WHOLE BLOOD HOLD SPECIMEN: NORMAL

## 2024-06-10 PROCEDURE — G0378 HOSPITAL OBSERVATION PER HR: HCPCS

## 2024-06-10 PROCEDURE — 85025 COMPLETE CBC W/AUTO DIFF WBC: CPT | Performed by: PHYSICIAN ASSISTANT

## 2024-06-10 PROCEDURE — 25010000002 HYDROMORPHONE PER 4 MG: Performed by: STUDENT IN AN ORGANIZED HEALTH CARE EDUCATION/TRAINING PROGRAM

## 2024-06-10 PROCEDURE — 73552 X-RAY EXAM OF FEMUR 2/>: CPT

## 2024-06-10 PROCEDURE — 85610 PROTHROMBIN TIME: CPT | Performed by: PHYSICIAN ASSISTANT

## 2024-06-10 PROCEDURE — 71045 X-RAY EXAM CHEST 1 VIEW: CPT

## 2024-06-10 PROCEDURE — 73523 X-RAY EXAM HIPS BI 5/> VIEWS: CPT

## 2024-06-10 PROCEDURE — 72131 CT LUMBAR SPINE W/O DYE: CPT

## 2024-06-10 PROCEDURE — 85730 THROMBOPLASTIN TIME PARTIAL: CPT | Performed by: PHYSICIAN ASSISTANT

## 2024-06-10 PROCEDURE — 72128 CT CHEST SPINE W/O DYE: CPT | Performed by: RADIOLOGY

## 2024-06-10 PROCEDURE — 80053 COMPREHEN METABOLIC PANEL: CPT | Performed by: PHYSICIAN ASSISTANT

## 2024-06-10 PROCEDURE — 71045 X-RAY EXAM CHEST 1 VIEW: CPT | Performed by: RADIOLOGY

## 2024-06-10 PROCEDURE — 83735 ASSAY OF MAGNESIUM: CPT | Performed by: PHYSICIAN ASSISTANT

## 2024-06-10 PROCEDURE — 96372 THER/PROPH/DIAG INJ SC/IM: CPT

## 2024-06-10 PROCEDURE — 96374 THER/PROPH/DIAG INJ IV PUSH: CPT

## 2024-06-10 PROCEDURE — 73523 X-RAY EXAM HIPS BI 5/> VIEWS: CPT | Performed by: RADIOLOGY

## 2024-06-10 PROCEDURE — 72128 CT CHEST SPINE W/O DYE: CPT

## 2024-06-10 PROCEDURE — 72131 CT LUMBAR SPINE W/O DYE: CPT | Performed by: RADIOLOGY

## 2024-06-10 PROCEDURE — 51798 US URINE CAPACITY MEASURE: CPT

## 2024-06-10 PROCEDURE — 25010000002 ENOXAPARIN PER 10 MG: Performed by: STUDENT IN AN ORGANIZED HEALTH CARE EDUCATION/TRAINING PROGRAM

## 2024-06-10 PROCEDURE — 73552 X-RAY EXAM OF FEMUR 2/>: CPT | Performed by: RADIOLOGY

## 2024-06-10 PROCEDURE — 99285 EMERGENCY DEPT VISIT HI MDM: CPT

## 2024-06-10 PROCEDURE — 99223 1ST HOSP IP/OBS HIGH 75: CPT

## 2024-06-10 PROCEDURE — 36415 COLL VENOUS BLD VENIPUNCTURE: CPT | Performed by: PHYSICIAN ASSISTANT

## 2024-06-10 RX ORDER — MECLIZINE HYDROCHLORIDE 25 MG/1
25 TABLET ORAL EVERY 8 HOURS PRN
COMMUNITY

## 2024-06-10 RX ORDER — DIAZEPAM 2 MG/1
2 TABLET ORAL DAILY
Status: DISCONTINUED | OUTPATIENT
Start: 2024-06-11 | End: 2024-06-12 | Stop reason: HOSPADM

## 2024-06-10 RX ORDER — SODIUM CHLORIDE 0.9 % (FLUSH) 0.9 %
10 SYRINGE (ML) INJECTION AS NEEDED
Status: DISCONTINUED | OUTPATIENT
Start: 2024-06-10 | End: 2024-06-12 | Stop reason: HOSPADM

## 2024-06-10 RX ORDER — VALSARTAN 40 MG/1
20 TABLET ORAL 2 TIMES DAILY
Status: ON HOLD | COMMUNITY
End: 2024-06-10

## 2024-06-10 RX ORDER — VALSARTAN 40 MG/1
20 TABLET ORAL 2 TIMES DAILY
COMMUNITY
End: 2024-06-12 | Stop reason: HOSPADM

## 2024-06-10 RX ORDER — DIAZEPAM 5 MG/1
5 TABLET ORAL EVERY 12 HOURS PRN
Status: DISCONTINUED | OUTPATIENT
Start: 2024-06-10 | End: 2024-06-10

## 2024-06-10 RX ORDER — MECLIZINE HYDROCHLORIDE 25 MG/1
25 TABLET ORAL EVERY 8 HOURS PRN
Status: DISCONTINUED | OUTPATIENT
Start: 2024-06-10 | End: 2024-06-12 | Stop reason: HOSPADM

## 2024-06-10 RX ORDER — SODIUM CHLORIDE 9 MG/ML
40 INJECTION, SOLUTION INTRAVENOUS AS NEEDED
Status: DISCONTINUED | OUTPATIENT
Start: 2024-06-10 | End: 2024-06-12 | Stop reason: HOSPADM

## 2024-06-10 RX ORDER — BUSPIRONE HYDROCHLORIDE 5 MG/1
5 TABLET ORAL EVERY 12 HOURS SCHEDULED
Status: DISCONTINUED | OUTPATIENT
Start: 2024-06-11 | End: 2024-06-12 | Stop reason: HOSPADM

## 2024-06-10 RX ORDER — NAPROXEN 250 MG/1
500 TABLET ORAL 2 TIMES DAILY PRN
Status: CANCELLED | OUTPATIENT
Start: 2024-06-10

## 2024-06-10 RX ORDER — SODIUM CHLORIDE 0.9 % (FLUSH) 0.9 %
10 SYRINGE (ML) INJECTION EVERY 12 HOURS SCHEDULED
Status: DISCONTINUED | OUTPATIENT
Start: 2024-06-10 | End: 2024-06-12 | Stop reason: HOSPADM

## 2024-06-10 RX ORDER — TRAMADOL HYDROCHLORIDE 50 MG/1
50 TABLET ORAL EVERY 6 HOURS PRN
Status: DISCONTINUED | OUTPATIENT
Start: 2024-06-10 | End: 2024-06-10

## 2024-06-10 RX ORDER — VALSARTAN 40 MG/1
20 TABLET ORAL 2 TIMES DAILY
Status: DISCONTINUED | OUTPATIENT
Start: 2024-06-10 | End: 2024-06-10 | Stop reason: RX

## 2024-06-10 RX ORDER — HYDROMORPHONE HYDROCHLORIDE 1 MG/ML
0.25 INJECTION, SOLUTION INTRAMUSCULAR; INTRAVENOUS; SUBCUTANEOUS ONCE
Status: COMPLETED | OUTPATIENT
Start: 2024-06-10 | End: 2024-06-10

## 2024-06-10 RX ORDER — ROSUVASTATIN CALCIUM 5 MG/1
5 TABLET, COATED ORAL DAILY
COMMUNITY

## 2024-06-10 RX ORDER — ALBUTEROL SULFATE 2.5 MG/3ML
2.5 SOLUTION RESPIRATORY (INHALATION) EVERY 4 HOURS PRN
Status: DISCONTINUED | OUTPATIENT
Start: 2024-06-10 | End: 2024-06-12 | Stop reason: HOSPADM

## 2024-06-10 RX ORDER — VALSARTAN 80 MG/1
40 TABLET ORAL
Status: DISCONTINUED | OUTPATIENT
Start: 2024-06-11 | End: 2024-06-12 | Stop reason: HOSPADM

## 2024-06-10 RX ORDER — HYDROCODONE BITARTRATE AND ACETAMINOPHEN 5; 325 MG/1; MG/1
1 TABLET ORAL EVERY 6 HOURS PRN
Status: DISCONTINUED | OUTPATIENT
Start: 2024-06-10 | End: 2024-06-11

## 2024-06-10 RX ORDER — ROSUVASTATIN CALCIUM 10 MG/1
5 TABLET, COATED ORAL DAILY
Status: DISCONTINUED | OUTPATIENT
Start: 2024-06-11 | End: 2024-06-12 | Stop reason: HOSPADM

## 2024-06-10 RX ORDER — ENOXAPARIN SODIUM 100 MG/ML
40 INJECTION SUBCUTANEOUS EVERY 24 HOURS
Status: DISCONTINUED | OUTPATIENT
Start: 2024-06-10 | End: 2024-06-12 | Stop reason: HOSPADM

## 2024-06-10 RX ORDER — POLYETHYLENE GLYCOL 3350 17 G/17G
17 POWDER, FOR SOLUTION ORAL DAILY
Status: DISCONTINUED | OUTPATIENT
Start: 2024-06-11 | End: 2024-06-12 | Stop reason: HOSPADM

## 2024-06-10 RX ORDER — DOCUSATE SODIUM 100 MG/1
100 CAPSULE, LIQUID FILLED ORAL 2 TIMES DAILY
Status: DISCONTINUED | OUTPATIENT
Start: 2024-06-10 | End: 2024-06-12 | Stop reason: HOSPADM

## 2024-06-10 RX ORDER — ALBUTEROL SULFATE 90 UG/1
2 AEROSOL, METERED RESPIRATORY (INHALATION) EVERY 4 HOURS PRN
COMMUNITY
End: 2024-07-01 | Stop reason: SDUPTHER

## 2024-06-10 RX ADMIN — DOCUSATE SODIUM 100 MG: 100 CAPSULE, LIQUID FILLED ORAL at 22:22

## 2024-06-10 RX ADMIN — ENOXAPARIN SODIUM 40 MG: 40 INJECTION SUBCUTANEOUS at 15:57

## 2024-06-10 RX ADMIN — VITAMINS A AND D OINTMENT 1 APPLICATION: 15.5; 53.4 OINTMENT TOPICAL at 21:02

## 2024-06-10 RX ADMIN — TRAMADOL HYDROCHLORIDE 50 MG: 50 TABLET ORAL at 21:02

## 2024-06-10 RX ADMIN — HYDROMORPHONE HYDROCHLORIDE 0.25 MG: 1 INJECTION, SOLUTION INTRAMUSCULAR; INTRAVENOUS; SUBCUTANEOUS at 10:16

## 2024-06-10 RX ADMIN — Medication 10 ML: at 21:02

## 2024-06-10 RX ADMIN — DIAZEPAM 5 MG: 5 TABLET ORAL at 21:02

## 2024-06-10 NOTE — ED PROVIDER NOTES
Subjective   History of Present Illness  79-year-old female who presents to the ED today via EMS with her daughter due to a fall.  The patient has issues with chronic back pain and sciatica with degenerative disc disease.  Her daughter states over the last several months the patient's pain has progressively worsened and she has had difficulty ambulating.  It has gotten to the point where she has difficulty standing on her own.  She states she was helping her pivot from the potty chair to the recliner when the patient fell and landed on her buttocks.  She did not hit her head and she did not lose consciousness.  The patient was unable to get up so EMS was called and she was transported here.  The patient is complaining of lower back pain as well as right hip pain.  She denies any numbness or tingling in her extremities.  She denies any loss of bowel or bladder function.  The patient's daughter reports that she has urinated several times this morning with no difficulty.  The patient denies any head or neck pain.  She denies any chest pain or difficulty breathing.  She denies any abdominal pain.  EMS did give the patient Zofran and Toradol prior to arrival.  The patient wears 2 L of oxygen at all times due to lung disease.    History provided by:  Patient and relative (daughter)  Fall  Mechanism of injury: fall    Injury location:  Torso and leg  Torso injury location:  Back  Leg injury location:  R hip  Incident location:  Home  Time since incident:  1 hour  Arrived directly from scene: yes    Fall:     Fall occurred:  Standing    Impact surface:  Hard floor    Point of impact:  Buttocks    Entrapped after fall: no    Suspicion of alcohol use: no    Suspicion of drug use: no    Prior to arrival data:     Patient ambulatory at scene: no      Blood loss:  None    Responsiveness at scene:  Alert    Orientation at scene:  Person, place, situation and time    Loss of consciousness: no      Amnesic to event: no     Associated symptoms: back pain    Associated symptoms: no abdominal pain, no chest pain, no difficulty breathing, no headaches, no loss of consciousness, no nausea, no neck pain and no vomiting        Review of Systems   Constitutional:  Positive for activity change.   HENT: Negative.     Eyes: Negative.    Respiratory: Negative.     Cardiovascular:  Negative for chest pain.   Gastrointestinal:  Negative for abdominal pain, nausea and vomiting.   Genitourinary: Negative.    Musculoskeletal:  Positive for arthralgias, back pain and gait problem. Negative for neck pain.   Skin: Negative.    Neurological:  Negative for loss of consciousness and headaches.   Psychiatric/Behavioral: Negative.     All other systems reviewed and are negative.      Past Medical History:   Diagnosis Date    Abdominal distension     Allergic rhinitis     Anxiety     Arthritis     Closed nondisplaced fracture of surgical neck of left humerus with routine healing 3/9/2021    Cough     Dry eyes     Dyslipidemia     Dysuria     Hyperlipidemia     Hypertension     Meniere's disease     Mitral and aortic valve disease     Oral candidiasis     Osteoarthritis     Stomatitis     URI, acute     Vaginal candidiasis     Vocal cord dysfunction        Allergies   Allergen Reactions    Z-Ponce [Azithromycin] Swelling    Amoxicillin Other (See Comments)    Cortisone Swelling     Face swelling      Fish-Derived Products      SEAFOOD    Iodinated Contrast Media     Iodine Itching    Lidocaine Other (See Comments)     Shaking, fever when she had mouth numbed at the dentist    Morphine Mental Status Change    Niacin Other (See Comments)     Flushing     Nystatin Other (See Comments)    Other     Sulfa Antibiotics Other (See Comments)    Verapamil Other (See Comments)     Feel funny        Past Surgical History:   Procedure Laterality Date    CHOLECYSTECTOMY      COLONOSCOPY      DILATATION AND CURETTAGE      HEMORRHOIDECTOMY      HERNIA REPAIR      SHOULDER  SURGERY      SKIN CANCER EXCISION      on Nose    TONSILLECTOMY         Family History   Problem Relation Age of Onset    Diabetes Mother     Cancer Father     Liver disease Sister     Diabetes Sister     Liver disease Brother     Diabetes Brother     Diabetes Daughter     Cancer Other     Liver disease Other     Breast cancer Neg Hx        Social History     Socioeconomic History    Marital status:    Tobacco Use    Smoking status: Never     Passive exposure: Never    Smokeless tobacco: Never    Tobacco comments:     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   Constitutional:       General: She is in acute distress (appears to be in pain).      Appearance: She is ill-appearing (chronically). She is not toxic-appearing or diaphoretic.   HENT:      Head: Normocephalic and atraumatic.      Right Ear: External ear normal.      Left Ear: External ear normal.      Nose: Nose normal.   Eyes:      Conjunctiva/sclera: Conjunctivae normal.      Pupils: Pupils are equal, round, and reactive to light.   Cardiovascular:      Rate and Rhythm: Normal rate and regular rhythm.      Pulses: Normal pulses.      Heart sounds: Normal heart sounds.   Pulmonary:      Effort: Pulmonary effort is normal.      Breath sounds: Normal breath sounds.   Abdominal:      General: Bowel sounds are normal.      Palpations: Abdomen is soft.      Tenderness: There is no abdominal tenderness.   Musculoskeletal:         General: Tenderness (point tender to midline lumbar spine with palpation, pain with any movement) present. No swelling or deformity.      Cervical back: Normal range of motion and neck supple. No tenderness.      Comments: Tenderness to bilateral hips and femurs with palpation.  Strength is equal in upper and lower  extremities bilaterally, sensation intact. Reflexes are normal in lower extremities, no saddle paresthesias, rectal tone is normal.   Skin:     General: Skin is warm and dry.      Capillary Refill: Capillary refill takes less than 2 seconds.      Comments: Pressure wound noted to coccyx area   Neurological:      General: No focal deficit present.      Mental Status: She is alert and oriented to person, place, and time.   Psychiatric:         Mood and Affect: Mood is anxious. Affect is tearful.         Procedures       Results for orders placed or performed during the hospital encounter of 06/10/24   Comprehensive Metabolic Panel    Specimen: Blood   Result Value Ref Range    Glucose 117 (H) 65 - 99 mg/dL    BUN 16 8 - 23 mg/dL    Creatinine 0.95 0.57 - 1.00 mg/dL    Sodium 133 (L) 136 - 145 mmol/L    Potassium 4.5 3.5 - 5.2 mmol/L    Chloride 98 98 - 107 mmol/L    CO2 28.5 22.0 - 29.0 mmol/L    Calcium 9.3 8.6 - 10.5 mg/dL    Total Protein 6.9 6.0 - 8.5 g/dL    Albumin 3.5 3.5 - 5.2 g/dL    ALT (SGPT) 11 1 - 33 U/L    AST (SGOT) 18 1 - 32 U/L    Alkaline Phosphatase 120 (H) 39 - 117 U/L    Total Bilirubin 0.3 0.0 - 1.2 mg/dL    Globulin 3.4 gm/dL    A/G Ratio 1.0 g/dL    BUN/Creatinine Ratio 16.8 7.0 - 25.0    Anion Gap 6.5 5.0 - 15.0 mmol/L    eGFR 60.3 >60.0 mL/min/1.73   Protime-INR    Specimen: Blood   Result Value Ref Range    Protime 13.8 12.1 - 14.7 Seconds    INR 1.05 0.90 - 1.10   aPTT    Specimen: Blood   Result Value Ref Range    PTT 25.7 (L) 26.5 - 34.5 seconds   Magnesium    Specimen: Blood   Result Value Ref Range    Magnesium 2.0 1.6 - 2.4 mg/dL   CBC Auto Differential    Specimen: Blood   Result Value Ref Range    WBC 7.27 3.40 - 10.80 10*3/mm3    RBC 4.12 3.77 - 5.28 10*6/mm3    Hemoglobin 12.6 12.0 - 15.9 g/dL    Hematocrit 40.0 34.0 - 46.6 %    MCV 97.1 (H) 79.0 - 97.0 fL    MCH 30.6 26.6 - 33.0 pg    MCHC 31.5 31.5 - 35.7 g/dL    RDW 12.4 12.3 - 15.4 %    RDW-SD 44.7 37.0 - 54.0 fl    MPV 9.8 6.0  - 12.0 fL    Platelets 198 140 - 450 10*3/mm3    Neutrophil % 67.7 42.7 - 76.0 %    Lymphocyte % 20.8 19.6 - 45.3 %    Monocyte % 6.7 5.0 - 12.0 %    Eosinophil % 3.9 0.3 - 6.2 %    Basophil % 0.8 0.0 - 1.5 %    Immature Grans % 0.1 0.0 - 0.5 %    Neutrophils, Absolute 4.92 1.70 - 7.00 10*3/mm3    Lymphocytes, Absolute 1.51 0.70 - 3.10 10*3/mm3    Monocytes, Absolute 0.49 0.10 - 0.90 10*3/mm3    Eosinophils, Absolute 0.28 0.00 - 0.40 10*3/mm3    Basophils, Absolute 0.06 0.00 - 0.20 10*3/mm3    Immature Grans, Absolute 0.01 0.00 - 0.05 10*3/mm3    nRBC 0.0 0.0 - 0.2 /100 WBC   Green Top (Gel)   Result Value Ref Range    Extra Tube Hold for add-ons.    Lavender Top   Result Value Ref Range    Extra Tube hold for add-on    Gold Top - SST   Result Value Ref Range    Extra Tube Hold for add-ons.    Light Blue Top   Result Value Ref Range    Extra Tube Hold for add-ons.           ED Course  ED Course as of 06/10/24 1323   Mon Manjit 10, 2024   1052 CT Thoracic Spine Without Contrast  FINDINGS: The thoracic spine is well aligned with no acute fractures.  There are diffuse degenerative changes with loss of disc space height  and anterior osteophyte formation. Evaluation of the soft tissues  reveals fibrotic lung changes     IMPRESSION:  No acute process.   [AH]   1053 CT Lumbar Spine Without Contrast  FINDINGS: There is an acute compression fracture of the inferior  endplate of the L3 vertebral body with minimal loss of vertebral body  height. No other fractures are identified. There are diffuse  degenerative changes with loss of disc space height and facet  arthropathy which produces multilevel predominantly neural foraminal  narrowing. The paraspinal soft tissues are unremarkable.     IMPRESSION:  Acute compression fracture involving the inferior endplate  of the L3 vertebral body with minimal loss of vertebral body height.   [AH]   1059 XR Chest 1 View  FINDINGS: The heart is normal in size. The mediastinum is  unremarkable.  There are fibrotic lung changes. No focal opacities or effusions are  evident. There is no pneumothorax. There are no acute osseous  abnormalities.     IMPRESSION:  Fibrotic lung changes with no acute cardiopulmonary process.   [AH]   1059 XR Femur 2 View Bilateral  FINDINGS:  A 2 view exam demonstrates no acute fracture or dislocation.   The joint spaces are preserved.  No soft tissue abnormality is seen.     IMPRESSION:  No acute fracture.    [AH]   1100 XR Hips Bilateral With or Without Pelvis 5 View     FINDINGS: There are no fractures or dislocations. The joint spaces are  preserved. The pubic symphysis and SI joints are intact. The soft  tissues are unremarkable.     IMPRESSION:  No fracture or dislocation.   [AH]   1139 Spoke with Osbaldo Moncada in the transfer center at  - she will have spine team look at the images and then call us back. [AH]   1202 Discussed with Dr. Fermin Lugo, spine team at  - advised this is a non-surgical case at this time. Agrees with plan to admit here for pain control and PT/OT. [AH]   1217 Dr. Shay to admit [AH]   1224 Patient is having some difficulty urinating, currently has external catheter in place. Bladder scan shows 380 mL in the bladder. Tried patient on bed pan with no results. I did discuss this with Dr. Shay - he advised no rausch catheter at this time and to continue to monitor. [AH]      ED Course User Index  [AH] Nathalie Verdin PA                                             Medical Decision Making  79-year-old female who presents to the ED today due to a fall at home.  She is found to have an acute L3 compression fracture.  No fracture to the hips, pelvis or femurs.  I contacted the spine team at .  They advised this is nonsurgical at this time.  Patient will need to be admitted for pain control and PT/OT consult.  I discussed the patient with Dr. Shay who will admit to [facility].    Problems Addressed:  Compression fracture of L3 vertebra,  initial encounter: complicated acute illness or injury  Fall in home, initial encounter: complicated acute illness or injury    Amount and/or Complexity of Data Reviewed  Labs: ordered.  Radiology: ordered. Decision-making details documented in ED Course.    Risk  Prescription drug management.  Decision regarding hospitalization.        Final diagnoses:   Compression fracture of L3 vertebra, initial encounter   Fall in home, initial encounter       ED Disposition  ED Disposition       ED Disposition   Decision to Admit    Condition   --    Comment   Level of Care: Med/Surg [1]   Diagnosis: Compression fracture of L3 vertebra [[id number]]                 No follow-up provider specified.       Medication List      No changes were made to your prescriptions during this visit.            Nathalie Verdin PA  06/10/24 8567

## 2024-06-10 NOTE — CASE MANAGEMENT/SOCIAL WORK
Discharge Planning Assessment  Psychiatric     Patient Name: Edgar Cotto  MRN: [id number]  Today's Date: 6/10/2024    Admit Date: 6/10/2024    Plan: SS received consult per Physician for discharge planning.  SS noted ED Case Management completed initial assessment.  SS spoke with pt and pt's daughter Ava at bedside.  Pt and daughter agreeable to short term nursing home placement for rehab in [facility] and would be agreeable to [facility].  SS will send available information to [facility] for review.  Pt will require prior authorization with insurance.  SS noted therapy consults.  SS will follow.       Discharge Plan       Row Name 06/10/24 1619       Plan    Plan SS received consult per Physician for discharge planning.  SS noted ED Case Management completed initial assessment.  SS spoke with pt and pt's daughter Ava at bedside.  Pt and daughter agreeable to short term nursing home placement for rehab in [facility] and would be agreeable to [facility].  SS will send available information to [facility] for review.  Pt will require prior authorization with insurance.  SS noted therapy consults.  SS will follow.                  Continued Care and Services - Admitted Since 6/10/2024    No active coordination exists for this encounter.       Selected Continued Care - Episodes Includes continued care and service providers with selected services from the active episodes listed below      Ambulatory Social Work Case Management Episode start date: 5/29/2024   There are no active outsourced providers for this episode.                 Expected Discharge Date and Time       Expected Discharge Date Expected Discharge Time    Jun 11, 2024           SAUL Murray

## 2024-06-10 NOTE — DISCHARGE PLACEMENT REQUEST
"Edgar Stokes (79 y.o. Female)       Date of Birth   1945    Social Security Number       Address   [address] [address]    Home Phone   [phone]    MRN   [id number]       Samaritan   Confucianism    Marital Status                               Admission Date   6/10/24    Admission Type   Emergency    Admitting Provider   Nitesh Shay DO    Attending Provider   Nitesh Shay DO    Department, Room/Bed   [address], 3302/1S       Discharge Date       Discharge Disposition       Discharge Destination                                 Attending Provider: Nitesh Shay DO    Allergies: Z-mary [Azithromycin], Amoxicillin, Cortisone, Fish-derived Products, Iodinated Contrast Media, Iodine, Lidocaine, Morphine, Niacin, Nystatin, Other, Sulfa Antibiotics, Verapamil    Isolation: None   Infection: None   Code Status: CPR    Ht: 152.4 cm (60\")   Wt: 75 kg (165 lb 5.5 oz)    Admission Cmt: None   Principal Problem: Compression fracture of L3 vertebra [S32.030A]                   Active Insurance as of 6/10/2024       Primary Coverage       Payor Plan Insurance Group Employer/Plan Group    ANTHEM MEDICARE REPLACEMENT ANTH MEDICARE ADVANTAGE [id number]       Payor Plan Address Payor Plan Phone Number Payor Plan Fax Number Effective Dates    [address] [phone]  1/1/2021 - None Entered    [facility] [address]         Subscriber Name Subscriber Birth Date Member ID       EDGAR STOKES 1945 [id number]                     Emergency Contacts        (Rel.) Home Phone Work Phone Mobile Phone    Ava Andersen (Daughter) [phone] -- --              Emergency Contact Information       Name Relation Home Work Ava Urbina Daughter [phone]            Insurance Information                  ANTHEM MEDICARE REPLACEMENT/ANTHEM MEDICARE ADVANTAGE Phone: [phone]    Subscriber: Edgar Stokes Subscriber#: " [id number]    Group#: [id number] Precert#: --          Problem List             Codes Noted - Resolved       Hospital    * (Principal) Compression fracture of L3 vertebra ICD-10-CM: S32.030A  ICD-9-CM: 805.4 6/10/2024 - Present       Non-Hospital    Physical deconditioning ICD-10-CM: R53.81  ICD-9-CM: 799.3 4/16/2024 - Present    BMI 32.0-32.9,adult ICD-10-CM: Z68.32  ICD-9-CM: V85.32 2/8/2024 - Present    Scoliosis ICD-10-CM: M41.9  ICD-9-CM: 737.30 4/24/2023 - Present    Hip pain ICD-10-CM: M25.559  ICD-9-CM: 719.45 4/24/2023 - Present    Lumbar degenerative disc disease ICD-10-CM: M51.36  ICD-9-CM: 722.52 4/24/2023 - Present    Restrictive lung disease (Chronic) ICD-10-CM: J98.4  ICD-9-CM: 518.89 10/19/2022 - Present    CABAN (dyspnea on exertion) (Chronic) ICD-10-CM: R06.09  ICD-9-CM: 786.09 7/19/2022 - Present    Hypoxia ICD-10-CM: R09.02  ICD-9-CM: 799.02 7/19/2022 - Present    Cervical lymphadenopathy ICD-10-CM: R59.0  ICD-9-CM: 785.6 4/14/2022 - Present    Age-related osteoporosis without current pathological fracture (Chronic) ICD-10-CM: M81.0  ICD-9-CM: 733.01 11/18/2021 - Present    Thyroid cyst (Chronic) ICD-10-CM: E04.1  ICD-9-CM: 246.2 12/18/2019 - Present    Dyslipidemia (Chronic) ICD-10-CM: E78.5  ICD-9-CM: 272.4 4/3/2019 - Present    Abnormal EKG ICD-10-CM: R94.31  ICD-9-CM: 794.31 3/13/2019 - Present    Moderate obesity ICD-10-CM: E66.8  ICD-9-CM: 278.00 3/13/2019 - Present    S/P hemorrhoidectomy ICD-10-CM: Z98.890, Z87.19  ICD-9-CM: V45.89 1/28/2019 - Present    Meniere's disease of both ears (Chronic) ICD-10-CM: H81.03  ICD-9-CM: 386.00 7/12/2016 - Present    Atopic rhinitis ICD-10-CM: J30.9  ICD-9-CM: 477.9 4/26/2016 - Present    Dizziness ICD-10-CM: R42  ICD-9-CM: 780.4 4/26/2016 - Present    Generalized anxiety disorder (Chronic) ICD-10-CM: F41.1  ICD-9-CM: 300.02 4/26/2016 - Present    Generalized osteoarthritis (Chronic) ICD-10-CM: M15.9  ICD-9-CM: 715.00 4/26/2016 - Present    Essential  "hypertension (Chronic) ICD-10-CM: I10  ICD-9-CM: 401.9 2016 - Present    Auditory vertigo ICD-10-CM: H81.319  ICD-9-CM: 386.19 2016 - Present    Mitral and aortic valve disease (Chronic) ICD-10-CM: I08.0  ICD-9-CM: 396.9 2016 - Present    Stomatitis ICD-10-CM: K12.1  ICD-9-CM: 528.00 2016 - Present    Seasonal allergic rhinitis (Chronic) ICD-10-CM: J30.2  ICD-9-CM: 477.9 2016 - Present    Varicose veins of both lower extremities ICD-10-CM: I83.93  ICD-9-CM: 454.9 2016 - Present        History & Physical        Giancarlo Logan PA-C at 06/10/24 1320              [facility] Medicine Services  History & Physical    Patient Identification:  Name:  Edgar Cotto  Age:  79 y.o.  Sex:  female  :  1945  MRN:  [id number]   Visit Number:  [id number]  Admit Date: 6/10/2024   Primary Care Physician:  Abbie Hillman DO    Subjective     Chief complaint: Fall, hip pain    History of presenting illness:      Edgar Cotto is a 79 y.o. female who presented for further evaluation of low back and right lower extremity pain. She was seen and examined in the ED with family member at the bedside who helps provide the history. They report patient with chronic low back and RLE pain which has been progressively worsening over the past several months. They state that typically patient had been able to ambulate around her home with a walker however over the past 4 weeks she has been unable to bear weight, requiring her sister to push her around the home on the seat of the wheelchair. This morning while sister was attempting to stand her up to pivot onto bedside commode she states \"I just lost her\" and the patient fell onto her buttocks. She had immediate worsening of her back and right hip, LE pain. Patient did not hit her head. She denied other injury. On further ROS she denied shortness of breath greater than baseline, no chest pain or palpitations. See full " ROS below. Patient and daughter report that for the past several weeks they have been working with PCP to wean patient off of valium to allow her to take more pain medication. She reports is currently taking valium once daily and tramadol.     Past medical history is significant for BONG, osteoarthritis, HTN, hyperlipidemia, lumbar DDD, mitral and aortic valve disease, restrictive lung disease with chronic respiratory failure on 2 L at baseline    Upon arrival to the ED, vital signs were temp 97.1, heart rate 91, respirations 18, /85, SpO2 97% on RA.  Imaging workup identified acute compression fracture involving the inferior endplate of the L3 vertebral body with minimal loss of vertebral body height.  EKG was NSR    Known Emergency Department medications received prior to my evaluation included Dilaudid.   Emergency Department Room location at the time of my evaluation was 114.     ---------------------------------------------------------------------------------------------------------------------   Review of Systems   Constitutional:  Positive for activity change. Negative for chills and fever.   HENT:  Negative for congestion and rhinorrhea.    Respiratory:  Negative for cough and shortness of breath.    Cardiovascular:  Negative for chest pain.   Gastrointestinal:  Negative for abdominal pain, constipation and diarrhea.   Genitourinary:  Negative for difficulty urinating and dysuria.   Musculoskeletal:  Positive for arthralgias, gait problem and myalgias.   Skin:  Negative for rash and wound.   Neurological:  Positive for weakness. Negative for dizziness and light-headedness.        ---------------------------------------------------------------------------------------------------------------------   Past Medical History:   Diagnosis Date    Abdominal distension     Allergic rhinitis     Anxiety     Arthritis     Closed nondisplaced fracture of surgical neck of left humerus with routine healing 3/9/2021     Cough     Dry eyes     Dyslipidemia     Dysuria     Hyperlipidemia     Hypertension     Meniere's disease     Mitral and aortic valve disease     Oral candidiasis     Osteoarthritis     Stomatitis     URI, acute     Vaginal candidiasis     Vocal cord dysfunction      Past Surgical History:   Procedure Laterality Date    CHOLECYSTECTOMY      COLONOSCOPY      DILATATION AND CURETTAGE      HEMORRHOIDECTOMY      HERNIA REPAIR      SHOULDER SURGERY      SKIN CANCER EXCISION      on Nose    TONSILLECTOMY       Family History   Problem Relation Age of Onset    Diabetes Mother     Cancer Father     Liver disease Sister     Diabetes Sister     Liver disease Brother     Diabetes Brother     Diabetes Daughter     Cancer Other     Liver disease Other     Breast cancer Neg Hx      Social History     Socioeconomic History    Marital status:    Tobacco Use    Smoking status: Never     Passive exposure: Never    Smokeless tobacco: Never    Tobacco comments:     She previously worked in a [employer] and is now retired   Vaping Use    Vaping status: Never Used   Substance and Sexual Activity    Alcohol use: No    Drug use: No    Sexual activity: Defer     ---------------------------------------------------------------------------------------------------------------------   Allergies:  Z-mary [azithromycin], Amoxicillin, Cortisone, Fish-derived products, Iodinated contrast media, Iodine, Lidocaine, Morphine, Niacin, Nystatin, Other, Sulfa antibiotics, and Verapamil  ---------------------------------------------------------------------------------------------------------------------   Home medications:    Medications below are reported home medications pulling from within the system; at this time, these medications have not been reconciled unless otherwise specified and are in the verification process for further verifcation as current home medications.  (Not in a hospital admission)      Hospital Scheduled Meds:          Current  listed hospital scheduled medications may not yet reflect those currently placed in orders that are signed and held awaiting patient's arrival to floor.   ---------------------------------------------------------------------------------------------------------------------     Objective     Vital Signs:  Temp:  [97.1 °F (36.2 °C)] 97.1 °F (36.2 °C)  Heart Rate:  [75-91] 90  Resp:  [18] 18  BP: (129-141)/(82-86) 138/82      06/10/24  0940   Weight: 74 kg (163 lb 2.3 oz)     Body mass index is 31.86 kg/m².  ---------------------------------------------------------------------------------------------------------------------       Physical Exam  Vitals and nursing note reviewed.   Constitutional:       General: She is not in acute distress.     Appearance: She is obese.   HENT:      Head: Normocephalic and atraumatic.   Eyes:      Extraocular Movements: Extraocular movements intact.   Cardiovascular:      Rate and Rhythm: Normal rate and regular rhythm.   Pulmonary:      Effort: Pulmonary effort is normal.      Breath sounds: Normal breath sounds.   Abdominal:      Palpations: Abdomen is soft.   Musculoskeletal:      Right lower leg: No edema.      Left lower leg: No edema.   Skin:     General: Skin is warm and dry.   Neurological:      Mental Status: She is alert. Mental status is at baseline.   Psychiatric:         Mood and Affect: Mood normal.         Behavior: Behavior normal.         ---------------------------------------------------------------------------------------------------------------------  EKG:      I have personally looked at the EKG.  ---------------------------------------------------------------------------------------------------------------------   Results from last 7 days   Lab Units 06/10/24  1006 06/08/24  0809   LACTATE mmol/L  --  1.0   WBC 10*3/mm3 7.27 6.87   HEMOGLOBIN g/dL 12.6 13.4   HEMATOCRIT % 40.0 41.1   MCV fL 97.1* 97.9*   MCHC g/dL 31.5 32.6   PLATELETS 10*3/mm3 198 187   INR  1.05   "--          Results from last 7 days   Lab Units 06/10/24  1006 06/08/24  0809   SODIUM mmol/L 133* 138   POTASSIUM mmol/L 4.5 3.8   MAGNESIUM mg/dL 2.0  --    CHLORIDE mmol/L 98 99   CO2 mmol/L 28.5 28.6   BUN mg/dL 16 12   CREATININE mg/dL 0.95 0.89   CALCIUM mg/dL 9.3 9.4   GLUCOSE mg/dL 117* 114*   ALBUMIN g/dL 3.5 3.7   BILIRUBIN mg/dL 0.3 0.5   ALK PHOS U/L 120* 121*   AST (SGOT) U/L 18 19   ALT (SGPT) U/L 11 13   Estimated Creatinine Clearance: 41.7 mL/min (by C-G formula based on SCr of 0.95 mg/dL).  No results found for: \"AMMONIA\"  Results from last 7 days   Lab Units 06/08/24  1009 06/08/24  0809   HSTROP T ng/L 8 9     Results from last 7 days   Lab Units 06/08/24  0809   PROBNP pg/mL 95.0     Lab Results   Component Value Date    HGBA1C 5.50 07/13/2017     Lab Results   Component Value Date    TSH 0.806 02/08/2024    FREET4 1.13 02/08/2024     No results found for: \"PREGTESTUR\", \"PREGSERUM\", \"HCG\", \"HCGQUANT\"  Pain Management Panel  More data exists         Latest Ref Rng & Units 4/16/2024 2/8/2024   Pain Management Panel   Amphetamine, Urine Qual Negative Negative  Negative    Barbiturates Screen, Urine Negative Negative  Negative    Benzodiazepine Screen, Urine Negative Positive  Positive    Buprenorphine, Screen, Urine Negative Negative  Negative    Cocaine Screen, Urine Negative Negative  Negative    Fentanyl, Urine Negative Negative  Negative    Methadone Screen , Urine Negative Negative  Negative    Methamphetamine, Ur Negative Negative  Negative      No results found for: \"BLOODCX\"  No results found for: \"URINECX\"  No results found for: \"WOUNDCX\"  No results found for: \"STOOLCX\"      ---------------------------------------------------------------------------------------------------------------------  Imaging Results (Last 7 Days)       Procedure Component Value Units Date/Time    XR Chest 1 View [[id number]] Collected: 06/10/24 1054     Updated: 06/10/24 1057    Narrative:      PROCEDURE: XR CHEST " 1 VW-       HISTORY: fall     COMPARISON: 2/19/2021.     FINDINGS: The heart is normal in size. The mediastinum is unremarkable.  There are fibrotic lung changes. No focal opacities or effusions are  evident. There is no pneumothorax. There are no acute osseous  abnormalities.       Impression:      Fibrotic lung changes with no acute cardiopulmonary process.        This report was finalized on 6/10/2024 10:55 AM by Ioana Christina M.D..       XR Femur 2 View Bilateral [[id number]] Collected: 06/10/24 1053     Updated: 06/10/24 1055    Narrative:      PROCEDURE: XR FEMUR 2 VW BILATERAL-     HISTORY: fall     COMPARISON: None.     FINDINGS:  A 2 view exam demonstrates no acute fracture or dislocation.   The joint spaces are preserved.  No soft tissue abnormality is seen.       Impression:      No acute fracture.                     This report was finalized on 6/10/2024 10:53 AM by Ioana Christina M.D..       XR Hips Bilateral With or Without Pelvis 5 View [[id number]] Collected: 06/10/24 1053     Updated: 06/10/24 1055    Narrative:      PROCEDURE: XR HIPS BILATERAL W OR WO PELVIS 5 VIEW-     HISTORY: fall     COMPARISON: None.     FINDINGS: There are no fractures or dislocations. The joint spaces are  preserved. The pubic symphysis and SI joints are intact. The soft  tissues are unremarkable.       Impression:      No fracture or dislocation.              This report was finalized on 6/10/2024 10:53 AM by Ioana Christina M.D..       CT Thoracic Spine Without Contrast [[id number]] Collected: 06/10/24 1033     Updated: 06/10/24 1036    Narrative:      PROCEDURE: CT THORACIC SPINE WO CONTRAST-     HISTORY: fall     TECHNIQUE: Multiple axial CT images were obtained through the thoracic  spine using bone window algorithms. Coronal and sagittal images were  reconstructed from the original axial data set. This study was performed  with techniques to keep radiation doses as low as reasonably achievable  (ALARA).  "Individualized dose reduction techniques using automated  exposure control or adjustment of mA and/or kV according to the patient  size were employed.     COMPARISON: None.     FINDINGS: The thoracic spine is well aligned with no acute fractures.  There are diffuse degenerative changes with loss of disc space height  and anterior osteophyte formation. Evaluation of the soft tissues  reveals fibrotic lung changes       Impression:      No acute process.              This report was finalized on 6/10/2024 10:34 AM by Ioana Christina M.D..       CT Lumbar Spine Without Contrast [[id number]] Collected: 06/10/24 1029     Updated: 06/10/24 1034    Narrative:      PROCEDURE: CT LUMBAR SPINE WO CONTRAST-     HISTORY: fall     TECHNIQUE: Multiple axial CT images were obtained through the lumbar  spine using bone window algorithms. Coronal and sagittal images were  reconstructed from the original axial data set. This study was performed  with techniques to keep radiation doses as low as reasonably achievable  (ALARA). Individualized dose reduction techniques using automated  exposure control or adjustment of mA and/or kV according to the patient  size were employed.     COMPARISON: 6/8/2024.     FINDINGS: There is an acute compression fracture of the inferior  endplate of the L3 vertebral body with minimal loss of vertebral body  height. No other fractures are identified. There are diffuse  degenerative changes with loss of disc space height and facet  arthropathy which produces multilevel predominantly neural foraminal  narrowing. The paraspinal soft tissues are unremarkable.       Impression:      Acute compression fracture involving the inferior endplate  of the L3 vertebral body with minimal loss of vertebral body height.              This report was finalized on 6/10/2024 10:32 AM by Ioana Christina M.D..               Cultures:  No results found for: \"BLOODCX\", \"URINECX\", \"WOUNDCX\", \"MRSACX\", \"RESPCX\", " "\"STOOLCX\"    Last echocardiogram:  Results for orders placed during the hospital encounter of 08/03/22    Adult Transthoracic Echo Complete W/ Cont if Necessary Per Protocol    Interpretation Summary  · Normal left ventricular cavity size and wall thickness noted  · The following left ventricular wall segments are hypokinetic: mid inferoseptal.  · Left ventricular ejection fraction appears to be 56 - 60%.  · Left ventricular diastolic function is consistent with (grade I) impaired relaxation.  · The aortic valve is abnormal in structure. There is mild calcification of the aortic valve. The aortic valve appears trileaflet. Mild aortic valve regurgitation is present. No aortic valve stenosis is present.  · The mitral valve is structurally normal with no regurgitation or significant stenosis present.  · There is no evidence of pericardial effusion.          I have personally reviewed the above radiology images and read the final radiology report on 06/10/24  ---------------------------------------------------------------------------------------------------------------------  Assessment / Plan     Active Hospital Problems    Diagnosis  POA    **Compression fracture of L3 vertebra [S32.030A]  Yes       ASSESSMENT/PLAN:    Intractable pain due to compression fracture of L3 vertebra  Fall, PTA  Lumbar degenerative disc disease  Debility, secondary to above and likely age-related  Patient presented to the ED after a fall at home complaining of low back and right hip pain.  Imaging without concern for hip fracture though did note acute compression fracture of the L3 vertebra.  On further discussion patient and family report patient with persistently worsening pain over the previous several weeks to months to the extent that she has difficulty standing and ambulating at home.  Will admit to [facility] for further observation and management  Start pain regimen with Norco 5-325 mg every 6 hours as needed  PT OT consults  Case " management consult to assist with discharge planning  Continue supportive care measures    Chronic:  BONG  Osteoarthritis  HTN  HLD  Restrictive lung disease with chronic respiratory failure on 2 L at baseline  Will resume home regimen as indicated once med rec is complete  Monitor vital signs closely  Continue supplemental O2 titrate as needed  ----------  -DVT prophylaxis: Lovenox  -Activity: Up in chair  -Expected length of stay: Less than 2 midnights  -Disposition pending course    High risk secondary to intractable pain due to compression fracture    Code Status and Medical Interventions:   Ordered at: 06/10/24 1232     Code Status (Patient has no pulse and is not breathing):    CPR (Attempt to Resuscitate)     Medical Interventions (Patient has pulse or is breathing):    Full Support       Giancarlo Logan PA-C   06/10/24  13:20 EDT    Electronically signed by Giancarlo Logan PA-C at 06/10/24 1408          Discharge Summaryl        Salma Weaver BSW at 06/10/24 1622          Discharge Planning Assessment  [facility]     Patient Name: Edgar Cotto  MRN: [id number]  Today's Date: 6/10/2024    Admit Date: 6/10/2024    Plan: SS received consult per Physician for discharge planning.  SS noted ED Case Management completed initial assessment.  SS spoke with pt and pt's daughter Ava at bedside.  Pt and daughter agreeable to short term nursing home placement for rehab in [facility] and would be agreeable to [facility].  SS will send available information to [facility] for review.  Pt will require prior authorization with insurance.  SS noted therapy consults.  SS will follow.       Discharge Plan       Row Name 06/10/24 1619       Plan    Plan SS received consult per Physician for discharge planning.  SS noted ED Case Management completed initial assessment.  SS spoke with pt and pt's daughter Ava at bedside.  Pt and daughter agreeable to short term nursing home placement for rehab  in [facility] and would be agreeable to [facility].  SS will send available information to [facility] for review.  Pt will require prior authorization with insurance.  SS noted therapy consults.  SS will follow.                  Continued Care and Services - Admitted Since 6/10/2024    No active coordination exists for this encounter.       Selected Continued Care - Episodes Includes continued care and service providers with selected services from the active episodes listed below      Ambulatory Social Work Case Management Episode start date: 2024   There are no active outsourced providers for this episode.                 Expected Discharge Date and Time       Expected Discharge Date Expected Discharge Time    2024           SAUL Murray      Electronically signed by Salma Weaver BSW at 06/10/24 1622       Roland Arrington RN at 06/10/24 1520          Goal Outcome Evaluation:   Pt resting in bed. Pt transferred from ED this shift. No complaints or requests at this time. VSS. Will continue plan of care.                                              Electronically signed by Roland Arrington RN at 06/10/24 1521       Giancarlo Logan PA-C at 06/10/24 1320              [facility] Medicine Services  History & Physical    Patient Identification:  Name:  Edgar Cotto  Age:  79 y.o.  Sex:  female  :  1945  MRN:  [id number]   Visit Number:  [id number]  Admit Date: 6/10/2024   Primary Care Physician:  Abbie Hillman DO    Subjective     Chief complaint: Fall, hip pain    History of presenting illness:      Edgar Cotto is a 79 y.o. female who presented for further evaluation of low back and right lower extremity pain. She was seen and examined in the ED with family member at the bedside who helps provide the history. They report patient with chronic low back and RLE pain which has been progressively worsening over the past several  "months. They state that typically patient had been able to ambulate around her home with a walker however over the past 4 weeks she has been unable to bear weight, requiring her sister to push her around the home on the seat of the wheelchair. This morning while sister was attempting to stand her up to pivot onto bedside commode she states \"I just lost her\" and the patient fell onto her buttocks. She had immediate worsening of her back and right hip, LE pain. Patient did not hit her head. She denied other injury. On further ROS she denied shortness of breath greater than baseline, no chest pain or palpitations. See full ROS below. Patient and daughter report that for the past several weeks they have been working with PCP to wean patient off of valium to allow her to take more pain medication. She reports is currently taking valium once daily and tramadol.     Past medical history is significant for BONG, osteoarthritis, HTN, hyperlipidemia, lumbar DDD, mitral and aortic valve disease, restrictive lung disease with chronic respiratory failure on 2 L at baseline    Upon arrival to the ED, vital signs were temp 97.1, heart rate 91, respirations 18, /85, SpO2 97% on RA.  Imaging workup identified acute compression fracture involving the inferior endplate of the L3 vertebral body with minimal loss of vertebral body height.  EKG was NSR    Known Emergency Department medications received prior to my evaluation included Dilaudid.   Emergency Department Room location at the time of my evaluation was 114.     ---------------------------------------------------------------------------------------------------------------------   Review of Systems   Constitutional:  Positive for activity change. Negative for chills and fever.   HENT:  Negative for congestion and rhinorrhea.    Respiratory:  Negative for cough and shortness of breath.    Cardiovascular:  Negative for chest pain.   Gastrointestinal:  Negative for abdominal " pain, constipation and diarrhea.   Genitourinary:  Negative for difficulty urinating and dysuria.   Musculoskeletal:  Positive for arthralgias, gait problem and myalgias.   Skin:  Negative for rash and wound.   Neurological:  Positive for weakness. Negative for dizziness and light-headedness.        ---------------------------------------------------------------------------------------------------------------------   Past Medical History:   Diagnosis Date    Abdominal distension     Allergic rhinitis     Anxiety     Arthritis     Closed nondisplaced fracture of surgical neck of left humerus with routine healing 3/9/2021    Cough     Dry eyes     Dyslipidemia     Dysuria     Hyperlipidemia     Hypertension     Meniere's disease     Mitral and aortic valve disease     Oral candidiasis     Osteoarthritis     Stomatitis     URI, acute     Vaginal candidiasis     Vocal cord dysfunction      Past Surgical History:   Procedure Laterality Date    CHOLECYSTECTOMY      COLONOSCOPY      DILATATION AND CURETTAGE      HEMORRHOIDECTOMY      HERNIA REPAIR      SHOULDER SURGERY      SKIN CANCER EXCISION      on Nose    TONSILLECTOMY       Family History   Problem Relation Age of Onset    Diabetes Mother     Cancer Father     Liver disease Sister     Diabetes Sister     Liver disease Brother     Diabetes Brother     Diabetes Daughter     Cancer Other     Liver disease Other     Breast cancer Neg Hx      Social History     Socioeconomic History    Marital status:    Tobacco Use    Smoking status: Never     Passive exposure: Never    Smokeless tobacco: Never    Tobacco comments:     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   Vaping Use    Vaping status: Never Used   Substance and Sexual Activity    Alcohol use: No    Drug use: No    Sexual activity: Defer     ---------------------------------------------------------------------------------------------------------------------   Allergies:  Z-mary [azithromycin],  Amoxicillin, Cortisone, Fish-derived products, Iodinated contrast media, Iodine, Lidocaine, Morphine, Niacin, Nystatin, Other, Sulfa antibiotics, and Verapamil  ---------------------------------------------------------------------------------------------------------------------   Home medications:    Medications below are reported home medications pulling from within the system; at this time, these medications have not been reconciled unless otherwise specified and are in the verification process for further verifcation as current home medications.  (Not in a hospital admission)      Hospital Scheduled Meds:          Current listed hospital scheduled medications may not yet reflect those currently placed in orders that are signed and held awaiting patient's arrival to floor.   ---------------------------------------------------------------------------------------------------------------------     Objective     Vital Signs:  Temp:  [97.1 °F (36.2 °C)] 97.1 °F (36.2 °C)  Heart Rate:  [75-91] 90  Resp:  [18] 18  BP: (129-141)/(82-86) 138/82      06/10/24  0940   Weight: 74 kg (163 lb 2.3 oz)     Body mass index is 31.86 kg/m².  ---------------------------------------------------------------------------------------------------------------------       Physical Exam  Vitals and nursing note reviewed.   Constitutional:       General: She is not in acute distress.     Appearance: She is obese.   HENT:      Head: Normocephalic and atraumatic.   Eyes:      Extraocular Movements: Extraocular movements intact.   Cardiovascular:      Rate and Rhythm: Normal rate and regular rhythm.   Pulmonary:      Effort: Pulmonary effort is normal.      Breath sounds: Normal breath sounds.   Abdominal:      Palpations: Abdomen is soft.   Musculoskeletal:      Right lower leg: No edema.      Left lower leg: No edema.   Skin:     General: Skin is warm and dry.   Neurological:      Mental Status: She is alert. Mental status is at baseline.  "  Psychiatric:         Mood and Affect: Mood normal.         Behavior: Behavior normal.         ---------------------------------------------------------------------------------------------------------------------  EKG:      I have personally looked at the EKG.  ---------------------------------------------------------------------------------------------------------------------   Results from last 7 days   Lab Units 06/10/24  1006 06/08/24  0809   LACTATE mmol/L  --  1.0   WBC 10*3/mm3 7.27 6.87   HEMOGLOBIN g/dL 12.6 13.4   HEMATOCRIT % 40.0 41.1   MCV fL 97.1* 97.9*   MCHC g/dL 31.5 32.6   PLATELETS 10*3/mm3 198 187   INR  1.05  --          Results from last 7 days   Lab Units 06/10/24  1006 06/08/24  0809   SODIUM mmol/L 133* 138   POTASSIUM mmol/L 4.5 3.8   MAGNESIUM mg/dL 2.0  --    CHLORIDE mmol/L 98 99   CO2 mmol/L 28.5 28.6   BUN mg/dL 16 12   CREATININE mg/dL 0.95 0.89   CALCIUM mg/dL 9.3 9.4   GLUCOSE mg/dL 117* 114*   ALBUMIN g/dL 3.5 3.7   BILIRUBIN mg/dL 0.3 0.5   ALK PHOS U/L 120* 121*   AST (SGOT) U/L 18 19   ALT (SGPT) U/L 11 13   Estimated Creatinine Clearance: 41.7 mL/min (by C-G formula based on SCr of 0.95 mg/dL).  No results found for: \"AMMONIA\"  Results from last 7 days   Lab Units 06/08/24  1009 06/08/24  0809   HSTROP T ng/L 8 9     Results from last 7 days   Lab Units 06/08/24  0809   PROBNP pg/mL 95.0     Lab Results   Component Value Date    HGBA1C 5.50 07/13/2017     Lab Results   Component Value Date    TSH 0.806 02/08/2024    FREET4 1.13 02/08/2024     No results found for: \"PREGTESTUR\", \"PREGSERUM\", \"HCG\", \"HCGQUANT\"  Pain Management Panel  More data exists         Latest Ref Rng & Units 4/16/2024 2/8/2024   Pain Management Panel   Amphetamine, Urine Qual Negative Negative  Negative    Barbiturates Screen, Urine Negative Negative  Negative    Benzodiazepine Screen, Urine Negative Positive  Positive    Buprenorphine, Screen, Urine Negative Negative  Negative    Cocaine Screen, Urine " "Negative Negative  Negative    Fentanyl, Urine Negative Negative  Negative    Methadone Screen , Urine Negative Negative  Negative    Methamphetamine, Ur Negative Negative  Negative      No results found for: \"BLOODCX\"  No results found for: \"URINECX\"  No results found for: \"WOUNDCX\"  No results found for: \"STOOLCX\"      ---------------------------------------------------------------------------------------------------------------------  Imaging Results (Last 7 Days)       Procedure Component Value Units Date/Time    XR Chest 1 View [[id number]] Collected: 06/10/24 1054     Updated: 06/10/24 1057    Narrative:      PROCEDURE: XR CHEST 1 VW-       HISTORY: fall     COMPARISON: 2/19/2021.     FINDINGS: The heart is normal in size. The mediastinum is unremarkable.  There are fibrotic lung changes. No focal opacities or effusions are  evident. There is no pneumothorax. There are no acute osseous  abnormalities.       Impression:      Fibrotic lung changes with no acute cardiopulmonary process.        This report was finalized on 6/10/2024 10:55 AM by Ioana Christina M.D..       XR Femur 2 View Bilateral [[id number]] Collected: 06/10/24 1053     Updated: 06/10/24 1055    Narrative:      PROCEDURE: XR FEMUR 2 VW BILATERAL-     HISTORY: fall     COMPARISON: None.     FINDINGS:  A 2 view exam demonstrates no acute fracture or dislocation.   The joint spaces are preserved.  No soft tissue abnormality is seen.       Impression:      No acute fracture.                     This report was finalized on 6/10/2024 10:53 AM by Ioana Christina M.D..       XR Hips Bilateral With or Without Pelvis 5 View [[id number]] Collected: 06/10/24 1053     Updated: 06/10/24 1055    Narrative:      PROCEDURE: XR HIPS BILATERAL W OR WO PELVIS 5 VIEW-     HISTORY: fall     COMPARISON: None.     FINDINGS: There are no fractures or dislocations. The joint spaces are  preserved. The pubic symphysis and SI joints are intact. The soft  tissues are " unremarkable.       Impression:      No fracture or dislocation.              This report was finalized on 6/10/2024 10:53 AM by Ioana Christina M.D..       CT Thoracic Spine Without Contrast [[id number]] Collected: 06/10/24 1033     Updated: 06/10/24 1036    Narrative:      PROCEDURE: CT THORACIC SPINE WO CONTRAST-     HISTORY: fall     TECHNIQUE: Multiple axial CT images were obtained through the thoracic  spine using bone window algorithms. Coronal and sagittal images were  reconstructed from the original axial data set. This study was performed  with techniques to keep radiation doses as low as reasonably achievable  (ALARA). Individualized dose reduction techniques using automated  exposure control or adjustment of mA and/or kV according to the patient  size were employed.     COMPARISON: None.     FINDINGS: The thoracic spine is well aligned with no acute fractures.  There are diffuse degenerative changes with loss of disc space height  and anterior osteophyte formation. Evaluation of the soft tissues  reveals fibrotic lung changes       Impression:      No acute process.              This report was finalized on 6/10/2024 10:34 AM by Ioana Christina M.D..       CT Lumbar Spine Without Contrast [[id number]] Collected: 06/10/24 1029     Updated: 06/10/24 1034    Narrative:      PROCEDURE: CT LUMBAR SPINE WO CONTRAST-     HISTORY: fall     TECHNIQUE: Multiple axial CT images were obtained through the lumbar  spine using bone window algorithms. Coronal and sagittal images were  reconstructed from the original axial data set. This study was performed  with techniques to keep radiation doses as low as reasonably achievable  (ALARA). Individualized dose reduction techniques using automated  exposure control or adjustment of mA and/or kV according to the patient  size were employed.     COMPARISON: 6/8/2024.     FINDINGS: There is an acute compression fracture of the inferior  endplate of the L3 vertebral body  "with minimal loss of vertebral body  height. No other fractures are identified. There are diffuse  degenerative changes with loss of disc space height and facet  arthropathy which produces multilevel predominantly neural foraminal  narrowing. The paraspinal soft tissues are unremarkable.       Impression:      Acute compression fracture involving the inferior endplate  of the L3 vertebral body with minimal loss of vertebral body height.              This report was finalized on 6/10/2024 10:32 AM by Ioana Christina M.D..               Cultures:  No results found for: \"BLOODCX\", \"URINECX\", \"WOUNDCX\", \"MRSACX\", \"RESPCX\", \"STOOLCX\"    Last echocardiogram:  Results for orders placed during the hospital encounter of 08/03/22    Adult Transthoracic Echo Complete W/ Cont if Necessary Per Protocol    Interpretation Summary  · Normal left ventricular cavity size and wall thickness noted  · The following left ventricular wall segments are hypokinetic: mid inferoseptal.  · Left ventricular ejection fraction appears to be 56 - 60%.  · Left ventricular diastolic function is consistent with (grade I) impaired relaxation.  · The aortic valve is abnormal in structure. There is mild calcification of the aortic valve. The aortic valve appears trileaflet. Mild aortic valve regurgitation is present. No aortic valve stenosis is present.  · The mitral valve is structurally normal with no regurgitation or significant stenosis present.  · There is no evidence of pericardial effusion.          I have personally reviewed the above radiology images and read the final radiology report on 06/10/24  ---------------------------------------------------------------------------------------------------------------------  Assessment / Plan     Active Hospital Problems    Diagnosis  POA    **Compression fracture of L3 vertebra [S32.030A]  Yes       ASSESSMENT/PLAN:    Intractable pain due to compression fracture of L3 vertebra  Fall, PTA  Lumbar " degenerative disc disease  Debility, secondary to above and likely age-related  Patient presented to the ED after a fall at home complaining of low back and right hip pain.  Imaging without concern for hip fracture though did note acute compression fracture of the L3 vertebra.  On further discussion patient and family report patient with persistently worsening pain over the previous several weeks to months to the extent that she has difficulty standing and ambulating at home.  Will admit to [facility] for further observation and management  Start pain regimen with Norco 5-325 mg every 6 hours as needed  PT OT consults  Case management consult to assist with discharge planning  Continue supportive care measures    Chronic:  BONG  Osteoarthritis  HTN  HLD  Restrictive lung disease with chronic respiratory failure on 2 L at baseline  Will resume home regimen as indicated once med rec is complete  Monitor vital signs closely  Continue supplemental O2 titrate as needed  ----------  -DVT prophylaxis: Lovenox  -Activity: Up in chair  -Expected length of stay: Less than 2 midnights  -Disposition pending course    High risk secondary to intractable pain due to compression fracture    Code Status and Medical Interventions:   Ordered at: 06/10/24 1232     Code Status (Patient has no pulse and is not breathing):    CPR (Attempt to Resuscitate)     Medical Interventions (Patient has pulse or is breathing):    Full Support       Giancarlo Logan PA-C   06/10/24  13:20 EDT    Electronically signed by Giancarlo Logan PA-C at 06/10/24 1408       Leann Valentino at 06/10/24 1156          Discharge Planning Assessment   Ronald     Patient Name: Edgar Cotto  MRN: [id number]  Today's Date: 6/10/2024    Admit Date: 6/10/2024    Plan: Spoke with patient and daughter at bedside. Patient lives at home and daughter lives with her. Patient has no POA or Living Will. Patient uses oxygen 2.5 L, Scooter, BSC, HB, Lift chair, shower  bench and wheelchair from [employer]. Patient currently has no HH they came to see pt and her daughter refuses PT and they stopped services. Patients daughter is wanting patient placed in a SNF for rehab and stated if she does better she will take back home. I asked patient if she wanted to go to SNF she said I don't know, patients daughter told her it would only be for rehab then she may come back home. Patient was reluctant to SNF but said yes if she could go back home. Patients PCP Abbie Gates and pharmacy [facility]. Patients disposition and transport unclear at this time.   Discharge Needs Assessment       Row Name 06/10/24 1140       Living Environment    People in Home child(eleonora), adult    Name(s) of People in Home Ava Andersen Daughter [phone]    Potentially Unsafe Housing Conditions none    In the past 12 months has the electric, gas, oil, or water company threatened to shut off services in your home? No    Primary Care Provided by child(eleonora)    Provides Primary Care For no one, unable/limited ability to care for self    Family Caregiver if Needed child(eleonora), adult    Family Caregiver Names Ava Andersen [phone]    Quality of Family Relationships helpful;involved;supportive    Able to Return to Prior Arrangements other (see comments)  Daughter wants pt placed at SNF for rehab then will see how does and will take home if better       Resource/Environmental Concerns    Resource/Environmental Concerns none    Transportation Concerns none       Transportation Needs    In the past 12 months, has lack of transportation kept you from medical appointments or from getting medications? no    In the past 12 months, has lack of transportation kept you from meetings, work, or from getting things needed for daily living? No       Food Insecurity    Within the past 12 months, you worried that your food would run out before you got the money to buy more. Never true    Within the past 12  months, the food you bought just didn't last and you didn't have money to get more. Never true       Transition Planning    Patient/Family Anticipates Transition to inpatient rehabilitation facility    Patient/Family Anticipated Services at Transition none    Transportation Anticipated public transportation;family or friend will provide       Discharge Needs Assessment    Readmission Within the Last 30 Days no previous admission in last 30 days    Equipment Currently Used at Home oxygen;commode;hospital bed;lift device;bath bench;wheelchair    Concerns to be Addressed discharge planning    Anticipated Changes Related to Illness none    Equipment Needed After Discharge none                   Discharge Plan       Row Name 06/10/24 1148       Plan    Plan Spoke with patient and daughter at bedside. Patient lives at home and daughter lives with her. Patient has no POA or Living Will. Patient uses oxygen 2.5 L, Scooter, BSC, HB, Lift chair, shower bench and wheelchair from [facility]. Patient currently has no HH they came to see pt and her daughter refuses PT and they stopped services. Patients daughter is wanting patient placed in a SNF for rehab and stated if she does better she will take back home. I asked patient if she wanted to go to SNF she said I don't know, patients daughter told her it would only be for rehab then she may come back home. Patient was reluctant to SNF but said yes if she could go back home. Patients PCP Abbie Gates and pharmacy [facility]. Patients disposition and transport unclear at this time.    Patient/Family in Agreement with Plan yes                     Leann Valentino      Electronically signed by Leann Valentino at 06/10/24 1156       Rahat Boyd at 06/10/24 1139          Called  [facility] for neurosurgery     Electronically signed by Rahat Boyd at 06/10/24 1134       Nathalie Verdin PA at 06/10/24 9070          Subjective  History of Present Illness  79-year-old female who  presents to the ED today via EMS with her daughter due to a fall.  The patient has issues with chronic back pain and sciatica with degenerative disc disease.  Her daughter states over the last several months the patient's pain has progressively worsened and she has had difficulty ambulating.  It has gotten to the point where she has difficulty standing on her own.  She states she was helping her pivot from the potty chair to the recliner when the patient fell and landed on her buttocks.  She did not hit her head and she did not lose consciousness.  The patient was unable to get up so EMS was called and she was transported here.  The patient is complaining of lower back pain as well as right hip pain.  She denies any numbness or tingling in her extremities.  She denies any loss of bowel or bladder function.  The patient's daughter reports that she has urinated several times this morning with no difficulty.  The patient denies any head or neck pain.  She denies any chest pain or difficulty breathing.  She denies any abdominal pain.  EMS did give the patient Zofran and Toradol prior to arrival.  The patient wears 2 L of oxygen at all times due to lung disease.    History provided by:  Patient and relative (daughter)  Fall  Mechanism of injury: fall    Injury location:  Torso and leg  Torso injury location:  Back  Leg injury location:  R hip  Incident location:  Home  Time since incident:  1 hour  Arrived directly from scene: yes    Fall:     Fall occurred:  Standing    Impact surface:  Hard floor    Point of impact:  Buttocks    Entrapped after fall: no    Suspicion of alcohol use: no    Suspicion of drug use: no    Prior to arrival data:     Patient ambulatory at scene: no      Blood loss:  None    Responsiveness at scene:  Alert    Orientation at scene:  Person, place, situation and time    Loss of consciousness: no      Amnesic to event: no    Associated symptoms: back pain    Associated symptoms: no abdominal pain,  no chest pain, no difficulty breathing, no headaches, no loss of consciousness, no nausea, no neck pain and no vomiting        Review of Systems   Constitutional:  Positive for activity change.   HENT: Negative.     Eyes: Negative.    Respiratory: Negative.     Cardiovascular:  Negative for chest pain.   Gastrointestinal:  Negative for abdominal pain, nausea and vomiting.   Genitourinary: Negative.    Musculoskeletal:  Positive for arthralgias, back pain and gait problem. Negative for neck pain.   Skin: Negative.    Neurological:  Negative for loss of consciousness and headaches.   Psychiatric/Behavioral: Negative.     All other systems reviewed and are negative.      Past Medical History:   Diagnosis Date    Abdominal distension     Allergic rhinitis     Anxiety     Arthritis     Closed nondisplaced fracture of surgical neck of left humerus with routine healing 3/9/2021    Cough     Dry eyes     Dyslipidemia     Dysuria     Hyperlipidemia     Hypertension     Meniere's disease     Mitral and aortic valve disease     Oral candidiasis     Osteoarthritis     Stomatitis     URI, acute     Vaginal candidiasis     Vocal cord dysfunction        Allergies   Allergen Reactions    Z-Ponce [Azithromycin] Swelling    Amoxicillin Other (See Comments)    Cortisone Swelling     Face swelling      Fish-Derived Products      SEAFOOD    Iodinated Contrast Media     Iodine Itching    Lidocaine Other (See Comments)     Shaking, fever when she had mouth numbed at the dentist    Morphine Mental Status Change    Niacin Other (See Comments)     Flushing     Nystatin Other (See Comments)    Other     Sulfa Antibiotics Other (See Comments)    Verapamil Other (See Comments)     Feel funny        Past Surgical History:   Procedure Laterality Date    CHOLECYSTECTOMY      COLONOSCOPY      DILATATION AND CURETTAGE      HEMORRHOIDECTOMY      HERNIA REPAIR      SHOULDER SURGERY      SKIN CANCER EXCISION      on Nose    TONSILLECTOMY         Family  History   Problem Relation Age of Onset    Diabetes Mother     Cancer Father     Liver disease Sister     Diabetes Sister     Liver disease Brother     Diabetes Brother     Diabetes Daughter     Cancer Other     Liver disease Other     Breast cancer Neg Hx        Social History     Socioeconomic History    Marital status:    Tobacco Use    Smoking status: Never     Passive exposure: Never    Smokeless tobacco: Never    Tobacco comments:     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   Constitutional:       General: She is in acute distress (appears to be in pain).      Appearance: She is ill-appearing (chronically). She is not toxic-appearing or diaphoretic.   HENT:      Head: Normocephalic and atraumatic.      Right Ear: External ear normal.      Left Ear: External ear normal.      Nose: Nose normal.   Eyes:      Conjunctiva/sclera: Conjunctivae normal.      Pupils: Pupils are equal, round, and reactive to light.   Cardiovascular:      Rate and Rhythm: Normal rate and regular rhythm.      Pulses: Normal pulses.      Heart sounds: Normal heart sounds.   Pulmonary:      Effort: Pulmonary effort is normal.      Breath sounds: Normal breath sounds.   Abdominal:      General: Bowel sounds are normal.      Palpations: Abdomen is soft.      Tenderness: There is no abdominal tenderness.   Musculoskeletal:         General: Tenderness (point tender to midline lumbar spine with palpation, pain with any movement) present. No swelling or deformity.      Cervical back: Normal range of motion and neck supple. No tenderness.      Comments: Tenderness to bilateral hips and femurs with palpation.  Strength is equal in upper and lower extremities bilaterally, sensation intact. Reflexes are normal in lower  extremities, no saddle paresthesias, rectal tone is normal.   Skin:     General: Skin is warm and dry.      Capillary Refill: Capillary refill takes less than 2 seconds.      Comments: Pressure wound noted to coccyx area   Neurological:      General: No focal deficit present.      Mental Status: She is alert and oriented to person, place, and time.   Psychiatric:         Mood and Affect: Mood is anxious. Affect is tearful.         Procedures       Results for orders placed or performed during the hospital encounter of 06/10/24   Comprehensive Metabolic Panel    Specimen: Blood   Result Value Ref Range    Glucose 117 (H) 65 - 99 mg/dL    BUN 16 8 - 23 mg/dL    Creatinine 0.95 0.57 - 1.00 mg/dL    Sodium 133 (L) 136 - 145 mmol/L    Potassium 4.5 3.5 - 5.2 mmol/L    Chloride 98 98 - 107 mmol/L    CO2 28.5 22.0 - 29.0 mmol/L    Calcium 9.3 8.6 - 10.5 mg/dL    Total Protein 6.9 6.0 - 8.5 g/dL    Albumin 3.5 3.5 - 5.2 g/dL    ALT (SGPT) 11 1 - 33 U/L    AST (SGOT) 18 1 - 32 U/L    Alkaline Phosphatase 120 (H) 39 - 117 U/L    Total Bilirubin 0.3 0.0 - 1.2 mg/dL    Globulin 3.4 gm/dL    A/G Ratio 1.0 g/dL    BUN/Creatinine Ratio 16.8 7.0 - 25.0    Anion Gap 6.5 5.0 - 15.0 mmol/L    eGFR 60.3 >60.0 mL/min/1.73   Protime-INR    Specimen: Blood   Result Value Ref Range    Protime 13.8 12.1 - 14.7 Seconds    INR 1.05 0.90 - 1.10   aPTT    Specimen: Blood   Result Value Ref Range    PTT 25.7 (L) 26.5 - 34.5 seconds   Magnesium    Specimen: Blood   Result Value Ref Range    Magnesium 2.0 1.6 - 2.4 mg/dL   CBC Auto Differential    Specimen: Blood   Result Value Ref Range    WBC 7.27 3.40 - 10.80 10*3/mm3    RBC 4.12 3.77 - 5.28 10*6/mm3    Hemoglobin 12.6 12.0 - 15.9 g/dL    Hematocrit 40.0 34.0 - 46.6 %    MCV 97.1 (H) 79.0 - 97.0 fL    MCH 30.6 26.6 - 33.0 pg    MCHC 31.5 31.5 - 35.7 g/dL    RDW 12.4 12.3 - 15.4 %    RDW-SD 44.7 37.0 - 54.0 fl    MPV 9.8 6.0 - 12.0 fL    Platelets 198 140 - 450 10*3/mm3    Neutrophil % 67.7 42.7  - 76.0 %    Lymphocyte % 20.8 19.6 - 45.3 %    Monocyte % 6.7 5.0 - 12.0 %    Eosinophil % 3.9 0.3 - 6.2 %    Basophil % 0.8 0.0 - 1.5 %    Immature Grans % 0.1 0.0 - 0.5 %    Neutrophils, Absolute 4.92 1.70 - 7.00 10*3/mm3    Lymphocytes, Absolute 1.51 0.70 - 3.10 10*3/mm3    Monocytes, Absolute 0.49 0.10 - 0.90 10*3/mm3    Eosinophils, Absolute 0.28 0.00 - 0.40 10*3/mm3    Basophils, Absolute 0.06 0.00 - 0.20 10*3/mm3    Immature Grans, Absolute 0.01 0.00 - 0.05 10*3/mm3    nRBC 0.0 0.0 - 0.2 /100 WBC   Green Top (Gel)   Result Value Ref Range    Extra Tube Hold for add-ons.    Lavender Top   Result Value Ref Range    Extra Tube hold for add-on    Gold Top - SST   Result Value Ref Range    Extra Tube Hold for add-ons.    Light Blue Top   Result Value Ref Range    Extra Tube Hold for add-ons.           ED Course  ED Course as of 06/10/24 1323   Mon Manjit 10, 2024   1052 CT Thoracic Spine Without Contrast  FINDINGS: The thoracic spine is well aligned with no acute fractures.  There are diffuse degenerative changes with loss of disc space height  and anterior osteophyte formation. Evaluation of the soft tissues  reveals fibrotic lung changes     IMPRESSION:  No acute process.   [AH]   1053 CT Lumbar Spine Without Contrast  FINDINGS: There is an acute compression fracture of the inferior  endplate of the L3 vertebral body with minimal loss of vertebral body  height. No other fractures are identified. There are diffuse  degenerative changes with loss of disc space height and facet  arthropathy which produces multilevel predominantly neural foraminal  narrowing. The paraspinal soft tissues are unremarkable.     IMPRESSION:  Acute compression fracture involving the inferior endplate  of the L3 vertebral body with minimal loss of vertebral body height.   [AH]   1059 XR Chest 1 View  FINDINGS: The heart is normal in size. The mediastinum is unremarkable.  There are fibrotic lung changes. No focal opacities or effusions  are  evident. There is no pneumothorax. There are no acute osseous  abnormalities.     IMPRESSION:  Fibrotic lung changes with no acute cardiopulmonary process.   [AH]   1059 XR Femur 2 View Bilateral  FINDINGS:  A 2 view exam demonstrates no acute fracture or dislocation.   The joint spaces are preserved.  No soft tissue abnormality is seen.     IMPRESSION:  No acute fracture.    [AH]   1100 XR Hips Bilateral With or Without Pelvis 5 View     FINDINGS: There are no fractures or dislocations. The joint spaces are  preserved. The pubic symphysis and SI joints are intact. The soft  tissues are unremarkable.     IMPRESSION:  No fracture or dislocation.   [AH]   1139 Spoke with Osbaldo Moncada in the transfer center at  - she will have spine team look at the images and then call us back. [AH]   1202 Discussed with Dr. Fermin Lugo, spine team at  - advised this is a non-surgical case at this time. Agrees with plan to admit here for pain control and PT/OT. [AH]   1217 Dr. Shay to admit [AH]   1224 Patient is having some difficulty urinating, currently has external catheter in place. Bladder scan shows 380 mL in the bladder. Tried patient on bed pan with no results. I did discuss this with Dr. Shay - he advised no rausch catheter at this time and to continue to monitor. [AH]      ED Course User Index  [AH] Nathalie Verdin PA                                             Medical Decision Making  79-year-old female who presents to the ED today due to a fall at home.  She is found to have an acute L3 compression fracture.  No fracture to the hips, pelvis or femurs.  I contacted the spine team at .  They advised this is nonsurgical at this time.  Patient will need to be admitted for pain control and PT/OT consult.  I discussed the patient with Dr. Shay who will admit to [facility].    Problems Addressed:  Compression fracture of L3 vertebra, initial encounter: complicated acute illness or injury  Fall in home, initial  encounter: complicated acute illness or injury    Amount and/or Complexity of Data Reviewed  Labs: ordered.  Radiology: ordered. Decision-making details documented in ED Course.    Risk  Prescription drug management.  Decision regarding hospitalization.        Final diagnoses:   Compression fracture of L3 vertebra, initial encounter   Fall in home, initial encounter       ED Disposition  ED Disposition       ED Disposition   Decision to Admit    Condition   --    Comment   Level of Care: Med/Surg [1]   Diagnosis: Compression fracture of L3 vertebra [[id number]]                 No follow-up provider specified.       Medication List      No changes were made to your prescriptions during this visit.            Nathalie Verdin PA  06/10/24 1323      Electronically signed by Nathalie Verdin PA at 06/10/24 1323       Lines, Drains & Airways       Active LDAs       Name Placement date Placement time Site Days    Peripheral IV 06/10/24 0944 Left;Posterior Wrist 06/10/24  0944  Wrist  less than 1                  Current Facility-Administered Medications   Medication Dose Route Frequency Provider Last Rate Last Admin    Enoxaparin Sodium (LOVENOX) syringe 40 mg  40 mg Subcutaneous Q24H Nitesh Shay DO   40 mg at 06/10/24 1557    HYDROcodone-acetaminophen (NORCO) 5-325 MG per tablet 1 tablet  1 tablet Oral Q6H PRN Nitesh Shay DO        Pharmacy to Dose enoxaparin (LOVENOX)   Does not apply Continuous PRN Nitesh Shay DO        sodium chloride 0.9 % flush 10 mL  10 mL Intravenous PRN Nathalie Verdin PA        sodium chloride 0.9 % flush 10 mL  10 mL Intravenous Q12H Nitesh Shay DO        sodium chloride 0.9 % flush 10 mL  10 mL Intravenous PRN Nitesh Shay DO        sodium chloride 0.9 % infusion 40 mL  40 mL Intravenous PRN Nitesh Shay DO         Lab Results (most recent)       Procedure Component Value Units Date/Time    Comprehensive Metabolic Panel [[id number]]  (Abnormal) Collected: 06/10/24 1006    Specimen:  Blood Updated: 06/10/24 1039     Glucose 117 mg/dL      BUN 16 mg/dL      Creatinine 0.95 mg/dL      Sodium 133 mmol/L      Potassium 4.5 mmol/L      Chloride 98 mmol/L      CO2 28.5 mmol/L      Calcium 9.3 mg/dL      Total Protein 6.9 g/dL      Albumin 3.5 g/dL      ALT (SGPT) 11 U/L      AST (SGOT) 18 U/L      Alkaline Phosphatase 120 U/L      Total Bilirubin 0.3 mg/dL      Globulin 3.4 gm/dL      A/G Ratio 1.0 g/dL      BUN/Creatinine Ratio 16.8     Anion Gap 6.5 mmol/L      eGFR 60.3 mL/min/1.73     Narrative:      GFR Normal >60  Chronic Kidney Disease <60  Kidney Failure <15    The GFR formula is only valid for adults with stable renal function between ages 18 and 70.    Magnesium [[id number]]  (Normal) Collected: 06/10/24 1006    Specimen: Blood Updated: 06/10/24 1039     Magnesium 2.0 mg/dL     Protime-INR [[id number]]  (Normal) Collected: 06/10/24 1006    Specimen: Blood Updated: 06/10/24 1035     Protime 13.8 Seconds      INR 1.05    Narrative:      Suggested INR therapeutic range for stable oral anticoagulant therapy:    Low Intensity therapy:   1.5-2.0  Moderate Intensity therapy:   2.0-3.0  High Intensity therapy:   2.5-4.0    aPTT [[id number]]  (Abnormal) Collected: 06/10/24 1006    Specimen: Blood Updated: 06/10/24 1035     PTT 25.7 seconds     Narrative:      PTT Heparin Therapeutic Range:  45 - 116 seconds      [place] Draw [[id number]] Collected: 06/10/24 1006    Specimen: Blood Updated: 06/10/24 1015    Narrative:      The following orders were created for panel order [place] Draw.  Procedure                               Abnormality         Status                     ---------                               -----------         ------                     Green Top (Gel)[[id number]]                                  Final result               Lavender Top[[id number]]                                     Final result               Gold Top - SST[[id number]]                                   Final result                Light Blue Top[[id number]]                                   Final result                 Please view results for these tests on the individual orders.    Green Top (Gel) [[id number]] Collected: 06/10/24 1006    Specimen: Blood Updated: 06/10/24 1015     Extra Tube Hold for add-ons.     Comment: Auto resulted.       Lavender Top [[id number]] Collected: 06/10/24 1006    Specimen: Blood Updated: 06/10/24 1015     Extra Tube hold for add-on     Comment: Auto resulted       Gold Top - SST [[id number]] Collected: 06/10/24 1006    Specimen: Blood Updated: 06/10/24 1015     Extra Tube Hold for add-ons.     Comment: Auto resulted.       Light Blue Top [[id number]] Collected: 06/10/24 1006    Specimen: Blood Updated: 06/10/24 1015     Extra Tube Hold for add-ons.     Comment: Auto resulted       CBC & Differential [[id number]]  (Abnormal) Collected: 06/10/24 1006    Specimen: Blood Updated: 06/10/24 1012    Narrative:      The following orders were created for panel order CBC & Differential.  Procedure                               Abnormality         Status                     ---------                               -----------         ------                     CBC Auto Differential[[id number]]        Abnormal            Final result                 Please view results for these tests on the individual orders.    CBC Auto Differential [[id number]]  (Abnormal) Collected: 06/10/24 1006    Specimen: Blood Updated: 06/10/24 1012     WBC 7.27 10*3/mm3      RBC 4.12 10*6/mm3      Hemoglobin 12.6 g/dL      Hematocrit 40.0 %      MCV 97.1 fL      MCH 30.6 pg      MCHC 31.5 g/dL      RDW 12.4 %      RDW-SD 44.7 fl      MPV 9.8 fL      Platelets 198 10*3/mm3      Neutrophil % 67.7 %      Lymphocyte % 20.8 %      Monocyte % 6.7 %      Eosinophil % 3.9 %      Basophil % 0.8 %      Immature Grans % 0.1 %      Neutrophils, Absolute 4.92 10*3/mm3      Lymphocytes, Absolute 1.51 10*3/mm3      Monocytes, Absolute 0.49 10*3/mm3       Eosinophils, Absolute 0.28 10*3/mm3      Basophils, Absolute 0.06 10*3/mm3      Immature Grans, Absolute 0.01 10*3/mm3      nRBC 0.0 /100 WBC           Orders (last 24 hrs)        Start     Ordered    06/10/24 2100  sodium chloride 0.9 % flush 10 mL  Every 12 Hours Scheduled         06/10/24 1500    06/10/24 1800  Oral Care  2 Times Daily       06/10/24 1500    06/10/24 1600  Vital Signs  Every 4 Hours       06/10/24 1500    06/10/24 1600  Enoxaparin Sodium (LOVENOX) syringe 40 mg  Every 24 Hours         06/10/24 1509    06/10/24 1535  Stage II Pressure Ulcer Care Every Other Day  Every Other Day        Comments: - Gently Cleanse With Normal Saline  - Cover With Silicone Border Dressing (If Indicated)  - For Frequent Incontinence - Apply Skin Protective Barrier Cream Rather Than Silicone Border Dressing    06/10/24 1534    06/10/24 1535  Follow Pressure Ulcer Prevention Measures Policy  Continuous        Comments: Implement Appropriate Pressure Ulcer Prevention Measures  - Open Order Report to View Full Instructions  Enter Wound LDA & Document Assessment  Add Wound Care Plan  Add Patient Education Per Policy    06/10/24 1534    06/10/24 1534  Wound Ostomy Eval & Treat  Once         06/10/24 1534    06/10/24 1501  Intake & Output  Every Shift       06/10/24 1500    06/10/24 1501  Weigh Patient  Once         06/10/24 1500    06/10/24 1501  Insert Peripheral IV  Once         06/10/24 1500    06/10/24 1501  Saline Lock & Maintain IV Access  Continuous         06/10/24 1500    06/10/24 1501  Diet: Regular/House; Fluid Consistency: Thin (IDDSI 0)  Diet Effective Now         06/10/24 1500    06/10/24 1501  OT Consult: Eval & Treat ADL Performance Below Baseline  Once         06/10/24 1500    06/10/24 1501  PT Consult: Eval & Treat Functional Mobility Below Baseline  Once         06/10/24 1500    06/10/24 1501  Bowel Regimen Not Indicated  Once         06/10/24 1500    06/10/24 1501  Case Management  Consult   "Once        Provider:  (Not yet assigned)    06/10/24 1500    06/10/24 1500  sodium chloride 0.9 % flush 10 mL  As Needed         06/10/24 1500    06/10/24 1500  sodium chloride 0.9 % infusion 40 mL  As Needed         06/10/24 1500    06/10/24 1500  Pharmacy to Dose enoxaparin (LOVENOX)  Continuous PRN         06/10/24 1500    06/10/24 1500  HYDROcodone-acetaminophen (NORCO) 5-325 MG per tablet 1 tablet  Every 6 Hours PRN         06/10/24 1500    06/10/24 1454  Inpatient Consult to Advance Care Planning  Once        Provider:  (Not yet assigned)    06/10/24 1454    06/10/24 1227  Initiate Observation Status  Once         06/10/24 1232    06/10/24 1227  Code Status and Medical Interventions:  Continuous         06/10/24 1232    06/10/24 1154  Bladder scan  Once         06/10/24 1153    06/10/24 1014  HYDROmorphone (DILAUDID) injection 0.25 mg  Once         06/10/24 0958    06/10/24 0959  XR Chest 1 View  1 Time Imaging         06/10/24 0959    06/10/24 0957  XR Hips Bilateral With or Without Pelvis 5 View  1 Time Imaging         06/10/24 0956    06/10/24 0957  XR Femur 2 View Bilateral  1 Time Imaging         06/10/24 0956    06/10/24 0957  Insert Peripheral IV  Once        Placed in \"And\" Linked Group    06/10/24 0956    06/10/24 0956  sodium chloride 0.9 % flush 10 mL  As Needed        Placed in \"And\" Linked Group    06/10/24 0956    06/10/24 0956  CBC & Differential  Once         06/10/24 0956    06/10/24 0956  Comprehensive Metabolic Panel  Once         06/10/24 0956    06/10/24 0956  Protime-INR  Once         06/10/24 0956    06/10/24 0956  aPTT  Once         06/10/24 0956    06/10/24 0956  [facility] Draw  Once         06/10/24 0956    06/10/24 0956  Magnesium  Once         06/10/24 0956    06/10/24 0956  CT Thoracic Spine Without Contrast  1 Time Imaging         06/10/24 0956    06/10/24 0956  CT Lumbar Spine Without Contrast  1 Time Imaging         06/10/24 0956    06/10/24 0956  CBC Auto Differential  PROCEDURE " ONCE         06/10/24 0956    06/10/24 0956  Green Top (Gel)  PROCEDURE ONCE         06/10/24 0956    06/10/24 0956  Lavender Top  PROCEDURE ONCE         06/10/24 0956    06/10/24 0956  Gold Top - SST  PROCEDURE ONCE         06/10/24 0956    06/10/24 0956  Light Blue Top  PROCEDURE ONCE         06/10/24 0956    Unscheduled  Oxygen Therapy- Nasal Cannula; Titrate 1-6 LPM Per SpO2; 90 - 95%  Continuous PRN       06/10/24 0956    Unscheduled  Up in Chair  As Needed       06/10/24 1500    Unscheduled  Stage II Pressure Ulcer Care PRN  As Needed      Comments: - Gently Cleanse With Normal Saline  - Cover With Silicone Border Dressing (If Indicated)  - For Frequent Incontinence - Apply Skin Protective Barrier Cream Rather Than Silicone Border Dressing    06/10/24 1534                  Operative/Procedure Notes (most recent note)    No notes of this type exist for this encounter.       Physician Progress Notes (most recent note)    No notes of this type exist for this encounter.       Consult Notes (most recent note)    No notes of this type exist for this encounter.       Physical Therapy Notes (most recent note)    No notes exist for this encounter.       Occupational Therapy Notes (most recent note)    No notes exist for this encounter.       Speech Language Pathology Notes (most recent note)    No notes exist for this encounter.       ADL Documentation (most recent)      Flowsheet Row Most Recent Value   Transferring 4 - completely dependent   Toileting 4 - completely dependent   Bathing 4 - completely dependent   Dressing 2 - assistive person   Eating 0 - independent   Communication 0 - understands/communicates without difficulty   Swallowing 0 - swallows foods/liquids without difficulty   Equipment Currently Used at Home wheelchair, walker, rolling            Discharge Summary    No notes of this type exist for this encounter.       Discharge Order (From admission, onward)      None

## 2024-06-10 NOTE — H&P
"    [facility] Medicine Services  History & Physical    Patient Identification:  Name:  Edgar Cotto  Age:  79 y.o.  Sex:  female  :  1945  MRN:  [id number]   Visit Number:  [id number]  Admit Date: 6/10/2024   Primary Care Physician:  Abbie Hillman DO    Subjective     Chief complaint: Fall, hip pain    History of presenting illness:      Edgar Cotto is a 79 y.o. female who presented for further evaluation of low back and right lower extremity pain. She was seen and examined in the ED with family member at the bedside who helps provide the history. They report patient with chronic low back and RLE pain which has been progressively worsening over the past several months. They state that typically patient had been able to ambulate around her home with a walker however over the past 4 weeks she has been unable to bear weight, requiring her sister to push her around the home on the seat of the wheelchair. This morning while sister was attempting to stand her up to pivot onto bedside commode she states \"I just lost her\" and the patient fell onto her buttocks. She had immediate worsening of her back and right hip, LE pain. Patient did not hit her head. She denied other injury. On further ROS she denied shortness of breath greater than baseline, no chest pain or palpitations. See full ROS below. Patient and daughter report that for the past several weeks they have been working with PCP to wean patient off of valium to allow her to take more pain medication. She reports is currently taking valium once daily and tramadol.     Past medical history is significant for BONG, osteoarthritis, HTN, hyperlipidemia, lumbar DDD, mitral and aortic valve disease, restrictive lung disease with chronic respiratory failure on 2 L at baseline    Upon arrival to the ED, vital signs were temp 97.1, heart rate 91, respirations 18, /85, SpO2 97% on RA.  Imaging workup identified acute " compression fracture involving the inferior endplate of the L3 vertebral body with minimal loss of vertebral body height.  EKG was NSR    Known Emergency Department medications received prior to my evaluation included Dilaudid.   Emergency Department Room location at the time of my evaluation was 114.     ---------------------------------------------------------------------------------------------------------------------   Review of Systems   Constitutional:  Positive for activity change. Negative for chills and fever.   HENT:  Negative for congestion and rhinorrhea.    Respiratory:  Negative for cough and shortness of breath.    Cardiovascular:  Negative for chest pain.   Gastrointestinal:  Negative for abdominal pain, constipation and diarrhea.   Genitourinary:  Negative for difficulty urinating and dysuria.   Musculoskeletal:  Positive for arthralgias, gait problem and myalgias.   Skin:  Negative for rash and wound.   Neurological:  Positive for weakness. Negative for dizziness and light-headedness.        ---------------------------------------------------------------------------------------------------------------------   Past Medical History:   Diagnosis Date    Abdominal distension     Allergic rhinitis     Anxiety     Arthritis     Closed nondisplaced fracture of surgical neck of left humerus with routine healing 3/9/2021    Cough     Dry eyes     Dyslipidemia     Dysuria     Hyperlipidemia     Hypertension     Meniere's disease     Mitral and aortic valve disease     Oral candidiasis     Osteoarthritis     Stomatitis     URI, acute     Vaginal candidiasis     Vocal cord dysfunction      Past Surgical History:   Procedure Laterality Date    CHOLECYSTECTOMY      COLONOSCOPY      DILATATION AND CURETTAGE      HEMORRHOIDECTOMY      HERNIA REPAIR      SHOULDER SURGERY      SKIN CANCER EXCISION      on Nose    TONSILLECTOMY       Family History   Problem Relation Age of Onset    Diabetes Mother     Cancer Father      Liver disease Sister     Diabetes Sister     Liver disease Brother     Diabetes Brother     Diabetes Daughter     Cancer Other     Liver disease Other     Breast cancer Neg Hx      Social History     Socioeconomic History    Marital status:    Tobacco Use    Smoking status: Never     Passive exposure: Never    Smokeless tobacco: Never    Tobacco comments:     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   Vaping Use    Vaping status: Never Used   Substance and Sexual Activity    Alcohol use: No    Drug use: No    Sexual activity: Defer     ---------------------------------------------------------------------------------------------------------------------   Allergies:  Z-mary [azithromycin], Amoxicillin, Cortisone, Fish-derived products, Iodinated contrast media, Iodine, Lidocaine, Morphine, Niacin, Nystatin, Other, Sulfa antibiotics, and Verapamil  ---------------------------------------------------------------------------------------------------------------------   Home medications:    Medications below are reported home medications pulling from within the system; at this time, these medications have not been reconciled unless otherwise specified and are in the verification process for further verifcation as current home medications.  (Not in a hospital admission)      Hospital Scheduled Meds:          Current listed hospital scheduled medications may not yet reflect those currently placed in orders that are signed and held awaiting patient's arrival to floor.   ---------------------------------------------------------------------------------------------------------------------     Objective     Vital Signs:  Temp:  [97.1 °F (36.2 °C)] 97.1 °F (36.2 °C)  Heart Rate:  [75-91] 90  Resp:  [18] 18  BP: (129-141)/(82-86) 138/82      06/10/24  0940   Weight: 74 kg (163 lb 2.3 oz)     Body mass index is 31.86  kg/m².  ---------------------------------------------------------------------------------------------------------------------       Physical Exam  Vitals and nursing note reviewed.   Constitutional:       General: She is not in acute distress.     Appearance: She is obese.   HENT:      Head: Normocephalic and atraumatic.   Eyes:      Extraocular Movements: Extraocular movements intact.   Cardiovascular:      Rate and Rhythm: Normal rate and regular rhythm.   Pulmonary:      Effort: Pulmonary effort is normal.      Breath sounds: Normal breath sounds.   Abdominal:      Palpations: Abdomen is soft.   Musculoskeletal:      Right lower leg: No edema.      Left lower leg: No edema.   Skin:     General: Skin is warm and dry.   Neurological:      Mental Status: She is alert. Mental status is at baseline.   Psychiatric:         Mood and Affect: Mood normal.         Behavior: Behavior normal.         ---------------------------------------------------------------------------------------------------------------------  EKG:      I have personally looked at the EKG.  ---------------------------------------------------------------------------------------------------------------------   Results from last 7 days   Lab Units 06/10/24  1006 06/08/24  0809   LACTATE mmol/L  --  1.0   WBC 10*3/mm3 7.27 6.87   HEMOGLOBIN g/dL 12.6 13.4   HEMATOCRIT % 40.0 41.1   MCV fL 97.1* 97.9*   MCHC g/dL 31.5 32.6   PLATELETS 10*3/mm3 198 187   INR  1.05  --          Results from last 7 days   Lab Units 06/10/24  1006 06/08/24  0809   SODIUM mmol/L 133* 138   POTASSIUM mmol/L 4.5 3.8   MAGNESIUM mg/dL 2.0  --    CHLORIDE mmol/L 98 99   CO2 mmol/L 28.5 28.6   BUN mg/dL 16 12   CREATININE mg/dL 0.95 0.89   CALCIUM mg/dL 9.3 9.4   GLUCOSE mg/dL 117* 114*   ALBUMIN g/dL 3.5 3.7   BILIRUBIN mg/dL 0.3 0.5   ALK PHOS U/L 120* 121*   AST (SGOT) U/L 18 19   ALT (SGPT) U/L 11 13   Estimated Creatinine Clearance: 41.7 mL/min (by C-G formula based on SCr of  "0.95 mg/dL).  No results found for: \"AMMONIA\"  Results from last 7 days   Lab Units 06/08/24  1009 06/08/24  0809   HSTROP T ng/L 8 9     Results from last 7 days   Lab Units 06/08/24  0809   PROBNP pg/mL 95.0     Lab Results   Component Value Date    HGBA1C 5.50 07/13/2017     Lab Results   Component Value Date    TSH 0.806 02/08/2024    FREET4 1.13 02/08/2024     No results found for: \"PREGTESTUR\", \"PREGSERUM\", \"HCG\", \"HCGQUANT\"  Pain Management Panel  More data exists         Latest Ref Rng & Units 4/16/2024 2/8/2024   Pain Management Panel   Amphetamine, Urine Qual Negative Negative  Negative    Barbiturates Screen, Urine Negative Negative  Negative    Benzodiazepine Screen, Urine Negative Positive  Positive    Buprenorphine, Screen, Urine Negative Negative  Negative    Cocaine Screen, Urine Negative Negative  Negative    Fentanyl, Urine Negative Negative  Negative    Methadone Screen , Urine Negative Negative  Negative    Methamphetamine, Ur Negative Negative  Negative      No results found for: \"BLOODCX\"  No results found for: \"URINECX\"  No results found for: \"WOUNDCX\"  No results found for: \"STOOLCX\"      ---------------------------------------------------------------------------------------------------------------------  Imaging Results (Last 7 Days)       Procedure Component Value Units Date/Time    XR Chest 1 View [[id number]] Collected: 06/10/24 1054     Updated: 06/10/24 1057    Narrative:      PROCEDURE: XR CHEST 1 VW-       HISTORY: fall     COMPARISON: 2/19/2021.     FINDINGS: The heart is normal in size. The mediastinum is unremarkable.  There are fibrotic lung changes. No focal opacities or effusions are  evident. There is no pneumothorax. There are no acute osseous  abnormalities.       Impression:      Fibrotic lung changes with no acute cardiopulmonary process.        This report was finalized on 6/10/2024 10:55 AM by Ioana Christina M.D..       XR Femur 2 View Bilateral [[id number]] Collected: " 06/10/24 1053     Updated: 06/10/24 1055    Narrative:      PROCEDURE: XR FEMUR 2 VW BILATERAL-     HISTORY: fall     COMPARISON: None.     FINDINGS:  A 2 view exam demonstrates no acute fracture or dislocation.   The joint spaces are preserved.  No soft tissue abnormality is seen.       Impression:      No acute fracture.                     This report was finalized on 6/10/2024 10:53 AM by Ioana Christina M.D..       XR Hips Bilateral With or Without Pelvis 5 View [[id number]] Collected: 06/10/24 1053     Updated: 06/10/24 1055    Narrative:      PROCEDURE: XR HIPS BILATERAL W OR WO PELVIS 5 VIEW-     HISTORY: fall     COMPARISON: None.     FINDINGS: There are no fractures or dislocations. The joint spaces are  preserved. The pubic symphysis and SI joints are intact. The soft  tissues are unremarkable.       Impression:      No fracture or dislocation.              This report was finalized on 6/10/2024 10:53 AM by Ioana Christina M.D..       CT Thoracic Spine Without Contrast [[id number]] Collected: 06/10/24 1033     Updated: 06/10/24 1036    Narrative:      PROCEDURE: CT THORACIC SPINE WO CONTRAST-     HISTORY: fall     TECHNIQUE: Multiple axial CT images were obtained through the thoracic  spine using bone window algorithms. Coronal and sagittal images were  reconstructed from the original axial data set. This study was performed  with techniques to keep radiation doses as low as reasonably achievable  (ALARA). Individualized dose reduction techniques using automated  exposure control or adjustment of mA and/or kV according to the patient  size were employed.     COMPARISON: None.     FINDINGS: The thoracic spine is well aligned with no acute fractures.  There are diffuse degenerative changes with loss of disc space height  and anterior osteophyte formation. Evaluation of the soft tissues  reveals fibrotic lung changes       Impression:      No acute process.              This report was finalized on  "6/10/2024 10:34 AM by Ioana Christina M.D..       CT Lumbar Spine Without Contrast [[id number]] Collected: 06/10/24 1029     Updated: 06/10/24 1034    Narrative:      PROCEDURE: CT LUMBAR SPINE WO CONTRAST-     HISTORY: fall     TECHNIQUE: Multiple axial CT images were obtained through the lumbar  spine using bone window algorithms. Coronal and sagittal images were  reconstructed from the original axial data set. This study was performed  with techniques to keep radiation doses as low as reasonably achievable  (ALARA). Individualized dose reduction techniques using automated  exposure control or adjustment of mA and/or kV according to the patient  size were employed.     COMPARISON: 6/8/2024.     FINDINGS: There is an acute compression fracture of the inferior  endplate of the L3 vertebral body with minimal loss of vertebral body  height. No other fractures are identified. There are diffuse  degenerative changes with loss of disc space height and facet  arthropathy which produces multilevel predominantly neural foraminal  narrowing. The paraspinal soft tissues are unremarkable.       Impression:      Acute compression fracture involving the inferior endplate  of the L3 vertebral body with minimal loss of vertebral body height.              This report was finalized on 6/10/2024 10:32 AM by Ioana Christina M.D..               Cultures:  No results found for: \"BLOODCX\", \"URINECX\", \"WOUNDCX\", \"MRSACX\", \"RESPCX\", \"STOOLCX\"    Last echocardiogram:  Results for orders placed during the hospital encounter of 08/03/22    Adult Transthoracic Echo Complete W/ Cont if Necessary Per Protocol    Interpretation Summary  · Normal left ventricular cavity size and wall thickness noted  · The following left ventricular wall segments are hypokinetic: mid inferoseptal.  · Left ventricular ejection fraction appears to be 56 - 60%.  · Left ventricular diastolic function is consistent with (grade I) impaired relaxation.  · The " aortic valve is abnormal in structure. There is mild calcification of the aortic valve. The aortic valve appears trileaflet. Mild aortic valve regurgitation is present. No aortic valve stenosis is present.  · The mitral valve is structurally normal with no regurgitation or significant stenosis present.  · There is no evidence of pericardial effusion.          I have personally reviewed the above radiology images and read the final radiology report on 06/10/24  ---------------------------------------------------------------------------------------------------------------------  Assessment / Plan     Active Hospital Problems    Diagnosis  POA    **Compression fracture of L3 vertebra [S32.030A]  Yes       ASSESSMENT/PLAN:    Intractable pain due to compression fracture of L3 vertebra  Fall, PTA  Lumbar degenerative disc disease  Debility, secondary to above and likely age-related  Patient presented to the ED after a fall at home complaining of low back and right hip pain.  Imaging without concern for hip fracture though did note acute compression fracture of the L3 vertebra.  On further discussion patient and family report patient with persistently worsening pain over the previous several weeks to months to the extent that she has difficulty standing and ambulating at home.  Will admit to [facility] for further observation and management  Start pain regimen with Norco 5-325 mg every 6 hours as needed  PT OT consults  Case management consult to assist with discharge planning  Continue supportive care measures    Chronic:  BONG  Osteoarthritis  HTN  HLD  Restrictive lung disease with chronic respiratory failure on 2 L at baseline  Will resume home regimen as indicated once med rec is complete  Monitor vital signs closely  Continue supplemental O2 titrate as needed  ----------  -DVT prophylaxis: Lovenox  -Activity: Up in chair  -Expected length of stay: Less than 2 midnights  -Disposition pending course    High risk secondary  to intractable pain due to compression fracture    Code Status and Medical Interventions:   Ordered at: 06/10/24 1232     Code Status (Patient has no pulse and is not breathing):    CPR (Attempt to Resuscitate)     Medical Interventions (Patient has pulse or is breathing):    Full Support       Giancarlo Logan PA-C   06/10/24  13:20 EDT

## 2024-06-10 NOTE — CASE MANAGEMENT/SOCIAL WORK
Discharge Planning Assessment   Ronald     Patient Name: Edgar Cotto  MRN: [id number]  Today's Date: 6/10/2024    Admit Date: 6/10/2024    Plan: Spoke with patient and daughter at bedside. Patient lives at home and daughter lives with her. Patient has no POA or Living Will. Patient uses oxygen 2.5 L, Scooter, BSC, HB, Lift chair, shower bench and wheelchair from [facility]. Patient currently has no HH they came to see pt and her daughter refuses PT and they stopped services. Patients daughter is wanting patient placed in a SNF for rehab and stated if she does better she will take back home. I asked patient if she wanted to go to SNF she said I don't know, patients daughter told her it would only be for rehab then she may come back home. Patient was reluctant to SNF but said yes if she could go back home. Patients PCP Abbie Gates and pharmacy [facility]. Patients disposition and transport unclear at this time.   Discharge Needs Assessment       Row Name 06/10/24 1140       Living Environment    People in Home child(eleonora), adult    Name(s) of People in Home Ava Andersen Daughter [phone]    Potentially Unsafe Housing Conditions none    In the past 12 months has the electric, gas, oil, or water company threatened to shut off services in your home? No    Primary Care Provided by child(eleonora)    Provides Primary Care For no one, unable/limited ability to care for self    Family Caregiver if Needed child(eleonora), adult    Family Caregiver Names Ava Andersen Daughter [phone]    Quality of Family Relationships helpful;involved;supportive    Able to Return to Prior Arrangements other (see comments)  Daughter wants pt placed at SNF for rehab then will see how does and will take home if better       Resource/Environmental Concerns    Resource/Environmental Concerns none    Transportation Concerns none       Transportation Needs    In the past 12 months, has lack of transportation kept you from  medical appointments or from getting medications? no    In the past 12 months, has lack of transportation kept you from meetings, work, or from getting things needed for daily living? No       Food Insecurity    Within the past 12 months, you worried that your food would run out before you got the money to buy more. Never true    Within the past 12 months, the food you bought just didn't last and you didn't have money to get more. Never true       Transition Planning    Patient/Family Anticipates Transition to inpatient rehabilitation facility    Patient/Family Anticipated Services at Transition none    Transportation Anticipated public transportation;family or friend will provide       Discharge Needs Assessment    Readmission Within the Last 30 Days no previous admission in last 30 days    Equipment Currently Used at Home oxygen;commode;hospital bed;lift device;bath bench;wheelchair    Concerns to be Addressed discharge planning    Anticipated Changes Related to Illness none    Equipment Needed After Discharge none                   Discharge Plan       Row Name 06/10/24 1143       Plan    Plan Spoke with patient and daughter at bedside. Patient lives at home and daughter lives with her. Patient has no POA or Living Will. Patient uses oxygen 2.5 L, Scooter, BSC, HB, Lift chair, shower bench and wheelchair from [facility]. Patient currently has no HH they came to see pt and her daughter refuses PT and they stopped services. Patients daughter is wanting patient placed in a SNF for rehab and stated if she does better she will take back home. I asked patient if she wanted to go to SNF she said I don't know, patients daughter told her it would only be for rehab then she may come back home. Patient was reluctant to SNF but said yes if she could go back home. [facility] Patients disposition and transport unclear at this time.    Patient/Family in Agreement with Plan yes                      Leann Valentino     Statement Selected

## 2024-06-10 NOTE — PLAN OF CARE
Goal Outcome Evaluation:   Pt resting in bed. Pt transferred from ED this shift. No complaints or requests at this time. VSS. Will continue plan of care.

## 2024-06-11 LAB
ANION GAP SERPL CALCULATED.3IONS-SCNC: 7 MMOL/L (ref 5–15)
BUN SERPL-MCNC: 15 MG/DL (ref 8–23)
BUN/CREAT SERPL: 16.9 (ref 7–25)
CALCIUM SPEC-SCNC: 9.2 MG/DL (ref 8.6–10.5)
CHLORIDE SERPL-SCNC: 99 MMOL/L (ref 98–107)
CO2 SERPL-SCNC: 31 MMOL/L (ref 22–29)
CREAT SERPL-MCNC: 0.89 MG/DL (ref 0.57–1)
EGFRCR SERPLBLD CKD-EPI 2021: 65.2 ML/MIN/1.73
GLUCOSE SERPL-MCNC: 102 MG/DL (ref 65–99)
POTASSIUM SERPL-SCNC: 4 MMOL/L (ref 3.5–5.2)
SODIUM SERPL-SCNC: 137 MMOL/L (ref 136–145)

## 2024-06-11 PROCEDURE — 96372 THER/PROPH/DIAG INJ SC/IM: CPT

## 2024-06-11 PROCEDURE — 97162 PT EVAL MOD COMPLEX 30 MIN: CPT

## 2024-06-11 PROCEDURE — 97166 OT EVAL MOD COMPLEX 45 MIN: CPT

## 2024-06-11 PROCEDURE — 94761 N-INVAS EAR/PLS OXIMETRY MLT: CPT

## 2024-06-11 PROCEDURE — 80048 BASIC METABOLIC PNL TOTAL CA: CPT | Performed by: STUDENT IN AN ORGANIZED HEALTH CARE EDUCATION/TRAINING PROGRAM

## 2024-06-11 PROCEDURE — G0378 HOSPITAL OBSERVATION PER HR: HCPCS

## 2024-06-11 PROCEDURE — 99232 SBSQ HOSP IP/OBS MODERATE 35: CPT | Performed by: STUDENT IN AN ORGANIZED HEALTH CARE EDUCATION/TRAINING PROGRAM

## 2024-06-11 PROCEDURE — 94799 UNLISTED PULMONARY SVC/PX: CPT

## 2024-06-11 PROCEDURE — 25010000002 ENOXAPARIN PER 10 MG: Performed by: STUDENT IN AN ORGANIZED HEALTH CARE EDUCATION/TRAINING PROGRAM

## 2024-06-11 RX ORDER — HYDROCODONE BITARTRATE AND ACETAMINOPHEN 7.5; 325 MG/1; MG/1
1 TABLET ORAL EVERY 6 HOURS PRN
Status: DISCONTINUED | OUTPATIENT
Start: 2024-06-11 | End: 2024-06-12

## 2024-06-11 RX ADMIN — VITAMINS A AND D OINTMENT 1 APPLICATION: 15.5; 53.4 OINTMENT TOPICAL at 21:39

## 2024-06-11 RX ADMIN — POLYETHYLENE GLYCOL (3350) 17 G: 17 POWDER, FOR SOLUTION ORAL at 22:25

## 2024-06-11 RX ADMIN — DIAZEPAM 2 MG: 2 TABLET ORAL at 09:13

## 2024-06-11 RX ADMIN — ENOXAPARIN SODIUM 40 MG: 40 INJECTION SUBCUTANEOUS at 15:48

## 2024-06-11 RX ADMIN — BUSPIRONE HYDROCHLORIDE 5 MG: 5 TABLET ORAL at 22:24

## 2024-06-11 RX ADMIN — Medication 10 ML: at 22:24

## 2024-06-11 RX ADMIN — ROSUVASTATIN CALCIUM 5 MG: 10 TABLET, FILM COATED ORAL at 09:06

## 2024-06-11 RX ADMIN — HYDROCODONE BITARTRATE AND ACETAMINOPHEN 1 TABLET: 5; 325 TABLET ORAL at 06:44

## 2024-06-11 RX ADMIN — VALSARTAN 40 MG: 80 TABLET, FILM COATED ORAL at 09:06

## 2024-06-11 RX ADMIN — HYDROCODONE BITARTRATE AND ACETAMINOPHEN 1 TABLET: 7.5; 325 TABLET ORAL at 21:37

## 2024-06-11 RX ADMIN — VITAMINS A AND D OINTMENT 1 APPLICATION: 15.5; 53.4 OINTMENT TOPICAL at 09:07

## 2024-06-11 RX ADMIN — DOCUSATE SODIUM 100 MG: 100 CAPSULE, LIQUID FILLED ORAL at 22:24

## 2024-06-11 RX ADMIN — Medication 10 ML: at 09:07

## 2024-06-11 RX ADMIN — BUSPIRONE HYDROCHLORIDE 5 MG: 5 TABLET ORAL at 09:06

## 2024-06-11 RX ADMIN — DOCUSATE SODIUM 100 MG: 100 CAPSULE, LIQUID FILLED ORAL at 10:01

## 2024-06-11 NOTE — PLAN OF CARE
Goal Outcome Evaluation:  Report called and given to [facility]. Patient's belongings sent with patient.

## 2024-06-11 NOTE — DISCHARGE PLACEMENT REQUEST
"Edgar Stokes (79 y.o. Female)       Date of Birth   1945    Social Security Number       Address   [address] [address]    Home Phone   [phone]    MRN   [id number]       Sabianism   Uatsdin    Marital Status                               Admission Date   6/10/24    Admission Type   Emergency    Admitting Provider   Nitesh Shay DO    Attending Provider   Nitesh Shay DO    Department, Room/Bed   [address], 3302/1S       Discharge Date       Discharge Disposition       Discharge Destination                                 Attending Provider: Nitesh Shay DO    Allergies: Z-mary [Azithromycin], Amoxicillin, Cortisone, Fish-derived Products, Iodinated Contrast Media, Iodine, Lidocaine, Morphine, Niacin, Nystatin, Other, Sulfa Antibiotics, Verapamil    Isolation: None   Infection: None   Code Status: CPR    Ht: 152.4 cm (60\")   Wt: 74.3 kg (163 lb 12.8 oz)    Admission Cmt: None   Principal Problem: Compression fracture of L3 vertebra [S32.030A]                   Active Insurance as of 6/10/2024       Primary Coverage       Payor Plan Insurance Group Employer/Plan Group    ANTHEM MEDICARE REPLACEMENT ANTHEM MEDICARE ADVANTAGE [id number]       Payor Plan Address Payor Plan Phone Number Payor Plan Fax Number Effective Dates    [address] [phone]  1/1/2021 - None Entered    [facility] [id number]         Subscriber Name Subscriber Birth Date Member ID       EDGAR STOKES 1945 [id number]                     Emergency Contacts        (Rel.) Home Phone Work Phone Mobile Phone    Ava Andersen (Daughter) [phone] -- --              Physician Progress Notes (most recent note)    No notes of this type exist for this encounter.       Consult Notes (most recent note)    No notes of this type exist for this encounter.          Physical Therapy Notes (all)        Chaitanya Hale, PT at 06/11/24 1137  Version 1 of 1 "         Acute Care - Physical Therapy Initial Evaluation   [place]     Patient Name: Edgar Cotto  : 1945  MRN: [id number]  Today's Date: 2024      Visit Dx:     ICD-10-CM ICD-9-CM   1. Compression fracture of L3 vertebra, initial encounter  S32.030A 805.4   2. Fall in home, initial encounter  W19.XXXA E888.9    Y92.009 E849.0     Patient Active Problem List   Diagnosis    Atopic rhinitis    Dizziness    Generalized anxiety disorder    Generalized osteoarthritis    Essential hypertension    Auditory vertigo    Mitral and aortic valve disease    Stomatitis    Seasonal allergic rhinitis    Varicose veins of both lower extremities    Meniere's disease of both ears    S/P hemorrhoidectomy    Abnormal EKG    Moderate obesity    Dyslipidemia    Thyroid cyst    Age-related osteoporosis without current pathological fracture    Cervical lymphadenopathy    CABAN (dyspnea on exertion)    Hypoxia    Restrictive lung disease    Scoliosis    Hip pain    Lumbar degenerative disc disease    BMI 32.0-32.9,adult    Physical deconditioning    Compression fracture of L3 vertebra     Past Medical History:   Diagnosis Date    Abdominal distension     Allergic rhinitis     Anxiety     Arthritis     Closed nondisplaced fracture of surgical neck of left humerus with routine healing 3/9/2021    Cough     Dry eyes     Dyslipidemia     Dysuria     Hyperlipidemia     Hypertension     Meniere's disease     Mitral and aortic valve disease     Oral candidiasis     Osteoarthritis     Stomatitis     URI, acute     Vaginal candidiasis     Vocal cord dysfunction      Past Surgical History:   Procedure Laterality Date    CHOLECYSTECTOMY      COLONOSCOPY      DILATATION AND CURETTAGE      HEMORRHOIDECTOMY      HERNIA REPAIR      SHOULDER SURGERY      SKIN CANCER EXCISION      on Nose    TONSILLECTOMY       PT Assessment (Last 12 Hours)       PT Evaluation and Treatment       Row Name 24 1015          Physical Therapy Time and  Intention    Subjective Information complains of;weakness;fatigue;pain  -KM     Document Type evaluation  -KM     Mode of Treatment physical therapy  -KM     Patient Effort good  -KM     Symptoms Noted During/After Treatment increased pain  -KM       Row Name 06/11/24 1015          General Information    Patient Profile Reviewed yes  -KM     Patient Observations alert;cooperative;agree to therapy  -KM     Prior Level of Function --  pt. reports requiring some assistance from her daughter for the last year. She reports steady decline in function over the last 3-4 months.  -KM     Existing Precautions/Restrictions fall  lumbar fx  -KM     Risks Reviewed patient:;LOB;nausea/vomiting;dizziness;increased discomfort  -KM     Benefits Reviewed patient:;improve function;increase independence;increase strength;increase balance  -KM     Barriers to Rehab previous functional deficit  -KM       Row Name 06/11/24 1015          Living Environment    Current Living Arrangements home  -KM     People in Home child(eleonora), adult  -KM     Primary Care Provided by child(eleonora)  -KM       Row Name 06/11/24 1015          Home Use of Assistive/Adaptive Equipment    Equipment Currently Used at Home wheelchair;walker, rolling  -KM       Row Name 06/11/24 1015          Cognition    Affect/Mental Status (Cognition) WFL  -KM     Orientation Status (Cognition) oriented x 4  -KM     Follows Commands (Cognition) WFL  -KM       Row Name 06/11/24 1015          Range of Motion (ROM)    Range of Motion bilateral lower extremities;ROM is WFL  -KM       Row Name 06/11/24 1015          Strength (Manual Muscle Testing)    Strength (Manual Muscle Testing) --  LLE strength grossly 3/5; RLE strength grossly 2/5  -KM       Row Name 06/11/24 1015          Bed Mobility    Bed Mobility bed mobility (all) activities  -KM     All Activities, [place] (Bed Mobility) moderate assist (50% patient effort);maximum assist (25% patient effort);2 person assist;verbal  cues;nonverbal cues (demo/gesture)  -KM     Bed Mobility, Safety Issues decreased use of arms for pushing/pulling;decreased use of legs for bridging/pushing  -KM     Assistive Device (Bed Mobility) bed rails;draw sheet  -       Row Name 06/11/24 1015          Transfers    Transfers sit-stand transfer;stand-sit transfer  -       Row Name 06/11/24 1015          Sit-Stand Transfer    Sit-Stand [place] (Transfers) moderate assist (50% patient effort);2 person assist;verbal cues  -     Assistive Device (Sit-Stand Transfers) --  DARIUS HAROA  -PERI       Row Name 06/11/24 1015          Stand-Sit Transfer    Stand-Sit [place] (Transfers) moderate assist (50% patient effort);2 person assist;verbal cues  -     Assistive Device (Stand-Sit Transfers) --  DARIUS HAROA  -PERI       Row Name 06/11/24 1015          Gait/Stairs (Locomotion)    Comment, (Gait/Stairs) pt. was able to take 3 side steps R w/ modA x2 DARIUS HAROA  -       Row Name 06/11/24 1015          Safety Issues, Functional Mobility    Impairments Affecting Function (Mobility) balance;endurance/activity tolerance;pain;strength  -       Row Name 06/11/24 1015          Balance    Balance Assessment sitting static balance;standing static balance  -KM     Static Sitting Balance standby assist;contact guard  -KM     Position, Sitting Balance sitting edge of bed;unsupported  -KM     Static Standing Balance moderate assist;2-person assist;verbal cues  -     Position/Device Used, Standing Balance --  DARIUS MALLORY  [facility]       Row Name             [REMOVED] Wound 06/10/24 1444 Bilateral gluteal Pressure Injury    Wound - Properties Group Placement Date: 06/10/24  -EB Placement Time: 1444 -EB Present on Original Admission: Y  -EB Side: Bilateral  -EB Location: gluteal  -EB Primary Wound Type: Pressure inj  -EB Removal Date: 06/11/24  -BB Removal Time: 1033  -BB    Retired Wound - Properties Group Placement Date: 06/10/24  -EB Placement Time: 1444 -EB Present on Original  Admission: Y  -EB Side: Bilateral  -EB Location: gluteal  -EB Primary Wound Type: Pressure inj  -EB Removal Date: 06/11/24  -BB Removal Time: 1033 -BB    Retired Wound - Properties Group Date first assessed: 06/10/24  -EB Time first assessed: 1444  -EB Present on Original Admission: Y  -EB Side: Bilateral  -EB Location: gluteal  -EB Primary Wound Type: Pressure inj  -EB Resolution Date: 06/11/24  -BB Resolution Time: 1033 -BB      Row Name             Wound 06/11/24 1033 Left gluteal Pressure Injury    Wound - Properties Group Placement Date: 06/11/24  -BB Placement Time: 1033 -BB Side: Left  -BB Location: gluteal  -BB Primary Wound Type: Pressure inj  -BB    Retired Wound - Properties Group Placement Date: 06/11/24  -BB Placement Time: 1033 -BB Side: Left  -BB Location: gluteal  -BB Primary Wound Type: Pressure inj  -BB    Retired Wound - Properties Group Date first assessed: 06/11/24  -BB Time first assessed: 1033 -BB Side: Left  -BB Location: gluteal  -BB Primary Wound Type: Pressure inj  -BB      Row Name             Wound 06/11/24 1034 Right gluteal Pressure Injury    Wound - Properties Group Placement Date: 06/11/24  -BB Placement Time: 1034 -BB Side: Right  -BB Location: gluteal  -BB Primary Wound Type: Pressure inj  -BB    Retired Wound - Properties Group Placement Date: 06/11/24  -BB Placement Time: 1034 -BB Side: Right  -BB Location: gluteal  -BB Primary Wound Type: Pressure inj  -BB    Retired Wound - Properties Group Date first assessed: 06/11/24  -BB Time first assessed: 1034 -BB Side: Right  -BB Location: gluteal  -BB Primary Wound Type: Pressure inj  -BB      Row Name 06/11/24 1015          Plan of Care Review    Plan of Care Reviewed With patient  -KM     Outcome Evaluation Pt. evaluation completed during PT session. She was able to peform bed mobility w/ maxA x2. She was able to perform transfers w/ modA x2. She c/o pain and fatigue throughout session. Pt. would benefit from skilled PT  services.  -       Row Name 06/11/24 1015          Therapy Assessment/Plan (PT)    Patient/Family Therapy Goals Statement (PT) improve mobility  -KM     Functional Level at Time of Evaluation (PT) modA-maxA x2  -KM     PT Diagnosis (PT) decreased mobility  -KM     Rehab Potential (PT) good, to achieve stated therapy goals  -KM     Criteria for Skilled Interventions Met (PT) yes;skilled treatment is necessary  -KM     Therapy Frequency (PT) 3 times/wk  3-5x/wk  -KM     Predicted Duration of Therapy Intervention (PT) until discharge  -KM     Problem List (PT) problems related to;balance;mobility;strength;pain  -KM     Activity Limitations Related to Problem List (PT) unable to ambulate safely;unable to transfer safely  -       Row Name 06/11/24 1015          Therapy Plan Review/Discharge Plan (PT)    Therapy Plan Review (PT) evaluation/treatment results reviewed;care plan/treatment goals reviewed;risks/benefits reviewed;patient;daughter  -[facility] Name 06/11/24 1015          Physical Therapy Goals    Bed Mobility Goal Selection (PT) bed mobility, PT goal 1  -KM     Transfer Goal Selection (PT) transfer, PT goal 1  -KM     Gait Training Goal Selection (PT) gait training, PT goal 1  -KM       Row Name 06/11/24 1015          Bed Mobility Goal 1 (PT)    Activity/Assistive Device (Bed Mobility Goal 1, PT) bed mobility activities, all  -KM     [place] Level/Cues Needed (Bed Mobility Goal 1, PT) moderate assist (50-74% patient effort)  -KM     Time Frame (Bed Mobility Goal 1, PT) by discharge  -[facility] Name 06/11/24 1015          Transfer Goal 1 (PT)    Activity/Assistive Device (Transfer Goal 1, PT) transfers, all;walker, rolling  -KM     [place] Level/Cues Needed (Transfer Goal 1, PT) contact guard required  -KM     Time Frame (Transfer Goal 1, PT) by discharge  [facility]       Row Name 06/11/24 1015          Gait Training Goal 1 (PT)    Activity/Assistive Device (Gait Training Goal 1, PT) gait (walking  locomotion);assistive device use;walker, rolling  -KM     [place] Level (Gait Training Goal 1, PT) minimum assist (75% or more patient effort)  -KM     Distance (Gait Training Goal 1, PT) 10'  -KM     Time Frame (Gait Training Goal 1, PT) by discharge  -               User Key  (r) = Recorded By, (t) = Taken By, (c) = Cosigned By      Initials Name Provider Type    BB David Scruggs, RN Registered Nurse    Chaitanya Banda, PT Physical Therapist    Roland Ruvalcaba, RN Registered Nurse                    Physical Therapy Education       Title: PT OT SLP Therapies (Done)       Topic: Physical Therapy (Done)       Point: Mobility training (Done)       Learning Progress Summary             Patient Acceptance, E,TB, VU by  at 6/11/2024 1137                         Point: Home exercise program (Done)       Learning Progress Summary             Patient Acceptance, E,TB, VU by  at 6/11/2024 1137                         Point: Body mechanics (Done)       Learning Progress Summary             Patient Acceptance, E,TB, VU by  at 6/11/2024 1137                         Point: Precautions (Done)       Learning Progress Summary             Patient Acceptance, E,TB, VU by  at 6/11/2024 1137                                         User Key       Initials Effective Dates Name Provider Type Discipline     05/24/22 -  Chaitanya Hale, PT Physical Therapist PT                  PT Recommendation and Plan  Anticipated Discharge Disposition (PT): skilled nursing facility (for increased PT services)  Planned Therapy Interventions (PT): balance training, bed mobility training, gait training, home exercise program, patient/family education, postural re-education, ROM (range of motion), stair training, strengthening, stretching, transfer training  Therapy Frequency (PT): 3 times/wk (3-5x/wk)  Plan of Care Reviewed With: patient  Outcome Evaluation: Pt. evaluation completed during PT session. She was able to peform bed  mobility w/ maxA x2. She was able to perform transfers w/ modA x2. She c/o pain and fatigue throughout session. Pt. would benefit from skilled PT services.       Time Calculation:    PT Charges       Row Name 24 1127             Time Calculation    PT Received On 24  -KM      PT Goal Re-Cert Due Date 24  -KM                User Key  (r) = Recorded By, (t) = Taken By, (c) = Cosigned By      Initials Name Provider Type     Chaitanya Hale, PT Physical Therapist                  Therapy Charges for Today       Code Description Service Date Service Provider Modifiers Qty    57461491483 HC PT EVAL MOD COMPLEXITY 4 2024 Chaitanya Hale, PT GP 1            PT G-Codes  AM-PAC 6 Clicks Score (PT): 8    Chaitanya Hale PT  2024      Electronically signed by Chaitanya Hale, PT at 24 1137          Occupational Therapy Notes (all)        Viviane Moore, OT at 24 1139          Patient Name: Edgar Cotto  : 1945    MRN: [id number]                              Today's Date: 2024       Admit Date: 6/10/2024    Visit Dx:     ICD-10-CM ICD-9-CM   1. Compression fracture of L3 vertebra, initial encounter  S32.030A 805.4   2. Fall in home, initial encounter  W19.XXXA E888.9    Y92.009 E849.0     Patient Active Problem List   Diagnosis    Atopic rhinitis    Dizziness    Generalized anxiety disorder    Generalized osteoarthritis    Essential hypertension    Auditory vertigo    Mitral and aortic valve disease    Stomatitis    Seasonal allergic rhinitis    Varicose veins of both lower extremities    Meniere's disease of both ears    S/P hemorrhoidectomy    Abnormal EKG    Moderate obesity    Dyslipidemia    Thyroid cyst    Age-related osteoporosis without current pathological fracture    Cervical lymphadenopathy    CABAN (dyspnea on exertion)    Hypoxia    Restrictive lung disease    Scoliosis    Hip pain    Lumbar degenerative disc disease    BMI 32.0-32.9,adult    Physical  deconditioning    Compression fracture of L3 vertebra     Past Medical History:   Diagnosis Date    Abdominal distension     Allergic rhinitis     Anxiety     Arthritis     Closed nondisplaced fracture of surgical neck of left humerus with routine healing 3/9/2021    Cough     Dry eyes     Dyslipidemia     Dysuria     Hyperlipidemia     Hypertension     Meniere's disease     Mitral and aortic valve disease     Oral candidiasis     Osteoarthritis     Stomatitis     URI, acute     Vaginal candidiasis     Vocal cord dysfunction      Past Surgical History:   Procedure Laterality Date    CHOLECYSTECTOMY      COLONOSCOPY      DILATATION AND CURETTAGE      HEMORRHOIDECTOMY      HERNIA REPAIR      SHOULDER SURGERY      SKIN CANCER EXCISION      on Nose    TONSILLECTOMY        General Information       Row Name 06/11/24 1131          OT Time and Intention    Document Type evaluation  -     Mode of Treatment individual therapy;occupational therapy  -       Row Name 06/11/24 1131          General Information    Patient Profile Reviewed yes  -     Prior Level of Function --  functional decline over past 3-4 weeks; prior she was able to complete short distances with rolling walker and assist from daughter; assist with ADLs (recently patient with fall during tranfer to [facility] and unable to weight bear through RLE)  -     Existing Precautions/Restrictions fall;other (see comments);spinal  L3 Compression Fx Inferior Endplate with minimal height loss  -     Barriers to Rehab previous functional deficit  -       Row Name 06/11/24 1131          Occupational Profile    Reason for Services/Referral (Occupational Profile) Patient admitted to [facility] on [facility]/10/20[facility]. She was referred for OT evaluation due to change in functional performance with ADLs, functional mobility, and/or transfers.  -       Row Name 06/11/24 1131          Living Environment    People in Home child(eleonora), adult  -       Row Name 06/11/24 1131           Cognition    Orientation Status (Cognition) oriented x 3  -[facility] Name 06/11/24 1131          Safety Issues, Functional Mobility    Impairments Affecting Function (Mobility) balance;endurance/activity tolerance;pain;range of motion (ROM);strength  -               User Key  (r) = Recorded By, (t) = Taken By, (c) = Cosigned By      Initials Name Provider Type     Viviane Mooer OT Occupational Therapist                     Mobility/ADL's       Row Name 06/11/24 1133          Bed Mobility    Bed Mobility supine-sit-supine;rolling right  -     Rolling Right [place] (Bed Mobility) minimum assist (75% patient effort);moderate assist (50% patient effort);verbal cues;nonverbal cues (demo/gesture)  -     Supine-Sit-Supine [place] (Bed Mobility) minimum assist (75% patient effort);moderate assist (50% patient effort);2 person assist;verbal cues;nonverbal cues (demo/gesture)  -     Bed Mobility, Safety Issues decreased use of arms for pushing/pulling  -     Assistive Device (Bed Mobility) bed rails;draw sheet;head of bed elevated  -     Comment, (Bed Mobility) log roll to right  -[facility] Name 06/11/24 1133          Transfers    Transfers sit-stand transfer;stand-sit transfer  -[facility] Name 06/11/24 1133          Sit-Stand Transfer    Sit-Stand [place] (Transfers) minimum assist (75% patient effort);moderate assist (50% patient effort);2 person assist  -     Comment, (Sit-Stand Transfer) handheld assist bilaterally  -[facility] Name 06/11/24 1133          Stand-Sit Transfer    Stand-Sit [place] (Transfers) minimum assist (75% patient effort);moderate assist (50% patient effort);2 person assist  -[facility] Name 06/11/24 1133          Functional Mobility    Functional Mobility- Comment unable to tolerate, increased pain in RLE/hip in standing  -[facility] Name 06/11/24 1133          Activities of Daily Living    BADL Assessment/Intervention bathing;upper body  dressing;lower body dressing;grooming;feeding;toileting  -KP       Row Name 06/11/24 1133          Bathing Assessment/Intervention    [place] Level (Bathing) bathing skills;maximum assist (25% patient effort);dependent (less than 25% patient effort)  -KP       Row Name 06/11/24 1133          Upper Body Dressing Assessment/Training    [place] Level (Upper Body Dressing) upper body dressing skills;maximum assist (25% patient effort);dependent (less than 25% patient effort)  -KP       Row Name 06/11/24 1133          Lower Body Dressing Assessment/Training    [place] Level (Lower Body Dressing) lower body dressing skills;dependent (less than 25% patient effort)  -KP       Row Name 06/11/24 1133          Grooming Assessment/Training    [place] Level (Grooming) grooming skills;maximum assist (25% patient effort)  -KP       Row Name 06/11/24 1133          Self-Feeding Assessment/Training    [place] Level (Feeding) feeding skills;minimum assist (75% patient effort)  -KP       Row Name 06/11/24 1133          Toileting Assessment/Training    [place] Level (Toileting) toileting skills;dependent (less than 25% patient effort)  -               User Key  (r) = Recorded By, (t) = Taken By, (c) = Cosigned By      Initials Name Provider Type     Viviane Moore OT Occupational Therapist                   Obj/Interventions       Row Name 06/11/24 1134          Sensory Assessment (Somatosensory)    Sensory Assessment (Somatosensory) UE sensation intact  -KP       Row Name 06/11/24 1134          Vision Assessment/Intervention    Visual Impairment/Limitations WFL;corrective lenses full-time  -KP       Row Name 06/11/24 1134          Range of Motion Comprehensive    General Range of Motion bilateral upper extremity ROM WFL  -     Comment, General Range of Motion limited at end range in RUE at shoulder due to prior injury  -KP       Row Name 06/11/24 1134          Strength Comprehensive (MMT)     Comment, General Manual Muscle Testing (MMT) Assessment 4+/5 MMT in BUEs  -       Row Name 06/11/24 1134          Motor Skills    Motor Skills coordination;functional endurance  -     Coordination bilateral;upper extremity;WFL  -     Functional Endurance fair minus  -KP       Row Name 06/11/24 1134          Balance    Balance Assessment sitting static balance;standing static balance  -KP     Static Sitting Balance contact guard;standby assist  -KP     Static Standing Balance minimal assist;moderate assist  -KP               User Key  (r) = Recorded By, (t) = Taken By, (c) = Cosigned By      Initials Name Provider Type     Viviane Moore, OT Occupational Therapist                   Goals/Plan       Row Name 06/11/24 1138          Transfer Goal 1 (OT)    Activity/Assistive Device (Transfer Goal 1, OT) toilet;commode, 3-in-1  -KP     [place] Level/Cues Needed (Transfer Goal 1, OT) minimum assist (75% or more patient effort)  -     Time Frame (Transfer Goal 1, OT) by discharge  -       Row Name 06/11/24 1138          Dressing Goal 1 (OT)    Activity/Device (Dressing Goal 1, OT) dressing skills, all  -KP     [place]/Cues Needed (Dressing Goal 1, OT) moderate assist (50-74% patient effort)  -     Time Frame (Dressing Goal 1, OT) by discharge  -[facility] Name 06/11/24 1138          Problem Specific Goal 1 (OT)    Problem Specific Goal 1 (OT) Patient will perform sustained activity X10 minutes to promote functional endurance/activity tolerance needed for daily routine.  -     Time Frame (Problem Specific Goal 1, OT) by discharge  -       Row Name 06/11/24 1138          Therapy Assessment/Plan (OT)    Planned Therapy Interventions (OT) activity tolerance training;BADL retraining;adaptive equipment training;functional balance retraining;transfer/mobility retraining;occupation/activity based interventions;strengthening exercise;passive ROM/stretching;patient/caregiver  education/training;ROM/therapeutic exercise  -               User Key  (r) = Recorded By, (t) = Taken By, (c) = Cosigned By      Initials Name Provider Type     Viviane Moore, OT Occupational Therapist                   Clinical Impression       Row Name 06/11/24 1135          Pain Assessment    Pretreatment Pain Rating 0/10 - no pain  -     Posttreatment Pain Rating 3/10  -     Pain Location - Side/Orientation Right  -     Pain Location lower  -     Pain Location - extremity  -     Pain Intervention(s) Elevated;Rest  -       Row Name 06/11/24 1135          Plan of Care Review    Plan of Care Reviewed With patient;daughter  -     Progress no change  -     Outcome Evaluation Patient seen for OT evaluation. She presents with functional limitations including generalized weakness, pain, limited activity tolerance/functional endurance, and impaired balance. She would benefit from ongoing OT services to promote highest level of independence and safety with daily occupations prior to discharge.  -       Row Name 06/11/24 1135          Therapy Assessment/Plan (OT)    Patient/Family Therapy Goal Statement (OT) be able to return home  -     Rehab Potential (OT) good, to achieve stated therapy goals  -     Criteria for Skilled Therapeutic Interventions Met (OT) yes;meets criteria;skilled treatment is necessary  -     Therapy Frequency (OT) 3 times/wk  3-5x/week as able and available for functional progress  -     Predicted Duration of Therapy Intervention (OT) discharge  -       Row Name 06/11/24 1135          Therapy Plan Review/Discharge Plan (OT)    Anticipated Discharge Disposition (OT) inpatient rehabilitation facility;skilled nursing facility  -       Row Name 06/11/24 1135          Positioning and Restraints    Pre-Treatment Position in bed  -     Post Treatment Position bed  -     In Bed fowlers;call light within reach;encouraged to call for assist;exit alarm on;with  family/caregiver  -               User Key  (r) = Recorded By, (t) = Taken By, (c) = Cosigned By      Initials Name Provider Type    Viviane Barry OT Occupational Therapist                   Outcome Measures    No documentation.                     OT Recommendation and Plan  Planned Therapy Interventions (OT): activity tolerance training, BADL retraining, adaptive equipment training, functional balance retraining, transfer/mobility retraining, occupation/activity based interventions, strengthening exercise, passive ROM/stretching, patient/caregiver education/training, ROM/therapeutic exercise  Therapy Frequency (OT): 3 times/wk (3-5x/week as able and available for functional progress)  Plan of Care Review  Plan of Care Reviewed With: patient, daughter  Progress: no change  Outcome Evaluation: Patient seen for OT evaluation. She presents with functional limitations including generalized weakness, pain, limited activity tolerance/functional endurance, and impaired balance. She would benefit from ongoing OT services to promote highest level of independence and safety with daily occupations prior to discharge.     Time Calculation:   Evaluation Complexity (OT)  Review Occupational Profile/Medical/Therapy History Complexity: expanded/moderate complexity  Assessment, Occupational Performance/Identification of Deficit Complexity: 3-5 performance deficits  Clinical Decision Making Complexity (OT): detailed assessment/moderate complexity  Overall Complexity of Evaluation (OT): moderate complexity     Time Calculation- OT       Row Name 06/11/24 1139             Time Calculation- OT    OT Received On 06/11/24  -                User Key  (r) = Recorded By, (t) = Taken By, (c) = Cosigned By      Initials Name Provider Type    Viviane Barry OT Occupational Therapist                  Therapy Charges for Today       Code Description Service Date Service Provider Modifiers Qty    [id number]  OT EVAL MOD COMPLEXITY  4 6/11/2024 Viviane Moore, OT GO 1                 Viviane Moore OT  6/11/2024    Electronically signed by Viviane Moore OT at 06/11/24 1140       Speech Language Pathology Notes (all)    No notes exist for this encounter.       ADL Documentation (most recent)      Flowsheet Row Most Recent Value   Transferring 4 - completely dependent   Toileting 3 - assistive equipment and person   Bathing 2 - assistive person   Dressing 2 - assistive person   Eating 0 - independent   Communication 0 - understands/communicates without difficulty   Swallowing 0 - swallows foods/liquids without difficulty   Equipment Currently Used at Home wheelchair, walker, rolling

## 2024-06-11 NOTE — NURSING NOTE
Wound consult for denuded area to the bilateral upper buttocks. Areas present as stage 2 pressure ulcers with a partial thickness loss of the dermis. LT gluteal measures 0.6 x 0.3 x 0.1  RT gluteal measures 1.5 x 0.3 x 0.1  The rich wounds are both pink and blanchable with sheering present to the surrounding tissue. Pressure injury protocol in place. New order for magic barrier cream BID and PRN.

## 2024-06-11 NOTE — CASE MANAGEMENT/SOCIAL WORK
Discharge Planning Assessment  [facility]     Patient Name: Edgar Cotto  MRN: [id number]  Today's Date: 6/11/2024    Admit Date: 6/10/2024    Plan: [facility] per [place] can accept pt.  Pt and daughter aware and agreeable.  Physician is agreeable with prior authorization to be started.  SS notified Supervisor.  SS will follow.       Discharge Plan       Row Name 06/11/24 1158       Plan    Plan [facility] per [place] can accept pt.  Pt and daughter aware and agreeable.  Physician is agreeable with prior authorization to be started.  SS notified Supervisor.  SS will follow.                  Continued Care and Services - Admitted Since 6/10/2024       Destination Coordination complete.      Service Provider Request Status Selected Services Address Phone Fax Patient Preferred    [facility]  Selected Skilled Nursing [address] [place] [id number] [phone] --                  Selected Continued Care - Episodes Includes continued care and service providers with selected services from the active episodes listed below      Ambulatory Social Work Case Management Episode start date: 5/29/2024   There are no active outsourced providers for this episode.                 Expected Discharge Date and Time       Expected Discharge Date Expected Discharge Time    Jun 12, 2024             SAUL Murray

## 2024-06-11 NOTE — THERAPY EVALUATION
Acute Care - Physical Therapy Initial Evaluation   [facility]     Patient Name: Edgar Cotto  : 1945  MRN: [id number]  Today's Date: 2024      Visit Dx:     ICD-10-CM ICD-9-CM   1. Compression fracture of L3 vertebra, initial encounter  S32.030A 805.4   2. Fall in home, initial encounter  W19.XXXA E888.9    Y92.009 E849.0     Patient Active Problem List   Diagnosis    Atopic rhinitis    Dizziness    Generalized anxiety disorder    Generalized osteoarthritis    Essential hypertension    Auditory vertigo    Mitral and aortic valve disease    Stomatitis    Seasonal allergic rhinitis    Varicose veins of both lower extremities    Meniere's disease of both ears    S/P hemorrhoidectomy    Abnormal EKG    Moderate obesity    Dyslipidemia    Thyroid cyst    Age-related osteoporosis without current pathological fracture    Cervical lymphadenopathy    CABAN (dyspnea on exertion)    Hypoxia    Restrictive lung disease    Scoliosis    Hip pain    Lumbar degenerative disc disease    BMI 32.0-32.9,adult    Physical deconditioning    Compression fracture of L3 vertebra     Past Medical History:   Diagnosis Date    Abdominal distension     Allergic rhinitis     Anxiety     Arthritis     Closed nondisplaced fracture of surgical neck of left humerus with routine healing 3/9/2021    Cough     Dry eyes     Dyslipidemia     Dysuria     Hyperlipidemia     Hypertension     Meniere's disease     Mitral and aortic valve disease     Oral candidiasis     Osteoarthritis     Stomatitis     URI, acute     Vaginal candidiasis     Vocal cord dysfunction      Past Surgical History:   Procedure Laterality Date    CHOLECYSTECTOMY      COLONOSCOPY      DILATATION AND CURETTAGE      HEMORRHOIDECTOMY      HERNIA REPAIR      SHOULDER SURGERY      SKIN CANCER EXCISION      on Nose    TONSILLECTOMY       PT Assessment (Last 12 Hours)       PT Evaluation and Treatment       Row Name 24 1015          Physical Therapy Time and Intention     Subjective Information complains of;weakness;fatigue;pain  -KM     Document Type evaluation  -KM     Mode of Treatment physical therapy  -KM     Patient Effort good  -KM     Symptoms Noted During/After Treatment increased pain  -KM       Row Name 06/11/24 1015          General Information    Patient Profile Reviewed yes  -KM     Patient Observations alert;cooperative;agree to therapy  -KM     Prior Level of Function --  pt. reports requiring some assistance from her daughter for the last year. She reports steady decline in function over the last 3-4 months.  -KM     Existing Precautions/Restrictions fall  lumbar fx  -KM     Risks Reviewed patient:;LOB;nausea/vomiting;dizziness;increased discomfort  -KM     Benefits Reviewed patient:;improve function;increase independence;increase strength;increase balance  -KM     Barriers to Rehab previous functional deficit  -KM       Row Name 06/11/24 1015          Living Environment    Current Living Arrangements home  -KM     People in Home child(eleonora), adult  -KM     Primary Care Provided by child(eleonora)  -KM       Row Name 06/11/24 1015          Home Use of Assistive/Adaptive Equipment    Equipment Currently Used at Home wheelchair;walker, rolling  -KM       Row Name 06/11/24 1015          Cognition    Affect/Mental Status (Cognition) WFL  -KM     Orientation Status (Cognition) oriented x 4  -KM     Follows Commands (Cognition) WFL  -KM       Row Name 06/11/24 1015          Range of Motion (ROM)    Range of Motion bilateral lower extremities;ROM is WFL  -KM       Row Name 06/11/24 1015          Strength (Manual Muscle Testing)    Strength (Manual Muscle Testing) --  LLE strength grossly 3/5; RLE strength grossly 2/5  -KM       Row Name 06/11/24 1015          Bed Mobility    Bed Mobility bed mobility (all) activities  -KM     All Activities, [place] (Bed Mobility) moderate assist (50% patient effort);maximum assist (25% patient effort);2 person assist;verbal  cues;nonverbal cues (demo/gesture)  -KM     Bed Mobility, Safety Issues decreased use of arms for pushing/pulling;decreased use of legs for bridging/pushing  -KM     Assistive Device (Bed Mobility) bed rails;draw sheet  -       Row Name 06/11/24 1015          Transfers    Transfers sit-stand transfer;stand-sit transfer  -       Row Name 06/11/24 1015          Sit-Stand Transfer    Sit-Stand [place] (Transfers) moderate assist (50% patient effort);2 person assist;verbal cues  -     Assistive Device (Sit-Stand Transfers) --  DARIUS HAROA  -PERI       Row Name 06/11/24 1015          Stand-Sit Transfer    Stand-Sit [place] (Transfers) moderate assist (50% patient effort);2 person assist;verbal cues  -     Assistive Device (Stand-Sit Transfers) --  DARIUS HAROA  -PERI       Row Name 06/11/24 1015          Gait/Stairs (Locomotion)    Comment, (Gait/Stairs) pt. was able to take 3 side steps R w/ modA x2 DARIUS HAROA  -       Row Name 06/11/24 1015          Safety Issues, Functional Mobility    Impairments Affecting Function (Mobility) balance;endurance/activity tolerance;pain;strength  -       Row Name 06/11/24 1015          Balance    Balance Assessment sitting static balance;standing static balance  -KM     Static Sitting Balance standby assist;contact guard  -KM     Position, Sitting Balance sitting edge of bed;unsupported  -KM     Static Standing Balance moderate assist;2-person assist;verbal cues  -     Position/Device Used, Standing Balance --  DARIUS MALLORY  [facility]       Row Name             [REMOVED] Wound 06/10/24 1444 Bilateral gluteal Pressure Injury    Wound - Properties Group Placement Date: 06/10/24  -EB Placement Time: 1444 -EB Present on Original Admission: Y  -EB Side: Bilateral  -EB Location: gluteal  -EB Primary Wound Type: Pressure inj  -EB Removal Date: 06/11/24  -BB Removal Time: 1033  -BB    Retired Wound - Properties Group Placement Date: 06/10/24  -EB Placement Time: 1444 -EB Present on Original  Admission: Y  -EB Side: Bilateral  -EB Location: gluteal  -EB Primary Wound Type: Pressure inj  -EB Removal Date: 06/11/24  -BB Removal Time: 1033 -BB    Retired Wound - Properties Group Date first assessed: 06/10/24  -EB Time first assessed: 1444  -EB Present on Original Admission: Y  -EB Side: Bilateral  -EB Location: gluteal  -EB Primary Wound Type: Pressure inj  -EB Resolution Date: 06/11/24  -BB Resolution Time: 1033 -BB      Row Name             Wound 06/11/24 1033 Left gluteal Pressure Injury    Wound - Properties Group Placement Date: 06/11/24  -BB Placement Time: 1033 -BB Side: Left  -BB Location: gluteal  -BB Primary Wound Type: Pressure inj  -BB    Retired Wound - Properties Group Placement Date: 06/11/24  -BB Placement Time: 1033 -BB Side: Left  -BB Location: gluteal  -BB Primary Wound Type: Pressure inj  -BB    Retired Wound - Properties Group Date first assessed: 06/11/24  -BB Time first assessed: 1033 -BB Side: Left  -BB Location: gluteal  -BB Primary Wound Type: Pressure inj  -BB      Row Name             Wound 06/11/24 1034 Right gluteal Pressure Injury    Wound - Properties Group Placement Date: 06/11/24  -BB Placement Time: 1034 -BB Side: Right  -BB Location: gluteal  -BB Primary Wound Type: Pressure inj  -BB    Retired Wound - Properties Group Placement Date: 06/11/24  -BB Placement Time: 1034 -BB Side: Right  -BB Location: gluteal  -BB Primary Wound Type: Pressure inj  -BB    Retired Wound - Properties Group Date first assessed: 06/11/24  -BB Time first assessed: 1034 -BB Side: Right  -BB Location: gluteal  -BB Primary Wound Type: Pressure inj  -BB      Row Name 06/11/24 1015          Plan of Care Review    Plan of Care Reviewed With patient  -KM     Outcome Evaluation Pt. evaluation completed during PT session. She was able to peform bed mobility w/ maxA x2. She was able to perform transfers w/ modA x2. She c/o pain and fatigue throughout session. Pt. would benefit from skilled PT  services.  -       Row Name 06/11/24 1015          Therapy Assessment/Plan (PT)    Patient/Family Therapy Goals Statement (PT) improve mobility  -KM     Functional Level at Time of Evaluation (PT) modA-maxA x2  -KM     PT Diagnosis (PT) decreased mobility  -KM     Rehab Potential (PT) good, to achieve stated therapy goals  -KM     Criteria for Skilled Interventions Met (PT) yes;skilled treatment is necessary  -KM     Therapy Frequency (PT) 3 times/wk  3-5x/wk  -KM     Predicted Duration of Therapy Intervention (PT) until discharge  -KM     Problem List (PT) problems related to;balance;mobility;strength;pain  -KM     Activity Limitations Related to Problem List (PT) unable to ambulate safely;unable to transfer safely  -       Row Name 06/11/24 1015          Therapy Plan Review/Discharge Plan (PT)    Therapy Plan Review (PT) evaluation/treatment results reviewed;care plan/treatment goals reviewed;risks/benefits reviewed;patient;daughter  -[facility] Name 06/11/24 1015          Physical Therapy Goals    Bed Mobility Goal Selection (PT) bed mobility, PT goal 1  -KM     Transfer Goal Selection (PT) transfer, PT goal 1  -KM     Gait Training Goal Selection (PT) gait training, PT goal 1  -KM       Row Name 06/11/24 1015          Bed Mobility Goal 1 (PT)    Activity/Assistive Device (Bed Mobility Goal 1, PT) bed mobility activities, all  -KM     [place] Level/Cues Needed (Bed Mobility Goal 1, PT) moderate assist (50-74% patient effort)  -KM     Time Frame (Bed Mobility Goal 1, PT) by discharge  -[facility] Name 06/11/24 1015          Transfer Goal 1 (PT)    Activity/Assistive Device (Transfer Goal 1, PT) transfers, all;walker, rolling  -KM     [place] Level/Cues Needed (Transfer Goal 1, PT) contact guard required  -KM     Time Frame (Transfer Goal 1, PT) by discharge  [facility]       Row Name 06/11/24 1015          Gait Training Goal 1 (PT)    Activity/Assistive Device (Gait Training Goal 1, PT) gait (walking  locomotion);assistive device use;walker, rolling  -KM     [place] Level (Gait Training Goal 1, PT) minimum assist (75% or more patient effort)  -KM     Distance (Gait Training Goal 1, PT) 10'  -KM     Time Frame (Gait Training Goal 1, PT) by discharge  -               User Key  (r) = Recorded By, (t) = Taken By, (c) = Cosigned By      Initials Name Provider Type    BB David Scruggs, RN Registered Nurse    Chaitanya Banda, PT Physical Therapist    Roland Ruvalcaba, RN Registered Nurse                    Physical Therapy Education       Title: PT OT SLP Therapies (Done)       Topic: Physical Therapy (Done)       Point: Mobility training (Done)       Learning Progress Summary             Patient Acceptance, E,TB, VU by  at 6/11/2024 1137                         Point: Home exercise program (Done)       Learning Progress Summary             Patient Acceptance, E,TB, VU by  at 6/11/2024 1137                         Point: Body mechanics (Done)       Learning Progress Summary             Patient Acceptance, E,TB, VU by  at 6/11/2024 1137                         Point: Precautions (Done)       Learning Progress Summary             Patient Acceptance, E,TB, VU by  at 6/11/2024 1137                                         User Key       Initials Effective Dates Name Provider Type Discipline     05/24/22 -  Chaitanya Hale, PT Physical Therapist PT                  PT Recommendation and Plan  Anticipated Discharge Disposition (PT): skilled nursing facility (for increased PT services)  Planned Therapy Interventions (PT): balance training, bed mobility training, gait training, home exercise program, patient/family education, postural re-education, ROM (range of motion), stair training, strengthening, stretching, transfer training  Therapy Frequency (PT): 3 times/wk (3-5x/wk)  Plan of Care Reviewed With: patient  Outcome Evaluation: Pt. evaluation completed during PT session. She was able to peform bed  mobility w/ maxA x2. She was able to perform transfers w/ modA x2. She c/o pain and fatigue throughout session. Pt. would benefit from skilled PT services.       Time Calculation:    PT Charges       Row Name 06/11/24 1127             Time Calculation    PT Received On 06/11/24  -KM      PT Goal Re-Cert Due Date 06/25/24  -KM                User Key  (r) = Recorded By, (t) = Taken By, (c) = Cosigned By      Initials Name Provider Type    Chaitanya Banda, PT Physical Therapist                  Therapy Charges for Today       Code Description Service Date Service Provider Modifiers Qty    01284620323 HC PT EVAL MOD COMPLEXITY 4 6/11/2024 Chaitanya Hale, PT GP 1            PT G-Codes  AM-PAC 6 Clicks Score (PT): 8    Chaitanya Hale PT  6/11/2024

## 2024-06-11 NOTE — ACP (ADVANCE CARE PLANNING)
explained scope of Living Will and left blank document for patient's further review as requested.

## 2024-06-11 NOTE — THERAPY EVALUATION
Patient Name: Edgar Cotto  : 1945    MRN: [id number]                              Today's Date: 2024       Admit Date: 6/10/2024    Visit Dx:     ICD-10-CM ICD-9-CM   1. Compression fracture of L3 vertebra, initial encounter  S32.030A 805.4   2. Fall in home, initial encounter  W19.XXXA E888.9    Y92.009 E849.0     Patient Active Problem List   Diagnosis    Atopic rhinitis    Dizziness    Generalized anxiety disorder    Generalized osteoarthritis    Essential hypertension    Auditory vertigo    Mitral and aortic valve disease    Stomatitis    Seasonal allergic rhinitis    Varicose veins of both lower extremities    Meniere's disease of both ears    S/P hemorrhoidectomy    Abnormal EKG    Moderate obesity    Dyslipidemia    Thyroid cyst    Age-related osteoporosis without current pathological fracture    Cervical lymphadenopathy    CABAN (dyspnea on exertion)    Hypoxia    Restrictive lung disease    Scoliosis    Hip pain    Lumbar degenerative disc disease    BMI 32.0-32.9,adult    Physical deconditioning    Compression fracture of L3 vertebra     Past Medical History:   Diagnosis Date    Abdominal distension     Allergic rhinitis     Anxiety     Arthritis     Closed nondisplaced fracture of surgical neck of left humerus with routine healing 3/9/2021    Cough     Dry eyes     Dyslipidemia     Dysuria     Hyperlipidemia     Hypertension     Meniere's disease     Mitral and aortic valve disease     Oral candidiasis     Osteoarthritis     Stomatitis     URI, acute     Vaginal candidiasis     Vocal cord dysfunction      Past Surgical History:   Procedure Laterality Date    CHOLECYSTECTOMY      COLONOSCOPY      DILATATION AND CURETTAGE      HEMORRHOIDECTOMY      HERNIA REPAIR      SHOULDER SURGERY      SKIN CANCER EXCISION      on Nose    TONSILLECTOMY        General Information       Row Name 24 1131          OT Time and Intention    Document Type evaluation  -     Mode of Treatment individual  therapy;occupational therapy  -       Row Name 06/11/24 1131          General Information    Patient Profile Reviewed yes  -     Prior Level of Function --  functional decline over past 3-4 weeks; prior she was able to complete short distances with rolling walker and assist from daughter; assist with ADLs ([place] [facility] [place] bear through RLE)  -     Existing Precautions/Restrictions fall;other (see comments);spinal  L3 Compression Fx Inferior Endplate with minimal height loss  -     Barriers to Rehab previous functional deficit  -       Row Name 06/11/24 1131          Occupational Profile    Reason for Services/Referral (Occupational Profile) Patient admitted [facility]10[facility]. She was referred for OT evaluation due to change in functional performance with ADLs, functional mobility, and/or transfers.  -       Row Name 06/11/24 1131          Living Environment    People in Home child(eleonora), adult  -       Row Name 06/11/24 1131          Cognition    Orientation Status (Cognition) oriented x 3  -       Row Name 06/11/24 1131          Safety Issues, Functional Mobility    Impairments Affecting Function (Mobility) balance;endurance/activity tolerance;pain;range of motion (ROM);strength  -               User Key  (r) = Recorded By, (t) = Taken By, (c) = Cosigned By      Initials Name Provider Type    Viviane Barry, PAVITHRA Occupational Therapist                     Mobility/ADL's       Row Name 06/11/24 1133          Bed Mobility    Bed Mobility supine-sit-supine;rolling right  -     Rolling Right [place] (Bed Mobility) minimum assist (75% patient effort);moderate assist (50% patient effort);verbal cues;nonverbal cues (demo/gesture)  -     Supine-Sit-Supine [place] (Bed Mobility) minimum assist (75% patient effort);moderate assist (50% patient effort);2 person assist;verbal cues;nonverbal cues (demo/gesture)  -     Bed  Mobility, Safety Issues decreased use of arms for pushing/pulling  -     Assistive Device (Bed Mobility) bed rails;draw sheet;head of bed elevated  -     Comment, (Bed Mobility) log roll to right  -KP       Row Name 06/11/24 1133          Transfers    Transfers sit-stand transfer;stand-sit transfer  -KP       Row Name 06/11/24 1133          Sit-Stand Transfer    Sit-Stand [place] (Transfers) minimum assist (75% patient effort);moderate assist (50% patient effort);2 person assist  -     Comment, (Sit-Stand Transfer) handheld assist bilaterally  -KP       Row Name 06/11/24 1133          Stand-Sit Transfer    Stand-Sit [place] (Transfers) minimum assist (75% patient effort);moderate assist (50% patient effort);2 person assist  -KP       Row Name 06/11/24 1133          Functional Mobility    Functional Mobility- Comment unable to tolerate, increased pain in RLE/hip in standing  -KP       Row Name 06/11/24 1133          Activities of Daily Living    BADL Assessment/Intervention bathing;upper body dressing;lower body dressing;grooming;feeding;toileting  -KP       Row Name 06/11/24 1133          Bathing Assessment/Intervention    [place] Level (Bathing) bathing skills;maximum assist (25% patient effort);dependent (less than 25% patient effort)  -KP       Row Name 06/11/24 1133          Upper Body Dressing Assessment/Training    [place] Level (Upper Body Dressing) upper body dressing skills;maximum assist (25% patient effort);dependent (less than 25% patient effort)  -KP       Row Name 06/11/24 1133          Lower Body Dressing Assessment/Training    [place] Level (Lower Body Dressing) lower body dressing skills;dependent (less than 25% patient effort)  -KP       Row Name 06/11/24 1133          Grooming Assessment/Training    [place] Level (Grooming) grooming skills;maximum assist (25% patient effort)  -KP       Row Name 06/11/24 1133          Self-Feeding Assessment/Training     [place] Level (Feeding) feeding skills;minimum assist (75% patient effort)  -[facility] Name 06/11/24 1133          Toileting Assessment/Training    [place] Level (Toileting) toileting skills;dependent (less than 25% patient effort)  -               User Key  (r) = Recorded By, (t) = Taken By, (c) = Cosigned By      Initials Name Provider Type     Viviane Moore OT Occupational Therapist                   Obj/Interventions       [facility] Name 06/11/24 1134          Sensory Assessment (Somatosensory)    Sensory Assessment (Somatosensory) UE sensation intact  -KP       Row Name 06/11/24 1134          Vision Assessment/Intervention    Visual Impairment/Limitations WFL;corrective lenses full-time  -[facility] Name 06/11/24 1134          Range of Motion Comprehensive    General Range of Motion bilateral upper extremity ROM [facility]  -     Comment, General Range of Motion limited at end range in RUE at shoulder due to prior injury  -[facility] Name 06/11/24 1134          Strength Comprehensive (MMT)    Comment, General Manual Muscle Testing (MMT) Assessment 4+/5 MMT in BUEs  -KP       Row Name 06/11/24 1134          Motor Skills    Motor Skills coordination;functional endurance  -     Coordination bilateral;upper extremity;L  -     Functional Endurance fair minus  -KP       Row Name 06/11/24 1134          Balance    Balance Assessment sitting static balance;standing static balance  -     Static Sitting Balance contact guard;standby assist  -     Static Standing Balance minimal assist;moderate assist  -               User Key  (r) = Recorded By, (t) = Taken By, (c) = Cosigned By      Initials Name Provider Type     Viviane Moore OT Occupational Therapist                   Goals/Plan       Row Name 06/11/24 1138          Transfer Goal 1 (OT)    Activity/Assistive Device (Transfer Goal 1, OT) toilet;commode, 3-in-1  -     [place] Level/Cues Needed (Transfer Goal 1, OT) minimum assist (75%  or more patient effort)  -     Time Frame (Transfer Goal 1, OT) by discharge  -       Row Name 06/11/24 1138          Dressing Goal 1 (OT)    Activity/Device (Dressing Goal 1, OT) dressing skills, all  -     [place]/Cues Needed (Dressing Goal 1, OT) moderate assist (50-74% patient effort)  -     Time Frame (Dressing Goal 1, OT) by discharge  -       Row Name 06/11/24 1138          Problem Specific Goal 1 (OT)    Problem Specific Goal 1 (OT) Patient will perform sustained activity X10 minutes to promote functional endurance/activity tolerance needed for daily routine.  -     Time Frame (Problem Specific Goal 1, OT) by discharge  -       Row Name 06/11/24 1138          Therapy Assessment/Plan (OT)    Planned Therapy Interventions (OT) activity tolerance training;BADL retraining;adaptive equipment training;functional balance retraining;transfer/mobility retraining;occupation/activity based interventions;strengthening exercise;passive ROM/stretching;patient/caregiver education/training;ROM/therapeutic exercise  -               User Key  (r) = Recorded By, (t) = Taken By, (c) = Cosigned By      Initials Name Provider Type     Viviane Moore, OT Occupational Therapist                   Clinical Impression       Row Name 06/11/24 1135          Pain Assessment    Pretreatment Pain Rating 0/10 - no pain  -     Posttreatment Pain Rating 3/10  -     Pain Location - Side/Orientation Right  -     Pain Location lower  -     Pain Location - extremity  -     Pain Intervention(s) Elevated;Rest  -       Row Name 06/11/24 113          Plan of Care Review    Plan of Care Reviewed With patient;daughter  -     Progress no change  -     Outcome Evaluation Patient seen for OT evaluation. She presents with functional limitations including generalized weakness, pain, limited activity tolerance/functional endurance, and impaired balance. She would benefit from ongoing OT services to promote highest  level of independence and safety with daily occupations prior to discharge.  -       Row Name 06/11/24 1135          Therapy Assessment/Plan (OT)    Patient/Family Therapy Goal Statement (OT) be able to return home  -     Rehab Potential (OT) good, to achieve stated therapy goals  -     Criteria for Skilled Therapeutic Interventions Met (OT) yes;meets criteria;skilled treatment is necessary  -     Therapy Frequency (OT) 3 times/wk  3-5x/week as able and available for functional progress  -     Predicted Duration of Therapy Intervention (OT) discharge  -       Row Name 06/11/24 1135          Therapy Plan Review/Discharge Plan (OT)    Anticipated Discharge Disposition (OT) inpatient rehabilitation facility;skilled nursing facility  -       Row Name 06/11/24 1135          Positioning and Restraints    Pre-Treatment Position in bed  -     Post Treatment Position bed  -     In Bed fowlers;call light within reach;encouraged to call for assist;exit alarm on;with family/caregiver  -               User Key  (r) = Recorded By, (t) = Taken By, (c) = Cosigned By      Initials Name Provider Type    Viviane Barry, PAVITHRA Occupational Therapist                   Outcome Measures    No documentation.                     OT Recommendation and Plan  Planned Therapy Interventions (OT): activity tolerance training, BADL retraining, adaptive equipment training, functional balance retraining, transfer/mobility retraining, occupation/activity based interventions, strengthening exercise, passive ROM/stretching, patient/caregiver education/training, ROM/therapeutic exercise  Therapy Frequency (OT): 3 times/wk (3-5x/week as able and available for functional progress)  Plan of Care Review  Plan of Care Reviewed With: patient, daughter  Progress: no change  Outcome Evaluation: Patient seen for OT evaluation. She presents with functional limitations including generalized weakness, pain, limited activity tolerance/functional  endurance, and impaired balance. She would benefit from ongoing OT services to promote highest level of independence and safety with daily occupations prior to discharge.     Time Calculation:   Evaluation Complexity (OT)  Review Occupational Profile/Medical/Therapy History Complexity: expanded/moderate complexity  Assessment, Occupational Performance/Identification of Deficit Complexity: 3-5 performance deficits  Clinical Decision Making Complexity (OT): detailed assessment/moderate complexity  Overall Complexity of Evaluation (OT): moderate complexity     Time Calculation- OT       Row Name 06/11/24 1139             Time Calculation- OT    OT Received On 06/11/24  -                User Key  (r) = Recorded By, (t) = Taken By, (c) = Cosigned By      Initials Name Provider Type     Viviane Moore OT Occupational Therapist                  Therapy Charges for Today       Code Description Service Date Service Provider Modifiers Qty    [id number]  OT EVAL MOD COMPLEXITY 4 6/11/2024 Viviane Moore OT GO 1                 Viviane Moore OT  6/11/2024

## 2024-06-11 NOTE — CASE MANAGEMENT/SOCIAL WORK
Discharge Planning Assessment  Psychiatric     Patient Name: Edgar Cotto  MRN: [id number]  Today's Date: 6/11/2024    Admit Date: 6/10/2024    Plan: Per Supervisor pt has been approved by insurance for admit to [facility].  [facility] per [place] to complete pt's admit paperwork this pm and will accept pt in am.  SS notified Physician and changed pharmacy.  SS will follow.              Discharge Plan       Row Name 06/11/24 1319       Plan    Plan Per Supervisor pt has been approved by insurance for admit to [facility].  [facility] per [place] to complete pt's admit paperwork this pm and will accept pt in am.  SS notified Physician and changed pharmacy.  SS will follow.      Row Name 06/11/24 1158       Plan    Plan [facility] per [place] can accept pt.  Pt and daughter aware and agreeable.  Physician is agreeable with prior authorization to be started.  SS notified Supervisor.  SS will follow.                  Continued Care and Services - Admitted Since 6/10/2024       Destination Coordination complete.      Service Provider Request Status Selected Services Address Phone Fax Patient Preferred    [facility]  Selected Skilled Nursing [address] [facility] [address] [phone] --                  Selected Continued Care - Episodes Includes continued care and service providers with selected services from the active episodes listed below       Salma Weaver, SAUL

## 2024-06-11 NOTE — DISCHARGE PLACEMENT REQUEST
"Edgar Stokes (79 y.o. Female)       Date of Birth   1945    Social Security Number       Address   [address] [address]    Home Phone   [phone]    MRN   [id number]       Oriental orthodox   Gnosticism    Marital Status                               Admission Date   6/10/24    Admission Type   Emergency    Admitting Provider   Nitesh Shay DO    Attending Provider   Nitesh Shay DO    Department, Room/Bed   [address], 3302/1S       Discharge Date       Discharge Disposition       Discharge Destination                                 Attending Provider: Nitesh Shay DO    Allergies: Z-mary [Azithromycin], Amoxicillin, Cortisone, Fish-derived Products, Iodinated Contrast Media, Iodine, Lidocaine, Morphine, Niacin, Nystatin, Other, Sulfa Antibiotics, Verapamil    Isolation: None   Infection: None   Code Status: CPR    Ht: 152.4 cm (60\")   Wt: 74.3 kg (163 lb 12.8 oz)    Admission Cmt: None   Principal Problem: Compression fracture of L3 vertebra [S32.030A]                   Active Insurance as of 6/10/2024       Primary Coverage       Payor Plan Insurance Group Employer/Plan Group    ANTHEM MEDICARE REPLACEMENT ANTHEM MEDICARE ADVANTAGE [id number]       Payor Plan Address Payor Plan Phone Number Payor Plan Fax Number Effective Dates    [address] [phone]  1/1/2021 - None Entered    [facility] [id number]         Subscriber Name Subscriber Birth Date Member ID       EDGAR STOKES 1945 [id number]                     Emergency Contacts        (Rel.) Home Phone Work Phone Mobile Phone    Ava Andersen (Daughter) [phone] -- --                 History & Physical        Giancarlo Logan PA-C at 06/10/24 1320       Attestation signed by Nitesh Shay DO at 06/10/24 2108    Patient and daughter seen at bedside today.  Patient with progressive worsening debility due to chronic pain not well-controlled with " "tramadol and currently actively weaning from Valium at home.  Will reduce dose to 2 mg daily over the next 2 days and then stop as patient's daughter is already stepdown to 5 mg once daily rather than her previous prescribed twice daily.  Additionally we will hold tramadol and proceed with hydrocodone monotherapy alone as patient has not had any relief with tramadol in the outpatient setting for quite some time.  PT and OT to see evaluate and /case management already consulted for plan short-term SNF placement.    I have reviewed this documentation and agree.                      [facility] Medicine Services  History & Physical    Patient Identification:  Name:  Edgar Cotto  Age:  79 y.o.  Sex:  female  :  1945  MRN:  [id number]   Visit Number:  [id number]  Admit Date: 6/10/2024   Primary Care Physician:  Abbie Hillman DO    Subjective     Chief complaint: Fall, hip pain    History of presenting illness:      Edgar Cotto is a 79 y.o. female who presented for further evaluation of low back and right lower extremity pain. She was seen and examined in the ED with family member at the bedside who helps provide the history. They report patient with chronic low back and RLE pain which has been progressively worsening over the past several months. They state that typically patient had been able to ambulate around her home with a walker however over the past 4 weeks she has been unable to bear weight, requiring her sister to push her around the home on the seat of the wheelchair. This morning while sister was attempting to stand her up to pivot onto bedside commode she states \"I just lost her\" and the patient fell onto her buttocks. She had immediate worsening of her back and right hip, LE pain. Patient did not hit her head. She denied other injury. On further ROS she denied shortness of breath greater than baseline, no chest pain or palpitations. See full ROS " below. Patient and daughter report that for the past several weeks they have been working with PCP to wean patient off of valium to allow her to take more pain medication. She reports is currently taking valium once daily and tramadol.     Past medical history is significant for BONG, osteoarthritis, HTN, hyperlipidemia, lumbar DDD, mitral and aortic valve disease, restrictive lung disease with chronic respiratory failure on 2 L at baseline    Upon arrival to the ED, vital signs were temp 97.1, heart rate 91, respirations 18, /85, SpO2 97% on RA.  Imaging workup identified acute compression fracture involving the inferior endplate of the L3 vertebral body with minimal loss of vertebral body height.  EKG was NSR    Known Emergency Department medications received prior to my evaluation included Dilaudid.   Emergency Department Room location at the time of my evaluation was 114.     ---------------------------------------------------------------------------------------------------------------------   Review of Systems   Constitutional:  Positive for activity change. Negative for chills and fever.   HENT:  Negative for congestion and rhinorrhea.    Respiratory:  Negative for cough and shortness of breath.    Cardiovascular:  Negative for chest pain.   Gastrointestinal:  Negative for abdominal pain, constipation and diarrhea.   Genitourinary:  Negative for difficulty urinating and dysuria.   Musculoskeletal:  Positive for arthralgias, gait problem and myalgias.   Skin:  Negative for rash and wound.   Neurological:  Positive for weakness. Negative for dizziness and light-headedness.        ---------------------------------------------------------------------------------------------------------------------   Past Medical History:   Diagnosis Date    Abdominal distension     Allergic rhinitis     Anxiety     Arthritis     Closed nondisplaced fracture of surgical neck of left humerus with routine healing 3/9/2021     Cough     Dry eyes     Dyslipidemia     Dysuria     Hyperlipidemia     Hypertension     Meniere's disease     Mitral and aortic valve disease     Oral candidiasis     Osteoarthritis     Stomatitis     URI, acute     Vaginal candidiasis     Vocal cord dysfunction      Past Surgical History:   Procedure Laterality Date    CHOLECYSTECTOMY      COLONOSCOPY      DILATATION AND CURETTAGE      HEMORRHOIDECTOMY      HERNIA REPAIR      SHOULDER SURGERY      SKIN CANCER EXCISION      on Nose    TONSILLECTOMY       Family History   Problem Relation Age of Onset    Diabetes Mother     Cancer Father     Liver disease Sister     Diabetes Sister     Liver disease Brother     Diabetes Brother     Diabetes Daughter     Cancer Other     Liver disease Other     Breast cancer Neg Hx      Social History     Socioeconomic History    Marital status:    Tobacco Use    Smoking status: Never     Passive exposure: Never    Smokeless tobacco: Never    Tobacco comments:     She previously worked in a [employer] and is now retired   Vaping Use    Vaping status: Never Used   Substance and Sexual Activity    Alcohol use: No    Drug use: No    Sexual activity: Defer     ---------------------------------------------------------------------------------------------------------------------   Allergies:  Z-mary [azithromycin], Amoxicillin, Cortisone, Fish-derived products, Iodinated contrast media, Iodine, Lidocaine, Morphine, Niacin, Nystatin, Other, Sulfa antibiotics, and Verapamil  ---------------------------------------------------------------------------------------------------------------------   Home medications:    Medications below are reported home medications pulling from within the system; at this time, these medications have not been reconciled unless otherwise specified and are in the verification process for further verifcation as current home medications.  (Not in a hospital admission)      Hospital Scheduled Meds:          Current  listed hospital scheduled medications may not yet reflect those currently placed in orders that are signed and held awaiting patient's arrival to floor.   ---------------------------------------------------------------------------------------------------------------------     Objective     Vital Signs:  Temp:  [97.1 °F (36.2 °C)] 97.1 °F (36.2 °C)  Heart Rate:  [75-91] 90  Resp:  [18] 18  BP: (129-141)/(82-86) 138/82      06/10/24  0940   Weight: 74 kg (163 lb 2.3 oz)     Body mass index is 31.86 kg/m².  ---------------------------------------------------------------------------------------------------------------------       Physical Exam  Vitals and nursing note reviewed.   Constitutional:       General: She is not in acute distress.     Appearance: She is obese.   HENT:      Head: Normocephalic and atraumatic.   Eyes:      Extraocular Movements: Extraocular movements intact.   Cardiovascular:      Rate and Rhythm: Normal rate and regular rhythm.   Pulmonary:      Effort: Pulmonary effort is normal.      Breath sounds: Normal breath sounds.   Abdominal:      Palpations: Abdomen is soft.   Musculoskeletal:      Right lower leg: No edema.      Left lower leg: No edema.   Skin:     General: Skin is warm and dry.   Neurological:      Mental Status: She is alert. Mental status is at baseline.   Psychiatric:         Mood and Affect: Mood normal.         Behavior: Behavior normal.         ---------------------------------------------------------------------------------------------------------------------  EKG:      I have personally looked at the EKG.  ---------------------------------------------------------------------------------------------------------------------   Results from last 7 days   Lab Units 06/10/24  1006 06/08/24  0809   LACTATE mmol/L  --  1.0   WBC 10*3/mm3 7.27 6.87   HEMOGLOBIN g/dL 12.6 13.4   HEMATOCRIT % 40.0 41.1   MCV fL 97.1* 97.9*   MCHC g/dL 31.5 32.6   PLATELETS 10*3/mm3 198 187   INR  1.05   "--          Results from last 7 days   Lab Units 06/10/24  1006 06/08/24  0809   SODIUM mmol/L 133* 138   POTASSIUM mmol/L 4.5 3.8   MAGNESIUM mg/dL 2.0  --    CHLORIDE mmol/L 98 99   CO2 mmol/L 28.5 28.6   BUN mg/dL 16 12   CREATININE mg/dL 0.95 0.89   CALCIUM mg/dL 9.3 9.4   GLUCOSE mg/dL 117* 114*   ALBUMIN g/dL 3.5 3.7   BILIRUBIN mg/dL 0.3 0.5   ALK PHOS U/L 120* 121*   AST (SGOT) U/L 18 19   ALT (SGPT) U/L 11 13   Estimated Creatinine Clearance: 41.7 mL/min (by C-G formula based on SCr of 0.95 mg/dL).  No results found for: \"AMMONIA\"  Results from last 7 days   Lab Units 06/08/24  1009 06/08/24  0809   HSTROP T ng/L 8 9     Results from last 7 days   Lab Units 06/08/24  0809   PROBNP pg/mL 95.0     Lab Results   Component Value Date    HGBA1C 5.50 07/13/2017     Lab Results   Component Value Date    TSH 0.806 02/08/2024    FREET4 1.13 02/08/2024     No results found for: \"PREGTESTUR\", \"PREGSERUM\", \"HCG\", \"HCGQUANT\"  Pain Management Panel  More data exists         Latest Ref Rng & Units 4/16/2024 2/8/2024   Pain Management Panel   Amphetamine, Urine Qual Negative Negative  Negative    Barbiturates Screen, Urine Negative Negative  Negative    Benzodiazepine Screen, Urine Negative Positive  Positive    Buprenorphine, Screen, Urine Negative Negative  Negative    Cocaine Screen, Urine Negative Negative  Negative    Fentanyl, Urine Negative Negative  Negative    Methadone Screen , Urine Negative Negative  Negative    Methamphetamine, Ur Negative Negative  Negative      No results found for: \"BLOODCX\"  No results found for: \"URINECX\"  No results found for: \"WOUNDCX\"  No results found for: \"STOOLCX\"      ---------------------------------------------------------------------------------------------------------------------  Imaging Results (Last 7 Days)       Procedure Component Value Units Date/Time    XR Chest 1 View [[id number]] Collected: 06/10/24 1054     Updated: 06/10/24 1057    Narrative:      PROCEDURE: XR CHEST " 1 VW-       HISTORY: fall     COMPARISON: 2/19/2021.     FINDINGS: The heart is normal in size. The mediastinum is unremarkable.  There are fibrotic lung changes. No focal opacities or effusions are  evident. There is no pneumothorax. There are no acute osseous  abnormalities.       Impression:      Fibrotic lung changes with no acute cardiopulmonary process.        This report was finalized on 6/10/2024 10:55 AM by Ioana Christina M.D..       XR Femur 2 View Bilateral [[id number]] Collected: 06/10/24 1053     Updated: 06/10/24 1055    Narrative:      PROCEDURE: XR FEMUR 2 VW BILATERAL-     HISTORY: fall     COMPARISON: None.     FINDINGS:  A 2 view exam demonstrates no acute fracture or dislocation.   The joint spaces are preserved.  No soft tissue abnormality is seen.       Impression:      No acute fracture.                     This report was finalized on 6/10/2024 10:53 AM by Ioana Christina M.D..       XR Hips Bilateral With or Without Pelvis 5 View [[id number]] Collected: 06/10/24 1053     Updated: 06/10/24 1055    Narrative:      PROCEDURE: XR HIPS BILATERAL W OR WO PELVIS 5 VIEW-     HISTORY: fall     COMPARISON: None.     FINDINGS: There are no fractures or dislocations. The joint spaces are  preserved. The pubic symphysis and SI joints are intact. The soft  tissues are unremarkable.       Impression:      No fracture or dislocation.              This report was finalized on 6/10/2024 10:53 AM by Ioana Christina M.D..       CT Thoracic Spine Without Contrast [[id number]] Collected: 06/10/24 1033     Updated: 06/10/24 1036    Narrative:      PROCEDURE: CT THORACIC SPINE WO CONTRAST-     HISTORY: fall     TECHNIQUE: Multiple axial CT images were obtained through the thoracic  spine using bone window algorithms. Coronal and sagittal images were  reconstructed from the original axial data set. This study was performed  with techniques to keep radiation doses as low as reasonably achievable  (ALARA).  "Individualized dose reduction techniques using automated  exposure control or adjustment of mA and/or kV according to the patient  size were employed.     COMPARISON: None.     FINDINGS: The thoracic spine is well aligned with no acute fractures.  There are diffuse degenerative changes with loss of disc space height  and anterior osteophyte formation. Evaluation of the soft tissues  reveals fibrotic lung changes       Impression:      No acute process.              This report was finalized on 6/10/2024 10:34 AM by Ioana Christina M.D..       CT Lumbar Spine Without Contrast [[id number]] Collected: 06/10/24 1029     Updated: 06/10/24 1034    Narrative:      PROCEDURE: CT LUMBAR SPINE WO CONTRAST-     HISTORY: fall     TECHNIQUE: Multiple axial CT images were obtained through the lumbar  spine using bone window algorithms. Coronal and sagittal images were  reconstructed from the original axial data set. This study was performed  with techniques to keep radiation doses as low as reasonably achievable  (ALARA). Individualized dose reduction techniques using automated  exposure control or adjustment of mA and/or kV according to the patient  size were employed.     COMPARISON: 6/8/2024.     FINDINGS: There is an acute compression fracture of the inferior  endplate of the L3 vertebral body with minimal loss of vertebral body  height. No other fractures are identified. There are diffuse  degenerative changes with loss of disc space height and facet  arthropathy which produces multilevel predominantly neural foraminal  narrowing. The paraspinal soft tissues are unremarkable.       Impression:      Acute compression fracture involving the inferior endplate  of the L3 vertebral body with minimal loss of vertebral body height.              This report was finalized on 6/10/2024 10:32 AM by Ioana Christina M.D..               Cultures:  No results found for: \"BLOODCX\", \"URINECX\", \"WOUNDCX\", \"MRSACX\", \"RESPCX\", " "\"STOOLCX\"    Last echocardiogram:  Results for orders placed during the hospital encounter of 08/03/22    Adult Transthoracic Echo Complete W/ Cont if Necessary Per Protocol    Interpretation Summary  · Normal left ventricular cavity size and wall thickness noted  · The following left ventricular wall segments are hypokinetic: mid inferoseptal.  · Left ventricular ejection fraction appears to be 56 - 60%.  · Left ventricular diastolic function is consistent with (grade I) impaired relaxation.  · The aortic valve is abnormal in structure. There is mild calcification of the aortic valve. The aortic valve appears trileaflet. Mild aortic valve regurgitation is present. No aortic valve stenosis is present.  · The mitral valve is structurally normal with no regurgitation or significant stenosis present.  · There is no evidence of pericardial effusion.          I have personally reviewed the above radiology images and read the final radiology report on 06/10/24  ---------------------------------------------------------------------------------------------------------------------  Assessment / Plan     Active Hospital Problems    Diagnosis  POA    **Compression fracture of L3 vertebra [S32.030A]  Yes       ASSESSMENT/PLAN:    Intractable pain due to compression fracture of L3 vertebra  Fall, PTA  Lumbar degenerative disc disease  Debility, secondary to above and likely age-related  Patient presented to the ED after a fall at home complaining of low back and right hip pain.  Imaging without concern for hip fracture though did note acute compression fracture of the L3 vertebra.  On further discussion patient and family report patient with persistently worsening pain over the previous several weeks to months to the extent that she has difficulty standing and ambulating at home.  Will admit to [facility] for further observation and management  Start pain regimen with Norco 5-325 mg every 6 hours as needed  PT OT consults  Case " management consult to assist with discharge planning  Continue supportive care measures    Chronic:  BONG  Osteoarthritis  HTN  HLD  Restrictive lung disease with chronic respiratory failure on 2 L at baseline  Will resume home regimen as indicated once med rec is complete  Monitor vital signs closely  Continue supplemental O2 titrate as needed  ----------  -DVT prophylaxis: Lovenox  -Activity: Up in chair  -Expected length of stay: Less than 2 midnights  -Disposition pending course    High risk secondary to intractable pain due to compression fracture    Code Status and Medical Interventions:   Ordered at: 06/10/24 1232     Code Status (Patient has no pulse and is not breathing):    CPR (Attempt to Resuscitate)     Medical Interventions (Patient has pulse or is breathing):    Full Support       Giancarlo Logan PA-C   06/10/24  13:20 EDT    Electronically signed by Nitesh Shay DO at 06/10/24 2104       Current Facility-Administered Medications   Medication Dose Route Frequency Provider Last Rate Last Admin    albuterol (PROVENTIL) nebulizer solution 0.083% 2.5 mg/3mL  2.5 mg Nebulization Q4H PRN Chito Stroud MD        busPIRone (BUSPAR) tablet 5 mg  5 mg Oral Q12H Nitesh Shay DO   5 mg at 06/11/24 0906    diazePAM (VALIUM) tablet 2 mg  2 mg Oral Daily Nitesh Shay DO   2 mg at 06/11/24 0913    docusate sodium (COLACE) capsule 100 mg  100 mg Oral BID Nitesh Shay DO   100 mg at 06/11/24 1001    Enoxaparin Sodium (LOVENOX) syringe 40 mg  40 mg Subcutaneous Q24H Nitesh Shay DO   40 mg at 06/10/24 1557    HYDROcodone-acetaminophen (NORCO) 5-325 MG per tablet 1 tablet  1 tablet Oral Q6H PRN Nitesh Shay DO   1 tablet at 06/11/24 0644    magic barrier cream 1 Application  1 Application Topical BID Nitesh Shay DO   1 Application at 06/11/24 0907    meclizine (ANTIVERT) tablet 25 mg  25 mg Oral Q8H PRN Chito Stroud MD        Pharmacy to Dose enoxaparin (LOVENOX)   Does not apply  Continuous PRN Nitesh Shay DO        polyethylene glycol (MIRALAX) packet 17 g  17 g Oral Daily Nitesh Shay DO        rosuvastatin (CRESTOR) tablet 5 mg  5 mg Oral Daily Chito Stroud MD   5 mg at 24 0906    sodium chloride 0.9 % flush 10 mL  10 mL Intravenous PRN Nathalie Verdin PA        sodium chloride 0.9 % flush 10 mL  10 mL Intravenous Q12H Nitesh Shay DO   10 mL at 24 0907    sodium chloride 0.9 % flush 10 mL  10 mL Intravenous PRN Nitesh Shay DO        sodium chloride 0.9 % infusion 40 mL  40 mL Intravenous PRN Nitesh Shay DO        valsartan (DIOVAN) tablet 40 mg  40 mg Oral Q24H Nitesh Shay DO   40 mg at 24 0906     Physician Progress Notes (most recent note)    No notes of this type exist for this encounter.       Consult Notes (most recent note)    No notes of this type exist for this encounter.          Physical Therapy Notes (most recent note)        Chaitanya Hale, PT at 24 1137  Version 1 of 1         Acute Care - Physical Therapy Initial Evaluation  [facility]     Patient Name: Edgar Cotto  : 1945  MRN: [id number]  Today's Date: 2024      Visit Dx:     ICD-10-CM ICD-9-CM   1. Compression fracture of L3 vertebra, initial encounter  S32.030A 805.4   2. Fall in home, initial encounter  W19.XXXA E888.9    Y92.009 E849.0     Patient Active Problem List   Diagnosis    Atopic rhinitis    Dizziness    Generalized anxiety disorder    Generalized osteoarthritis    Essential hypertension    Auditory vertigo    Mitral and aortic valve disease    Stomatitis    Seasonal allergic rhinitis    Varicose veins of both lower extremities    Meniere's disease of both ears    S/P hemorrhoidectomy    Abnormal EKG    Moderate obesity    Dyslipidemia    Thyroid cyst    Age-related osteoporosis without current pathological fracture    Cervical lymphadenopathy    CABAN (dyspnea on exertion)    Hypoxia    Restrictive lung disease    Scoliosis    Hip pain     Lumbar degenerative disc disease    BMI 32.0-32.9,adult    Physical deconditioning    Compression fracture of L3 vertebra     Past Medical History:   Diagnosis Date    Abdominal distension     Allergic rhinitis     Anxiety     Arthritis     Closed nondisplaced fracture of surgical neck of left humerus with routine healing 3/9/2021    Cough     Dry eyes     Dyslipidemia     Dysuria     Hyperlipidemia     Hypertension     Meniere's disease     Mitral and aortic valve disease     Oral candidiasis     Osteoarthritis     Stomatitis     URI, acute     Vaginal candidiasis     Vocal cord dysfunction      Past Surgical History:   Procedure Laterality Date    CHOLECYSTECTOMY      COLONOSCOPY      DILATATION AND CURETTAGE      HEMORRHOIDECTOMY      HERNIA REPAIR      SHOULDER SURGERY      SKIN CANCER EXCISION      on Nose    TONSILLECTOMY       PT Assessment (Last 12 Hours)       PT Evaluation and Treatment       Row Name 06/11/24 1015          Physical Therapy Time and Intention    Subjective Information complains of;weakness;fatigue;pain  -KM     Document Type evaluation  -KM     Mode of Treatment physical therapy  -KM     Patient Effort good  -KM     Symptoms Noted During/After Treatment increased pain  -KM       Row Name 06/11/24 1015          General Information    Patient Profile Reviewed yes  -KM     Patient Observations alert;cooperative;agree to therapy  -KM     Prior Level of Function --  pt. reports requiring some assistance from her daughter for the last year. She reports steady decline in function over the last 3-4 months.  -KM     Existing Precautions/Restrictions fall  lumbar fx  -KM     Risks Reviewed patient:;LOB;nausea/vomiting;dizziness;increased discomfort  -KM     Benefits Reviewed patient:;improve function;increase independence;increase strength;increase balance  -KM     Barriers to Rehab previous functional deficit  -KM       Row Name 06/11/24 1015          Living Environment    Current Living  Arrangements home  -KM     People in Home child(eleonora), adult  -KM     Primary Care Provided by child(eleonora)  -       Row Name 06/11/24 1015          Home Use of Assistive/Adaptive Equipment    Equipment Currently Used at Home wheelchair;walker, rolling  -       Row Name 06/11/24 1015          Cognition    Affect/Mental Status (Cognition) [facility]     Orientation Status (Cognition) oriented x 4  -KM     Follows Commands (Cognition) [facility] Name 06/11/24 1015          Range of Motion (ROM)    Range of Motion bilateral lower extremities;ROM is [facility] Name 06/11/24 1015          Strength (Manual Muscle Testing)    Strength (Manual Muscle Testing) --  LLE strength grossly 3/5; RLE strength grossly 2/5  -KM       [facility] Name 06/11/24 1015          Bed Mobility    Bed Mobility bed mobility (all) activities  -     All Activities, [place] (Bed Mobility) moderate assist (50% patient effort);maximum assist (25% patient effort);2 person assist;verbal cues;nonverbal cues (demo/gesture)  KM     Bed Mobility, Safety Issues decreased use of arms for pushing/pulling;decreased use of legs for bridging/pushing  [facility]     Assistive Device (Bed Mobility) bed rails;draw sheet  -[facility] Name 06/11/24 1015          Transfers    Transfers sit-stand transfer;stand-sit transfer  -KM       Row Name 06/11/24 1015          Sit-Stand Transfer    Sit-Stand [place] (Transfers) moderate assist (50% patient effort);2 person assist;verbal cues  [facility]     Assistive Device (Sit-Stand Transfers) --  [facility]  -KM       Row Name 06/11/24 1015          Stand-Sit Transfer    Stand-Sit [place] (Transfers) moderate assist (50% patient effort);2 person assist;verbal cues  -     Assistive Device (Stand-Sit Transfers) --  E A  [facility] Name 06/11/24 1015          Gait/Stairs (Locomotion)    Comment, (Gait/Stairs) pt. was able to take 3 side steps R w/ modA x2 [facility]  -KM       Row Name 06/11/24 1015           Safety Issues, Functional Mobility    Impairments Affecting Function (Mobility) balance;endurance/activity tolerance;pain;strength  -KM       Row Name 06/11/24 1015          Balance    Balance Assessment sitting static balance;standing static balance  -KM     Static Sitting Balance standby assist;contact guard  -KM     Position, Sitting Balance sitting edge of bed;unsupported  -KM     Static Standing Balance moderate assist;2-person assist;verbal cues  -KM     Position/Device Used, Standing Balance --  BUE HHA  -KM       Row Name             [REMOVED] Wound 06/10/24 1444 Bilateral gluteal Pressure Injury    Wound - Properties Group Placement Date: 06/10/24  -EB Placement Time: 1444  -EB Present on Original Admission: Y  -EB Side: Bilateral  -EB Location: gluteal  -EB Primary Wound Type: Pressure inj  -EB Removal Date: 06/11/24  -BB Removal Time: 1033  -BB    Retired Wound - Properties Group Placement Date: 06/10/24  -EB Placement Time: 1444  -EB Present on Original Admission: Y  -EB Side: Bilateral  -EB Location: gluteal  -EB Primary Wound Type: Pressure inj  -EB Removal Date: 06/11/24  -BB Removal Time: 1033 -BB    Retired Wound - Properties Group Date first assessed: 06/10/24  -EB Time first assessed: 1444  -EB Present on Original Admission: Y  -EB Side: Bilateral  -EB Location: gluteal  -EB Primary Wound Type: Pressure inj  -EB Resolution Date: 06/11/24  -BB Resolution Time: 1033 -BB      Row Name             Wound 06/11/24 1033 Left gluteal Pressure Injury    Wound - Properties Group Placement Date: 06/11/24  -BB Placement Time: 1033 -BB Side: Left  -BB Location: gluteal  -BB Primary Wound Type: Pressure inj  -BB    Retired Wound - Properties Group Placement Date: 06/11/24  -BB Placement Time: 1033  -BB Side: Left  -BB Location: gluteal  -BB Primary Wound Type: Pressure inj  -BB    Retired Wound - Properties Group Date first assessed: 06/11/24  -BB Time first assessed: 1033 -BB Side: Left  -BB Location:  gluteal  -BB Primary Wound Type: Pressure inj  -BB      Row Name             Wound 06/11/24 1034 Right gluteal Pressure Injury    Wound - Properties Group Placement Date: 06/11/24 -BB Placement Time: 1034 -BB Side: Right  -BB Location: gluteal  -BB Primary Wound Type: Pressure inj  -BB    Retired Wound - Properties Group Placement Date: 06/11/24  -BB Placement Time: 1034 -BB Side: Right  -BB Location: gluteal  -BB Primary Wound Type: Pressure inj  -BB    Retired Wound - Properties Group Date first assessed: 06/11/24 -BB Time first assessed: 1034 -BB Side: Right  -BB Location: gluteal  -BB Primary Wound Type: Pressure inj  -BB      Row Name 06/11/24 1015          Plan of Care Review    Plan of Care Reviewed With patient  -KM     Outcome Evaluation Pt. evaluation completed during PT session. She was able to peform bed mobility w/ maxA x2. She was able to perform transfers w/ modA x2. She c/o pain and fatigue throughout session. Pt. would benefit from skilled PT services.  -       Row Name 06/11/24 1015          Therapy Assessment/Plan (PT)    Patient/Family Therapy Goals Statement (PT) improve mobility  -KM     Functional Level at Time of Evaluation (PT) modA-maxA x2  -KM     PT Diagnosis (PT) decreased mobility  -KM     Rehab Potential (PT) good, to achieve stated therapy goals  -     Criteria for Skilled Interventions Met (PT) yes;skilled treatment is necessary  -KM     Therapy Frequency (PT) 3 times/wk  3-5x/wk  -KM     Predicted Duration of Therapy Intervention (PT) until discharge  -KM     Problem List (PT) problems related to;balance;mobility;strength;pain  -KM     Activity Limitations Related to Problem List (PT) unable to ambulate safely;unable to transfer safely  -KM       Row Name 06/11/24 1015          Therapy Plan Review/Discharge Plan (PT)    Therapy Plan Review (PT) evaluation/treatment results reviewed;care plan/treatment goals reviewed;risks/benefits reviewed;patient;daughter  -       [facility]  Name 06/11/24 1015          Physical Therapy Goals    Bed Mobility Goal Selection (PT) bed mobility, PT goal 1  -KM     Transfer Goal Selection (PT) transfer, PT goal 1  -KM     Gait Training Goal Selection (PT) gait training, PT goal 1  -KM       Row Name 06/11/24 1015          Bed Mobility Goal 1 (PT)    Activity/Assistive Device (Bed Mobility Goal 1, PT) bed mobility activities, all  -KM     [place] Level/Cues Needed (Bed Mobility Goal 1, PT) moderate assist (50-74% patient effort)  -KM     Time Frame (Bed Mobility Goal 1, PT) by discharge  -KM       Row Name 06/11/24 1015          Transfer Goal 1 (PT)    Activity/Assistive Device (Transfer Goal 1, PT) transfers, all;walker, rolling  -KM     [place] Level/Cues Needed (Transfer Goal 1, PT) contact guard required  -KM     Time Frame (Transfer Goal 1, PT) by discharge  -KM       Row Name 06/11/24 1015          Gait Training Goal 1 (PT)    Activity/Assistive Device (Gait Training Goal 1, PT) gait (walking locomotion);assistive device use;walker, rolling  -KM     [place] Level (Gait Training Goal 1, PT) minimum assist (75% or more patient effort)  -KM     Distance (Gait Training Goal 1, PT) 10'  -KM     Time Frame (Gait Training Goal 1, PT) by discharge  -               User Key  (r) = Recorded By, (t) = Taken By, (c) = Cosigned By      Initials Name Provider Type    David Levi, RN Registered Nurse    Chaitanya Banda, PT Physical Therapist    Roland Ruvalcaba, RN Registered Nurse                    Physical Therapy Education       Title: PT OT SLP Therapies (Done)       Topic: Physical Therapy (Done)       Point: Mobility training (Done)       Learning Progress Summary             Patient Acceptance, E,TB, VU by PERI at 6/11/2024 1137                         Point: Home exercise program (Done)       Learning Progress Summary             Patient Acceptance, E,TB, VU by PERI at 6/11/2024 1137                         Point: Body mechanics (Done)        Learning Progress Summary             Patient Acceptance, E,TB, VU by  at 2024 1137                         Point: Precautions (Done)       Learning Progress Summary             Patient Acceptance, E,TB, VU by  at 2024 1137                                         User Key       Initials Effective Dates Name Provider Type Discipline     22 -  Chaitanya Hale PT Physical Therapist PT                  PT Recommendation and Plan  Anticipated Discharge Disposition (PT): skilled nursing facility (for increased PT services)  Planned Therapy Interventions (PT): balance training, bed mobility training, gait training, home exercise program, patient/family education, postural re-education, ROM (range of motion), stair training, strengthening, stretching, transfer training  Therapy Frequency (PT): 3 times/wk (3-5x/wk)  Plan of Care Reviewed With: patient  Outcome Evaluation: Pt. evaluation completed during PT session. She was able to peform bed mobility w/ maxA x2. She was able to perform transfers w/ modA x2. She c/o pain and fatigue throughout session. Pt. would benefit from skilled PT services.       Time Calculation:    PT Charges       Row Name 24 1127             Time Calculation    PT Received On 24  -      PT Goal Re-Cert Due Date 24  -                User Key  (r) = Recorded By, (t) = Taken By, (c) = Cosigned By      Initials Name Provider Type     Chaitanya Hale PT Physical Therapist                  Therapy Charges for Today       Code Description Service Date Service Provider Modifiers Qty    10027304151 HC PT EVAL MOD COMPLEXITY 4 2024 Chaitanya Hale, PT GP 1            PT G-Codes  AM-PAC 6 Clicks Score (PT): 8    Chaitanya Hale PT  2024      Electronically signed by Chaitanya Hale, PT at 24 1137          Occupational Therapy Notes (most recent note)        Viviane Moore, OT at 24 1139          Patient Name: Edgar Cotto  :  1945    MRN: [id number]                              Today's Date: 6/11/2024       Admit Date: 6/10/2024    Visit Dx:     ICD-10-CM ICD-9-CM   1. Compression fracture of L3 vertebra, initial encounter  S32.030A 805.4   2. Fall in home, initial encounter  W19.XXXA E888.9    Y92.009 E849.0     Patient Active Problem List   Diagnosis    Atopic rhinitis    Dizziness    Generalized anxiety disorder    Generalized osteoarthritis    Essential hypertension    Auditory vertigo    Mitral and aortic valve disease    Stomatitis    Seasonal allergic rhinitis    Varicose veins of both lower extremities    Meniere's disease of both ears    S/P hemorrhoidectomy    Abnormal EKG    Moderate obesity    Dyslipidemia    Thyroid cyst    Age-related osteoporosis without current pathological fracture    Cervical lymphadenopathy    CABAN (dyspnea on exertion)    Hypoxia    Restrictive lung disease    Scoliosis    Hip pain    Lumbar degenerative disc disease    BMI 32.0-32.9,adult    Physical deconditioning    Compression fracture of L3 vertebra     Past Medical History:   Diagnosis Date    Abdominal distension     Allergic rhinitis     Anxiety     Arthritis     Closed nondisplaced fracture of surgical neck of left humerus with routine healing 3/9/2021    Cough     Dry eyes     Dyslipidemia     Dysuria     Hyperlipidemia     Hypertension     Meniere's disease     Mitral and aortic valve disease     Oral candidiasis     Osteoarthritis     Stomatitis     URI, acute     Vaginal candidiasis     Vocal cord dysfunction      Past Surgical History:   Procedure Laterality Date    CHOLECYSTECTOMY      COLONOSCOPY      DILATATION AND CURETTAGE      HEMORRHOIDECTOMY      HERNIA REPAIR      SHOULDER SURGERY      SKIN CANCER EXCISION      on Nose    TONSILLECTOMY        General Information       Row Name 06/11/24 1131          OT Time and Intention    Document Type evaluation  -KP     Mode of Treatment individual therapy;occupational therapy  -KP        Row Name 06/11/24 1131          General Information    Patient Profile Reviewed yes  -     Prior Level of Function --  functional decline over past 3-4 weeks; prior she was able to complete short distances with rolling walker and assist from daughter; assist with ADLs (recently patient with fall during tranfer to [facility] and unable to weight bear through RLE)  -     Existing Precautions/Restrictions fall;other (see comments);spinal  L3 Compression Fx Inferior Endplate with minimal height loss  -     Barriers to Rehab previous functional deficit  -       Row Name 06/11/24 1131          Occupational Profile    Reason for Services/Referral (Occupational Profile) Patient admitted to [facility] on 6/10/2024. She was referred for OT evaluation due to change in functional performance with ADLs, functional mobility, and/or transfers.  -       Row Name 06/11/24 1131          Living Environment    People in Home child(eleonora), adult  -       Row Name 06/11/24 1131          Cognition    Orientation Status (Cognition) oriented x 3  -       Row Name 06/11/24 1131          Safety Issues, Functional Mobility    Impairments Affecting Function (Mobility) balance;endurance/activity tolerance;pain;range of motion (ROM);strength  -               User Key  (r) = Recorded By, (t) = Taken By, (c) = Cosigned By      Initials Name Provider Type    Viviane Barry, OT Occupational Therapist                     Mobility/ADL's       Row Name 06/11/24 1133          Bed Mobility    Bed Mobility supine-sit-supine;rolling right  -     Rolling Right [place] (Bed Mobility) minimum assist (75% patient effort);moderate assist (50% patient effort);verbal cues;nonverbal cues (demo/gesture)  -     Supine-Sit-Supine [place] (Bed Mobility) minimum assist (75% patient effort);moderate assist (50% patient effort);2 person assist;verbal cues;nonverbal cues (demo/gesture)  -     Bed Mobility, Safety Issues decreased use of  arms for pushing/pulling  -     Assistive Device (Bed Mobility) bed rails;draw sheet;head of bed elevated  -     Comment, (Bed Mobility) log roll to right  -KP       Row Name 06/11/24 1133          Transfers    Transfers sit-stand transfer;stand-sit transfer  -KP       Row Name 06/11/24 1133          Sit-Stand Transfer    Sit-Stand [place] (Transfers) minimum assist (75% patient effort);moderate assist (50% patient effort);2 person assist  -     Comment, (Sit-Stand Transfer) handheld assist bilaterally  -KP       Row Name 06/11/24 1133          Stand-Sit Transfer    Stand-Sit [place] (Transfers) minimum assist (75% patient effort);moderate assist (50% patient effort);2 person assist  -KP       Row Name 06/11/24 1133          Functional Mobility    Functional Mobility- Comment unable to tolerate, increased pain in RLE/hip in standing  -KP       Row Name 06/11/24 1133          Activities of Daily Living    BADL Assessment/Intervention bathing;upper body dressing;lower body dressing;grooming;feeding;toileting  -KP       Row Name 06/11/24 1133          Bathing Assessment/Intervention    [place] Level (Bathing) bathing skills;maximum assist (25% patient effort);dependent (less than 25% patient effort)  -KP       Row Name 06/11/24 1133          Upper Body Dressing Assessment/Training    [place] Level (Upper Body Dressing) upper body dressing skills;maximum assist (25% patient effort);dependent (less than 25% patient effort)  -KP       Row Name 06/11/24 1133          Lower Body Dressing Assessment/Training    [place] Level (Lower Body Dressing) lower body dressing skills;dependent (less than 25% patient effort)  -KP       Row Name 06/11/24 1133          Grooming Assessment/Training    [place] Level (Grooming) grooming skills;maximum assist (25% patient effort)  -KP       Row Name 06/11/24 1133          Self-Feeding Assessment/Training    [place] Level (Feeding) feeding  skills;minimum assist (75% patient effort)  -[facility] Name 06/11/24 1133          Toileting Assessment/Training    [place] Level (Toileting) toileting skills;dependent (less than 25% patient effort)  -               User Key  (r) = Recorded By, (t) = Taken By, (c) = Cosigned By      Initials Name Provider Type     Viviane Moore OT Occupational Therapist                   Obj/Interventions       Row Name 06/11/24 1134          Sensory Assessment (Somatosensory)    Sensory Assessment (Somatosensory) UE sensation intact  -KP       Row Name 06/11/24 1134          Vision Assessment/Intervention    Visual Impairment/Limitations WFL;corrective lenses full-time  -[facility] Name 06/11/24 1134          Range of Motion Comprehensive    General Range of Motion bilateral upper extremity ROM WFL  -     Comment, General Range of Motion limited at end range in RUE at shoulder due to prior injury  -[facility] Name 06/11/24 1134          Strength Comprehensive (MMT)    Comment, General Manual Muscle Testing (MMT) Assessment 4+/5 MMT in BUEs  -KP       Row Name 06/11/24 1134          Motor Skills    Motor Skills coordination;functional endurance  -     Coordination bilateral;upper extremity;WFL  -     Functional Endurance fair minus  -KP       Row Name 06/11/24 1134          Balance    Balance Assessment sitting static balance;standing static balance  -     Static Sitting Balance contact guard;standby assist  -     Static Standing Balance minimal assist;moderate assist  -               User Key  (r) = Recorded By, (t) = Taken By, (c) = Cosigned By      Initials Name Provider Type     Viviane Moore OT Occupational Therapist                   Goals/Plan       Row Name 06/11/24 1138          Transfer Goal 1 (OT)    Activity/Assistive Device (Transfer Goal 1, OT) toilet;commode, 3-in-1  -     [place] Level/Cues Needed (Transfer Goal 1, OT) minimum assist (75% or more patient effort)  -     Time  Frame (Transfer Goal 1, OT) by discharge  -       Row Name 06/11/24 1138          Dressing Goal 1 (OT)    Activity/Device (Dressing Goal 1, OT) dressing skills, all  -     [place]/Cues Needed (Dressing Goal 1, OT) moderate assist (50-74% patient effort)  -     Time Frame (Dressing Goal 1, OT) by discharge  -       Row Name 06/11/24 1138          Problem Specific Goal 1 (OT)    Problem Specific Goal 1 (OT) Patient will perform sustained activity X10 minutes to promote functional endurance/activity tolerance needed for daily routine.  -     Time Frame (Problem Specific Goal 1, OT) by discharge  -       Row Name 06/11/24 1138          Therapy Assessment/Plan (OT)    Planned Therapy Interventions (OT) activity tolerance training;BADL retraining;adaptive equipment training;functional balance retraining;transfer/mobility retraining;occupation/activity based interventions;strengthening exercise;passive ROM/stretching;patient/caregiver education/training;ROM/therapeutic exercise  -               User Key  (r) = Recorded By, (t) = Taken By, (c) = Cosigned By      Initials Name Provider Type     Viviane Moore, OT Occupational Therapist                   Clinical Impression       Row Name 06/11/24 1135          Pain Assessment    Pretreatment Pain Rating 0/10 - no pain  -     Posttreatment Pain Rating 3/10  -     Pain Location - Side/Orientation Right  -     Pain Location lower  -     Pain Location - extremity  -     Pain Intervention(s) Elevated;Rest  -       Row Name 06/11/24 113          Plan of Care Review    Plan of Care Reviewed With patient;daughter  -     Progress no change  -     Outcome Evaluation Patient seen for OT evaluation. She presents with functional limitations including generalized weakness, pain, limited activity tolerance/functional endurance, and impaired balance. She would benefit from ongoing OT services to promote highest level of independence and safety with  daily occupations prior to discharge.  -       Row Name 06/11/24 1135          Therapy Assessment/Plan (OT)    Patient/Family Therapy Goal Statement (OT) be able to return home  -     Rehab Potential (OT) good, to achieve stated therapy goals  -     Criteria for Skilled Therapeutic Interventions Met (OT) yes;meets criteria;skilled treatment is necessary  -     Therapy Frequency (OT) 3 times/wk  3-5x/week as able and available for functional progress  -     Predicted Duration of Therapy Intervention (OT) discharge  -       Row Name 06/11/24 1135          Therapy Plan Review/Discharge Plan (OT)    Anticipated Discharge Disposition (OT) inpatient rehabilitation facility;skilled nursing facility  -       Row Name 06/11/24 1135          Positioning and Restraints    Pre-Treatment Position in bed  -     Post Treatment Position bed  -     In Bed fowlers;call light within reach;encouraged to call for assist;exit alarm on;with family/caregiver  -               User Key  (r) = Recorded By, (t) = Taken By, (c) = Cosigned By      Initials Name Provider Type    Viviane Barry, OT Occupational Therapist                   Outcome Measures    No documentation.                     OT Recommendation and Plan  Planned Therapy Interventions (OT): activity tolerance training, BADL retraining, adaptive equipment training, functional balance retraining, transfer/mobility retraining, occupation/activity based interventions, strengthening exercise, passive ROM/stretching, patient/caregiver education/training, ROM/therapeutic exercise  Therapy Frequency (OT): 3 times/wk (3-5x/week as able and available for functional progress)  Plan of Care Review  Plan of Care Reviewed With: patient, daughter  Progress: no change  Outcome Evaluation: Patient seen for OT evaluation. She presents with functional limitations including generalized weakness, pain, limited activity tolerance/functional endurance, and impaired balance. She  would benefit from ongoing OT services to promote highest level of independence and safety with daily occupations prior to discharge.     Time Calculation:   Evaluation Complexity (OT)  Review Occupational Profile/Medical/Therapy History Complexity: expanded/moderate complexity  Assessment, Occupational Performance/Identification of Deficit Complexity: 3-5 performance deficits  Clinical Decision Making Complexity (OT): detailed assessment/moderate complexity  Overall Complexity of Evaluation (OT): moderate complexity     Time Calculation- OT       Row Name 06/11/24 1139             Time Calculation- OT    OT Received On 06/11/24  -                User Key  (r) = Recorded By, (t) = Taken By, (c) = Cosigned By      Initials Name Provider Type     Viviane Moore OT Occupational Therapist                  Therapy Charges for Today       Code Description Service Date Service Provider Modifiers Qty    [id number]  OT EVAL MOD COMPLEXITY 4 6/11/2024 Viviane Moore OT GO 1                 Viviane Moore OT  6/11/2024    Electronically signed by Viviane Moore OT at 06/11/24 1140       Speech Language Pathology Notes (most recent note)    No notes exist for this encounter.

## 2024-06-11 NOTE — CASE MANAGEMENT/SOCIAL WORK
Discharge Planning Assessment  [facility]     Patient Name: Edgar Cotto  MRN: [id number]  Today's Date: 6/11/2024    Admit Date: 6/10/2024    Plan: Pt and pt's daughter aware and agreeable with placement at [facility] in am for short term rehab.  SS will follow          Discharge Plan       Row Name 06/11/24 1458       Plan    Plan Pt and pt's daughter aware and agreeable with placement at [facility] in am for short term rehab.  SS will follow      Row Name 06/11/24 1319       Plan    Plan Per Supervisor pt has been approved by insurance for admit to [facility].  [facility] per [place] to complete pt's admit paperwork this pm and will accept pt in am.  SS notified Physician and changed pharmacy.  SS will follow.                  Continued Care and Services - Admitted Since 6/10/2024       Destination Coordination complete.      Service Provider Request Status Selected Services Address Phone Fax Patient Preferred    [facility]  Selected Skilled Nursing [address] [address] [phone] --                  Selected Continued Care - Episodes Includes continued care and service providers with selected services from the active episodes listed below      Ambulatory Social Work Case Management Episode start date: 5/29/2024   There are no active outsourced providers for this episode.                 Expected Discharge Date and Time       Expected Discharge Date Expected Discharge Time    Jun 12, 2024             SAUL Murray

## 2024-06-11 NOTE — PLAN OF CARE
Problem: Adult Inpatient Plan of Care  Goal: Plan of Care Review  Outcome: Ongoing, Progressing   Goal Outcome Evaluation:  Plan of Care Reviewed With: patient   Patient resting in bed. No acute signs or symptoms of distress. Complaint of pain earlier in shift that has since resolved. PRN medications provided. No acute events overnight. Wound care completed. External cath care complete. IV patent. Turned q2h per schedule. Observation status continued. Plan for rehab, SNF on discharge. Bed alarm in place with call light within reach. Will continue to follow with plan of care.

## 2024-06-12 VITALS
OXYGEN SATURATION: 98 % | WEIGHT: 168.21 LBS | RESPIRATION RATE: 16 BRPM | DIASTOLIC BLOOD PRESSURE: 67 MMHG | BODY MASS INDEX: 33.02 KG/M2 | HEART RATE: 92 BPM | TEMPERATURE: 97.3 F | SYSTOLIC BLOOD PRESSURE: 116 MMHG | HEIGHT: 60 IN

## 2024-06-12 PROCEDURE — 96372 THER/PROPH/DIAG INJ SC/IM: CPT

## 2024-06-12 PROCEDURE — G0378 HOSPITAL OBSERVATION PER HR: HCPCS

## 2024-06-12 PROCEDURE — 94799 UNLISTED PULMONARY SVC/PX: CPT

## 2024-06-12 PROCEDURE — 94761 N-INVAS EAR/PLS OXIMETRY MLT: CPT

## 2024-06-12 PROCEDURE — 94664 DEMO&/EVAL PT USE INHALER: CPT

## 2024-06-12 PROCEDURE — 25010000002 ENOXAPARIN PER 10 MG: Performed by: STUDENT IN AN ORGANIZED HEALTH CARE EDUCATION/TRAINING PROGRAM

## 2024-06-12 PROCEDURE — 99239 HOSP IP/OBS DSCHRG MGMT >30: CPT | Performed by: STUDENT IN AN ORGANIZED HEALTH CARE EDUCATION/TRAINING PROGRAM

## 2024-06-12 RX ORDER — VALSARTAN 40 MG/1
40 TABLET ORAL
Qty: 30 TABLET | Refills: 0 | Status: SHIPPED | OUTPATIENT
Start: 2024-06-13 | End: 2024-07-13

## 2024-06-12 RX ORDER — BUSPIRONE HYDROCHLORIDE 5 MG/1
5 TABLET ORAL EVERY 12 HOURS SCHEDULED
Qty: 60 TABLET | Refills: 0 | Status: SHIPPED | OUTPATIENT
Start: 2024-06-12 | End: 2024-07-12

## 2024-06-12 RX ORDER — PSEUDOEPHEDRINE HCL 30 MG
100 TABLET ORAL 2 TIMES DAILY
Qty: 60 CAPSULE | Refills: 0 | Status: SHIPPED | OUTPATIENT
Start: 2024-06-12 | End: 2024-07-12

## 2024-06-12 RX ORDER — HYDROCODONE BITARTRATE AND ACETAMINOPHEN 10; 325 MG/1; MG/1
1 TABLET ORAL EVERY 6 HOURS PRN
Status: DISCONTINUED | OUTPATIENT
Start: 2024-06-12 | End: 2024-06-12 | Stop reason: HOSPADM

## 2024-06-12 RX ORDER — ALBUTEROL SULFATE 90 UG/1
2 AEROSOL, METERED RESPIRATORY (INHALATION) EVERY 4 HOURS PRN
Qty: 18 G | Refills: 0 | Status: SHIPPED | OUTPATIENT
Start: 2024-06-12

## 2024-06-12 RX ORDER — HYDROCODONE BITARTRATE AND ACETAMINOPHEN 10; 325 MG/1; MG/1
1 TABLET ORAL EVERY 6 HOURS PRN
Qty: 20 TABLET | Refills: 0 | Status: SHIPPED | OUTPATIENT
Start: 2024-06-12 | End: 2024-06-25 | Stop reason: SDUPTHER

## 2024-06-12 RX ORDER — POLYETHYLENE GLYCOL 3350 17 G/17G
17 POWDER, FOR SOLUTION ORAL DAILY
Qty: 30 PACKET | Refills: 0 | Status: SHIPPED | OUTPATIENT
Start: 2024-06-13 | End: 2024-07-13

## 2024-06-12 RX ADMIN — HYDROCODONE BITARTRATE AND ACETAMINOPHEN 1 TABLET: 10; 325 TABLET ORAL at 13:31

## 2024-06-12 RX ADMIN — HYDROCODONE BITARTRATE AND ACETAMINOPHEN 1 TABLET: 7.5; 325 TABLET ORAL at 04:36

## 2024-06-12 RX ADMIN — ENOXAPARIN SODIUM 40 MG: 40 INJECTION SUBCUTANEOUS at 15:28

## 2024-06-12 RX ADMIN — Medication 10 ML: at 08:50

## 2024-06-12 RX ADMIN — BUSPIRONE HYDROCHLORIDE 5 MG: 5 TABLET ORAL at 08:44

## 2024-06-12 RX ADMIN — POLYETHYLENE GLYCOL (3350) 17 G: 17 POWDER, FOR SOLUTION ORAL at 08:44

## 2024-06-12 RX ADMIN — VALSARTAN 40 MG: 80 TABLET, FILM COATED ORAL at 08:44

## 2024-06-12 RX ADMIN — VITAMINS A AND D OINTMENT 1 APPLICATION: 15.5; 53.4 OINTMENT TOPICAL at 08:50

## 2024-06-12 RX ADMIN — ROSUVASTATIN CALCIUM 5 MG: 10 TABLET, FILM COATED ORAL at 08:44

## 2024-06-12 RX ADMIN — DOCUSATE SODIUM 100 MG: 100 CAPSULE, LIQUID FILLED ORAL at 08:44

## 2024-06-12 NOTE — DISCHARGE INSTR - ACTIVITY
Activity as Tolerated      Wound care: Stage 2 bilateral buttock- Apply magic barrier cream BID and PRN

## 2024-06-12 NOTE — PROGRESS NOTES
[facility] HOSPITALIST PROGRESS NOTE     Patient Identification:  Name:  Edgar Cotto  Age:  79 y.o.  Sex:  female  :  1945  MRN:  [id number]  Visit Number:  [id number]  ROOM: [address] [place] [address]     Primary Care Provider:  Abbie Hillman DO    Length of stay in inpatient status:  0    Subjective     Chief Compliant:    Chief Complaint   Patient presents with    Fall    Hip Pain       History of Presenting Illness: Patient seen and evaluated in follow-up for acute compression fracture of L3 vertebrae with intractable pain and progressive worsening debility at home.  Patient today reports feeling improved but still having significant pain with any attempts at physical therapy or with movement in bed and will increase Norco.    Objective     Current Hospital Meds:  busPIRone, 5 mg, Oral, Q12H  diazePAM, 2 mg, Oral, Daily  docusate sodium, 100 mg, Oral, BID  enoxaparin, 40 mg, Subcutaneous, Q24H  magic barrier cream, 1 Application, Topical, BID  polyethylene glycol, 17 g, Oral, Daily  rosuvastatin, 5 mg, Oral, Daily  sodium chloride, 10 mL, Intravenous, Q12H  valsartan, 40 mg, Oral, Q24H      Pharmacy to Dose enoxaparin (LOVENOX),       ----------------------------------------------------------------------------------------------------------------------  Vital Signs:  Temp:  [97.3 °F (36.3 °C)-98.3 °F (36.8 °C)] 97.3 °F (36.3 °C)  Heart Rate:  [] 103  Resp:  [18-20] 20  BP: ()/(56-98) 111/63  SpO2:  [95 %-98 %] 98 %  on  Flow (L/min):  [2] 2;   Device (Oxygen Therapy): nasal cannula  Body mass index is 32.85 kg/m².      Intake/Output Summary (Last 24 hours) at 2024  Last data filed at 2024 1600  Gross per 24 hour   Intake 120 ml   Output 1850 ml   Net -1730 ml      ----------------------------------------------------------------------------------------------------------------------  Physical exam:  Constitutional: Elderly adult female resting bed in no distressHENT:   "Head:  Normocephalic and atraumatic.  Mouth:  Moist mucous membranes.    Eyes:  Conjunctivae and EOM are normal. No scleral icterus.      Cardiovascular:  Normal rate, regular rhythm and normal heart sounds with no murmur.  Pulmonary/Chest:  No respiratory distress, no wheezes, no crackles, with normal breath sounds and good air movement.  Abdominal:  Soft.  Bowel sounds are normal.  No distension and no tenderness.   Musculoskeletal:  No  tenderness, and no deformity.  No red or swollen joints anywhere.  Functional ROM intact.   Neurological:  Alert and oriented to person, place, and time.  No cranial nerve deficit.  No tongue deviation.  No facial droop.  No slurred speech. Intact Sensation throughout  Skin:  Skin is warm and dry. No rash or lesion noted. No pallor.   Peripheral vascular:  Pulses in all 4 extremities with no clubbing, no cyanosis, no edema.  Psychiatric: Appropriate mood and affect, pleasant.   ----------------------------------------------------------------------------------------------------------------------     BUN/CREAT/GLUC/ALT/AST/LEON/LIP    15/0.89/102/--/--/--/-- (06/11 0124)  SREEDHAR - Na/K/Cl/CO2: 137/4.0/99/31.0* (06/11 0124)        No results found for: \"URINECX\"  No results found for: \"BLOODCX\"    I have personally looked at the labs and they are summarized above.  ----------------------------------------------------------------------------------------------------------------------  Detailed radiology reports for the last 24 hours:  XR Chest 1 View    Result Date: 6/10/2024  Fibrotic lung changes with no acute cardiopulmonary process.   This report was finalized on 6/10/2024 10:55 AM by Ioana Christina M.D..      XR Femur 2 View Bilateral    Result Date: 6/10/2024  No acute fracture.       This report was finalized on 6/10/2024 10:53 AM by Ioana Christina M.D..      XR Hips Bilateral With or Without Pelvis 5 View    Result Date: 6/10/2024  No fracture or dislocation.     This " report was finalized on 6/10/2024 10:53 AM by Ioana Christina M.D..      CT Thoracic Spine Without Contrast    Result Date: 6/10/2024  No acute process.     This report was finalized on 6/10/2024 10:34 AM by Ioana Christina M.D..      CT Lumbar Spine Without Contrast    Result Date: 6/10/2024  Acute compression fracture involving the inferior endplate of the L3 vertebral body with minimal loss of vertebral body height.     This report was finalized on 6/10/2024 10:32 AM by Ioana Christina M.D..     Assessment & Plan      Acute L3 vertebral compression fracture  Intractable pain secondary to above  Lumbar degenerative disc disease  Chronic progressive debility    -Patient admitted for pain control and then transition to skilled nursing facility for subacute rehab    -Still having some pain on Norco 5 mg and will increase to 7.5 on monitoring for sedation.    -Continue bowel regiment with Dulcolax and MiraLAX    -PT and OT consulted and following along, appreciate help    - Case Man/ consulted and working on SNF placement and possible excepted to outside SNF with plan date of acceptance tomorrow.    Chronic conditions:  Generalized anxiety disorder -transition from Valium to BuSpar per patient and family request to avoid controlled substance.  Osteoarthritis  Hypertension  Hyperlipidemia  Restrictive lung disease  Chronic hypoxemic respiratory failure on 2 L    Copied text in portions of the note has been reviewed and is accurate as of .TODAYDATE    VTE Prophylaxis:   [unfilled]    Disposition SNF rehab tomorrow jarrell Shay DO  [facility]  06/11/24  20:42 EDT

## 2024-06-12 NOTE — CASE MANAGEMENT/SOCIAL WORK
Discharge Planning Assessment  [facility]     Patient Name: Edgar Cotto  MRN: [id number]  Today's Date: 6/12/2024    Admit Date: 6/10/2024       Discharge Plan       Row Name 06/12/24 1125       Plan    Final Discharge Disposition Code 03 - skilled nursing facility (SNF)    Final Note Pt is being discharged to [facility] on this date. Pt's dtr is aware and agreeable to discharge. SS faxed AVS Summary, clinical update and will fax dc summary. SS to arrange EMS for transport.    11:36am: SS provided Lead RN report number for [facility] [phone]   15:50pm: SS scheduled [facility] EMS per Tre. Per [facility] EMS not available.                   Continued Care and Services - Discharged on 6/12/2024 Admission date: 6/10/2024 - Discharge disposition: Skilled Nursing Facility (DC - External)      Destination Coordination complete.      Service Provider Request Status Selected Services Address Phone Fax Patient Preferred    [facility]  Selected Skilled Nursing [address] [facility] [address] [phone] --                    SAUL Chamorro

## 2024-06-12 NOTE — PLAN OF CARE
Goal Outcome Evaluation:  Plan of Care Reviewed With: patient        Progress: no change  Outcome Evaluation: Patient rested in bed during shift. Pt c/o pain, PRN pain medication given per MAR. VSS on 2L NC. Pt did not ambulate during shift r/t pain and weakness. Wound care completed per order. No concerns or complaints at this time. Will continue with plan of care.

## 2024-06-12 NOTE — DISCHARGE SUMMARY
[facility] HOSPITALISTS DISCHARGE SUMMARY    Patient Identification:  Name:  Edgar Cotto  Age:  79 y.o.  Sex:  female  :  1945  MRN:  [id number]  Visit Number:  [id number]    Date of Admission: 6/10/2024  Date of Discharge:  2024     PCP: Abbie Hillman,     DISCHARGE DIAGNOSIS  Acute L3 vertebral compression fracture  Intractable pain secondary to above  Lumbar degenerative disc disease  Chronic progressive debility  Generalized anxiety disorder  Osteoarthritis  Hypertension  Hyperlipidemia  Restrictive lung disease  Chronic hypoxemic respiratory failure on 2 L    CONSULTS   None    PROCEDURES PERFORMED      HOSPITAL COURSE  Patient is a 79 y.o. female presented to [facility] complaining of fall at home with low back pain and RLE pain.  Please see the admitting history and physical for further details.      After initial evaluation in the emergency department patient admitted to the hospitalist service for further evaluation and treatment of intractable pain secondary to compression fracture of L3 after mechanical fall at home.  Patient initiated on oral pain medication with IV for breakthrough and titration while in hospital  Patient with longstanding history of chronic pain limiting mobility now further complciated and worsened by acute L3 fracture.  Patient chronically on tramadol with report from both patient and daughter of complete ineffective control or benefit and had been actively seeking better pain control but reported PCP reluctant to start another controlled substance as patient already on benzodiazepine and to this end patient and family desire to wean from this and had been doing so at home already to help facilitate finding pain for patient and her prior significant degenerative disc disease and advance arthritis.  While in hospital patient competing tapering course wihtout issues and transitioned to buspar and start on oral norco with improved  pain control and upwards titration to relief. Patient was seen and evaluated by PT and OT while in hospital and patient and family desiring to proceed with short term SNF rehab while recovering from L3 fracture acute phase as well as improving mobility at home as patient lives with daughter and has become very dependent on her for help with day to day care.  and case management were consulted to help facilitate ASNF placement and ultimately very quickly able to be accepted to outside SNF with assigned bed and family agreeable to discharge to said facility. On day of discharge patient was toleraing analagesic regimen without overt sedation or alteration in mental status and both her and daughter reporting increased relief not previously present even prior to her fall and acute fracture. To this end patient was felt to have achieved maximal benefit of continued hospitalization and discharged to SNF for continued rehab/PT/OT in stable medical condition.  Given patient improvement and discontinuation of prior benzo she is felt to significantly benefit from continued prescription of opioid therapy at discharge from hospital and SNF in terms of improvement in quality of life and reduction in risk of continued decreased mobility and risk of becoming bed bound due to limited mobility from her pain to help preserve independence at home with family support.     VITAL SIGNS:  Temp:  [97.2 °F (36.2 °C)-98.3 °F (36.8 °C)] 97.9 °F (36.6 °C)  Heart Rate:  [] 84  Resp:  [18-20] 18  BP: ()/(59-78) 126/78  SpO2:  [96 %-98 %] 98 %  on  Flow (L/min):  [2] 2;   Device (Oxygen Therapy): nasal cannula    Body mass index is 32.85 kg/m².  Wt Readings from Last 3 Encounters:   06/11/24 76.3 kg (168 lb 3.4 oz)   06/08/24 74 kg (163 lb 2.3 oz)   02/08/24 74.8 kg (165 lb)       PHYSICAL EXAM:  Constitutional: Elderly adult female resting bed in no distress  HENT:  Head:  Normocephalic and atraumatic.  Mouth:  Moist  mucous membranes.    Eyes:  Conjunctivae and EOM are normal. No scleral icterus.      Cardiovascular:  Normal rate, regular rhythm and normal heart sounds with no murmur.  Pulmonary/Chest:  No respiratory distress, no wheezes, no crackles, with normal breath sounds and good air movement.  Abdominal:  Soft.  Bowel sounds are normal.  No distension and no tenderness.   Musculoskeletal:  No  tenderness, and no deformity.  No red or swollen joints anywhere.  Functional ROM intact.   Neurological:  Alert and oriented to person, place, and time.  No cranial nerve deficit.  No tongue deviation.  No facial droop.  No slurred speech. Intact Sensation throughout  Skin:  Skin is warm and dry. No rash or lesion noted. No pallor.   Peripheral vascular:  Pulses in all 4 extremities with no clubbing, no cyanosis, no edema.  Psychiatric: Appropriate mood and affect, pleasant.     DISCHARGE DISPOSITION   Stable    DISCHARGE MEDICATIONS:     Discharge Medications        New Medications        Instructions Start Date   busPIRone 5 MG tablet  Commonly known as: BUSPAR   5 mg, Oral, Every 12 Hours Scheduled      docusate sodium 100 MG capsule   100 mg, Oral, 2 Times Daily      HYDROcodone-acetaminophen  MG per tablet  Commonly known as: NORCO   1 tablet, Oral, Every 6 Hours PRN      polyethylene glycol 17 g packet  Commonly known as: MIRALAX   17 g, Oral, Daily   Start Date: June 13, 2024            Changes to Medications        Instructions Start Date   valsartan 40 MG tablet  Commonly known as: DIOVAN  What changed:   how much to take  when to take this   40 mg, Oral, Every 24 Hours Scheduled   Start Date: June 13, 2024            Continue These Medications        Instructions Start Date   albuterol sulfate  (90 Base) MCG/ACT inhaler  Commonly known as: PROVENTIL HFA;VENTOLIN HFA;PROAIR HFA   2 puffs, Inhalation, Every 4 Hours PRN      diclofenac 50 MG EC tablet  Commonly known as: VOLTAREN   50 mg, Oral, 2 Times Daily  PRN      meclizine 25 MG tablet  Commonly known as: ANTIVERT   25 mg, Oral, Every 8 Hours PRN      nystatin 100,000 unit/mL suspension  Commonly known as: MYCOSTATIN   500,000 Units, Swish & Swallow, 4 Times Daily PRN      rosuvastatin 5 MG tablet  Commonly known as: CRESTOR   5 mg, Oral, Daily             Stop These Medications      diazePAM 5 MG tablet  Commonly known as: VALIUM     traMADol 50 MG tablet  Commonly known as: ULTRAM                 Contact information for follow-up providers       Abbie Hillman DO .    Specialties: Family Medicine, Hospitalist  Contact information:  [address]  [facility] [address]  [phone]                       Contact information for after-discharge care       Destination       [facility] .    Service: Skilled Nursing  Contact information:  [facility] [facility] [address]  [phone]                                    TEST  RESULTS PENDING AT DISCHARGE       CODE STATUS  Code Status and Medical Interventions:   Ordered at: 06/10/24 1232     Code Status (Patient has no pulse and is not breathing):    CPR (Attempt to Resuscitate)     Medical Interventions (Patient has pulse or is breathing):    Full Support       Nitesh Shay DO  Psychiatric Hospitalist  06/12/24  10:09 EDT    Please note that this discharge summary required more than 30 minutes to complete.

## 2024-06-13 ENCOUNTER — TELEPHONE (OUTPATIENT)
Dept: FAMILY MEDICINE CLINIC | Facility: CLINIC | Age: 79
End: 2024-06-13
Payer: MEDICARE

## 2024-06-13 ENCOUNTER — PATIENT OUTREACH (OUTPATIENT)
Age: 79
End: 2024-06-13
Payer: MEDICARE

## 2024-06-13 NOTE — TELEPHONE ENCOUNTER
Patients family left a voicemail stating patient recently fell and has a fractured Vertebrae and is in rehab. Ava is requesting a referral for pain management.      Attempted to call Ava back to get me information and the phone number is not taking calls at this time.

## 2024-06-14 ENCOUNTER — TELEPHONE (OUTPATIENT)
Dept: SOCIAL WORK | Facility: HOSPITAL | Age: 79
End: 2024-06-14
Payer: MEDICARE

## 2024-06-14 NOTE — TELEPHONE ENCOUNTER
PATIENT DAUGHTER STATES SHE IS CURRENTLY IN REHAB SHE IS WANTING THINGS SET UP FOR WHEN SHE COMES HOME. PATIENT IS WANTING TO KNOW IF DR LESLIE WOULD WRITE HER PRESCRIPTION FOR PAIN MEDICINE INSTEAD OF HER GOING TO PAIN MANAGEMENT.

## 2024-06-14 NOTE — TELEPHONE ENCOUNTER
SS received call from [facility] per Sagrario who requested clarification about weight bearing status. SS notified Dr. Shay who states Pt is activity as tolerated. SS notified [facility] per Sagrario.

## 2024-06-17 NOTE — TELEPHONE ENCOUNTER
As long as she is not taking any other controlled substances, which I do not believe she is, I will write her pain medicine and wean it as tolerated on discharge from SNF.

## 2024-06-25 DIAGNOSIS — S32.030A COMPRESSION FRACTURE OF L3 VERTEBRA, INITIAL ENCOUNTER: ICD-10-CM

## 2024-06-25 NOTE — TELEPHONE ENCOUNTER
Caller: [facility]    Relationship:     Best call back number:     [phone]       Requested Prescriptions:   Requested Prescriptions     Pending Prescriptions Disp Refills    HYDROcodone-acetaminophen (NORCO)  MG per tablet 20 tablet 0     Sig: Take 1 tablet by mouth Every 6 (Six) Hours As Needed for Moderate Pain.        [id number] [place] - [address] [facility] [address] - [phone]  - [phone] FX     Last office visit with prescribing clinician: 4/16/2024   Last telemedicine visit with prescribing clinician: 5/15/2024   Next office visit with prescribing clinician: 7/1/2024     Additional details provided by patient: PATIENT WAS SEEN AT THIS FACILITY FOR PHYSICAL THERAPY DUE TO HAVING A WEDGE COMPRESSION FRACTURE OF THE THIRD VERTEBRAE.     PATIENT IS BEING DICHARGED AND WILL NOT HAVE PAIN MEDS PRESCRIBED.   THEY ARE NEEDING RUDOLPH LESLIE TO TAKE OVER THIS PRESCRIPTION     PATIENT WILL NEED ENOUGH TO LAST HER UNTIL JULY 1,2024 AT HER FOLLOW UP.     PLEASE CALL PATIENT WHEN ORDER IS SENT OVER.     Does the patient have less than a 3 day supply:  [x] Yes  [] No    Would you like a call back once the refill request has been completed: [] Yes [x] No    If the office needs to give you a call back, can they leave a voicemail: [] Yes [x] No    Gayatri Sommers Rep   06/25/24 15:22 EDT

## 2024-06-27 RX ORDER — HYDROCODONE BITARTRATE AND ACETAMINOPHEN 10; 325 MG/1; MG/1
1 TABLET ORAL EVERY 6 HOURS PRN
Qty: 20 TABLET | Refills: 0 | Status: SHIPPED | OUTPATIENT
Start: 2024-06-27 | End: 2024-07-01 | Stop reason: SDUPTHER

## 2024-06-27 NOTE — TELEPHONE ENCOUNTER
I reviewed PDMP.  I will send in a 5-day supply of this medication as requested.  I will follow-up with patient next week and she will need an updated controlled substance contract and UDS at that time.

## 2024-07-01 ENCOUNTER — OFFICE VISIT (OUTPATIENT)
Dept: FAMILY MEDICINE CLINIC | Facility: CLINIC | Age: 79
End: 2024-07-01
Payer: MEDICARE

## 2024-07-01 VITALS
DIASTOLIC BLOOD PRESSURE: 68 MMHG | TEMPERATURE: 98.4 F | BODY MASS INDEX: 30.82 KG/M2 | HEART RATE: 90 BPM | SYSTOLIC BLOOD PRESSURE: 100 MMHG | WEIGHT: 157 LBS | OXYGEN SATURATION: 98 % | HEIGHT: 60 IN

## 2024-07-01 DIAGNOSIS — Z51.81 MEDICATION MONITORING ENCOUNTER: ICD-10-CM

## 2024-07-01 DIAGNOSIS — K21.9 GASTROESOPHAGEAL REFLUX DISEASE WITHOUT ESOPHAGITIS: ICD-10-CM

## 2024-07-01 DIAGNOSIS — S32.030A COMPRESSION FRACTURE OF L3 VERTEBRA, INITIAL ENCOUNTER: Primary | ICD-10-CM

## 2024-07-01 DIAGNOSIS — M51.36 LUMBAR DEGENERATIVE DISC DISEASE: ICD-10-CM

## 2024-07-01 LAB
AMPHET+METHAMPHET UR QL: NEGATIVE
AMPHETAMINES UR QL: NEGATIVE
BARBITURATES UR QL SCN: NEGATIVE
BENZODIAZ UR QL SCN: POSITIVE
BUPRENORPHINE SERPL-MCNC: NEGATIVE NG/ML
CANNABINOIDS SERPL QL: NEGATIVE
COCAINE UR QL: NEGATIVE
FENTANYL UR-MCNC: NEGATIVE NG/ML
METHADONE UR QL SCN: NEGATIVE
OPIATES UR QL: POSITIVE
OXYCODONE UR QL SCN: NEGATIVE
PCP UR QL SCN: NEGATIVE
TRICYCLICS UR QL SCN: NEGATIVE

## 2024-07-01 PROCEDURE — 80307 DRUG TEST PRSMV CHEM ANLYZR: CPT | Performed by: INTERNAL MEDICINE

## 2024-07-01 RX ORDER — DULOXETIN HYDROCHLORIDE 20 MG/1
20 CAPSULE, DELAYED RELEASE ORAL DAILY
Qty: 30 CAPSULE | Refills: 5 | Status: SHIPPED | OUTPATIENT
Start: 2024-07-01

## 2024-07-01 RX ORDER — HYDROCODONE BITARTRATE AND ACETAMINOPHEN 10; 325 MG/1; MG/1
1 TABLET ORAL EVERY 6 HOURS PRN
Qty: 120 TABLET | Refills: 0 | Status: SHIPPED | OUTPATIENT
Start: 2024-07-01

## 2024-07-01 NOTE — PROGRESS NOTES
Patient Name: Edgar Cotto Today's Date: 2024   Patient MRN / CSN: [id number] / [id number] Date of Encounter: 2024   Patient Age / : 79 y.o. / 1945 Encounter Provider: Abbie Hillman DO   Referring Physician: No ref. provider found          Edgar is a 79 y.o. female who is being seen today for Hospital Follow Up Visit (Here for a follow up after a fall. States she is not feeling good at all. Has increased pain in the back. )      History of Present Illness    Edgar presents today for a hospital follow up after her recent stay at [facility] then inpatient rehab at [facility] after a fall resulting in acute compression fracture of L3. Edgar is living with her daughter who is her primary caregiver and present on today's exam.  Patient reports having severe back pain.  She is taking hydrocodone 10 mg 4 times daily as needed with relief.  She is tolerating this well.  Valium and tramadol were stopped while she was in the hospital.  She is also taking diclofenac for pain control.  She is limited in activities due to pain and mostly restricted to a wheelchair.  She is interested in pain management evaluation.  She is doing home therapy with the assistance of her daughter.    Allergies include:Z-mary [azithromycin], Amoxicillin, Cortisone, Fish-derived products, Iodinated contrast media, Iodine, Lidocaine, Morphine, Niacin, Nystatin, Other, Sulfa antibiotics, and Verapamil  Current Outpatient Medications   Medication Sig Dispense Refill    albuterol sulfate  (90 Base) MCG/ACT inhaler Inhale 2 puffs Every 4 (Four) Hours As Needed for Wheezing. 18 g 0    busPIRone (BUSPAR) 5 MG tablet Take 1 tablet by mouth Every 12 (Twelve) Hours for 30 days. 60 tablet 0    diclofenac (VOLTAREN) 50 MG EC tablet Take 1 tablet by mouth 2 (Two) Times a Day As Needed (Pain).      docusate sodium 100 MG capsule Take 1 capsule by mouth 2 (Two) Times a Day for 30 days. 60 capsule 0    HYDROcodone-acetaminophen (NORCO)   MG per tablet Take 1 tablet by mouth Every 6 (Six) Hours As Needed for Moderate Pain. 120 tablet 0    meclizine (ANTIVERT) 25 MG tablet Take 1 tablet by mouth Every 8 (Eight) Hours As Needed for Dizziness.      nystatin (MYCOSTATIN) 100,000 unit/mL suspension Swish and swallow 5 mL 4 (Four) Times a Day As Needed (Thrush, fungal, or yeast infection of the mouth.).      polyethylene glycol (MIRALAX) 17 g packet Take 17 g by mouth Daily for 30 days. 30 packet 0    rosuvastatin (CRESTOR) 5 MG tablet Take 1 tablet by mouth Daily.      valsartan (DIOVAN) 40 MG tablet Take 1 tablet by mouth Daily for 30 days. 30 tablet 0    DULoxetine (Cymbalta) 20 MG capsule Take 1 capsule by mouth Daily. 30 capsule 5     No current facility-administered medications for this visit.     Past Medical History:   Diagnosis Date    Abdominal distension     Allergic rhinitis     Anxiety     Arthritis     Closed nondisplaced fracture of surgical neck of left humerus with routine healing 3/9/2021    Cough     Dry eyes     Dyslipidemia     Dysuria     Hyperlipidemia     Hypertension     Meniere's disease     Mitral and aortic valve disease     Oral candidiasis     Osteoarthritis     Stomatitis     URI, acute     Vaginal candidiasis     Vocal cord dysfunction      Family History   Problem Relation Age of Onset    Diabetes Mother     Cancer Father     Liver disease Sister     Diabetes Sister     Liver disease Brother     Diabetes Brother     Diabetes Daughter     Cancer Other     Liver disease Other     Breast cancer Neg Hx      Past Surgical History:   Procedure Laterality Date    CHOLECYSTECTOMY      COLONOSCOPY      DILATATION AND CURETTAGE      HEMORRHOIDECTOMY      HERNIA REPAIR      SHOULDER SURGERY      SKIN CANCER EXCISION      on Nose    TONSILLECTOMY       Social History     Substance and Sexual Activity   Alcohol Use No     Social History     Tobacco Use   Smoking Status Never    Passive exposure: Never   Smokeless Tobacco Never   Tobacco  "Comments    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   Respiratory:  Negative for shortness of breath.    Cardiovascular:  Negative for chest pain.   Gastrointestinal:         Taking tums as needed for heartburn and prefers to hold on other medicines for this at this time.    Musculoskeletal:  Positive for arthralgias and back pain.        Depression Assessment Review:  PHQ-9 Total Score:    Vital Signs & Measurements Taken This Encounter  /68 (BP Location: Left arm, Patient Position: Sitting, Cuff Size: Adult)   Pulse 90   Temp 98.4 °F (36.9 °C) (Oral)   Ht 152.4 cm (60\")   Wt 71.2 kg (157 lb)   SpO2 98%   BMI 30.66 kg/m²    SpO2 Percentage    07/01/24 1121   SpO2: 98%        BMI is >= 30 and <35. (Class 1 Obesity). The following options were offered after discussion;: weight loss educational material (shared in after visit summary)      Physical Exam  Vitals reviewed.   Constitutional:       General: She is not in acute distress.  HENT:      Head: Normocephalic and atraumatic.   Eyes:      General: No scleral icterus.     Extraocular Movements: Extraocular movements intact.      Conjunctiva/sclera: Conjunctivae normal.      Pupils: Pupils are equal, round, and reactive to light.   Cardiovascular:      Rate and Rhythm: Normal rate and regular rhythm.   Pulmonary:      Effort: Pulmonary effort is normal. No respiratory distress.      Breath sounds: Normal breath sounds. No wheezing or rhonchi.      Comments: She is wearing oxygen  Musculoskeletal:         General: No swelling.      Cervical back: Neck supple. No tenderness.      Comments: Patient presents in a wheelchair today   Lymphadenopathy:      Cervical: No cervical adenopathy.   Skin:     General: Skin is warm and dry.      Coloration: Skin is not jaundiced.   Neurological:      Mental Status: She is alert. "   Psychiatric:         Mood and Affect: Mood normal.         Behavior: Behavior normal.         Thought Content: Thought content normal.         Judgment: Judgment normal.           Assessment & Plan  Patient Active Problem List   Diagnosis    Atopic rhinitis    Dizziness    Generalized anxiety disorder    Generalized osteoarthritis    Essential hypertension    Auditory vertigo    Mitral and aortic valve disease    Stomatitis    Seasonal allergic rhinitis    Varicose veins of both lower extremities    Meniere's disease of both ears    S/P hemorrhoidectomy    Abnormal EKG    Moderate obesity    Dyslipidemia    Thyroid cyst    Age-related osteoporosis without current pathological fracture    Cervical lymphadenopathy    CABAN (dyspnea on exertion)    Hypoxia    Restrictive lung disease    Scoliosis    Hip pain    Lumbar degenerative disc disease    BMI 32.0-32.9,adult    Physical deconditioning    Compression fracture of L3 vertebra    Gastroesophageal reflux disease without esophagitis       ICD-10-CM ICD-9-CM   1. Compression fracture of L3 vertebra, initial encounter  S32.030A 805.4   2. Lumbar degenerative disc disease  M51.36 722.52   3. Gastroesophageal reflux disease without esophagitis  K21.9 530.81   4. Medication monitoring encounter  Z51.81 V58.83     Orders Placed This Encounter   Procedures    Urine Drug Screen - Urine, Clean Catch     Order Specific Question:   Release to patient     Answer:   Routine Release [[id number]]    Fentanyl, Urine - Urine, Clean Catch     Order Specific Question:   Release to patient     Answer:   Routine Release [[id number]]    Ambulatory Referral to Pain Management Clinic     Referral Priority:   Routine     Referral Type:   Pain Management     Referral Reason:   Specialty Services Required     Requested Specialty:   Pain Medicine     Number of Visits Requested:   1       Meds Ordered During Visit:  New Medications Ordered This Visit   Medications    DULoxetine (Cymbalta) 20 MG  capsule     Sig: Take 1 capsule by mouth Daily.     Dispense:  30 capsule     Refill:  5    HYDROcodone-acetaminophen (NORCO)  MG per tablet     Sig: Take 1 tablet by mouth Every 6 (Six) Hours As Needed for Moderate Pain.     Dispense:  120 tablet     Refill:  0     I reviewed hospital records today.  We will continue current medicine regimen.  I also recommended a trial of Cymbalta 20 mg daily.  I agree with pain management referral and will place this order for her today.  Controlled substance contract and UDS were updated today.  We will continue monitoring PDMP as well.  I discussed updating dexa but patient declines at this time. I discussed adding osteoporosis medicine such as prolia, fosamax or boniva.  Patient declines these medications for fear of side effects.    Return in about 6 weeks (around 8/12/2024), or if symptoms worsen or fail to improve, for Recheck.          Referring Provider (if known): No ref. provider found      This document has been electronically signed by Abbie Hillman DO  July 1, 2024 17:33 EDT    Abbie Hillman DO, FACOI  [address] [place] [address]  [phone] (office)    Part of this note may be an electronic transcription/translation of spoken language to printed text using the Dragon Dictation System.

## 2024-07-05 RX ORDER — ALBUTEROL SULFATE 90 UG/1
2 AEROSOL, METERED RESPIRATORY (INHALATION) EVERY 4 HOURS PRN
Qty: 18 G | Refills: 0 | Status: SHIPPED | OUTPATIENT
Start: 2024-07-05

## 2024-07-08 DIAGNOSIS — Z51.81 MEDICATION MONITORING ENCOUNTER: Primary | ICD-10-CM

## 2024-07-15 NOTE — TELEPHONE ENCOUNTER
Caller: NathalyAva    Relationship: Emergency Contact    Best call back number: [phone]     Requested Prescriptions:   Requested Prescriptions     Pending Prescriptions Disp Refills    busPIRone (BUSPAR) 5 MG tablet 60 tablet 0     Sig: Take 1 tablet by mouth Every 12 (Twelve) Hours for 30 days.        Pharmacy where request should be sent: [id number] - [place], [place] - [address] - [phone] [facility] [phone] FX     Last office visit with prescribing clinician: 7/1/2024   Last telemedicine visit with prescribing clinician: 5/15/2024   Next office visit with prescribing clinician: 8/12/2024     Additional details provided by patient: DAUGHTER OF PATIENT HAS CALLED REQUESTING A NEW PRESCRIPTION ON ABOVE MEDICATION.    Does the patient have less than a 3 day supply:  [x] Yes  [] No    Would you like a call back once the refill request has been completed: [] Yes [x] No    If the office needs to give you a call back, can they leave a voicemail: [] Yes [x] No    Lacie Benavides   07/15/24 08:43 EDT

## 2024-07-16 RX ORDER — BUSPIRONE HYDROCHLORIDE 5 MG/1
5 TABLET ORAL EVERY 12 HOURS SCHEDULED
Qty: 60 TABLET | Refills: 5 | Status: SHIPPED | OUTPATIENT
Start: 2024-07-16

## 2024-07-24 RX ORDER — ALBUTEROL SULFATE 90 UG/1
2 AEROSOL, METERED RESPIRATORY (INHALATION) EVERY 4 HOURS PRN
Qty: 18 G | Refills: 0 | Status: SHIPPED | OUTPATIENT
Start: 2024-07-24

## 2024-07-30 ENCOUNTER — EXTERNAL PBMM DATA (OUTPATIENT)
Dept: PHARMACY | Facility: OTHER | Age: 79
End: 2024-07-30
Payer: MEDICARE

## 2024-07-31 DIAGNOSIS — S32.030A COMPRESSION FRACTURE OF L3 VERTEBRA, INITIAL ENCOUNTER: ICD-10-CM

## 2024-07-31 RX ORDER — HYDROCODONE BITARTRATE AND ACETAMINOPHEN 10; 325 MG/1; MG/1
1 TABLET ORAL EVERY 6 HOURS PRN
Qty: 120 TABLET | Refills: 0 | Status: CANCELLED | OUTPATIENT
Start: 2024-07-31

## 2024-07-31 NOTE — TELEPHONE ENCOUNTER
PDMP reviewed. Patient was prescribed percocet by pain mgmt. We will hold on sending norco at this time.

## 2024-08-12 ENCOUNTER — TELEMEDICINE (OUTPATIENT)
Dept: FAMILY MEDICINE CLINIC | Facility: CLINIC | Age: 79
End: 2024-08-12
Payer: MEDICARE

## 2024-08-12 DIAGNOSIS — M51.36 LUMBAR DEGENERATIVE DISC DISEASE: ICD-10-CM

## 2024-08-12 DIAGNOSIS — I10 ESSENTIAL HYPERTENSION: Chronic | ICD-10-CM

## 2024-08-12 DIAGNOSIS — E78.5 DYSLIPIDEMIA: Chronic | ICD-10-CM

## 2024-08-12 DIAGNOSIS — S32.030G COMPRESSION FRACTURE OF L3 VERTEBRA WITH DELAYED HEALING, SUBSEQUENT ENCOUNTER: Primary | ICD-10-CM

## 2024-08-12 PROCEDURE — 1159F MED LIST DOCD IN RCRD: CPT | Performed by: INTERNAL MEDICINE

## 2024-08-12 PROCEDURE — 99214 OFFICE O/P EST MOD 30 MIN: CPT | Performed by: INTERNAL MEDICINE

## 2024-08-12 PROCEDURE — 1125F AMNT PAIN NOTED PAIN PRSNT: CPT | Performed by: INTERNAL MEDICINE

## 2024-08-12 PROCEDURE — 1160F RVW MEDS BY RX/DR IN RCRD: CPT | Performed by: INTERNAL MEDICINE

## 2024-08-12 RX ORDER — POLYETHYLENE GLYCOL 3350 17 G/17G
17 POWDER, FOR SOLUTION ORAL DAILY
COMMUNITY

## 2024-08-12 RX ORDER — BISACODYL 5 MG/1
5 TABLET, DELAYED RELEASE ORAL DAILY PRN
COMMUNITY

## 2024-08-12 RX ORDER — OXYCODONE AND ACETAMINOPHEN 10; 325 MG/1; MG/1
1 TABLET ORAL EVERY 6 HOURS PRN
COMMUNITY

## 2024-08-12 RX ORDER — ROSUVASTATIN CALCIUM 5 MG/1
5 TABLET, COATED ORAL DAILY
Qty: 90 TABLET | Refills: 1 | Status: SHIPPED | OUTPATIENT
Start: 2024-08-12

## 2024-08-12 RX ORDER — VALSARTAN 40 MG/1
40 TABLET ORAL
Qty: 90 TABLET | Refills: 1 | Status: SHIPPED | OUTPATIENT
Start: 2024-08-12

## 2024-08-12 NOTE — PROGRESS NOTES
Patient Name: Edgar Cotto Today's Date: 2024   Patient MRN / CSN: [id number] / [id number] Date of Encounter: 2024   Patient Age / : 79 y.o. / 1945 Encounter Provider: Abbie Hillman DO   Referring Physician: No ref. provider found          Edgar is a 79 y.o. female who is being seen today for Back Pain      Back Pain  This is a chronic problem. The problem is unchanged. The pain is present in the lumbar spine. The pain radiates to the right buttock and left buttock. The pain is moderate.   Additional comments: Since last visit, patient followed up with pain management he changed hydrocodone to Percocet.  Patient reports the Percocet is helping some but she still having pain daily.  She has not tried Cymbalta because she was afraid it would make her dizzy.  She agrees to try it tonight.  She has not been taking diclofenac due to the Percocet.  Hypertension  This is a chronic problem. The current episode started more than 1 year ago. The problem has been stable since onset. The problem is controlled. Current antihypertension treatment includes angiotensin blockers. The current treatment provides significant improvement. There are no compliance problems.        Allergies include:Z-mary [azithromycin], Amoxicillin, Cortisone, Fish-derived products, Iodinated contrast media, Iodine, Lidocaine, Morphine, Niacin, Nystatin, Other, Sulfa antibiotics, and Verapamil  Current Outpatient Medications   Medication Sig Dispense Refill    albuterol sulfate  (90 Base) MCG/ACT inhaler INHALE 2 PUFFS BY MOUTH EVERY 4 HOURS AS NEEDED FOR WHEEZING 18 g 0    bisacodyl (DULCOLAX) 5 MG EC tablet Take 1 tablet by mouth Daily As Needed for Constipation. Indications: Constipation      busPIRone (BUSPAR) 5 MG tablet Take 1 tablet by mouth Every 12 (Twelve) Hours. 60 tablet 5    DULoxetine (Cymbalta) 20 MG capsule Take 1 capsule by mouth Daily. 30 capsule 5    meclizine (ANTIVERT) 25 MG tablet Take 1 tablet  by mouth Every 8 (Eight) Hours As Needed for Dizziness.      nystatin (MYCOSTATIN) 100,000 unit/mL suspension Swish and swallow 5 mL 4 (Four) Times a Day As Needed (Thrush, fungal, or yeast infection of the mouth.).      oxyCODONE-acetaminophen (PERCOCET)  MG per tablet Take 1 tablet by mouth Every 6 (Six) Hours As Needed for Moderate Pain.      polyethylene glycol (MiraLax) 17 GM/SCOOP powder Take 17 g by mouth Daily. Indications: Constipation      rosuvastatin (CRESTOR) 5 MG tablet Take 1 tablet by mouth Daily. 90 tablet 1    valsartan (DIOVAN) 40 MG tablet Take 1 tablet by mouth Daily. 90 tablet 1     No current facility-administered medications for this visit.     Past Medical History:   Diagnosis Date    Abdominal distension     Allergic rhinitis     Anxiety     Arthritis     Closed nondisplaced fracture of surgical neck of left humerus with routine healing 3/9/2021    Cough     Dry eyes     Dyslipidemia     Dysuria     Hyperlipidemia     Hypertension     Meniere's disease     Mitral and aortic valve disease     Oral candidiasis     Osteoarthritis     Stomatitis     URI, acute     Vaginal candidiasis     Vocal cord dysfunction      Family History   Problem Relation Age of Onset    Diabetes Mother     Cancer Father     Liver disease Sister     Diabetes Sister     Liver disease Brother     Diabetes Brother     Diabetes Daughter     Cancer Other     Liver disease Other     Breast cancer Neg Hx      Past Surgical History:   Procedure Laterality Date    CHOLECYSTECTOMY      COLONOSCOPY      DILATATION AND CURETTAGE      HEMORRHOIDECTOMY      HERNIA REPAIR      SHOULDER SURGERY      SKIN CANCER EXCISION      on Nose    TONSILLECTOMY       Social History     Substance and Sexual Activity   Alcohol Use No     Social History     Tobacco Use   Smoking Status Never    Passive exposure: Never   Smokeless Tobacco Never   Tobacco Comments    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        Patient and daughter have noticed a decrease in patient's appetite since the compression fracture.   Gastrointestinal:  Positive for constipation.   Musculoskeletal:  Positive for back pain.   Skin:         Daughter reports patient's pressure ulcers have closed and healed well.         Depression Assessment Review:  PHQ-9 Total Score:    Vital Signs & Measurements Taken This Encounter  There were no vitals taken for this visit.   There were no vitals filed for this visit.       Physical Exam  Constitutional:       General: She is not in acute distress.  Pulmonary:      Effort: No respiratory distress.   Neurological:      Mental Status: She is alert.   Psychiatric:         Mood and Affect: Mood normal.         Behavior: Behavior normal.              Assessment & Plan  Patient Active Problem List   Diagnosis    Atopic rhinitis    Dizziness    Generalized anxiety disorder    Generalized osteoarthritis    Essential hypertension    Auditory vertigo    Mitral and aortic valve disease    Stomatitis    Seasonal allergic rhinitis    Varicose veins of both lower extremities    Meniere's disease of both ears    S/P hemorrhoidectomy    Abnormal EKG    Moderate obesity    Dyslipidemia    Thyroid cyst    Age-related osteoporosis without current pathological fracture    Cervical lymphadenopathy    CABAN (dyspnea on exertion)    Hypoxia    Restrictive lung disease    Scoliosis    Hip pain    Lumbar degenerative disc disease    BMI 32.0-32.9,adult    Physical deconditioning    Compression fracture of L3 vertebra    Gastroesophageal reflux disease without esophagitis       ICD-10-CM ICD-9-CM   1. Compression fracture of L3 vertebra with delayed healing, subsequent encounter  S32.030G V54.17   2. Lumbar degenerative disc disease  M51.36 722.52   3. Dyslipidemia  E78.5 272.4   4. Essential hypertension  I10 401.9      No orders of the defined types were placed in this encounter.      Meds Ordered During Visit:  New Medications Ordered This Visit   Medications    rosuvastatin (CRESTOR) 5 MG tablet     Sig: Take 1 tablet by mouth Daily.     Dispense:  90 tablet     Refill:  1    valsartan (DIOVAN) 40 MG tablet     Sig: Take 1 tablet by mouth Daily.     Dispense:  90 tablet     Refill:  1     I reviewed PDMP.  I encourage patient to follow-up with pain management as planned.  Also encouraged her to try Cymbalta and explained that she may stop it at that dose if she were to be intolerant to it.  She is agreeable with plan.  We will continue other medicines as previously prescribed.  Updated refills for patient today.    Return in about 6 weeks (around 9/23/2024), or if symptoms worsen or fail to improve, for Recheck.     As a means of preventing the spread of the COVID 19, this visit was done virtually via video platform through [employer] as consented by the patient. Patient was at home and provider at [facility] office during this visit. The patient consented to this telehealth visit and signed the video visit consent form. This visit included audio and video interaction. There were no technical issues that occurred during this visit.       Referring Provider (if known): No ref. provider found      This document has been electronically signed by Abbie Hillman DO  August 12, 2024 15:27 EDT    Abbie Hillman DO, FACOI  [address] [place] [address]  [phone] (office)    Part of this note may be an electronic transcription/translation of spoken language to printed text using the Dragon Dictation System.

## 2024-08-14 RX ORDER — ALBUTEROL SULFATE 90 UG/1
2 AEROSOL, METERED RESPIRATORY (INHALATION) EVERY 4 HOURS PRN
Qty: 18 G | Refills: 0 | Status: SHIPPED | OUTPATIENT
Start: 2024-08-14

## 2024-09-03 RX ORDER — ALBUTEROL SULFATE 90 UG/1
2 AEROSOL, METERED RESPIRATORY (INHALATION) EVERY 4 HOURS PRN
Qty: 18 G | Refills: 0 | Status: SHIPPED | OUTPATIENT
Start: 2024-09-03

## 2024-09-12 ENCOUNTER — TELEMEDICINE (OUTPATIENT)
Dept: PULMONOLOGY | Facility: CLINIC | Age: 79
End: 2024-09-12
Payer: MEDICARE

## 2024-09-12 DIAGNOSIS — R09.02 HYPOXIA: ICD-10-CM

## 2024-09-12 DIAGNOSIS — J84.9 ILD (INTERSTITIAL LUNG DISEASE): Primary | ICD-10-CM

## 2024-09-12 RX ORDER — ALBUTEROL SULFATE 90 UG/1
2 AEROSOL, METERED RESPIRATORY (INHALATION) EVERY 4 HOURS PRN
Qty: 18 G | Refills: 0 | Status: SHIPPED | OUTPATIENT
Start: 2024-09-12

## 2024-09-12 RX ORDER — BUDESONIDE AND FORMOTEROL FUMARATE DIHYDRATE 80; 4.5 UG/1; UG/1
2 AEROSOL RESPIRATORY (INHALATION) 2 TIMES DAILY
Qty: 10.2 G | Refills: 3 | Status: SHIPPED | OUTPATIENT
Start: 2024-09-12

## 2024-09-12 RX ORDER — PROMETHAZINE HYDROCHLORIDE 12.5 MG/1
TABLET ORAL
COMMUNITY
Start: 2024-09-09

## 2024-09-12 RX ORDER — IPRATROPIUM BROMIDE AND ALBUTEROL SULFATE 2.5; .5 MG/3ML; MG/3ML
3 SOLUTION RESPIRATORY (INHALATION) 4 TIMES DAILY PRN
Qty: 360 ML | Refills: 3 | Status: SHIPPED | OUTPATIENT
Start: 2024-09-12

## 2024-09-12 NOTE — PROGRESS NOTES
"Chief Complaint  ILD (interstitial lung disease)    Subjective          Edgar Cotto presents to [facility] for   History of Present Illness    Ms. Cotto is an 79 year old female with a medical history significant for hypertension, hyperlipidmeia, GERD, anxiety, and ILD.    She presents today for follow up on ILD.  She states that her breathing has been doing good.  She reports no worsening of symptoms.  She does tell me that since I last seen her she fell and fractured her spine.  She completed 2 weeks of rehab and is using a walker at home.  She is using albuterol inhaelr and neb treatments as needed.  She is also compliant with use of supplemental oxygen at 3 L.  She does tell me that sometimes when she is walking her oxygen drops into the 80s on 3L.  She also inquires about other inhalers that may help her breathing.    Objective   Vital Signs:   There were no vitals taken for this visit.        Physical Exam    Deferred physical exam as visit was done via telemedicine.  She is alert and oriented.  No acute distress noted.    Estimated body mass index is 30.66 kg/m² as calculated from the following:    Height as of 7/1/24: 152.4 cm (60\").    Weight as of 7/1/24: 71.2 kg (157 lb).        Result Review :   The following data was reviewed by: JOSSELINE Barrett on 09/12/2024:  Common labs          6/8/2024    08:09 6/10/2024    10:06 6/11/2024    01:24   Common Labs   Glucose 114  117  102    BUN 12  16  15    Creatinine 0.89  0.95  0.89    Sodium 138  133  137    Potassium 3.8  4.5  4.0    Chloride 99  98  99    Calcium 9.4  9.3  9.2    Albumin 3.7  3.5     Total Bilirubin 0.5  0.3     Alkaline Phosphatase 121  120     AST (SGOT) 19  18     ALT (SGPT) 13  11     WBC 6.87  7.27     Hemoglobin 13.4  12.6     Hematocrit 41.1  40.0     Platelets 187  198            PFT:8/3/22    Spirometry suggest restriction with no significant bronchodilator " "effect seen on this occasion limbs confirm severe restrictive lung disease.  Flow volume loop is restricted.        Low dose lung cancer screening:NA    Previous chest imaging:  Hi res CT Chest 10/12/22  FINDINGS:    LUNGS:  Fairly extensive bibasilar predominant subpleural fibrotic  change noted of both lungs.    PLEURAL SPACE:  Unremarkable.  No pneumothorax.  No significant  effusion.    HEART:  Mild coronary artery calcifications.  No significant  pericardial effusion.    BONES/JOINTS:  Unremarkable.  No acute fracture.  No dislocation.    SOFT TISSUES:  Unremarkable.    VASCULATURE:  Unremarkable.  No thoracic aortic aneurysm.    LYMPH NODES:  Unremarkable.  No enlarged lymph nodes.    LIVER:  Cirrhotic nodular liver contour.     IMPRESSION:    Fairly extensive bibasilar predominant subpleural fibrotic change  noted of both lungs.     This report was finalized on 10/12/2022 1:14 PM by Dr. Leobardo Camara MD.      Alpha-1 antitrypsin screening:NA    STOP-Bang Score:   NA  [place] Sleepiness Scale:   NA      ABG:    pH No results found for: \"PHART\"   pO2 No results found for: \"PO2ART\"   pCO2 No results found for: \"AKL9TGH\"   HCO3 No results found for: \"TPT7UFH\"                      Assessment and Plan    Problem List Items Addressed This Visit          Pulmonary and Pneumonias    Hypoxia    Relevant Orders    Miscellaneous DME     Other Visit Diagnoses       ILD (interstitial lung disease)    -  Primary    Relevant Medications    promethazine (PHENERGAN) 12.5 MG tablet    albuterol sulfate  (90 Base) MCG/ACT inhaler    ipratropium-albuterol (DUO-NEB) 0.5-2.5 mg/3 ml nebulizer    budesonide-formoterol (SYMBICORT) 80-4.5 MCG/ACT inhaler            Edgar uRbyon  reports that she has never smoked. She has never been exposed to tobacco smoke. She has never used smokeless tobacco.        ILD is likely due to underlying RA.  Continue albtuerol inhaler and neb treatments as needed.    She is asking about " another inhaler that may help her breathing.  I educated her that her particular type of lung disease is typically not improved by inhalers but that we could try one.  Sent in Symbicort.    She is currently using supplemental oxygen at 3 L.  Educated her that she can increase her oxygen to 4 L during ambulation if needed.      Follow Up   No follow-ups on file.  Patient was given instructions and counseling regarding her condition or for health maintenance advice. Please see specific information pulled into the AVS if appropriate.

## 2024-09-15 ENCOUNTER — POP HEALTH PHARMACY (OUTPATIENT)
Dept: PHARMACY | Facility: OTHER | Age: 79
End: 2024-09-15
Payer: MEDICARE

## 2024-09-18 RX ORDER — ALBUTEROL SULFATE 90 UG/1
2 AEROSOL, METERED RESPIRATORY (INHALATION) EVERY 4 HOURS PRN
Qty: 18 G | Refills: 0 | Status: SHIPPED | OUTPATIENT
Start: 2024-09-18

## 2024-10-01 RX ORDER — ALBUTEROL SULFATE 90 UG/1
2 INHALANT RESPIRATORY (INHALATION) EVERY 4 HOURS PRN
Qty: 18 G | Refills: 0 | Status: SHIPPED | OUTPATIENT
Start: 2024-10-01

## 2024-10-07 ENCOUNTER — TELEMEDICINE (OUTPATIENT)
Dept: FAMILY MEDICINE CLINIC | Facility: CLINIC | Age: 79
End: 2024-10-07
Payer: MEDICARE

## 2024-10-07 DIAGNOSIS — S32.030G COMPRESSION FRACTURE OF L3 VERTEBRA WITH DELAYED HEALING, SUBSEQUENT ENCOUNTER: Primary | ICD-10-CM

## 2024-10-07 DIAGNOSIS — J06.9 ACUTE URI: ICD-10-CM

## 2024-10-07 DIAGNOSIS — M51.362 DEGENERATION OF INTERVERTEBRAL DISC OF LUMBAR REGION WITH DISCOGENIC BACK PAIN AND LOWER EXTREMITY PAIN: ICD-10-CM

## 2024-10-07 DIAGNOSIS — L89.159 PRESSURE INJURY OF SKIN OF SACRAL REGION, UNSPECIFIED INJURY STAGE: ICD-10-CM

## 2024-10-07 DIAGNOSIS — F41.1 GENERALIZED ANXIETY DISORDER: Chronic | ICD-10-CM

## 2024-10-07 PROCEDURE — 1160F RVW MEDS BY RX/DR IN RCRD: CPT | Performed by: INTERNAL MEDICINE

## 2024-10-07 PROCEDURE — 99214 OFFICE O/P EST MOD 30 MIN: CPT | Performed by: INTERNAL MEDICINE

## 2024-10-07 PROCEDURE — 1125F AMNT PAIN NOTED PAIN PRSNT: CPT | Performed by: INTERNAL MEDICINE

## 2024-10-07 PROCEDURE — 1159F MED LIST DOCD IN RCRD: CPT | Performed by: INTERNAL MEDICINE

## 2024-10-07 RX ORDER — BUSPIRONE HYDROCHLORIDE 10 MG/1
10 TABLET ORAL 3 TIMES DAILY
Qty: 60 TABLET | Refills: 5 | Status: SHIPPED | OUTPATIENT
Start: 2024-10-07

## 2024-10-07 RX ORDER — DOXYCYCLINE 100 MG/1
100 CAPSULE ORAL 2 TIMES DAILY
Qty: 20 CAPSULE | Refills: 0 | Status: SHIPPED | OUTPATIENT
Start: 2024-10-07

## 2024-10-07 NOTE — PROGRESS NOTES
Patient Name: Edgar Cotto Today's Date: 10/7/2024   Patient MRN / CSN: [id number] / [id number] Date of Encounter: 10/7/2024   Patient Age / : 79 y.o. / 1945 Encounter Provider: Abbie Hillman DO   Referring Physician: No ref. provider found          Edgar is a 79 y.o. female who is being seen today for Back Pain and Cough      Back Pain  This is a chronic problem. The current episode started more than 1 year ago. The pain is at a severity of 7/10. The pain is moderate. Associated symptoms include headaches. Pertinent negatives include no fever. She has tried analgesics (Cymbalta stopped due to dizziness) for the symptoms. The treatment provided significant relief.   Cough  This is a chronic problem. Progression since onset: worse over past 6 weeks. The cough is Productive of sputum. Associated symptoms include headaches, postnasal drip, rhinorrhea and shortness of breath. Pertinent negatives include no fever or wheezing. She has tried a beta-agonist inhaler and steroid inhaler for the symptoms. The treatment provided no relief.       Allergies include:Z-mary [azithromycin], Amoxicillin, Cortisone, Fish-derived products, Iodinated contrast media, Iodine, Lidocaine, Morphine, Niacin, Nystatin, Other, Sulfa antibiotics, and Verapamil  Current Outpatient Medications   Medication Sig Dispense Refill    albuterol sulfate  (90 Base) MCG/ACT inhaler INHALE 2 PUFFS BY MOUTH EVERY 4 HOURS AS NEEDED FOR WHEEZING. 18 g 0    bisacodyl (DULCOLAX) 5 MG EC tablet Take 1 tablet by mouth Daily As Needed for Constipation. Indications: Constipation      budesonide-formoterol (SYMBICORT) 80-4.5 MCG/ACT inhaler Inhale 2 puffs 2 (Two) Times a Day. 10.2 g 3    ipratropium-albuterol (DUO-NEB) 0.5-2.5 mg/3 ml nebulizer Take 3 mL by nebulization 4 (Four) Times a Day As Needed for Wheezing. 360 mL 3    meclizine (ANTIVERT) 25 MG tablet Take 1 tablet by mouth Every 8 (Eight) Hours As Needed for Dizziness.       nystatin (MYCOSTATIN) 100,000 unit/mL suspension Swish and swallow 5 mL 4 (Four) Times a Day As Needed (Thrush, fungal, or yeast infection of the mouth.).      oxyCODONE-acetaminophen (PERCOCET)  MG per tablet Take 1 tablet by mouth Every 6 (Six) Hours As Needed for Moderate Pain.      polyethylene glycol (MiraLax) 17 GM/SCOOP powder Take 17 g by mouth Daily. Indications: Constipation      promethazine (PHENERGAN) 12.5 MG tablet TAKE 1 TABLET BY MOUTH TWICE DAILY FOR 15 DAYS      rosuvastatin (CRESTOR) 5 MG tablet Take 1 tablet by mouth Daily. 90 tablet 1    valsartan (DIOVAN) 40 MG tablet Take 1 tablet by mouth Daily. 90 tablet 1    busPIRone (BUSPAR) 10 MG tablet Take 1 tablet by mouth 3 (Three) Times a Day. 60 tablet 5    doxycycline (VIBRAMYCIN) 100 MG capsule Take 1 capsule by mouth 2 (Two) Times a Day. 20 capsule 0    Vitamins A & D (magic barrier cream) Apply 1 Application topically to the appropriate area as directed 3 (Three) Times a Day As Needed (pressure ulcers). 120 g 5     No current facility-administered medications for this visit.     Past Medical History:   Diagnosis Date    Abdominal distension     Allergic rhinitis     Anxiety     Arthritis     Closed nondisplaced fracture of surgical neck of left humerus with routine healing 3/9/2021    Cough     Dry eyes     Dyslipidemia     Dysuria     Hyperlipidemia     Hypertension     Meniere's disease     Mitral and aortic valve disease     Oral candidiasis     Osteoarthritis     Stomatitis     URI, acute     Vaginal candidiasis     Vocal cord dysfunction      Family History   Problem Relation Age of Onset    Diabetes Mother     Cancer Father     Liver disease Sister     Diabetes Sister     Liver disease Brother     Diabetes Brother     Diabetes Daughter     Cancer Other     Liver disease Other     Breast cancer Neg Hx      Past Surgical History:   Procedure Laterality Date    CHOLECYSTECTOMY      COLONOSCOPY      DILATATION AND CURETTAGE       HEMORRHOIDECTOMY      HERNIA REPAIR      SHOULDER SURGERY      SKIN CANCER EXCISION      on Nose    TONSILLECTOMY       Social History     Substance and Sexual Activity   Alcohol Use No     Social History     Tobacco Use   Smoking Status Never    Passive exposure: Never   Smokeless Tobacco Never   Tobacco Comments    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   HENT:  Positive for postnasal drip and rhinorrhea.    Respiratory:  Positive for cough and shortness of breath. Negative for wheezing.    Musculoskeletal:  Positive for back pain.   Neurological:  Positive for headaches.   Psychiatric/Behavioral:  Negative for suicidal ideas. The patient is nervous/anxious.         Depression Assessment Review:  PHQ-9 Total Score:    Vital Signs & Measurements Taken This Encounter  There were no vitals taken for this visit.   There were no vitals filed for this visit.       Physical Exam  Constitutional:       General: She is not in acute distress.  Pulmonary:      Effort: No respiratory distress.   Neurological:      Mental Status: She is alert.   Psychiatric:         Mood and Affect: Mood normal.         Behavior: Behavior normal.      Comments: Smiling and pleasant today              Assessment & Plan  Patient Active Problem List   Diagnosis    Atopic rhinitis    Dizziness    Generalized anxiety disorder    Generalized osteoarthritis    Essential hypertension    Auditory vertigo    Mitral and aortic valve disease    Stomatitis    Seasonal allergic rhinitis    Varicose veins of both lower extremities    Meniere's disease of both ears    S/P hemorrhoidectomy    Abnormal EKG    Moderate obesity    Dyslipidemia    Thyroid cyst    Age-related osteoporosis without current pathological fracture    Cervical lymphadenopathy    CABAN (dyspnea on exertion)    Hypoxia    Restrictive lung disease     Scoliosis    Hip pain    Lumbar degenerative disc disease    BMI 32.0-32.9,adult    Physical deconditioning    Compression fracture of L3 vertebra    Gastroesophageal reflux disease without esophagitis       ICD-10-CM ICD-9-CM   1. Compression fracture of L3 vertebra with delayed healing, subsequent encounter  S32.030G V54.17   2. Degeneration of intervertebral disc of lumbar region with discogenic back pain and lower extremity pain  M51.362 722.52   3. Acute URI  J06.9 465.9   4. Generalized anxiety disorder  F41.1 300.02   5. Pressure injury of skin of sacral region, unspecified injury stage  L89.159 707.03     707.20     Diagnoses and all orders for this visit:    1. Compression fracture of L3 vertebra with delayed healing, subsequent encounter (Primary)    2. Degeneration of intervertebral disc of lumbar region with discogenic back pain and lower extremity pain    3. Acute URI  -     doxycycline (VIBRAMYCIN) 100 MG capsule; Take 1 capsule by mouth 2 (Two) Times a Day.  Dispense: 20 capsule; Refill: 0    4. Generalized anxiety disorder  -     busPIRone (BUSPAR) 10 MG tablet; Take 1 tablet by mouth 3 (Three) Times a Day.  Dispense: 60 tablet; Refill: 5    5. Pressure injury of skin of sacral region, unspecified injury stage  -     Vitamins A & D (magic barrier cream); Apply 1 Application topically to the appropriate area as directed 3 (Three) Times a Day As Needed (pressure ulcers).  Dispense: 120 g; Refill: 5         Meds Ordered During Visit:  New Medications Ordered This Visit   Medications    busPIRone (BUSPAR) 10 MG tablet     Sig: Take 1 tablet by mouth 3 (Three) Times a Day.     Dispense:  60 tablet     Refill:  5    Vitamins A & D (magic barrier cream)     Sig: Apply 1 Application topically to the appropriate area as directed 3 (Three) Times a Day As Needed (pressure ulcers).     Dispense:  120 g     Refill:  5     Apply to coccyx and bilateral upper buttocks BID. For topical use only.  Magic Barrier cream  contains vitamin a&d ointment, zinc oxide, clomitrazole, and Maalox.    doxycycline (VIBRAMYCIN) 100 MG capsule     Sig: Take 1 capsule by mouth 2 (Two) Times a Day.     Dispense:  20 capsule     Refill:  0     I reviewed pulmonology note.  I encourage patient to follow-up with her specialists as planned.  I reviewed PDMP.  Patient's daughter has noted her anxiety to be worse lately.  She would like for patient to try an increase in BuSpar.  I am agreeable to increasing BuSpar to 10 mg twice daily instead of 5 mg twice daily.  Cymbalta caused dizziness and patient has since stopped it.  I encouraged her to follow-up with pain management as previously arranged.  Patient's daughter reports using a Magic barrier cream for the pressure injuries of the sacral region as prescribed at patient's last hospitalization.  She is now out of this cream and would like it to be sent in for her today.  We will send this in as above.  I will also send in doxycycline to take twice daily for cough in addition to her current inhaler regimen.    Return in about 6 weeks (around 11/18/2024), or if symptoms worsen or fail to improve, for Recheck.     As a means of preventing the spread of the COVID 19, this visit was done virtually via video platform through [employer] Tw[employer] as consented by the patient. Patient was at home and provider at [facility] office during this visit. The patient consented to this telehealth visit and signed the video visit consent form. This visit included audio and video interaction. There were no technical issues that occurred during this visit.       Referring Provider (if known): No ref. provider found      This document has been electronically signed by Abbie Hillman DO  October 7, 2024 16:27 EDT    Abbie Hillman DO, FACOI  [address] [place] [address]  [phone] (office)    Part of this note may be an electronic transcription/translation of spoken language to printed text using the Dragon  Dictation System.

## 2024-10-23 RX ORDER — ALBUTEROL SULFATE 90 UG/1
2 INHALANT RESPIRATORY (INHALATION) EVERY 4 HOURS PRN
Qty: 18 G | Refills: 0 | Status: SHIPPED | OUTPATIENT
Start: 2024-10-23

## 2024-10-24 ENCOUNTER — EXTERNAL PBMM DATA (OUTPATIENT)
Dept: PHARMACY | Facility: OTHER | Age: 79
End: 2024-10-24
Payer: MEDICARE

## 2024-11-19 ENCOUNTER — TELEMEDICINE (OUTPATIENT)
Dept: FAMILY MEDICINE CLINIC | Facility: CLINIC | Age: 79
End: 2024-11-19
Payer: MEDICARE

## 2024-11-19 DIAGNOSIS — F41.1 GENERALIZED ANXIETY DISORDER: Primary | Chronic | ICD-10-CM

## 2024-11-19 DIAGNOSIS — I10 ESSENTIAL HYPERTENSION: Chronic | ICD-10-CM

## 2024-11-19 DIAGNOSIS — E78.2 MIXED HYPERLIPIDEMIA: ICD-10-CM

## 2024-11-19 DIAGNOSIS — M51.362 DEGENERATION OF INTERVERTEBRAL DISC OF LUMBAR REGION WITH DISCOGENIC BACK PAIN AND LOWER EXTREMITY PAIN: ICD-10-CM

## 2024-11-19 DIAGNOSIS — R09.02 HYPOXIA: ICD-10-CM

## 2024-11-19 DIAGNOSIS — K12.1 STOMATITIS: ICD-10-CM

## 2024-11-19 DIAGNOSIS — J98.4 RESTRICTIVE LUNG DISEASE: Chronic | ICD-10-CM

## 2024-11-19 PROBLEM — E78.5 DYSLIPIDEMIA: Chronic | Status: RESOLVED | Noted: 2019-04-03 | Resolved: 2024-11-19

## 2024-11-19 PROCEDURE — 1159F MED LIST DOCD IN RCRD: CPT | Performed by: INTERNAL MEDICINE

## 2024-11-19 PROCEDURE — G2211 COMPLEX E/M VISIT ADD ON: HCPCS | Performed by: INTERNAL MEDICINE

## 2024-11-19 PROCEDURE — 1125F AMNT PAIN NOTED PAIN PRSNT: CPT | Performed by: INTERNAL MEDICINE

## 2024-11-19 PROCEDURE — 1160F RVW MEDS BY RX/DR IN RCRD: CPT | Performed by: INTERNAL MEDICINE

## 2024-11-19 PROCEDURE — 99214 OFFICE O/P EST MOD 30 MIN: CPT | Performed by: INTERNAL MEDICINE

## 2024-11-19 RX ORDER — NYSTATIN 100000 [USP'U]/ML
500000 SUSPENSION ORAL 4 TIMES DAILY PRN
Qty: 473 ML | Refills: 5 | Status: SHIPPED | OUTPATIENT
Start: 2024-11-19

## 2024-11-19 RX ORDER — BUSPIRONE HYDROCHLORIDE 10 MG/1
10 TABLET ORAL 3 TIMES DAILY
Qty: 270 TABLET | Refills: 1 | Status: SHIPPED | OUTPATIENT
Start: 2024-11-19

## 2024-11-19 RX ORDER — VALSARTAN 40 MG/1
40 TABLET ORAL
Qty: 90 TABLET | Refills: 1 | Status: SHIPPED | OUTPATIENT
Start: 2024-11-19

## 2024-11-19 RX ORDER — ALBUTEROL SULFATE 90 UG/1
2 INHALANT RESPIRATORY (INHALATION) EVERY 4 HOURS PRN
Qty: 18 G | Refills: 11 | Status: SHIPPED | OUTPATIENT
Start: 2024-11-19

## 2024-11-19 RX ORDER — ALBUTEROL SULFATE 90 UG/1
2 INHALANT RESPIRATORY (INHALATION) EVERY 4 HOURS PRN
Qty: 18 G | Refills: 0 | OUTPATIENT
Start: 2024-11-19

## 2024-11-19 NOTE — PROGRESS NOTES
Patient Name: Edgar Cotto Today's Date: 2024   Patient MRN / CSN: [id number] / [id number] Date of Encounter: 2024   Patient Age / : 79 y.o. / 1945 Encounter Provider: Abbie Hillman DO   Referring Physician: No ref. provider found          Edgar is a 79 y.o. female who is being seen today for Anxiety and Back Pain      History of Present Illness    Edgar presents today for a follow-up telehealth visit, accompanied by her daughter Ava who is her primary caregiver.  Patient reports that she is feeling pretty well today.  She has tolerated the increased dose of BuSpar well and feels it is helped significantly with her anxiety.  She is continue to follow-up with pain management to manage her back pain and reports that her symptoms are managed at this point.  She still has pain daily but feels that the current medicine regimen keeps her more functional and she tolerates it well.  Her blood pressures been stable.  She is tolerating the current medicine regimen well and needs some refills today.  She does note shortness of air with exertion and is wearing her oxygen continuously at home.      Allergies include:Z-mary [azithromycin], Amoxicillin, Cortisone, Fish-derived products, Iodinated contrast media, Iodine, Lidocaine, Morphine, Niacin, Nystatin, Other, Sulfa antibiotics, and Verapamil  Current Outpatient Medications   Medication Sig Dispense Refill    albuterol sulfate  (90 Base) MCG/ACT inhaler Inhale 2 puffs Every 4 (Four) Hours As Needed for Wheezing. 18 g 11    budesonide-formoterol (SYMBICORT) 80-4.5 MCG/ACT inhaler Inhale 2 puffs 2 (Two) Times a Day. 10.2 g 3    busPIRone (BUSPAR) 10 MG tablet Take 1 tablet by mouth 3 (Three) Times a Day. 270 tablet 1    ipratropium-albuterol (DUO-NEB) 0.5-2.5 mg/3 ml nebulizer Take 3 mL by nebulization 4 (Four) Times a Day As Needed for Wheezing. 360 mL 3    meclizine (ANTIVERT) 25 MG tablet Take 1 tablet by mouth Every 8 (Eight)  Hours As Needed for Dizziness.      nystatin (MYCOSTATIN) 100,000 unit/mL suspension Swish and swallow 5 mL 4 (Four) Times a Day As Needed (Thrush, fungal, or yeast infection of the mouth.). 473 mL 5    oxyCODONE-acetaminophen (PERCOCET)  MG per tablet Take 1 tablet by mouth Every 6 (Six) Hours As Needed for Moderate Pain.      polyethylene glycol (MiraLax) 17 GM/SCOOP powder Take 17 g by mouth Daily. Indications: Constipation      promethazine (PHENERGAN) 12.5 MG tablet TAKE 1 TABLET BY MOUTH TWICE DAILY FOR 15 DAYS      rosuvastatin (CRESTOR) 5 MG tablet Take 1 tablet by mouth Daily. 90 tablet 1    valsartan (DIOVAN) 40 MG tablet Take 1 tablet by mouth Daily. 90 tablet 1    Vitamins A & D (magic barrier cream) Apply 1 Application topically to the appropriate area as directed 3 (Three) Times a Day As Needed (pressure ulcers). 120 g 5     No current facility-administered medications for this visit.     Past Medical History:   Diagnosis Date    Abdominal distension     Allergic rhinitis     Anxiety     Arthritis     Closed nondisplaced fracture of surgical neck of left humerus with routine healing 3/9/2021    Cough     Dry eyes     Dyslipidemia     Dysuria     Hyperlipidemia     Hypertension     Meniere's disease     Mitral and aortic valve disease     Oral candidiasis     Osteoarthritis     Stomatitis     URI, acute     Vaginal candidiasis     Vocal cord dysfunction      Family History   Problem Relation Age of Onset    Diabetes Mother     Cancer Father     Liver disease Sister     Diabetes Sister     Liver disease Brother     Diabetes Brother     Diabetes Daughter     Cancer Other     Liver disease Other     Breast cancer Neg Hx      Past Surgical History:   Procedure Laterality Date    CHOLECYSTECTOMY      COLONOSCOPY      DILATATION AND CURETTAGE      HEMORRHOIDECTOMY      HERNIA REPAIR      SHOULDER SURGERY      SKIN CANCER EXCISION      on Nose    TONSILLECTOMY       Social History     Substance and  Sexual Activity   Alcohol Use No     Social History     Tobacco Use   Smoking Status Never    Passive exposure: Never   Smokeless Tobacco Never   Tobacco Comments    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   Respiratory:          Soa with exertion, at baseline   Cardiovascular:  Negative for chest pain.   Gastrointestinal:  Negative for abdominal pain and blood in stool.   Genitourinary:  Negative for hematuria.   Musculoskeletal:  Positive for arthralgias, back pain and neck pain.        Depression Assessment Review:  PHQ-9 Total Score:    Vital Signs & Measurements Taken This Encounter  There were no vitals taken for this visit.   There were no vitals filed for this visit.            Physical Exam  Constitutional:       General: She is not in acute distress.  Pulmonary:      Effort: No respiratory distress.   Neurological:      Mental Status: She is alert.   Psychiatric:         Mood and Affect: Mood normal.         Behavior: Behavior normal.         Thought Content: Thought content normal.      Comments: Edgar is pleasant, appears relaxed, and is smiling during today's visit.            Assessment & Plan  Patient Active Problem List   Diagnosis    Atopic rhinitis    Dizziness    Generalized anxiety disorder    Generalized osteoarthritis    Mixed hyperlipidemia    Essential hypertension    Auditory vertigo    Mitral and aortic valve disease    Stomatitis    Seasonal allergic rhinitis    Varicose veins of both lower extremities    Meniere's disease of both ears    S/P hemorrhoidectomy    Abnormal EKG    Moderate obesity    Thyroid cyst    Age-related osteoporosis without current pathological fracture    Cervical lymphadenopathy    CABAN (dyspnea on exertion)    Hypoxia    Restrictive lung disease    Scoliosis    Hip pain    Lumbar degenerative disc disease    BMI  32.0-32.9,adult    Physical deconditioning    Compression fracture of L3 vertebra    Gastroesophageal reflux disease without esophagitis       ICD-10-CM ICD-9-CM   1. Generalized anxiety disorder  F41.1 300.02   2. Degeneration of intervertebral disc of lumbar region with discogenic back pain and lower extremity pain  M51.362 722.52   3. Essential hypertension  I10 401.9   4. Mixed hyperlipidemia  E78.2 272.2   5. Restrictive lung disease  J98.4 518.89   6. Hypoxia  R09.02 799.02   7. Stomatitis  K12.1 528.00     Diagnoses and all orders for this visit:    1. Generalized anxiety disorder (Primary)  -     busPIRone (BUSPAR) 10 MG tablet; Take 1 tablet by mouth 3 (Three) Times a Day.  Dispense: 270 tablet; Refill: 1    2. Degeneration of intervertebral disc of lumbar region with discogenic back pain and lower extremity pain    3. Essential hypertension  -     valsartan (DIOVAN) 40 MG tablet; Take 1 tablet by mouth Daily.  Dispense: 90 tablet; Refill: 1    4. Mixed hyperlipidemia    5. Restrictive lung disease    6. Hypoxia  -     albuterol sulfate  (90 Base) MCG/ACT inhaler; Inhale 2 puffs Every 4 (Four) Hours As Needed for Wheezing.  Dispense: 18 g; Refill: 11    7. Stomatitis  -     nystatin (MYCOSTATIN) 100,000 unit/mL suspension; Swish and swallow 5 mL 4 (Four) Times a Day As Needed (Thrush, fungal, or yeast infection of the mouth.).  Dispense: 473 mL; Refill: 5         Meds Ordered During Visit:  New Medications Ordered This Visit   Medications    valsartan (DIOVAN) 40 MG tablet     Sig: Take 1 tablet by mouth Daily.     Dispense:  90 tablet     Refill:  1    busPIRone (BUSPAR) 10 MG tablet     Sig: Take 1 tablet by mouth 3 (Three) Times a Day.     Dispense:  270 tablet     Refill:  1    albuterol sulfate  (90 Base) MCG/ACT inhaler     Sig: Inhale 2 puffs Every 4 (Four) Hours As Needed for Wheezing.     Dispense:  18 g     Refill:  11    nystatin (MYCOSTATIN) 100,000 unit/mL suspension     Sig:  Swish and swallow 5 mL 4 (Four) Times a Day As Needed (Thrush, fungal, or yeast infection of the mouth.).     Dispense:  473 mL     Refill:  5       I reviewed PDMP.  I reviewed pulmonology notes.  I encourage patient to follow-up with her specialists as planned.  I updated medicine refills today as requested.  We will plan on a 3-month in office visit and plan to update labs at that time.  We will certainly follow-up sooner if needed.    Return in about 3 months (around 2/19/2025), or if symptoms worsen or fail to improve, for Recheck. Please schedule as first visit of the day, [facility].     As a means of preventing the spread of the COVID 19, this visit was done virtually via video platform through Epic Tw[employer] as consented by the patient. Patient was at home and provider at [facility] [place] [facility] office during this visit. The patient consented to this telehealth visit and signed the video visit consent form. This visit included audio and video interaction. There were no technical issues that occurred during this visit.       Referring Provider (if known): No ref. provider found      This document has been electronically signed by Abbie Hillman DO  November 19, 2024 14:37 EST    Abbie Hillman DO, FACOI  [address] [place] [address]  [phone] (office)    Part of this note may be an electronic transcription/translation of spoken language to printed text using the Dragon Dictation System.

## 2024-11-21 DIAGNOSIS — E78.5 DYSLIPIDEMIA: Chronic | ICD-10-CM

## 2024-11-21 RX ORDER — ROSUVASTATIN CALCIUM 5 MG/1
5 TABLET, COATED ORAL DAILY
Qty: 90 TABLET | Refills: 1 | Status: SHIPPED | OUTPATIENT
Start: 2024-11-21

## 2024-12-10 ENCOUNTER — POP HEALTH PHARMACY (OUTPATIENT)
Dept: PHARMACY | Facility: OTHER | Age: 79
End: 2024-12-10
Payer: MEDICARE

## 2024-12-10 RX ORDER — IPRATROPIUM BROMIDE AND ALBUTEROL SULFATE 2.5; .5 MG/3ML; MG/3ML
SOLUTION RESPIRATORY (INHALATION)
Qty: 320 ML | Refills: 5 | Status: SHIPPED | OUTPATIENT
Start: 2024-12-10

## 2025-01-01 ENCOUNTER — APPOINTMENT (OUTPATIENT)
Dept: CARDIOLOGY | Facility: HOSPITAL | Age: 80
DRG: 871 | End: 2025-01-01
Payer: MEDICARE

## 2025-01-01 ENCOUNTER — APPOINTMENT (OUTPATIENT)
Dept: GENERAL RADIOLOGY | Facility: HOSPITAL | Age: 80
DRG: 871 | End: 2025-01-01
Payer: MEDICARE

## 2025-01-01 ENCOUNTER — HOSPITAL ENCOUNTER (INPATIENT)
Facility: HOSPITAL | Age: 80
LOS: 3 days | DRG: 871 | End: 2025-07-20
Attending: STUDENT IN AN ORGANIZED HEALTH CARE EDUCATION/TRAINING PROGRAM | Admitting: INTERNAL MEDICINE
Payer: MEDICARE

## 2025-01-01 VITALS
TEMPERATURE: 98.4 F | WEIGHT: 144.84 LBS | BODY MASS INDEX: 31.25 KG/M2 | SYSTOLIC BLOOD PRESSURE: 141 MMHG | HEIGHT: 57 IN | HEART RATE: 144 BPM | DIASTOLIC BLOOD PRESSURE: 82 MMHG | OXYGEN SATURATION: 80 % | RESPIRATION RATE: 19 BRPM

## 2025-01-01 DIAGNOSIS — I50.9 HEART FAILURE, UNSPECIFIED HF CHRONICITY, UNSPECIFIED HEART FAILURE TYPE: ICD-10-CM

## 2025-01-01 DIAGNOSIS — J18.9 PNEUMONIA DUE TO INFECTIOUS ORGANISM, UNSPECIFIED LATERALITY, UNSPECIFIED PART OF LUNG: Primary | ICD-10-CM

## 2025-01-01 DIAGNOSIS — J96.01 ACUTE RESPIRATORY FAILURE WITH HYPOXIA: ICD-10-CM

## 2025-01-01 DIAGNOSIS — I21.4 NSTEMI (NON-ST ELEVATED MYOCARDIAL INFARCTION): ICD-10-CM

## 2025-01-01 LAB
A-A DO2: 158.1 MMHG (ref 0–300)
A-A DO2: 190 MMHG (ref 0–300)
A-A DO2: 237.3 MMHG (ref 0–300)
A-A DO2: 253.5 MMHG (ref 0–300)
A-A DO2: 270.3 MMHG (ref 0–300)
ALBUMIN SERPL-MCNC: 2.8 G/DL (ref 3.5–5.2)
ALBUMIN SERPL-MCNC: 3 G/DL (ref 3.5–5.2)
ALBUMIN/GLOB SERPL: 0.8 G/DL
ALBUMIN/GLOB SERPL: 0.8 G/DL
ALP SERPL-CCNC: 74 U/L (ref 39–117)
ALP SERPL-CCNC: 83 U/L (ref 39–117)
ALT SERPL W P-5'-P-CCNC: 10 U/L (ref 1–33)
ALT SERPL W P-5'-P-CCNC: 10 U/L (ref 1–33)
ANION GAP SERPL CALCULATED.3IONS-SCNC: 13.5 MMOL/L (ref 5–15)
ANION GAP SERPL CALCULATED.3IONS-SCNC: 18.2 MMOL/L (ref 5–15)
AORTIC DIMENSIONLESS INDEX: 1.27 (DI)
ARTERIAL PATENCY WRIST A: ABNORMAL
ARTERIAL PATENCY WRIST A: POSITIVE
AST SERPL-CCNC: 24 U/L (ref 1–32)
AST SERPL-CCNC: 25 U/L (ref 1–32)
ATMOSPHERIC PRESS: 729 MMHG
ATMOSPHERIC PRESS: 729 MMHG
ATMOSPHERIC PRESS: 730 MMHG
AV MEAN PRESS GRAD SYS DOP V1V2: 4 MMHG
AV VMAX SYS DOP: 140 CM/SEC
B PARAPERT DNA SPEC QL NAA+PROBE: NOT DETECTED
B PERT DNA SPEC QL NAA+PROBE: NOT DETECTED
BASE EXCESS BLDA CALC-SCNC: 1 MMOL/L (ref 0–2)
BASE EXCESS BLDA CALC-SCNC: 2.9 MMOL/L (ref 0–2)
BASE EXCESS BLDA CALC-SCNC: 2.9 MMOL/L (ref 0–2)
BASE EXCESS BLDA CALC-SCNC: 3.2 MMOL/L (ref 0–2)
BASE EXCESS BLDA CALC-SCNC: 4.3 MMOL/L (ref 0–2)
BDY SITE: ABNORMAL
BH CV ECHO MEAS - ACS: 1.3 CM
BH CV ECHO MEAS - AO MAX PG: 7.8 MMHG
BH CV ECHO MEAS - AO ROOT DIAM: 3 CM
BH CV ECHO MEAS - AO V2 VTI: 17.2 CM
BH CV ECHO MEAS - AVA(I,D): 4 CM2
BH CV ECHO MEAS - EDV(CUBED): 27.5 ML
BH CV ECHO MEAS - EDV(MOD-SP2): 66.4 ML
BH CV ECHO MEAS - EDV(MOD-SP4): 52.9 ML
BH CV ECHO MEAS - EF(MOD-SP2): 57.5 %
BH CV ECHO MEAS - EF(MOD-SP4): 56.7 %
BH CV ECHO MEAS - ESV(CUBED): 10.1 ML
BH CV ECHO MEAS - ESV(MOD-SP2): 28.2 ML
BH CV ECHO MEAS - ESV(MOD-SP4): 22.9 ML
BH CV ECHO MEAS - FS: 28.5 %
BH CV ECHO MEAS - IVS/LVPW: 0.99 CM
BH CV ECHO MEAS - IVSD: 1 CM
BH CV ECHO MEAS - LA DIMENSION: 3.2 CM
BH CV ECHO MEAS - LAT PEAK E' VEL: 10.3 CM/SEC
BH CV ECHO MEAS - LV DIASTOLIC VOL/BSA (35-75): 33.8 CM2
BH CV ECHO MEAS - LV MASS(C)D: 83.6 GRAMS
BH CV ECHO MEAS - LV MAX PG: 4.4 MMHG
BH CV ECHO MEAS - LV MEAN PG: 2 MMHG
BH CV ECHO MEAS - LV SYSTOLIC VOL/BSA (12-30): 14.6 CM2
BH CV ECHO MEAS - LV V1 MAX: 105 CM/SEC
BH CV ECHO MEAS - LV V1 VTI: 21.9 CM
BH CV ECHO MEAS - LVIDD: 3 CM
BH CV ECHO MEAS - LVIDS: 2.16 CM
BH CV ECHO MEAS - LVOT AREA: 3.1 CM2
BH CV ECHO MEAS - LVOT DIAM: 2 CM
BH CV ECHO MEAS - LVPWD: 1.01 CM
BH CV ECHO MEAS - MED PEAK E' VEL: 6.6 CM/SEC
BH CV ECHO MEAS - MR MAX PG: 11.2 MMHG
BH CV ECHO MEAS - MR MAX VEL: 167.5 CM/SEC
BH CV ECHO MEAS - MV A MAX VEL: 106 CM/SEC
BH CV ECHO MEAS - MV DEC SLOPE: 713 CM/SEC2
BH CV ECHO MEAS - MV DEC TIME: 0.12 SEC
BH CV ECHO MEAS - MV E MAX VEL: 88.3 CM/SEC
BH CV ECHO MEAS - MV E/A: 0.83
BH CV ECHO MEAS - MV MAX PG: 5.3 MMHG
BH CV ECHO MEAS - MV MEAN PG: 3 MMHG
BH CV ECHO MEAS - MV V2 VTI: 17.6 CM
BH CV ECHO MEAS - MVA(VTI): 3.9 CM2
BH CV ECHO MEAS - PA ACC TIME: 0.1 SEC
BH CV ECHO MEAS - PA V2 MAX: 109 CM/SEC
BH CV ECHO MEAS - RAP SYSTOLE: 10 MMHG
BH CV ECHO MEAS - RVSP: 58.2 MMHG
BH CV ECHO MEAS - SV(LVOT): 68.8 ML
BH CV ECHO MEAS - SV(MOD-SP2): 38.2 ML
BH CV ECHO MEAS - SV(MOD-SP4): 30 ML
BH CV ECHO MEAS - SVI(LVOT): 44 ML/M2
BH CV ECHO MEAS - SVI(MOD-SP2): 24.4 ML/M2
BH CV ECHO MEAS - SVI(MOD-SP4): 19.2 ML/M2
BH CV ECHO MEAS - TAPSE (>1.6): 1.64 CM
BH CV ECHO MEAS - TR MAX PG: 48.2 MMHG
BH CV ECHO MEAS - TR MAX VEL: 347 CM/SEC
BH CV ECHO MEASUREMENTS AVERAGE E/E' RATIO: 10.45
BILIRUB SERPL-MCNC: 0.8 MG/DL (ref 0–1.2)
BILIRUB SERPL-MCNC: 0.9 MG/DL (ref 0–1.2)
BUN SERPL-MCNC: 12.2 MG/DL (ref 8–23)
BUN SERPL-MCNC: 12.3 MG/DL (ref 8–23)
BUN/CREAT SERPL: 15.8 (ref 7–25)
BUN/CREAT SERPL: 17.2 (ref 7–25)
C PNEUM DNA NPH QL NAA+NON-PROBE: NOT DETECTED
CALCIUM SPEC-SCNC: 8.5 MG/DL (ref 8.6–10.5)
CALCIUM SPEC-SCNC: 9.1 MG/DL (ref 8.6–10.5)
CHLORIDE SERPL-SCNC: 91 MMOL/L (ref 98–107)
CHLORIDE SERPL-SCNC: 95 MMOL/L (ref 98–107)
CO2 BLDA-SCNC: 28.1 MMOL/L (ref 22–33)
CO2 BLDA-SCNC: 29.6 MMOL/L (ref 22–33)
CO2 BLDA-SCNC: 31 MMOL/L (ref 22–33)
CO2 BLDA-SCNC: 31.6 MMOL/L (ref 22–33)
CO2 BLDA-SCNC: 32 MMOL/L (ref 22–33)
CO2 SERPL-SCNC: 20.8 MMOL/L (ref 22–29)
CO2 SERPL-SCNC: 23.5 MMOL/L (ref 22–29)
COHGB MFR BLD: 1.4 % (ref 0–5)
COHGB MFR BLD: 1.5 % (ref 0–5)
COHGB MFR BLD: 1.7 % (ref 0–5)
COHGB MFR BLD: 1.9 % (ref 0–5)
COHGB MFR BLD: 2 % (ref 0–5)
CREAT SERPL-MCNC: 0.71 MG/DL (ref 0.57–1)
CREAT SERPL-MCNC: 0.78 MG/DL (ref 0.57–1)
CRP SERPL-MCNC: 33.7 MG/DL (ref 0–0.5)
D-LACTATE SERPL-SCNC: 2.1 MMOL/L (ref 0.5–2)
D-LACTATE SERPL-SCNC: 2.2 MMOL/L (ref 0.5–2)
D-LACTATE SERPL-SCNC: 2.9 MMOL/L (ref 0.5–2)
D-LACTATE SERPL-SCNC: 3 MMOL/L (ref 0.5–2)
D-LACTATE SERPL-SCNC: 5.7 MMOL/L (ref 0.5–2)
DEPRECATED RDW RBC AUTO: 44.2 FL (ref 37–54)
DEPRECATED RDW RBC AUTO: 44.5 FL (ref 37–54)
EGFRCR SERPLBLD CKD-EPI 2021: 75.9 ML/MIN/1.73
EGFRCR SERPLBLD CKD-EPI 2021: 85 ML/MIN/1.73
EPAP: 6
EPAP: 8
ERYTHROCYTE [DISTWIDTH] IN BLOOD BY AUTOMATED COUNT: 12.3 % (ref 12.3–15.4)
ERYTHROCYTE [DISTWIDTH] IN BLOOD BY AUTOMATED COUNT: 12.3 % (ref 12.3–15.4)
FLUAV RNA RESP QL NAA+PROBE: NOT DETECTED
FLUAV SUBTYP SPEC NAA+PROBE: NOT DETECTED
FLUBV RNA NPH QL NAA+NON-PROBE: NOT DETECTED
FLUBV RNA NPH QL NAA+NON-PROBE: NOT DETECTED
GAS FLOW AIRWAY: 4 LPM
GEN 5 1HR TROPONIN T REFLEX: 40 NG/L
GLOBULIN UR ELPH-MCNC: 3.4 GM/DL
GLOBULIN UR ELPH-MCNC: 3.8 GM/DL
GLUCOSE SERPL-MCNC: 130 MG/DL (ref 65–99)
GLUCOSE SERPL-MCNC: 207 MG/DL (ref 65–99)
HADV DNA SPEC NAA+PROBE: NOT DETECTED
HBA1C MFR BLD: 5.7 % (ref 4.8–5.6)
HCO3 BLDA-SCNC: 26.7 MMOL/L (ref 20–26)
HCO3 BLDA-SCNC: 28.2 MMOL/L (ref 20–26)
HCO3 BLDA-SCNC: 29.4 MMOL/L (ref 20–26)
HCO3 BLDA-SCNC: 29.9 MMOL/L (ref 20–26)
HCO3 BLDA-SCNC: 30.4 MMOL/L (ref 20–26)
HCOV 229E RNA SPEC QL NAA+PROBE: NOT DETECTED
HCOV HKU1 RNA SPEC QL NAA+PROBE: NOT DETECTED
HCOV NL63 RNA SPEC QL NAA+PROBE: NOT DETECTED
HCOV OC43 RNA SPEC QL NAA+PROBE: NOT DETECTED
HCT VFR BLD AUTO: 39.3 % (ref 34–46.6)
HCT VFR BLD AUTO: 40.4 % (ref 34–46.6)
HCT VFR BLD CALC: 36.4 % (ref 38–51)
HCT VFR BLD CALC: 36.7 % (ref 38–51)
HCT VFR BLD CALC: 37.6 % (ref 38–51)
HCT VFR BLD CALC: 39.1 % (ref 38–51)
HCT VFR BLD CALC: 39.4 % (ref 38–51)
HGB BLD-MCNC: 12.4 G/DL (ref 12–15.9)
HGB BLD-MCNC: 12.9 G/DL (ref 12–15.9)
HGB BLDA-MCNC: 11.9 G/DL (ref 13.5–17.5)
HGB BLDA-MCNC: 12 G/DL (ref 13.5–17.5)
HGB BLDA-MCNC: 12.3 G/DL (ref 13.5–17.5)
HGB BLDA-MCNC: 12.8 G/DL (ref 13.5–17.5)
HGB BLDA-MCNC: 12.9 G/DL (ref 13.5–17.5)
HMPV RNA NPH QL NAA+NON-PROBE: NOT DETECTED
HOLD SPECIMEN: NORMAL
HOLD SPECIMEN: NORMAL
HPIV1 RNA ISLT QL NAA+PROBE: NOT DETECTED
HPIV2 RNA SPEC QL NAA+PROBE: NOT DETECTED
HPIV3 RNA NPH QL NAA+PROBE: NOT DETECTED
HPIV4 P GENE NPH QL NAA+PROBE: NOT DETECTED
INHALED O2 CONCENTRATION: 36 %
INHALED O2 CONCENTRATION: 55 %
INHALED O2 CONCENTRATION: 60 %
IPAP: 18
IPAP: 18
IPAP: 20
IPAP: 20
L PNEUMO1 AG UR QL IA: NEGATIVE
LV EF BIPLANE MOD: 57.9 %
LYMPHOCYTES # BLD MANUAL: 0.79 10*3/MM3 (ref 0.7–3.1)
LYMPHOCYTES # BLD MANUAL: 1.11 10*3/MM3 (ref 0.7–3.1)
LYMPHOCYTES NFR BLD MANUAL: 2 % (ref 5–12)
LYMPHOCYTES NFR BLD MANUAL: 6 % (ref 5–12)
Lab: ABNORMAL
M PNEUMO IGG SER IA-ACNC: NOT DETECTED
MACROCYTES BLD QL SMEAR: ABNORMAL
MAGNESIUM SERPL-MCNC: 1.5 MG/DL (ref 1.6–2.4)
MCH RBC QN AUTO: 30.8 PG (ref 26.6–33)
MCH RBC QN AUTO: 30.9 PG (ref 26.6–33)
MCHC RBC AUTO-ENTMCNC: 31.6 G/DL (ref 31.5–35.7)
MCHC RBC AUTO-ENTMCNC: 31.9 G/DL (ref 31.5–35.7)
MCV RBC AUTO: 96.9 FL (ref 79–97)
MCV RBC AUTO: 97.5 FL (ref 79–97)
METAMYELOCYTES NFR BLD MANUAL: 2 % (ref 0–0)
METAMYELOCYTES NFR BLD MANUAL: 2 % (ref 0–0)
METHGB BLD QL: 0 % (ref 0–3)
METHGB BLD QL: 0 % (ref 0–3)
METHGB BLD QL: 0.2 % (ref 0–3)
METHGB BLD QL: 0.4 % (ref 0–3)
METHGB BLD QL: 0.4 % (ref 0–3)
MODALITY: ABNORMAL
MONOCYTES # BLD: 0.22 10*3/MM3 (ref 0.1–0.9)
MONOCYTES # BLD: 0.79 10*3/MM3 (ref 0.1–0.9)
NEUTROPHILS # BLD AUTO: 11.28 10*3/MM3 (ref 1.7–7)
NEUTROPHILS # BLD AUTO: 9.56 10*3/MM3 (ref 1.7–7)
NEUTROPHILS NFR BLD MANUAL: 86 % (ref 42.7–76)
NEUTROPHILS NFR BLD MANUAL: 86 % (ref 42.7–76)
NOTE: ABNORMAL
NOTIFIED BY: ABNORMAL
NOTIFIED BY: ABNORMAL
NOTIFIED WHO: ABNORMAL
NOTIFIED WHO: ABNORMAL
NT-PROBNP SERPL-MCNC: 3178 PG/ML (ref 0–1800)
OXYHGB MFR BLDV: 65.9 % (ref 94–99)
OXYHGB MFR BLDV: 93.4 % (ref 94–99)
OXYHGB MFR BLDV: 94.4 % (ref 94–99)
OXYHGB MFR BLDV: 96.7 % (ref 94–99)
OXYHGB MFR BLDV: 97.7 % (ref 94–99)
PCO2 BLDA: 45 MM HG (ref 35–45)
PCO2 BLDA: 45.7 MM HG (ref 35–45)
PCO2 BLDA: 50.6 MM HG (ref 35–45)
PCO2 BLDA: 50.9 MM HG (ref 35–45)
PCO2 BLDA: 55.9 MM HG (ref 35–45)
PCO2 TEMP ADJ BLD: ABNORMAL MM[HG]
PH BLDA: 7.34 PH UNITS (ref 7.35–7.45)
PH BLDA: 7.37 PH UNITS (ref 7.35–7.45)
PH BLDA: 7.37 PH UNITS (ref 7.35–7.45)
PH BLDA: 7.39 PH UNITS (ref 7.35–7.45)
PH BLDA: 7.41 PH UNITS (ref 7.35–7.45)
PH, TEMP CORRECTED: ABNORMAL
PLAT MORPH BLD: NORMAL
PLAT MORPH BLD: NORMAL
PLATELET # BLD AUTO: 173 10*3/MM3 (ref 140–450)
PLATELET # BLD AUTO: 234 10*3/MM3 (ref 140–450)
PMV BLD AUTO: 10 FL (ref 6–12)
PMV BLD AUTO: 9.9 FL (ref 6–12)
PO2 BLDA: 102 MM HG (ref 83–108)
PO2 BLDA: 172 MM HG (ref 83–108)
PO2 BLDA: 36.9 MM HG (ref 83–108)
PO2 BLDA: 78.2 MM HG (ref 83–108)
PO2 BLDA: 84.7 MM HG (ref 83–108)
PO2 TEMP ADJ BLD: ABNORMAL MM[HG]
POTASSIUM SERPL-SCNC: 3.4 MMOL/L (ref 3.5–5.2)
POTASSIUM SERPL-SCNC: 3.5 MMOL/L (ref 3.5–5.2)
POTASSIUM SERPL-SCNC: 4.3 MMOL/L (ref 3.5–5.2)
PROCALCITONIN SERPL-MCNC: 0.7 NG/ML (ref 0–0.25)
PROT SERPL-MCNC: 6.2 G/DL (ref 6–8.5)
PROT SERPL-MCNC: 6.8 G/DL (ref 6–8.5)
QT INTERVAL: 280 MS
QT INTERVAL: 288 MS
QTC INTERVAL: 389 MS
QTC INTERVAL: 421 MS
RBC # BLD AUTO: 4.03 10*6/MM3 (ref 3.77–5.28)
RBC # BLD AUTO: 4.17 10*6/MM3 (ref 3.77–5.28)
RBC MORPH BLD: NORMAL
RHINOVIRUS RNA SPEC NAA+PROBE: NOT DETECTED
RSV RNA NPH QL NAA+NON-PROBE: NOT DETECTED
S PNEUM AG SPEC QL LA: NEGATIVE
SAO2 % BLDCOA: 67.3 % (ref 94–99)
SAO2 % BLDCOA: 95.2 % (ref 94–99)
SAO2 % BLDCOA: 96.4 % (ref 94–99)
SAO2 % BLDCOA: 98.1 % (ref 94–99)
SAO2 % BLDCOA: >99.2 % (ref 94–99)
SARS-COV-2 RNA RESP QL NAA+PROBE: NOT DETECTED
SARS-COV-2 RNA RESP QL NAA+PROBE: NOT DETECTED
SET MECH RESP RATE: 20
SET MECH RESP RATE: 24
SET MECH RESP RATE: 26
SET MECH RESP RATE: 26
SODIUM SERPL-SCNC: 130 MMOL/L (ref 136–145)
SODIUM SERPL-SCNC: 132 MMOL/L (ref 136–145)
TOTAL RATE: 32 BREATHS/MINUTE
TROPONIN T % DELTA: -11
TROPONIN T NUMERIC DELTA: -5 NG/L
TROPONIN T SERPL HS-MCNC: 21 NG/L
TROPONIN T SERPL HS-MCNC: 44 NG/L
TROPONIN T SERPL HS-MCNC: 45 NG/L
VARIANT LYMPHS NFR BLD MANUAL: 10 % (ref 19.6–45.3)
VARIANT LYMPHS NFR BLD MANUAL: 6 % (ref 19.6–45.3)
VENTILATOR MODE: ABNORMAL
WBC NRBC COR # BLD AUTO: 11.12 10*3/MM3 (ref 3.4–10.8)
WBC NRBC COR # BLD AUTO: 13.12 10*3/MM3 (ref 3.4–10.8)
WHOLE BLOOD HOLD COAG: NORMAL
WHOLE BLOOD HOLD SPECIMEN: NORMAL

## 2025-01-01 PROCEDURE — 82805 BLOOD GASES W/O2 SATURATION: CPT

## 2025-01-01 PROCEDURE — 93010 ELECTROCARDIOGRAM REPORT: CPT | Performed by: INTERNAL MEDICINE

## 2025-01-01 PROCEDURE — 99223 1ST HOSP IP/OBS HIGH 75: CPT | Performed by: HOSPITALIST

## 2025-01-01 PROCEDURE — 36415 COLL VENOUS BLD VENIPUNCTURE: CPT

## 2025-01-01 PROCEDURE — 5A09357 ASSISTANCE WITH RESPIRATORY VENTILATION, LESS THAN 24 CONSECUTIVE HOURS, CONTINUOUS POSITIVE AIRWAY PRESSURE: ICD-10-PCS | Performed by: HOSPITALIST

## 2025-01-01 PROCEDURE — 94799 UNLISTED PULMONARY SVC/PX: CPT

## 2025-01-01 PROCEDURE — 25010000002 HEPARIN (PORCINE) PER 1000 UNITS: Performed by: HOSPITALIST

## 2025-01-01 PROCEDURE — 85025 COMPLETE CBC W/AUTO DIFF WBC: CPT | Performed by: HOSPITALIST

## 2025-01-01 PROCEDURE — 93005 ELECTROCARDIOGRAM TRACING: CPT | Performed by: STUDENT IN AN ORGANIZED HEALTH CARE EDUCATION/TRAINING PROGRAM

## 2025-01-01 PROCEDURE — 80053 COMPREHEN METABOLIC PANEL: CPT | Performed by: STUDENT IN AN ORGANIZED HEALTH CARE EDUCATION/TRAINING PROGRAM

## 2025-01-01 PROCEDURE — 94761 N-INVAS EAR/PLS OXIMETRY MLT: CPT

## 2025-01-01 PROCEDURE — 83605 ASSAY OF LACTIC ACID: CPT | Performed by: HOSPITALIST

## 2025-01-01 PROCEDURE — 25010000002 MORPHINE PER 10 MG

## 2025-01-01 PROCEDURE — 25010000002 GLYCOPYRROLATE 0.4 MG/2ML SOLUTION

## 2025-01-01 PROCEDURE — 25810000003 SODIUM CHLORIDE 0.9 % SOLUTION: Performed by: HOSPITALIST

## 2025-01-01 PROCEDURE — 25010000002 FUROSEMIDE PER 20 MG

## 2025-01-01 PROCEDURE — 94660 CPAP INITIATION&MGMT: CPT

## 2025-01-01 PROCEDURE — 86140 C-REACTIVE PROTEIN: CPT | Performed by: HOSPITALIST

## 2025-01-01 PROCEDURE — 87449 NOS EACH ORGANISM AG IA: CPT | Performed by: NURSE PRACTITIONER

## 2025-01-01 PROCEDURE — 25010000002 POTASSIUM CHLORIDE 10 MEQ/100ML SOLUTION: Performed by: HOSPITALIST

## 2025-01-01 PROCEDURE — 82375 ASSAY CARBOXYHB QUANT: CPT

## 2025-01-01 PROCEDURE — 36600 WITHDRAWAL OF ARTERIAL BLOOD: CPT

## 2025-01-01 PROCEDURE — 94664 DEMO&/EVAL PT USE INHALER: CPT

## 2025-01-01 PROCEDURE — 25010000002 DOXYCYCLINE 100 MG RECONSTITUTED SOLUTION 1 EACH VIAL: Performed by: HOSPITALIST

## 2025-01-01 PROCEDURE — 84132 ASSAY OF SERUM POTASSIUM: CPT | Performed by: HOSPITALIST

## 2025-01-01 PROCEDURE — 99231 SBSQ HOSP IP/OBS SF/LOW 25: CPT | Performed by: INTERNAL MEDICINE

## 2025-01-01 PROCEDURE — 84484 ASSAY OF TROPONIN QUANT: CPT | Performed by: HOSPITALIST

## 2025-01-01 PROCEDURE — 99222 1ST HOSP IP/OBS MODERATE 55: CPT | Performed by: INTERNAL MEDICINE

## 2025-01-01 PROCEDURE — 94640 AIRWAY INHALATION TREATMENT: CPT

## 2025-01-01 PROCEDURE — 87636 SARSCOV2 & INF A&B AMP PRB: CPT | Performed by: STUDENT IN AN ORGANIZED HEALTH CARE EDUCATION/TRAINING PROGRAM

## 2025-01-01 PROCEDURE — 87040 BLOOD CULTURE FOR BACTERIA: CPT | Performed by: STUDENT IN AN ORGANIZED HEALTH CARE EDUCATION/TRAINING PROGRAM

## 2025-01-01 PROCEDURE — 84484 ASSAY OF TROPONIN QUANT: CPT

## 2025-01-01 PROCEDURE — 85025 COMPLETE CBC W/AUTO DIFF WBC: CPT | Performed by: STUDENT IN AN ORGANIZED HEALTH CARE EDUCATION/TRAINING PROGRAM

## 2025-01-01 PROCEDURE — 80053 COMPREHEN METABOLIC PANEL: CPT | Performed by: HOSPITALIST

## 2025-01-01 PROCEDURE — 83050 HGB METHEMOGLOBIN QUAN: CPT

## 2025-01-01 PROCEDURE — 93005 ELECTROCARDIOGRAM TRACING: CPT | Performed by: HOSPITALIST

## 2025-01-01 PROCEDURE — 83605 ASSAY OF LACTIC ACID: CPT | Performed by: STUDENT IN AN ORGANIZED HEALTH CARE EDUCATION/TRAINING PROGRAM

## 2025-01-01 PROCEDURE — 25010000002 DIAZEPAM PER 5 MG

## 2025-01-01 PROCEDURE — 93306 TTE W/DOPPLER COMPLETE: CPT | Performed by: INTERNAL MEDICINE

## 2025-01-01 PROCEDURE — 71045 X-RAY EXAM CHEST 1 VIEW: CPT

## 2025-01-01 PROCEDURE — 25010000002 CEFTRIAXONE PER 250 MG: Performed by: STUDENT IN AN ORGANIZED HEALTH CARE EDUCATION/TRAINING PROGRAM

## 2025-01-01 PROCEDURE — 85007 BL SMEAR W/DIFF WBC COUNT: CPT | Performed by: STUDENT IN AN ORGANIZED HEALTH CARE EDUCATION/TRAINING PROGRAM

## 2025-01-01 PROCEDURE — 99233 SBSQ HOSP IP/OBS HIGH 50: CPT | Performed by: INTERNAL MEDICINE

## 2025-01-01 PROCEDURE — 87899 AGENT NOS ASSAY W/OPTIC: CPT | Performed by: NURSE PRACTITIONER

## 2025-01-01 PROCEDURE — 83036 HEMOGLOBIN GLYCOSYLATED A1C: CPT | Performed by: HOSPITALIST

## 2025-01-01 PROCEDURE — 0202U NFCT DS 22 TRGT SARS-COV-2: CPT | Performed by: HOSPITALIST

## 2025-01-01 PROCEDURE — 25010000002 MAGNESIUM SULFATE 2 GM/50ML SOLUTION: Performed by: HOSPITALIST

## 2025-01-01 PROCEDURE — 36415 COLL VENOUS BLD VENIPUNCTURE: CPT | Performed by: HOSPITALIST

## 2025-01-01 PROCEDURE — 71045 X-RAY EXAM CHEST 1 VIEW: CPT | Performed by: RADIOLOGY

## 2025-01-01 PROCEDURE — 83735 ASSAY OF MAGNESIUM: CPT | Performed by: HOSPITALIST

## 2025-01-01 PROCEDURE — 99285 EMERGENCY DEPT VISIT HI MDM: CPT

## 2025-01-01 PROCEDURE — 84145 PROCALCITONIN (PCT): CPT | Performed by: HOSPITALIST

## 2025-01-01 PROCEDURE — 84484 ASSAY OF TROPONIN QUANT: CPT | Performed by: STUDENT IN AN ORGANIZED HEALTH CARE EDUCATION/TRAINING PROGRAM

## 2025-01-01 PROCEDURE — 93306 TTE W/DOPPLER COMPLETE: CPT

## 2025-01-01 PROCEDURE — 83880 ASSAY OF NATRIURETIC PEPTIDE: CPT

## 2025-01-01 RX ORDER — POLYETHYLENE GLYCOL 3350 17 G/17G
17 POWDER, FOR SOLUTION ORAL DAILY PRN
Status: DISCONTINUED | OUTPATIENT
Start: 2025-01-01 | End: 2025-01-01

## 2025-01-01 RX ORDER — SODIUM CHLORIDE 9 MG/ML
40 INJECTION, SOLUTION INTRAVENOUS AS NEEDED
Status: DISCONTINUED | OUTPATIENT
Start: 2025-01-01 | End: 2025-01-01

## 2025-01-01 RX ORDER — HEPARIN SODIUM 5000 [USP'U]/ML
5000 INJECTION, SOLUTION INTRAVENOUS; SUBCUTANEOUS EVERY 12 HOURS SCHEDULED
Status: DISCONTINUED | OUTPATIENT
Start: 2025-01-01 | End: 2025-01-01

## 2025-01-01 RX ORDER — SODIUM CHLORIDE 0.9 % (FLUSH) 0.9 %
10 SYRINGE (ML) INJECTION AS NEEDED
Status: DISCONTINUED | OUTPATIENT
Start: 2025-01-01 | End: 2025-07-21 | Stop reason: HOSPADM

## 2025-01-01 RX ORDER — MORPHINE SULFATE 2 MG/ML
2 INJECTION, SOLUTION INTRAMUSCULAR; INTRAVENOUS
Status: DISCONTINUED | OUTPATIENT
Start: 2025-01-01 | End: 2025-07-21 | Stop reason: HOSPADM

## 2025-01-01 RX ORDER — MECLIZINE HYDROCHLORIDE 25 MG/1
25 TABLET ORAL EVERY 8 HOURS PRN
Status: DISCONTINUED | OUTPATIENT
Start: 2025-01-01 | End: 2025-07-21 | Stop reason: HOSPADM

## 2025-01-01 RX ORDER — VALSARTAN 80 MG/1
40 TABLET ORAL
Status: CANCELLED | OUTPATIENT
Start: 2025-01-01

## 2025-01-01 RX ORDER — BUDESONIDE AND FORMOTEROL FUMARATE DIHYDRATE 80; 4.5 UG/1; UG/1
2 AEROSOL RESPIRATORY (INHALATION) 2 TIMES DAILY
Status: DISCONTINUED | OUTPATIENT
Start: 2025-01-01 | End: 2025-01-01

## 2025-01-01 RX ORDER — MECLIZINE HYDROCHLORIDE 25 MG/1
25 TABLET ORAL EVERY 8 HOURS PRN
Status: CANCELLED | OUTPATIENT
Start: 2025-01-01

## 2025-01-01 RX ORDER — POTASSIUM CHLORIDE 7.45 MG/ML
10 INJECTION INTRAVENOUS
Status: COMPLETED | OUTPATIENT
Start: 2025-01-01 | End: 2025-01-01

## 2025-01-01 RX ORDER — IPRATROPIUM BROMIDE AND ALBUTEROL SULFATE 2.5; .5 MG/3ML; MG/3ML
3 SOLUTION RESPIRATORY (INHALATION) ONCE
Status: COMPLETED | OUTPATIENT
Start: 2025-01-01 | End: 2025-01-01

## 2025-01-01 RX ORDER — ALBUTEROL SULFATE 0.83 MG/ML
2.5 SOLUTION RESPIRATORY (INHALATION) EVERY 4 HOURS PRN
Status: CANCELLED | OUTPATIENT
Start: 2025-01-01

## 2025-01-01 RX ORDER — OXYCODONE AND ACETAMINOPHEN 10; 325 MG/1; MG/1
1 TABLET ORAL EVERY 6 HOURS PRN
Refills: 0 | Status: CANCELLED | OUTPATIENT
Start: 2025-01-01

## 2025-01-01 RX ORDER — DIAZEPAM 10 MG/2ML
5 INJECTION, SOLUTION INTRAMUSCULAR; INTRAVENOUS EVERY 4 HOURS PRN
Status: DISCONTINUED | OUTPATIENT
Start: 2025-01-01 | End: 2025-07-21 | Stop reason: HOSPADM

## 2025-01-01 RX ORDER — NYSTATIN 100000 [USP'U]/ML
500000 SUSPENSION ORAL 4 TIMES DAILY PRN
Status: DISCONTINUED | OUTPATIENT
Start: 2025-01-01 | End: 2025-07-21 | Stop reason: HOSPADM

## 2025-01-01 RX ORDER — BUSPIRONE HYDROCHLORIDE 10 MG/1
10 TABLET ORAL 3 TIMES DAILY
Status: DISCONTINUED | OUTPATIENT
Start: 2025-01-01 | End: 2025-07-21 | Stop reason: HOSPADM

## 2025-01-01 RX ORDER — NITROGLYCERIN 0.4 MG/1
0.4 TABLET SUBLINGUAL
Status: DISCONTINUED | OUTPATIENT
Start: 2025-01-01 | End: 2025-01-01

## 2025-01-01 RX ORDER — GLYCOPYRROLATE 0.2 MG/ML
0.4 INJECTION INTRAMUSCULAR; INTRAVENOUS EVERY 4 HOURS PRN
Status: DISCONTINUED | OUTPATIENT
Start: 2025-01-01 | End: 2025-07-21 | Stop reason: HOSPADM

## 2025-01-01 RX ORDER — NYSTATIN 100000 [USP'U]/ML
500000 SUSPENSION ORAL 4 TIMES DAILY PRN
COMMUNITY

## 2025-01-01 RX ORDER — SODIUM CHLORIDE 0.9 % (FLUSH) 0.9 %
10 SYRINGE (ML) INJECTION EVERY 12 HOURS SCHEDULED
Status: DISCONTINUED | OUTPATIENT
Start: 2025-01-01 | End: 2025-07-21 | Stop reason: HOSPADM

## 2025-01-01 RX ORDER — BUSPIRONE HYDROCHLORIDE 10 MG/1
10 TABLET ORAL 3 TIMES DAILY
Status: CANCELLED | OUTPATIENT
Start: 2025-01-01

## 2025-01-01 RX ORDER — OXYCODONE AND ACETAMINOPHEN 10; 325 MG/1; MG/1
1 TABLET ORAL EVERY 6 HOURS PRN
Status: CANCELLED | OUTPATIENT
Start: 2025-01-01

## 2025-01-01 RX ORDER — IPRATROPIUM BROMIDE AND ALBUTEROL SULFATE 2.5; .5 MG/3ML; MG/3ML
3 SOLUTION RESPIRATORY (INHALATION) 4 TIMES DAILY PRN
COMMUNITY

## 2025-01-01 RX ORDER — ROSUVASTATIN CALCIUM 10 MG/1
5 TABLET, COATED ORAL DAILY
Status: CANCELLED | OUTPATIENT
Start: 2025-01-01

## 2025-01-01 RX ORDER — BUSPIRONE HYDROCHLORIDE 10 MG/1
10 TABLET ORAL 3 TIMES DAILY
COMMUNITY

## 2025-01-01 RX ORDER — MAGNESIUM SULFATE HEPTAHYDRATE 40 MG/ML
2 INJECTION, SOLUTION INTRAVENOUS
Status: COMPLETED | OUTPATIENT
Start: 2025-01-01 | End: 2025-01-01

## 2025-01-01 RX ORDER — AMOXICILLIN 250 MG
2 CAPSULE ORAL 2 TIMES DAILY
Status: DISCONTINUED | OUTPATIENT
Start: 2025-01-01 | End: 2025-01-01

## 2025-01-01 RX ORDER — BISACODYL 5 MG/1
5 TABLET, DELAYED RELEASE ORAL DAILY PRN
Status: DISCONTINUED | OUTPATIENT
Start: 2025-01-01 | End: 2025-01-01

## 2025-01-01 RX ORDER — FUROSEMIDE 10 MG/ML
40 INJECTION INTRAMUSCULAR; INTRAVENOUS ONCE
Status: COMPLETED | OUTPATIENT
Start: 2025-01-01 | End: 2025-01-01

## 2025-01-01 RX ORDER — NYSTATIN 100000 [USP'U]/ML
5 SUSPENSION ORAL 4 TIMES DAILY
Status: CANCELLED | OUTPATIENT
Start: 2025-01-01 | End: 2025-08-17

## 2025-01-01 RX ORDER — BISACODYL 10 MG
10 SUPPOSITORY, RECTAL RECTAL DAILY PRN
Status: DISCONTINUED | OUTPATIENT
Start: 2025-01-01 | End: 2025-01-01

## 2025-01-01 RX ADMIN — MORPHINE SULFATE 2 MG: 2 INJECTION, SOLUTION INTRAMUSCULAR; INTRAVENOUS at 16:13

## 2025-01-01 RX ADMIN — GLYCOPYRROLATE 0.4 MG: 0.2 INJECTION INTRAMUSCULAR; INTRAVENOUS at 20:08

## 2025-01-01 RX ADMIN — HEPARIN SODIUM 5000 UNITS: 5000 INJECTION, SOLUTION INTRAVENOUS; SUBCUTANEOUS at 09:27

## 2025-01-01 RX ADMIN — MAGNESIUM SULFATE HEPTAHYDRATE 2 G: 40 INJECTION, SOLUTION INTRAVENOUS at 04:56

## 2025-01-01 RX ADMIN — Medication 10 ML: at 08:41

## 2025-01-01 RX ADMIN — IPRATROPIUM BROMIDE AND ALBUTEROL SULFATE 3 ML: .5; 2.5 SOLUTION RESPIRATORY (INHALATION) at 20:02

## 2025-01-01 RX ADMIN — Medication 10 ML: at 20:27

## 2025-01-01 RX ADMIN — Medication 10 ML: at 20:38

## 2025-01-01 RX ADMIN — DIAZEPAM 5 MG: 10 INJECTION, SOLUTION INTRAMUSCULAR; INTRAVENOUS at 12:55

## 2025-01-01 RX ADMIN — MORPHINE SULFATE 2 MG: 2 INJECTION, SOLUTION INTRAMUSCULAR; INTRAVENOUS at 22:47

## 2025-01-01 RX ADMIN — DOXYCYCLINE 100 MG: 100 INJECTION, POWDER, LYOPHILIZED, FOR SOLUTION INTRAVENOUS at 22:42

## 2025-01-01 RX ADMIN — Medication 10 ML: at 23:54

## 2025-01-01 RX ADMIN — MORPHINE SULFATE 2 MG: 2 INJECTION, SOLUTION INTRAMUSCULAR; INTRAVENOUS at 20:38

## 2025-01-01 RX ADMIN — MORPHINE SULFATE 2 MG: 2 INJECTION, SOLUTION INTRAMUSCULAR; INTRAVENOUS at 03:35

## 2025-01-01 RX ADMIN — POTASSIUM CHLORIDE 10 MEQ: 7.46 INJECTION, SOLUTION INTRAVENOUS at 02:51

## 2025-01-01 RX ADMIN — CEFTRIAXONE SODIUM 1000 MG: 1 INJECTION, POWDER, FOR SOLUTION INTRAMUSCULAR; INTRAVENOUS at 22:08

## 2025-01-01 RX ADMIN — POTASSIUM CHLORIDE 10 MEQ: 7.46 INJECTION, SOLUTION INTRAVENOUS at 03:48

## 2025-01-01 RX ADMIN — DIAZEPAM 5 MG: 10 INJECTION, SOLUTION INTRAMUSCULAR; INTRAVENOUS at 20:08

## 2025-01-01 RX ADMIN — MORPHINE SULFATE 2 MG: 2 INJECTION, SOLUTION INTRAMUSCULAR; INTRAVENOUS at 10:33

## 2025-01-01 RX ADMIN — FUROSEMIDE 40 MG: 10 INJECTION, SOLUTION INTRAMUSCULAR; INTRAVENOUS at 19:10

## 2025-01-01 RX ADMIN — POTASSIUM CHLORIDE 10 MEQ: 7.46 INJECTION, SOLUTION INTRAVENOUS at 04:56

## 2025-01-01 RX ADMIN — MORPHINE SULFATE 2 MG: 2 INJECTION, SOLUTION INTRAMUSCULAR; INTRAVENOUS at 10:22

## 2025-01-01 RX ADMIN — DIAZEPAM 5 MG: 10 INJECTION, SOLUTION INTRAMUSCULAR; INTRAVENOUS at 00:55

## 2025-01-01 RX ADMIN — DIAZEPAM 5 MG: 10 INJECTION, SOLUTION INTRAMUSCULAR; INTRAVENOUS at 22:47

## 2025-01-01 RX ADMIN — MAGNESIUM SULFATE HEPTAHYDRATE 2 G: 40 INJECTION, SOLUTION INTRAVENOUS at 01:27

## 2025-01-01 RX ADMIN — Medication 10 ML: at 09:28

## 2025-01-01 RX ADMIN — MORPHINE SULFATE 2 MG: 2 INJECTION, SOLUTION INTRAMUSCULAR; INTRAVENOUS at 15:39

## 2025-01-01 RX ADMIN — MORPHINE SULFATE 2 MG: 2 INJECTION, SOLUTION INTRAMUSCULAR; INTRAVENOUS at 21:32

## 2025-01-01 RX ADMIN — DOXYCYCLINE 100 MG: 100 INJECTION, POWDER, LYOPHILIZED, FOR SOLUTION INTRAVENOUS at 09:27

## 2025-01-01 RX ADMIN — MUPIROCIN 1 APPLICATION: 20 OINTMENT TOPICAL at 09:28

## 2025-01-01 RX ADMIN — DIAZEPAM 5 MG: 10 INJECTION, SOLUTION INTRAMUSCULAR; INTRAVENOUS at 20:26

## 2025-01-01 RX ADMIN — MAGNESIUM SULFATE HEPTAHYDRATE 2 G: 40 INJECTION, SOLUTION INTRAVENOUS at 02:51

## 2025-01-01 RX ADMIN — SODIUM CHLORIDE 500 ML: 9 INJECTION, SOLUTION INTRAVENOUS at 23:52

## 2025-01-01 RX ADMIN — IPRATROPIUM BROMIDE 0.5 MG: 0.5 SOLUTION RESPIRATORY (INHALATION) at 00:00

## 2025-01-01 RX ADMIN — IPRATROPIUM BROMIDE 0.5 MG: 0.5 SOLUTION RESPIRATORY (INHALATION) at 04:00

## 2025-01-01 RX ADMIN — Medication 10 ML: at 20:08

## 2025-01-01 RX ADMIN — Medication 10 ML: at 08:45

## 2025-01-01 RX ADMIN — MORPHINE SULFATE 2 MG: 2 INJECTION, SOLUTION INTRAMUSCULAR; INTRAVENOUS at 13:01

## 2025-01-01 RX ADMIN — MORPHINE SULFATE 2 MG: 2 INJECTION, SOLUTION INTRAMUSCULAR; INTRAVENOUS at 18:21

## 2025-01-01 RX ADMIN — POTASSIUM CHLORIDE 10 MEQ: 7.46 INJECTION, SOLUTION INTRAVENOUS at 01:27

## 2025-01-01 RX ADMIN — MUPIROCIN 1 APPLICATION: 20 OINTMENT TOPICAL at 01:27

## 2025-01-01 RX ADMIN — MORPHINE SULFATE 2 MG: 2 INJECTION, SOLUTION INTRAMUSCULAR; INTRAVENOUS at 12:55

## 2025-01-01 RX ADMIN — MORPHINE SULFATE 2 MG: 2 INJECTION, SOLUTION INTRAMUSCULAR; INTRAVENOUS at 20:08

## 2025-01-01 RX ADMIN — MORPHINE SULFATE 2 MG: 2 INJECTION, SOLUTION INTRAMUSCULAR; INTRAVENOUS at 13:45

## 2025-01-01 RX ADMIN — MORPHINE SULFATE 2 MG: 2 INJECTION, SOLUTION INTRAMUSCULAR; INTRAVENOUS at 08:40

## 2025-01-01 RX ADMIN — MORPHINE SULFATE 2 MG: 2 INJECTION, SOLUTION INTRAMUSCULAR; INTRAVENOUS at 01:32

## 2025-01-01 RX ADMIN — IPRATROPIUM BROMIDE 0.5 MG: 0.5 SOLUTION RESPIRATORY (INHALATION) at 05:30

## 2025-01-01 RX ADMIN — MORPHINE SULFATE 2 MG: 2 INJECTION, SOLUTION INTRAMUSCULAR; INTRAVENOUS at 05:19

## 2025-01-01 RX ADMIN — MORPHINE SULFATE 2 MG: 2 INJECTION, SOLUTION INTRAMUSCULAR; INTRAVENOUS at 08:43

## 2025-01-01 RX ADMIN — MORPHINE SULFATE 2 MG: 2 INJECTION, SOLUTION INTRAMUSCULAR; INTRAVENOUS at 01:00

## 2025-01-01 RX ADMIN — HEPARIN SODIUM 5000 UNITS: 5000 INJECTION, SOLUTION INTRAVENOUS; SUBCUTANEOUS at 00:16

## 2025-01-01 RX ADMIN — GLYCOPYRROLATE 0.4 MG: 0.2 INJECTION INTRAMUSCULAR; INTRAVENOUS at 23:44

## 2025-01-01 RX ADMIN — IPRATROPIUM BROMIDE 0.5 MG: 0.5 SOLUTION RESPIRATORY (INHALATION) at 10:41

## 2025-02-25 ENCOUNTER — OFFICE VISIT (OUTPATIENT)
Dept: FAMILY MEDICINE CLINIC | Facility: CLINIC | Age: 80
End: 2025-02-25
Payer: MEDICARE

## 2025-02-25 VITALS
WEIGHT: 135.6 LBS | TEMPERATURE: 98.6 F | DIASTOLIC BLOOD PRESSURE: 60 MMHG | HEIGHT: 60 IN | BODY MASS INDEX: 26.62 KG/M2 | OXYGEN SATURATION: 99 % | HEART RATE: 95 BPM | SYSTOLIC BLOOD PRESSURE: 100 MMHG

## 2025-02-25 DIAGNOSIS — M51.362 DEGENERATION OF INTERVERTEBRAL DISC OF LUMBAR REGION WITH DISCOGENIC BACK PAIN AND LOWER EXTREMITY PAIN: ICD-10-CM

## 2025-02-25 DIAGNOSIS — H81.03 MENIERE'S DISEASE OF BOTH EARS: Chronic | ICD-10-CM

## 2025-02-25 DIAGNOSIS — Z00.00 ENCOUNTER FOR WELLNESS EXAMINATION: Primary | ICD-10-CM

## 2025-02-25 DIAGNOSIS — E78.5 DYSLIPIDEMIA: Chronic | ICD-10-CM

## 2025-02-25 DIAGNOSIS — I10 ESSENTIAL HYPERTENSION: Chronic | ICD-10-CM

## 2025-02-25 DIAGNOSIS — Z00.00 HEALTH CARE MAINTENANCE: ICD-10-CM

## 2025-02-25 DIAGNOSIS — Z13.1 ENCOUNTER FOR SCREENING FOR DIABETES MELLITUS: ICD-10-CM

## 2025-02-25 DIAGNOSIS — J06.9 ACUTE URI: ICD-10-CM

## 2025-02-25 DIAGNOSIS — M81.0 AGE-RELATED OSTEOPOROSIS WITHOUT CURRENT PATHOLOGICAL FRACTURE: ICD-10-CM

## 2025-02-25 DIAGNOSIS — J84.9 INTERSTITIAL LUNG DISEASE: ICD-10-CM

## 2025-02-25 DIAGNOSIS — F41.1 GENERALIZED ANXIETY DISORDER: Chronic | ICD-10-CM

## 2025-02-25 DIAGNOSIS — R53.81 PHYSICAL DECONDITIONING: ICD-10-CM

## 2025-02-25 LAB
25(OH)D3 SERPL-MCNC: 51.6 NG/ML (ref 30–100)
ALBUMIN SERPL-MCNC: 4 G/DL (ref 3.5–5.2)
ALBUMIN/GLOB SERPL: 1.3 G/DL
ALP SERPL-CCNC: 73 U/L (ref 39–117)
ALT SERPL W P-5'-P-CCNC: 10 U/L (ref 1–33)
ANION GAP SERPL CALCULATED.3IONS-SCNC: 6.1 MMOL/L (ref 5–15)
AST SERPL-CCNC: 26 U/L (ref 1–32)
BILIRUB SERPL-MCNC: 0.4 MG/DL (ref 0–1.2)
BUN SERPL-MCNC: 11 MG/DL (ref 8–23)
BUN/CREAT SERPL: 15.3 (ref 7–25)
CALCIUM SPEC-SCNC: 9.8 MG/DL (ref 8.6–10.5)
CHLORIDE SERPL-SCNC: 102 MMOL/L (ref 98–107)
CHOLEST SERPL-MCNC: 140 MG/DL (ref 0–200)
CK SERPL-CCNC: 36 U/L (ref 20–180)
CO2 SERPL-SCNC: 31.9 MMOL/L (ref 22–29)
CREAT SERPL-MCNC: 0.72 MG/DL (ref 0.57–1)
DEPRECATED RDW RBC AUTO: 41.5 FL (ref 37–54)
EGFRCR SERPLBLD CKD-EPI 2021: 83.6 ML/MIN/1.73
ERYTHROCYTE [DISTWIDTH] IN BLOOD BY AUTOMATED COUNT: 11.6 % (ref 12.3–15.4)
GLOBULIN UR ELPH-MCNC: 3.2 GM/DL
GLUCOSE SERPL-MCNC: 113 MG/DL (ref 65–99)
HBA1C MFR BLD: 5.4 % (ref 4.8–5.6)
HCT VFR BLD AUTO: 41 % (ref 34–46.6)
HDLC SERPL-MCNC: 62 MG/DL (ref 40–60)
HGB BLD-MCNC: 13.5 G/DL (ref 12–15.9)
LDLC SERPL CALC-MCNC: 53 MG/DL (ref 0–100)
LDLC/HDLC SERPL: 0.79 {RATIO}
MCH RBC QN AUTO: 32.3 PG (ref 26.6–33)
MCHC RBC AUTO-ENTMCNC: 32.9 G/DL (ref 31.5–35.7)
MCV RBC AUTO: 98.1 FL (ref 79–97)
PLATELET # BLD AUTO: 157 10*3/MM3 (ref 140–450)
PMV BLD AUTO: 12.2 FL (ref 6–12)
POTASSIUM SERPL-SCNC: 4.3 MMOL/L (ref 3.5–5.2)
PROT SERPL-MCNC: 7.2 G/DL (ref 6–8.5)
RBC # BLD AUTO: 4.18 10*6/MM3 (ref 3.77–5.28)
SODIUM SERPL-SCNC: 140 MMOL/L (ref 136–145)
T4 FREE SERPL-MCNC: 1.08 NG/DL (ref 0.92–1.68)
TRIGL SERPL-MCNC: 145 MG/DL (ref 0–150)
TSH SERPL DL<=0.05 MIU/L-ACNC: 1.28 UIU/ML (ref 0.27–4.2)
VIT B12 BLD-MCNC: 1122 PG/ML (ref 211–946)
VLDLC SERPL-MCNC: 25 MG/DL (ref 5–40)
WBC NRBC COR # BLD AUTO: 9.83 10*3/MM3 (ref 3.4–10.8)

## 2025-02-25 PROCEDURE — 82306 VITAMIN D 25 HYDROXY: CPT | Performed by: INTERNAL MEDICINE

## 2025-02-25 PROCEDURE — 84443 ASSAY THYROID STIM HORMONE: CPT | Performed by: INTERNAL MEDICINE

## 2025-02-25 PROCEDURE — 82607 VITAMIN B-12: CPT | Performed by: INTERNAL MEDICINE

## 2025-02-25 PROCEDURE — 80061 LIPID PANEL: CPT | Performed by: INTERNAL MEDICINE

## 2025-02-25 PROCEDURE — 99214 OFFICE O/P EST MOD 30 MIN: CPT | Performed by: INTERNAL MEDICINE

## 2025-02-25 PROCEDURE — 80053 COMPREHEN METABOLIC PANEL: CPT | Performed by: INTERNAL MEDICINE

## 2025-02-25 PROCEDURE — 82550 ASSAY OF CK (CPK): CPT | Performed by: INTERNAL MEDICINE

## 2025-02-25 PROCEDURE — 85027 COMPLETE CBC AUTOMATED: CPT | Performed by: INTERNAL MEDICINE

## 2025-02-25 PROCEDURE — 3074F SYST BP LT 130 MM HG: CPT | Performed by: INTERNAL MEDICINE

## 2025-02-25 PROCEDURE — 84439 ASSAY OF FREE THYROXINE: CPT | Performed by: INTERNAL MEDICINE

## 2025-02-25 PROCEDURE — 1159F MED LIST DOCD IN RCRD: CPT | Performed by: INTERNAL MEDICINE

## 2025-02-25 PROCEDURE — 3078F DIAST BP <80 MM HG: CPT | Performed by: INTERNAL MEDICINE

## 2025-02-25 PROCEDURE — G0439 PPPS, SUBSEQ VISIT: HCPCS | Performed by: INTERNAL MEDICINE

## 2025-02-25 PROCEDURE — 1125F AMNT PAIN NOTED PAIN PRSNT: CPT | Performed by: INTERNAL MEDICINE

## 2025-02-25 PROCEDURE — 83036 HEMOGLOBIN GLYCOSYLATED A1C: CPT | Performed by: INTERNAL MEDICINE

## 2025-02-25 PROCEDURE — 1160F RVW MEDS BY RX/DR IN RCRD: CPT | Performed by: INTERNAL MEDICINE

## 2025-02-25 PROCEDURE — 1170F FXNL STATUS ASSESSED: CPT | Performed by: INTERNAL MEDICINE

## 2025-02-25 RX ORDER — BUSPIRONE HYDROCHLORIDE 15 MG/1
15 TABLET ORAL 3 TIMES DAILY
Qty: 270 TABLET | Refills: 1 | Status: SHIPPED | OUTPATIENT
Start: 2025-02-25

## 2025-02-25 RX ORDER — VALSARTAN 40 MG/1
40 TABLET ORAL
Qty: 90 TABLET | Refills: 1 | Status: SHIPPED | OUTPATIENT
Start: 2025-02-25

## 2025-02-25 RX ORDER — BUDESONIDE AND FORMOTEROL FUMARATE DIHYDRATE 80; 4.5 UG/1; UG/1
2 AEROSOL RESPIRATORY (INHALATION) 2 TIMES DAILY
Qty: 10.2 G | Refills: 3 | Status: SHIPPED | OUTPATIENT
Start: 2025-02-25

## 2025-02-25 RX ORDER — ROSUVASTATIN CALCIUM 5 MG/1
5 TABLET, COATED ORAL DAILY
Qty: 90 TABLET | Refills: 1 | Status: SHIPPED | OUTPATIENT
Start: 2025-02-25

## 2025-02-25 RX ORDER — DOXYCYCLINE 100 MG/1
100 CAPSULE ORAL 2 TIMES DAILY
Qty: 20 CAPSULE | Refills: 0 | Status: SHIPPED | OUTPATIENT
Start: 2025-02-25

## 2025-02-25 RX ORDER — CALCIUM CARBONATE 500 MG/1
2 TABLET, CHEWABLE ORAL DAILY
COMMUNITY

## 2025-02-25 RX ORDER — MECLIZINE HYDROCHLORIDE 25 MG/1
25 TABLET ORAL EVERY 8 HOURS PRN
Qty: 90 TABLET | Refills: 3 | Status: SHIPPED | OUTPATIENT
Start: 2025-02-25

## 2025-02-25 RX ORDER — BUDESONIDE AND FORMOTEROL FUMARATE DIHYDRATE 80; 4.5 UG/1; UG/1
2 AEROSOL RESPIRATORY (INHALATION) 2 TIMES DAILY
Qty: 10.2 G | Refills: 3 | OUTPATIENT
Start: 2025-02-25

## 2025-02-25 NOTE — ASSESSMENT & PLAN NOTE
Orders:    meclizine (ANTIVERT) 25 MG tablet; Take 1 tablet by mouth Every 8 (Eight) Hours As Needed for Dizziness.

## 2025-02-25 NOTE — PROGRESS NOTES
Venipuncture Blood Specimen Collection  Venipuncture performed in left antecubital by Heather Symes, MA with good hemostasis. Patient tolerated the procedure well without complications.   02/25/25   Heather Symes, MA

## 2025-02-25 NOTE — PROGRESS NOTES
Subjective   The ABCs of the Annual Wellness Visit  Medicare Wellness Visit      Edgar Cotto is a 80 y.o. patient who presents for a Medicare Wellness Visit.    The following portions of the patient's history were reviewed and   updated as appropriate: allergies, current medications, past family history, past medical history, past social history, past surgical history, and problem list.    Compared to one year ago, the patient's physical   health is worse.  Compared to one year ago, the patient's mental   health is the same.    Recent Hospitalizations:  This patient has had a [facility] admission record on file within the last 365 days.  Current Medical Providers:  Patient Care Team:  Abbie Hillman DO as PCP - General (Family Medicine)  Elif Terry OD (Optometry)  Ramírez Monk MD as Consulting Physician (Pain Medicine)  Devi Sanchez APRN as Nurse Practitioner (Pulmonary Disease)    Outpatient Medications Prior to Visit   Medication Sig Dispense Refill    albuterol sulfate  (90 Base) MCG/ACT inhaler Inhale 2 puffs Every 4 (Four) Hours As Needed for Wheezing. 18 g 11    ipratropium-albuterol (DUO-NEB) 0.5-2.5 mg/3 ml nebulizer USE 3 ML VIA NEBULIZER FOUR TIMES DAILY AS NEEDED FOR WHEEZING 320 mL 5    nystatin (MYCOSTATIN) 100,000 unit/mL suspension Swish and swallow 5 mL 4 (Four) Times a Day As Needed (Thrush, fungal, or yeast infection of the mouth.). 473 mL 5    oxyCODONE-acetaminophen (PERCOCET)  MG per tablet Take 1 tablet by mouth Every 6 (Six) Hours As Needed for Moderate Pain.      polyethylene glycol (MiraLax) 17 GM/SCOOP powder Take 17 g by mouth Daily. Indications: Constipation      promethazine (PHENERGAN) 12.5 MG tablet TAKE 1 TABLET BY MOUTH TWICE DAILY FOR 15 DAYS      Vitamins A & D (magic barrier cream) Apply 1 Application topically to the appropriate area as directed 3 (Three) Times a Day As Needed (pressure ulcers). 120 g 5     budesonide-formoterol (SYMBICORT) 80-4.5 MCG/ACT inhaler Inhale 2 puffs 2 (Two) Times a Day. 10.2 g 3    busPIRone (BUSPAR) 10 MG tablet Take 1 tablet by mouth 3 (Three) Times a Day. 270 tablet 1    meclizine (ANTIVERT) 25 MG tablet Take 1 tablet by mouth Every 8 (Eight) Hours As Needed for Dizziness.      rosuvastatin (CRESTOR) 5 MG tablet TAKE 1 TABLET BY MOUTH DAILY 90 tablet 1    valsartan (DIOVAN) 40 MG tablet Take 1 tablet by mouth Daily. 90 tablet 1    calcium carbonate (TUMS) 500 MG chewable tablet Chew 2 tablets Daily.      Cholecalciferol (Vitamin D3) 20 MCG (800 UNIT) tablet Take 800 Int'l Units/day by mouth Daily.      O2 (OXYGEN) Inhale 2 L/min Continuous.       No facility-administered medications prior to visit.     Opioid medication/s are on active medication list.  and I have evaluated her active treatment plan and pain score trends (see table).  Vitals:    02/25/25 0825   PainSc: 10-Worst pain ever   PainLoc: Back     I have reviewed the chart for potential of high risk medication and harmful drug interactions in the elderly.        Aspirin is not on active medication list.  Aspirin use is not indicated based on review of current medical condition/s. Risk of harm outweighs potential benefits.      Patient Active Problem List   Diagnosis    Atopic rhinitis    Dizziness    Generalized anxiety disorder    Generalized osteoarthritis    Mixed hyperlipidemia    Essential hypertension    Auditory vertigo    Mitral and aortic valve disease    Stomatitis    Seasonal allergic rhinitis    Varicose veins of both lower extremities    Meniere's disease of both ears    S/P hemorrhoidectomy    Abnormal EKG    Moderate obesity    Thyroid cyst    Age-related osteoporosis without current pathological fracture    Cervical lymphadenopathy    CABAN (dyspnea on exertion)    Hypoxia    Restrictive lung disease    Scoliosis    Hip pain    Lumbar degenerative disc disease    BMI 32.0-32.9,adult    Physical deconditioning     "Compression fracture of L3 vertebra    Gastroesophageal reflux disease without esophagitis    Interstitial lung disease     Advance Care Planning Advance Directive is not on file.  ACP discussion was held with the patient during this visit. Patient does not have an advance directive, declines further assistance.            Objective   Vitals:    25 0825   BP: 100/60   BP Location: Left arm   Patient Position: Sitting   Cuff Size: Adult   Pulse: 95   Temp: 98.6 °F (37 °C)   TempSrc: Oral   SpO2: 99%  Comment: 2L   Weight: 61.5 kg (135 lb 9.6 oz)   Height: 152.4 cm (60\")   PainSc: 10-Worst pain ever   PainLoc: Back       Estimated body mass index is 26.48 kg/m² as calculated from the following:    Height as of this encounter: 152.4 cm (60\").    Weight as of this encounter: 61.5 kg (135 lb 9.6 oz).           Does the patient have evidence of cognitive impairment? 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 Assessment completed on:2025    Depression Screening   Little interest or pleasure in doing things? Not at all   Feeling down, depressed, or hopeless? Not at all   PHQ-2 Total Score 0      Health Habits and Functional and Cognitive Screenin/25/2025     8:18 AM   Functional & Cognitive Status   Do you have difficulty preparing food and eating? Yes   Do you have difficulty bathing yourself, getting dressed or grooming yourself? Yes   Do you have difficulty using the toilet? No   Do you have difficulty moving around from place " to place? Yes   Do you have trouble with steps or getting out of a bed or a chair? Yes   Current Diet Well Balanced Diet   Dental Exam Other        Dental Exam Comment Dentures   Eye Exam Not up to date   Exercise (times per week) 7 times per week   Current Exercises Include Other        Exercise Comment Chair exercising   Do you need help using the phone?  No   Are you deaf or do you have serious difficulty hearing?  Yes   Do you need help to go to places out of walking distance? Yes   Do you need help shopping? Yes   Do you need help preparing meals?  Yes   Do you need help with housework?  Yes   Do you need help with laundry? Yes   Do you need help taking your medications? Yes   Do you need help managing money? No   Do you ever drive or ride in a car without wearing a seat belt? No   Have you felt unusual stress, anger or loneliness in the last month? Yes   Who do you live with? Child   If you need help, do you have trouble finding someone available to you? No   Have you been bothered in the last four weeks by sexual problems? No   Do you have difficulty concentrating, remembering or making decisions? No           Age-appropriate Screening Schedule:  Refer to the list below for future screening recommendations based on patient's age, sex and/or medical conditions. Orders for these recommended tests are listed in the plan section. The patient has been provided with a written plan.    Health Maintenance List  Health Maintenance   Topic Date Due    ANNUAL WELLNESS VISIT  02/08/2025    LIPID PANEL  02/08/2025    DXA SCAN  02/25/2025 (Originally 11/8/2023)    INFLUENZA VACCINE  03/31/2025 (Originally 7/1/2024)    RSV Vaccine - Adults (1 - 1-dose 75+ series) 02/25/2026 (Originally 1/28/2018)    BMI FOLLOWUP  07/01/2025    COVID-19 Vaccine  Discontinued    Pneumococcal Vaccine 50+  Discontinued    MAMMOGRAM  Discontinued    TDAP/TD VACCINES  Discontinued    ZOSTER VACCINE  Discontinued    COLORECTAL CANCER SCREENING   Discontinued                                                                                                                                                CMS Preventative Services Quick Reference  Risk Factors Identified During Encounter  Immunizations Discussed/Encouraged: Influenza, COVID19, and RSV (Respiratory Syncytial Virus)-patient declines.    Age appropriate screenings were discussed with patient today. We agree that she is not a candidate for colonoscopy due to age and co-morbidities. We will update metabolic screenings today.     The above risks/problems have been discussed with the patient.  Pertinent information has been shared with the patient in the After Visit Summary.  An After Visit Summary and PPPS were made available to the patient.    Follow Up:   Next Medicare Wellness visit to be scheduled in 1 year.         Additional E&M Note during same encounter follows:  Patient has additional, significant, and separately identifiable condition(s)/problem(s) that require work above and beyond the Medicare Wellness Visit     Chief Complaint  Medicare Wellness-subsequent (Doing okay. When she gets up to walk her oxygen is dropping very low she states around 60's. ), Back Pain (Having increased lower back pain. ), Hypertension (Blood pressure is running around 120/80. ), and Hyperlipidemia    Subjective     Edgar is also being seen today for additional medical problem/s including dyslipidemia, hypertension, anxiety, interstitial lung disease with chronic respiratory failure with hypoxia on home O2 2 to 3 L continuous, chronic lumbar back pain secondary to lumbar degenerative disc disease and scoliosis managed by pain management, osteoporosis, Ménière's disease, and recent URI which started 2 to 3 days ago.  She notes increased cough and congestion with shortness of air.  She has not required more oxygen than her normal home regimen.  She has occasional wheezing but denies chest pain.  She has not had any  "fever.  She is continue to follow with pain management and oxygen regularly.  She notes that her oxygen level does drop into the 60s without oxygen when she gets up and walks around.  She has noted increased anxiety recently and would like an increase in BuSpar to help.  She is tolerating her current medicine regimen well.    Review of Systems   Constitutional:  Negative for fever.   HENT:  Positive for congestion.    Respiratory:  Positive for cough and shortness of breath. Negative for wheezing.    Cardiovascular:  Negative for chest pain.   Gastrointestinal:         Hemorrhoidal bleeding at times.    Genitourinary:  Negative for hematuria.   Musculoskeletal:  Positive for back pain.   Psychiatric/Behavioral:  The patient is nervous/anxious.         Anxiety has been worse lately. She is interested in trying a higher dose of buspar.               Objective   Vital Signs:  /60 (BP Location: Left arm, Patient Position: Sitting, Cuff Size: Adult)   Pulse 95   Temp 98.6 °F (37 °C) (Oral)   Ht 152.4 cm (60\")   Wt 61.5 kg (135 lb 9.6 oz)   SpO2 99% Comment: 2L  BMI 26.48 kg/m²   Physical Exam  Vitals reviewed.   Constitutional:       General: She is not in acute distress.  HENT:      Head: Normocephalic and atraumatic.   Eyes:      General: No scleral icterus.     Extraocular Movements: Extraocular movements intact.      Conjunctiva/sclera: Conjunctivae normal.      Pupils: Pupils are equal, round, and reactive to light.   Cardiovascular:      Rate and Rhythm: Normal rate and regular rhythm.   Pulmonary:      Effort: Pulmonary effort is normal. No respiratory distress.      Breath sounds: No wheezing.      Comments: Decreased breath sounds at the bases bilaterally without wheezing.  Congested cough  Musculoskeletal:         General: No swelling.      Cervical back: Neck supple. No tenderness.      Comments: Edgar presents in a wheelchair today, wearing her home oxygen.   Lymphadenopathy:      Cervical: No " cervical adenopathy.   Skin:     General: Skin is warm and dry.      Coloration: Skin is not jaundiced.   Neurological:      Mental Status: She is alert.   Psychiatric:         Mood and Affect: Mood normal.         Behavior: Behavior normal.         Thought Content: Thought content normal.         Judgment: Judgment normal.              Assessment and Plan          Encounter for wellness examination         Health care maintenance         Dyslipidemia    Orders:    rosuvastatin (CRESTOR) 5 MG tablet; Take 1 tablet by mouth Daily.    Comprehensive Metabolic Panel    Lipid Panel    CK    Essential hypertension      Orders:    valsartan (DIOVAN) 40 MG tablet; Take 1 tablet by mouth Daily.    CBC (No Diff)    Comprehensive Metabolic Panel    Lipid Panel    TSH    T4, Free    Vitamin B12    Generalized anxiety disorder    Orders:    busPIRone (BUSPAR) 15 MG tablet; Take 1 tablet by mouth 3 (Three) Times a Day.    Interstitial lung disease    Orders:    budesonide-formoterol (SYMBICORT) 80-4.5 MCG/ACT inhaler; Inhale 2 puffs 2 (Two) Times a Day.    Age-related osteoporosis without current pathological fracture    Orders:    Vitamin D,25-Hydroxy    Physical deconditioning    Orders:    Vitamin B12    Meniere's disease of both ears    Orders:    meclizine (ANTIVERT) 25 MG tablet; Take 1 tablet by mouth Every 8 (Eight) Hours As Needed for Dizziness.    Acute URI    Orders:    doxycycline (VIBRAMYCIN) 100 MG capsule; Take 1 capsule by mouth 2 (Two) Times a Day.    Encounter for screening for diabetes mellitus    Orders:    Hemoglobin A1c    Degeneration of intervertebral disc of lumbar region with discogenic back pain and lower extremity pain             I reviewed PDMP and pulmonology notes. I encourage patient to follow up with her specialists as planned.  I have recommended doxycycline for acute URI.  Patient declined COVID and flu testing today.  We will continue current medicine regimen.  Updated refills today as  requested.  We will update labs today as above.  We will plan on a 3-month follow-up, or certainly sooner if needed.  We can schedule this follow-up as a telehealth if more convenient for patient.    Follow Up   Return in about 3 months (around 5/25/2025), or if symptoms worsen or fail to improve, for Recheck.  Patient was given instructions and counseling regarding her condition or for health maintenance advice. Please see specific information pulled into the AVS if appropriate.      This document has been electronically signed by Abbie Hillman DO  February 25, 2025 09:28 EST

## 2025-02-25 NOTE — ASSESSMENT & PLAN NOTE
Orders:    budesonide-formoterol (SYMBICORT) 80-4.5 MCG/ACT inhaler; Inhale 2 puffs 2 (Two) Times a Day.

## 2025-02-25 NOTE — ASSESSMENT & PLAN NOTE
Orders:    valsartan (DIOVAN) 40 MG tablet; Take 1 tablet by mouth Daily.    CBC (No Diff)    Comprehensive Metabolic Panel    Lipid Panel    TSH    T4, Free    Vitamin B12

## 2025-05-21 DIAGNOSIS — K12.1 STOMATITIS: ICD-10-CM

## 2025-05-21 RX ORDER — NYSTATIN 100000 [USP'U]/ML
5 SUSPENSION ORAL 4 TIMES DAILY
Qty: 120 ML | Refills: 5 | Status: SHIPPED | OUTPATIENT
Start: 2025-05-21

## 2025-05-27 ENCOUNTER — TELEMEDICINE (OUTPATIENT)
Dept: FAMILY MEDICINE CLINIC | Facility: CLINIC | Age: 80
End: 2025-05-27
Payer: MEDICARE

## 2025-05-27 DIAGNOSIS — I10 ESSENTIAL HYPERTENSION: Primary | Chronic | ICD-10-CM

## 2025-05-27 DIAGNOSIS — J06.9 ACUTE URI: ICD-10-CM

## 2025-05-27 PROCEDURE — 99214 OFFICE O/P EST MOD 30 MIN: CPT | Performed by: INTERNAL MEDICINE

## 2025-05-27 PROCEDURE — 1159F MED LIST DOCD IN RCRD: CPT | Performed by: INTERNAL MEDICINE

## 2025-05-27 PROCEDURE — 1125F AMNT PAIN NOTED PAIN PRSNT: CPT | Performed by: INTERNAL MEDICINE

## 2025-05-27 PROCEDURE — 1160F RVW MEDS BY RX/DR IN RCRD: CPT | Performed by: INTERNAL MEDICINE

## 2025-05-27 RX ORDER — DOXYCYCLINE 100 MG/1
100 CAPSULE ORAL 2 TIMES DAILY
Qty: 20 CAPSULE | Refills: 0 | Status: SHIPPED | OUTPATIENT
Start: 2025-05-27

## 2025-05-27 NOTE — PROGRESS NOTES
Patient Name: Edgar Cotto Today's Date: 2025   Patient MRN / CSN: [id number] / [id number] Date of Encounter: 2025   Patient Age / : 80 y.o. / 1945 Encounter Provider: Abbie Hillman DO   Referring Physician: No ref. provider found          Edgar is a 80 y.o. female who is being seen today for Hypertension and Sinusitis      Hypertension  Chronicity:  Chronic  Onset:  More than 1 year ago  Progression since onset:  Stable  Condition status:  Controlled  Associated symptoms: headaches and shortness of breath    Associated symptoms: no chest pain    Current therapy:  Angiotensin blockers  Improvement on treatment:  Significant  Compliance problems:  No compliance problems  Sinusitis  This is a new problem. The current episode started in the past 7 days. The problem has been waxing and waning since onset. There has been no fever. Associated symptoms include congestion, coughing, ear pain, headaches, shortness of breath and sinus pressure. Treatments tried: benadryl. The treatment provided mild relief.       Allergies include:Z-mary [azithromycin], Amoxicillin, Cortisone, Fish-derived products, Iodinated contrast media, Iodine, Lidocaine, Morphine, Nystatin, Other, Sulfa antibiotics, and Verapamil  Current Outpatient Medications   Medication Sig Dispense Refill    albuterol sulfate  (90 Base) MCG/ACT inhaler Inhale 2 puffs Every 4 (Four) Hours As Needed for Wheezing. 18 g 11    budesonide-formoterol (SYMBICORT) 80-4.5 MCG/ACT inhaler Inhale 2 puffs 2 (Two) Times a Day. 10.2 g 3    busPIRone (BUSPAR) 15 MG tablet Take 1 tablet by mouth 3 (Three) Times a Day. 270 tablet 1    calcium carbonate (TUMS) 500 MG chewable tablet Chew 2 tablets Daily.      Cholecalciferol (Vitamin D3) 20 MCG (800 UNIT) tablet Take 800 Int'l Units/day by mouth Daily.      doxycycline (VIBRAMYCIN) 100 MG capsule Take 1 capsule by mouth 2 (Two) Times a Day. 20 capsule 0    ipratropium-albuterol (DUO-NEB) 0.5-2.5  mg/3 ml nebulizer USE 3 ML VIA NEBULIZER FOUR TIMES DAILY AS NEEDED FOR WHEEZING 320 mL 5    meclizine (ANTIVERT) 25 MG tablet Take 1 tablet by mouth Every 8 (Eight) Hours As Needed for Dizziness. 90 tablet 3    nystatin (MYCOSTATIN) 100,000 unit/mL suspension SWISH AND SWALLOW 5 ML BY MOUTH FOUR TIMES DAILY 120 mL 5    O2 (OXYGEN) Inhale 2 L/min Continuous.      oxyCODONE-acetaminophen (PERCOCET)  MG per tablet Take 1 tablet by mouth Every 6 (Six) Hours As Needed for Moderate Pain.      polyethylene glycol (MiraLax) 17 GM/SCOOP powder Take 17 g by mouth Daily. Indications: Constipation      promethazine (PHENERGAN) 12.5 MG tablet TAKE 1 TABLET BY MOUTH TWICE DAILY FOR 15 DAYS      rosuvastatin (CRESTOR) 5 MG tablet Take 1 tablet by mouth Daily. 90 tablet 1    valsartan (DIOVAN) 40 MG tablet Take 1 tablet by mouth Daily. 90 tablet 1    Vitamins A & D (magic barrier cream) Apply 1 Application topically to the appropriate area as directed 3 (Three) Times a Day As Needed (pressure ulcers). 120 g 5     No current facility-administered medications for this visit.     Past Medical History:   Diagnosis Date    Abdominal distension     Allergic rhinitis     Anxiety     Arthritis     Closed nondisplaced fracture of surgical neck of left humerus with routine healing 3/9/2021    Cough     Dry eyes     Dyslipidemia     Dysuria     Hyperlipidemia     Hypertension     Meniere's disease     Mitral and aortic valve disease     Oral candidiasis     Osteoarthritis     Stomatitis     URI, acute     Vaginal candidiasis     Vocal cord dysfunction      Family History   Problem Relation Age of Onset    Diabetes Mother     Cancer Father     Liver disease Sister     Diabetes Sister     Liver disease Brother     Diabetes Brother     Diabetes Daughter     Cancer Other     Liver disease Other     Breast cancer Neg Hx      Past Surgical History:   Procedure Laterality Date    CHOLECYSTECTOMY      COLONOSCOPY      DILATATION AND CURETTAGE       HEMORRHOIDECTOMY      HERNIA REPAIR      SHOULDER SURGERY      SKIN CANCER EXCISION      on Nose    TONSILLECTOMY       Social History     Substance and Sexual Activity   Alcohol Use No     Social History     Tobacco Use   Smoking Status Never    Passive exposure: Never   Smokeless Tobacco Never   Tobacco Comments    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 Negative for fever.   HENT:  Positive for congestion, ear pain, postnasal drip, sinus pressure and sinus pain.    Respiratory:  Positive for cough and shortness of breath.         SOA at baseline without recent change but coughing worse over the past 1 week   Cardiovascular:  Negative for chest pain.   Gastrointestinal:  Negative for blood in stool.   Genitourinary:  Negative for hematuria.   Neurological:  Positive for headaches.        Depression Assessment Review:  PHQ-9 Total Score:    Vital Signs & Measurements Taken This Encounter  There were no vitals taken for this visit.   There were no vitals filed for this visit.         Physical Exam  Constitutional:       General: She is not in acute distress.     Comments: Pleasant and smiling   Pulmonary:      Effort: No respiratory distress.      Comments: Wearing oxygen  Neurological:      Mental Status: She is alert.   Psychiatric:         Mood and Affect: Mood normal.         Behavior: Behavior normal.           Assessment & Plan  Patient Active Problem List   Diagnosis    Atopic rhinitis    Dizziness    Generalized anxiety disorder    Generalized osteoarthritis    Mixed hyperlipidemia    Essential hypertension    Auditory vertigo    Mitral and aortic valve disease    Stomatitis    Seasonal allergic rhinitis    Varicose veins of both lower extremities    Meniere's disease of both ears    S/P hemorrhoidectomy    Abnormal EKG    Moderate obesity    Thyroid cyst     Age-related osteoporosis without current pathological fracture    Cervical lymphadenopathy    CABAN (dyspnea on exertion)    Hypoxia    Restrictive lung disease    Scoliosis    Hip pain    Lumbar degenerative disc disease    BMI 32.0-32.9,adult    Physical deconditioning    Compression fracture of L3 vertebra    Gastroesophageal reflux disease without esophagitis    Interstitial lung disease       ICD-10-CM ICD-9-CM   1. Essential hypertension  I10 401.9   2. Acute URI  J06.9 465.9     Diagnoses and all orders for this visit:    1. Essential hypertension (Primary)    2. Acute URI  -     doxycycline (VIBRAMYCIN) 100 MG capsule; Take 1 capsule by mouth 2 (Two) Times a Day.  Dispense: 20 capsule; Refill: 0         Meds Ordered During Visit:  New Medications Ordered This Visit   Medications    doxycycline (VIBRAMYCIN) 100 MG capsule     Sig: Take 1 capsule by mouth 2 (Two) Times a Day.     Dispense:  20 capsule     Refill:  0     I reviewed PDMP.  I reviewed most recent labs.  We will continue current medicine regimen at this time.  Hypertension is well-controlled with this regimen.  We we will also add doxycycline for acute URI.  Will plan to update labs at next appointment.      Return in about 3 months (around 8/27/2025), or if symptoms worsen or fail to improve, for Recheck.    As a means of preventing the spread of the COVID 19, this visit was done virtually via video platform through [employer] as consented by the patient. Patient was at home and provider at [facility] office during this visit. The patient consented to this telehealth visit and signed the video visit consent form. This visit included audio and video interaction. There were no technical issues that occurred during this visit.       Referring Provider (if known): No ref. provider found      This document has been electronically signed by Abbie Hillman DO  May 27, 2025 10:36 EDT    Abbie Hillman DO, FACOI  [address]  [place]  [address]  [phone] (office)    Part of this note may be an electronic transcription/translation of spoken language to printed text using the Dragon Dictation System.

## 2025-07-17 PROBLEM — J96.21 ACUTE ON CHRONIC RESPIRATORY FAILURE WITH HYPOXIA: Status: ACTIVE | Noted: 2025-01-01

## 2025-07-17 NOTE — ED PROVIDER NOTES
Subjective   History of Present Illness  Chari Cotto is an 80-year-old female presenting to the emergency department for respiratory failure.  Patient's family at bedside reports she has had nonproductive cough and congestion for multiple days.  No fevers or other infectious symptoms.  Patient denying any chest pain or other complaints.  On arrival satting in the 60s.  Denies any lower extremity swelling.  No abdominal distention.  No orthopnea.  No prior history of heart failure per family at bedside.    Review of Systems   Constitutional: Negative.  Negative for activity change and fever.   HENT:  Positive for congestion.    Respiratory:  Positive for cough and shortness of breath.    Cardiovascular: Negative.  Negative for chest pain.   Gastrointestinal: Negative.  Negative for abdominal pain.   Endocrine: Negative.    Genitourinary: Negative.  Negative for dysuria.   Skin: Negative.    Neurological: Negative.  Negative for dizziness, weakness and light-headedness.   Psychiatric/Behavioral: Negative.     All other systems reviewed and are negative.      Past Medical History:   Diagnosis Date    Abdominal distension     Allergic rhinitis     Anxiety     Arthritis     Closed nondisplaced fracture of surgical neck of left humerus with routine healing 3/9/2021    Cough     Dry eyes     Dyslipidemia     Dysuria     Hyperlipidemia     Hypertension     Meniere's disease     Mitral and aortic valve disease     Oral candidiasis     Osteoarthritis     Stomatitis     URI, acute     Vaginal candidiasis     Vocal cord dysfunction        Allergies   Allergen Reactions    Z-Ponce [Azithromycin] Swelling    Amoxicillin Other (See Comments)    Cortisone Swelling     Face swelling      Fish-Derived Products      SEAFOOD    Iodinated Contrast Media     Iodine Itching    Lidocaine Other (See Comments)     Shaking, fever when she had mouth numbed at the dentist    Morphine Mental Status Change    Nystatin Other (See Comments)     Other     Sulfa Antibiotics Other (See Comments)    Verapamil Other (See Comments)     Feel funny        Past Surgical History:   Procedure Laterality Date    CHOLECYSTECTOMY      COLONOSCOPY      DILATATION AND CURETTAGE      HEMORRHOIDECTOMY      HERNIA REPAIR      SHOULDER SURGERY      SKIN CANCER EXCISION      on Nose    TONSILLECTOMY         Family History   Problem Relation Age of Onset    Diabetes Mother     Cancer Father     Liver disease Sister     Diabetes Sister     Liver disease Brother     Diabetes Brother     Diabetes Daughter     Cancer Other     Liver disease Other     Breast cancer Neg Hx        Social History     Socioeconomic History    Marital status:    Tobacco Use    Smoking status: Never     Passive exposure: Never    Smokeless tobacco: Never    Tobacco comments:     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   HENT:      Head: Normocephalic and atraumatic.      Right Ear: Tympanic membrane normal.      Left Ear: Tympanic membrane normal.      Nose: Nose normal. No congestion or rhinorrhea.      Mouth/Throat:      Mouth: Mucous membranes are moist.      Pharynx: No oropharyngeal exudate or posterior oropharyngeal erythema.   Eyes:      Extraocular Movements: Extraocular movements intact.      Pupils: Pupils are equal, round, and reactive to light.   Cardiovascular:      Rate and Rhythm: Normal rate and regular rhythm.   Pulmonary:      Effort: Tachypnea and respiratory distress present.      Breath sounds: Decreased breath sounds present.   Abdominal:      General: Abdomen is flat. Bowel sounds are normal. There is no distension.   Musculoskeletal:         General: No swelling. Normal range of motion.      Cervical back: Normal range of motion. No  rigidity or tenderness.   Skin:     General: Skin is warm.      Capillary Refill: Capillary refill takes less than 2 seconds.      Coloration: Skin is not jaundiced or pale.   Neurological:      General: No focal deficit present.      Mental Status: She is alert and oriented to person, place, and time.         Procedures           ED Course  ED Course as of 07/17/25 2241   u Jul 17, 2025 2237 Patient care handed off to me pending final dispo.  Patient to be admitted for respiratory failure.  Of note patient has a lactic acidosis of 5.7 however likely secondary to hypoxic respiratory failure on arrival satting in the 60s.  Currently on BiPAP and satting 93 to 94%.  Of note patient started on Rocephin given evidence of pneumonia seen on chest x-ray.  Of note may also warrant cardiac workup given elevated BNP and NSTEMI/Trop elevation likely type II in setting of respiratory failure. Admitted in stable condition. Electronically signed by Brittany Huynh MD, 07/17/25, 10:39 PM EDT.  [LS]      ED Course User Index  [LS] Brittany Huynh MD                                                       Medical Decision Making  Problems Addressed:  Acute respiratory failure with hypoxia: complicated acute illness or injury  Heart failure, unspecified HF chronicity, unspecified heart failure type: complicated acute illness or injury  NSTEMI (non-ST elevated myocardial infarction): complicated acute illness or injury  Pneumonia due to infectious organism, unspecified laterality, unspecified part of lung: complicated acute illness or injury    Amount and/or Complexity of Data Reviewed  Labs: ordered.  Radiology: ordered and independent interpretation performed.  ECG/medicine tests: ordered.     Details:   Echocardiogram Findings    Left Ventricle Left ventricular ejection fraction appears to be 56 - 60%.   Normal left ventricular cavity size and wall thickness noted. Left ventricular diastolic function is consistent with  (grade I) impaired relaxation.  Right Ventricle Normal right ventricular wall thickness, systolic function and septal motion noted. The right ventricular cavity is mildly dilated.  Left Atrium Normal left atrial size and volume noted.  Right Atrium Normal right atrial cavity size noted.  Aortic Valve The aortic valve is abnormal in structure. There is mild calcification of the aortic valve. The aortic valve appears trileaflet. Mild aortic valve regurgitation is present. No aortic valve stenosis is present.  Mitral Valve The mitral valve is structurally normal with no regurgitation or significant stenosis present.  Tricuspid Valve The tricuspid valve is structurally normal with no significant stenosis present. Trace tricuspid valve regurgitation is present. Estimated right ventricular systolic pressure from tricuspid regurgitation is normal (<35 mmHg).  Pulmonic Valve The pulmonic valve is structurally normal with no regurgitation or significant stenosis present.  Greater Vessels No dilation of the aortic root is present.  Pericardium The pericardium is normal. There is no evidence of pericardial effusion. .        Risk  Prescription drug management.  Decision regarding hospitalization.        Final diagnoses:   Pneumonia due to infectious organism, unspecified laterality, unspecified part of lung   Acute respiratory failure with hypoxia   Heart failure, unspecified HF chronicity, unspecified heart failure type   NSTEMI (non-ST elevated myocardial infarction)       ED Disposition  ED Disposition       ED Disposition   Decision to Admit    Condition   --    Comment   Level of Care: Critical Care [6]   Diagnosis: Acute on chronic respiratory failure with hypoxia [[id number]]   Admitting Physician: BEN AGUILERA [[id number]]   Attending Physician: BEN AGUILERA [[id number]]   Certification: I Certify That Inpatient Hospital Services Are Medically Necessary For Greater Than 2 Midnights                 No follow-up  provider specified.       Medication List      No changes were made to your prescriptions during this visit.            Brittany Huynh MD  07/17/25 2604

## 2025-07-18 NOTE — NURSING NOTE
[facility] consulted for coccyx and rich area MASD and bilateral gluteals. Patient currently having ECHO done - unable to assess at this time. Will return later to evaluate skin.

## 2025-07-18 NOTE — PROGRESS NOTES
THC Physician - Brief Progress Note  PERMANENT  07/18/2025 02:50    [facility] [place] ([facility])    ASTER STOKES    Date of Service 07/18/2025 02:50    HPI/Events of Note e alert d/t svt ~160 not sustained; pt now back in [address]. pt however has persistant tachypnea ~30-40 rr in spite of bipap which was just adjusted by RT. will check f/u abg and if she is not improving will try avaps. also if svt   recurrent will start amiodarone per protocol.      Interventions Major-Arrhythmia - evaluation and management, Hypercarbia - evaluation and management, Respiratory failure - evaluation and management  Intermediate-Communication with other healthcare providers and/or family        Electronically Signed by: Rito Lam) on 07/18/2025 02:53

## 2025-07-18 NOTE — NURSING NOTE
Pt resting in bed at this time. Pt refused full skin assessment and wound inspection. Pt A/Ox4. Non-rebreather mask in place. Pt refused removal of EMS IV. Comfort measures maintained. No acute changes noted at this time. Will continue to follow plan of care.

## 2025-07-18 NOTE — PLAN OF CARE
Problem: Noninvasive Ventilation Acute  Goal: Effective Unassisted Ventilation and Oxygenation  Outcome: Progressing     Problem: Adult Inpatient Plan of Care  Goal: Plan of Care Review  Outcome: Progressing  Flowsheets (Taken 7/18/2025 0531)  Progress: declining  Plan of Care Reviewed With: patient  Goal: Patient-Specific Goal (Individualized)  Outcome: Progressing  Goal: Absence of Hospital-Acquired Illness or Injury  Outcome: Progressing  Intervention: Identify and Manage Fall Risk  Recent Flowsheet Documentation  Taken 7/18/2025 0500 by Tonya Smith RN  Safety Promotion/Fall Prevention: safety round/check completed  Taken 7/18/2025 0400 by Tonya Smith RN  Safety Promotion/Fall Prevention: safety round/check completed  Taken 7/18/2025 0300 by Tonya Simth RN  Safety Promotion/Fall Prevention: safety round/check completed  Taken 7/18/2025 0200 by Tonya Smith RN  Safety Promotion/Fall Prevention: safety round/check completed  Taken 7/18/2025 0100 by Tonya Smith RN  Safety Promotion/Fall Prevention: safety round/check completed  Taken 7/18/2025 0000 by Tonya Smith RN  Safety Promotion/Fall Prevention: safety round/check completed  Taken 7/17/2025 2315 by Tonya Smith RN  Safety Promotion/Fall Prevention: safety round/check completed  Intervention: Prevent Skin Injury  Recent Flowsheet Documentation  Taken 7/18/2025 0400 by Tonya Smith RN  Body Position:   turned   side-lying   right   heels elevated  Taken 7/18/2025 0200 by Tonya Smith RN  Body Position:   turned   side-lying   left   heels elevated  Taken 7/18/2025 0000 by Tonya Smith RN  Body Position:   turned   side-lying   right   heels elevated  Skin Protection:   transparent dressing maintained   skin sealant/moisture barrier applied   silicone foam dressing in place   pulse oximeter probe site changed   incontinence pads utilized   drying agents applied  Intervention: Prevent and Manage VTE (Venous Thromboembolism)  Risk  Recent Flowsheet Documentation  Taken 7/18/2025 0000 by Tonya Smith RN  VTE Prevention/Management: (see MAR- sq heparin) other (see comments)  Intervention: Prevent Infection  Recent Flowsheet Documentation  Taken 7/18/2025 0000 by Tonya Smith RN  Infection Prevention:   hand hygiene promoted   rest/sleep promoted  Goal: Optimal Comfort and Wellbeing  Outcome: Progressing  Intervention: Provide Person-Centered Care  Recent Flowsheet Documentation  Taken 7/18/2025 0000 by Tonya Smith RN  Trust Relationship/Rapport:   care explained   choices provided  Goal: Readiness for Transition of Care  Outcome: Progressing     Problem: Comorbidity Management  Goal: Blood Pressure in Desired Range  Outcome: Progressing  Intervention: Maintain Blood Pressure Management  Recent Flowsheet Documentation  Taken 7/18/2025 0500 by Tonya Smith RN  Medication Review/Management: medications reviewed  Taken 7/18/2025 0400 by Tonya Smith RN  Medication Review/Management: medications reviewed  Taken 7/18/2025 0300 by Tonya Smith RN  Medication Review/Management: medications reviewed  Taken 7/18/2025 0200 by Tonya Smith RN  Medication Review/Management: medications reviewed  Taken 7/18/2025 0100 by Tnoya Smith RN  Medication Review/Management: medications reviewed  Taken 7/18/2025 0000 by Tonya Smith RN  Medication Review/Management: medications reviewed  Taken 7/17/2025 2315 by Tonya Smith RN  Medication Review/Management: medications reviewed  Goal: Maintenance of Osteoarthritis Symptom Control  Outcome: Progressing  Intervention: Maintain Osteoarthritis Symptom Control  Recent Flowsheet Documentation  Taken 7/18/2025 0500 by Tonya Smith RN  Medication Review/Management: medications reviewed  Taken 7/18/2025 0400 by Tonya Smith RN  Medication Review/Management: medications reviewed  Taken 7/18/2025 0300 by Tonya Smith RN  Medication Review/Management: medications reviewed  Taken  7/18/2025 0200 by Tonya Smith RN  Medication Review/Management: medications reviewed  Taken 7/18/2025 0100 by Tonya Smith RN  Medication Review/Management: medications reviewed  Taken 7/18/2025 0000 by Tonya Smith RN  Activity Management: bedrest  Assistive Device Utilized: lift device  Medication Review/Management: medications reviewed  Taken 7/17/2025 2315 by Tonya Smith RN  Medication Review/Management: medications reviewed     Problem: Sepsis/Septic Shock  Goal: Optimal Coping  Outcome: Progressing  Intervention: Support Patient and Family Response  Recent Flowsheet Documentation  Taken 7/18/2025 0000 by Tonya Smith RN  Family/Support System Care: support provided  Goal: Absence of Bleeding  Outcome: Progressing  Goal: Blood Glucose Level Within Target Range  Outcome: Progressing  Goal: Absence of Infection Signs and Symptoms  Outcome: Progressing  Intervention: Initiate Sepsis Management  Recent Flowsheet Documentation  Taken 7/18/2025 0000 by Tonya Smith RN  Infection Prevention:   hand hygiene promoted   rest/sleep promoted  Intervention: Promote Recovery  Recent Flowsheet Documentation  Taken 7/18/2025 0000 by Tonya Smith RN  Activity Management: bedrest  Goal: Optimal Nutrition Delivery  Outcome: Progressing     Problem: Fall Injury Risk  Goal: Absence of Fall and Fall-Related Injury  Outcome: Progressing  Intervention: Identify and Manage Contributors  Recent Flowsheet Documentation  Taken 7/18/2025 0500 by Tonya Smith RN  Medication Review/Management: medications reviewed  Taken 7/18/2025 0400 by Tonya Smith RN  Medication Review/Management: medications reviewed  Taken 7/18/2025 0300 by Tonya Smith RN  Medication Review/Management: medications reviewed  Taken 7/18/2025 0200 by Tonya Smith RN  Medication Review/Management: medications reviewed  Taken 7/18/2025 0100 by Tonya Smith RN  Medication Review/Management: medications reviewed  Taken 7/18/2025 0000 by  Tonya Smith RN  Medication Review/Management: medications reviewed  Taken 7/17/2025 2315 by Tonya Smith RN  Medication Review/Management: medications reviewed  Intervention: Promote Injury-Free Environment  Recent Flowsheet Documentation  Taken 7/18/2025 0500 by Tonya Smith RN  Safety Promotion/Fall Prevention: safety round/check completed  Taken 7/18/2025 0400 by Tonya Smith RN  Safety Promotion/Fall Prevention: safety round/check completed  Taken 7/18/2025 0300 by Tonya Smith RN  Safety Promotion/Fall Prevention: safety round/check completed  Taken 7/18/2025 0200 by Tonya Smith RN  Safety Promotion/Fall Prevention: safety round/check completed  Taken 7/18/2025 0100 by Tonya Smith RN  Safety Promotion/Fall Prevention: safety round/check completed  Taken 7/18/2025 0000 by Tonya Smith RN  Safety Promotion/Fall Prevention: safety round/check completed  Taken 7/17/2025 2315 by Tonya Smith RN  Safety Promotion/Fall Prevention: safety round/check completed     Problem: Skin Injury Risk Increased  Goal: Skin Health and Integrity  Outcome: Progressing  Intervention: Optimize Skin Protection  Recent Flowsheet Documentation  Taken 7/18/2025 0400 by Tonya Smith RN  Head of Bed (HOB) Positioning: HOB at 30-45 degrees  Taken 7/18/2025 0200 by Tonya Smith RN  Head of Bed (HOB) Positioning: HOB at 30-45 degrees  Taken 7/18/2025 0000 by Tonya Smith RN  Activity Management: bedrest  Pressure Reduction Techniques:   weight shift assistance provided   frequent weight shift encouraged   heels elevated off bed   pressure points protected  Head of Bed (HOB) Positioning: HOB at 30-45 degrees  Pressure Reduction Devices:   specialty bed utilized   pressure-redistributing mattress utilized   positioning supports utilized   heel offloading device utilized  Skin Protection:   transparent dressing maintained   skin sealant/moisture barrier applied   silicone foam dressing in place   pulse  oximeter probe site changed   incontinence pads utilized   drying agents applied   Goal Outcome Evaluation:  Plan of Care Reviewed With: patient        Progress: declining

## 2025-07-18 NOTE — PAYOR COMM NOTE
"[facility]  NPI:[id number]    Utilization Review  Contact: Vika Tejada RN  Phone: [phone]  Fax:[phone]    INITIATE INPATIENT AUTHORIZATION  Edgar Stokes (80 y.o. Female)       Date of Birth   1945    Social Security Number       Address   [address] [address]    Home Phone   [phone]    MRN   [id number]       Mormonism   Mandaen    Marital Status                               Admission Date   7/17/2025    Admission Type   Emergency    Admitting Provider   Anoop Yu MD    Attending Provider   Rito Brown MD    Department, Room/Bed   [facility], CC02/       Discharge Date       Discharge Disposition       Discharge Destination                                 Attending Provider: Rito Brown MD    Allergies: Z-mary [Azithromycin], Amoxicillin, Cortisone, Fish-derived Products, Iodinated Contrast Media, Iodine, Lidocaine, Morphine, Nystatin, Other, Sulfa Antibiotics, Verapamil    Isolation: None   Infection: None   Code Status: No CPR    Ht: 152.4 cm (60\")   Wt: 60.3 kg (132 lb 15 oz)    Admission Cmt: None   Principal Problem: Acute on chronic respiratory failure with hypoxia [J96.21]                   Active Insurance as of 7/17/2025       Primary Coverage       Payor Plan Insurance Group Employer/Plan Group    ANTHEM MEDICARE REPLACEMENT ANTHEM MEDICARE ADVANTAGE PPO [id number]       Payor Plan Address Payor Plan Phone Number Payor Plan Fax Number Effective Dates    [address] [phone]  1/1/2021 - None Entered    [facility] [id number]         Subscriber Name Subscriber Birth Date Member ID       EDGAR STOKES 1945 [id number]                     Emergency Contacts        (Rel.) Home Phone Work Phone Mobile Phone    Ava Andersen (Daughter) [phone] -- --                 History & Physical        Anoop Yu MD at 07/17/25 0608        " "  Hospitalist History and Physical        Patient Identification  Name: Edgar Cotto  Age/Sex: 80 y.o. female  :  1945        MRN: [id number]  Visit Number: [id number]  Admit Date: 2025   PCP: Abbie Hillman DO          Chief complaint respiratory distress    History of Present Illness:  Patient is a 80 y.o. female with history of anxiety, arthritis, HTN, HLD, meniere's disease, oral candidiasis, vocal cord dysfunction, and ILD which her daughter describes as \"stiff lungs\", who presents with reports of rapidly worsening shortness of breath over the last couple days that progressed to respiratory distress by the time she was brought to the ED. Patient's daughter states she herself had a viral URI earlier this week, so she presumes the patient caught it from her. She has been coughing more frequently than usual and bringing up thick gray sputum. She is on 3L NC at baseline, but even with supplemental oxygen on today, her O2 sat was dropping to the 30s per her daughter. She denies any fever, chills, chest pain or lower extremity edema. She does report feeling congested in her chest. In the ED, patient was afebrile but tachycardic up to 130 bpm and tachypneic up to 32 breaths per minute. O2 sat was as low as 63% on home 3L NC. ABG showed severe hypoxia with PO2 36 and sat 67% on 4L, with PCO2 elevated at 45 but pH was compensated at 7.374. Troponin was 21 with repeat 44. BNP was elevated at 3718. Na 130, K 3.5, Cl 91, bicarb 20.8, AG 18, BUN 12, cr 0.71, glucose 207, albumin 3, LFT's normal. Lactate was 5.7 with reflex 2.1. WBC was 13 with 86% neutrophils. Both COVID-19/flu swabs and respiratory PCR swabs were negative. CXR was read by ED as pneumonia with interstitial edema, though per my review she has no pleural effusions and interstitial changes could also be due to her underlying ILD. She received IV lasix and duoneb in the ED. She was placed on bipap and repeat ABG shows overall improvement " with pH 7.388 even though CO2 is up to 50, PO2 102 so turning down Fio2 which will help with CO2, and bicarb 30. Current settings FiO2 60 (going down to 45-50), rate 20 (going up to 24), IPAP 18 and EPAP 8 (going down to 6). Patient is being admitted to the CCU for further management.     Review of Systems  Review of Systems   Constitutional:  Positive for activity change. Negative for chills and fever.   Respiratory:  Positive for cough and shortness of breath. Negative for wheezing.    Cardiovascular:  Negative for chest pain, palpitations and leg swelling.   Gastrointestinal:  Negative for abdominal distention, abdominal pain, constipation, diarrhea, nausea and vomiting.   Genitourinary:  Negative for difficulty urinating and dysuria.       History  Past Medical History:   Diagnosis Date    Abdominal distension     Allergic rhinitis     Anxiety     Arthritis     Closed nondisplaced fracture of surgical neck of left humerus with routine healing 3/9/2021    Cough     Dry eyes     Dyslipidemia     Dysuria     Hyperlipidemia     Hypertension     Meniere's disease     Mitral and aortic valve disease     Oral candidiasis     Osteoarthritis     Stomatitis     URI, acute     Vaginal candidiasis     Vocal cord dysfunction      Past Surgical History:   Procedure Laterality Date    CHOLECYSTECTOMY      COLONOSCOPY      DILATATION AND CURETTAGE      HEMORRHOIDECTOMY      HERNIA REPAIR      SHOULDER SURGERY      SKIN CANCER EXCISION      on Nose    TONSILLECTOMY       Family History   Problem Relation Age of Onset    Diabetes Mother     Cancer Father     Liver disease Sister     Diabetes Sister     Liver disease Brother     Diabetes Brother     Diabetes Daughter     Cancer Other     Liver disease Other     Breast cancer Neg Hx      Social History     Tobacco Use    Smoking status: Never     Passive exposure: Never    Smokeless tobacco: Never    Tobacco comments:     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    Physical Exam:  Physical Exam  Constitutional:       Comments: Elderly female, appears acutely ill but no longer in distress, resting on bipap.    HENT:      Head: Normocephalic and atraumatic.      Right Ear: External ear normal.      Left Ear: External ear normal.      Nose:      Comments: Bipap mask in place     Mouth/Throat:      Comments: Bipap mask in place  Eyes:      Extraocular Movements: Extraocular movements intact.      Conjunctiva/sclera: Conjunctivae normal.      Pupils: Pupils are equal, round, and reactive to light.   Cardiovascular:      Rate and Rhythm: Regular rhythm. Tachycardia present.      Pulses: Normal pulses.      Heart sounds: Normal heart sounds. No murmur heard.  Pulmonary:      Comments: Bipap mask in place. Coarse breath sounds bilaterally. Some velcro like rales appreciated on right side of chest more so than left.   Abdominal:      General: Abdomen is flat. Bowel sounds are normal. There is no distension.      Palpations: Abdomen is soft.      Tenderness: There is no abdominal tenderness.   Musculoskeletal:      Cervical back: Normal range of motion and neck supple. No tenderness.      Right lower leg: No edema.      Left lower leg: No edema.   Lymphadenopathy:      Cervical: No cervical adenopathy.   Skin:     General: Skin is warm and dry.      Capillary Refill: Capillary refill takes less than 2 seconds.      Coloration: Skin is not jaundiced.   Neurological:      General: No focal  "deficit present.      Mental Status: She is oriented to person, place, and time.   Psychiatric:         Mood and Affect: Mood normal.         Behavior: Behavior normal.           Results Review:       Lab Results:  Results from last 7 days   Lab Units 07/17/25  1859   WBC 10*3/mm3 13.12*   HEMOGLOBIN g/dL 12.9   PLATELETS 10*3/mm3 234         Results from last 7 days   Lab Units 07/17/25  1859   SODIUM mmol/L 130*   POTASSIUM mmol/L 3.5   CHLORIDE mmol/L 91*   CO2 mmol/L 20.8*   BUN mg/dL 12.2   CREATININE mg/dL 0.71   CALCIUM mg/dL 9.1   GLUCOSE mg/dL 207*         No results found for: \"HGBA1C\"  Results from last 7 days   Lab Units 07/17/25  1859   BILIRUBIN mg/dL 0.9   ALK PHOS U/L 83   AST (SGOT) U/L 24   ALT (SGPT) U/L 10     Results from last 7 days   Lab Units 07/17/25  2143 07/17/25  1859   HSTROP T ng/L 44* 21*             Results from last 7 days   Lab Units 07/17/25  2152   PH, ARTERIAL pH units 7.388   PO2 ART mm Hg 102.0   PCO2, ARTERIAL mm Hg 50.6*   HCO3 ART mmol/L 30.4*       I have reviewed the patient's laboratory results.    Imaging:  Imaging Results (Last 72 Hours)       Procedure Component Value Units Date/Time    XR Chest 1 View - Preliminary [[id number]] Resulted: 07/17/25 2156     Updated: 07/17/25 2156    This result has not been signed. Information might be incomplete.              I have personally reviewed the patient's radiologic imaging.        EKG:  Sinus tachycardia, , QTc 421  Inferior infarct , age undetermined  Anterolateral infarct (cited on or before 06-Dec-2018)  Abnormal ECG  When compared with ECG of 08-Jun-2024 07:48,  Questionable change in initial forces of Anterolateral leads  Confirmed by Shayna Coleman ([id number]) on 7/17/2025 9:19:59 PM    I have personally reviewed the patient's EKG. Both anterolateral and inferior Q waves were present on prior EKG's from 6/2024 and 5/2021        Assessment & Plan    - Acute on chronic respiratory failure with hypoxia: suspect secondary to " bilateral pneumonia superimposed on ILD. Also question of CHF though clinically she denies orthopnea or lower extremity edema and suspect BNP and troponin are both elevated secondary to severity of hypoxia on presentation. Meets septic shock criteria at time of admission with tachycardia, tachypnea, leukocytosis and lactic acid >4. Did not receive IV fluid bolus due to concerns for CHF, but this is felt to be less likely after examination and review of chart though not completely ruled out. Furthermore, lactate improved significantly from 5.7 to 2.1 after correcting hypoxia, so suspect hypoxia is the primary  of that as well. Treat pneumonia with IV rocephin and doxycycline. Check CRP, procal. Obtain blood cultures. Will order scheduled atrovent while avoiding albuterol due to tachycardia. Unfortunately patient is reportedly very allergic to all steroids. Received dose of IV lasix in the ED but has not had any urine output since. Will give a 500cc IV fluid bolus at this time. Obtain ECHO in the morning. Settings have been adjusted by me since last ABG. Will repeat ABG in about an hour and then again in the morning. Consult pulmonology for assistance with respiratory failure and bipap management.   - Troponin, BNP elevation: again, suspect secondary to severe hypoxia. Troponin trending up from 21 to 44 on repeat, however. If continues to rise on 3rd check, will start IV heparin infusion empirically. See above regarding BNP discussion. Hold off on further diuresis. Obtain ECHO in the morning to evaluate further.   - Low-normal K+ 3.5: replace per protocol. Check mag and replace if indicated.   - Hyperglycemia: likely reactive. A1c 5.7 which is in pre-diabetic range. Recommend lifestyle modification. Continue to monitor with daily labs.     DVT Prophylaxis: SQ heparin    Code Status: full    Estimated Length of Stay >2 midnights    I discussed the patient's findings, assessment and plan with the patient, her  daughter at bedside, and ED provider Dr Huynh    Patient is high risk due to acute on chronic hypoxic respiratory failure, septic shock, pneumonia, superimposed on ILD, troponin and BNP elevation though suspect secondary to severity of hypoxia    Anoop Yu MD  07/17/25  22:48 EDT      Electronically signed by Anoop Yu MD at 07/17/25 2311          Emergency Department Notes        Brittnay Huynh MD at 07/17/25 1900          Subjective   History of Present Illness  Chari Cotto is an 80-year-old female presenting to the emergency department for respiratory failure.  Patient's family at bedside reports she has had nonproductive cough and congestion for multiple days.  No fevers or other infectious symptoms.  Patient denying any chest pain or other complaints.  On arrival satting in the 60s.  Denies any lower extremity swelling.  No abdominal distention.  No orthopnea.  No prior history of heart failure per family at bedside.    Review of Systems   Constitutional: Negative.  Negative for activity change and fever.   HENT:  Positive for congestion.    Respiratory:  Positive for cough and shortness of breath.    Cardiovascular: Negative.  Negative for chest pain.   Gastrointestinal: Negative.  Negative for abdominal pain.   Endocrine: Negative.    Genitourinary: Negative.  Negative for dysuria.   Skin: Negative.    Neurological: Negative.  Negative for dizziness, weakness and light-headedness.   Psychiatric/Behavioral: Negative.     All other systems reviewed and are negative.      Past Medical History:   Diagnosis Date    Abdominal distension     Allergic rhinitis     Anxiety     Arthritis     Closed nondisplaced fracture of surgical neck of left humerus with routine healing 3/9/2021    Cough     Dry eyes     Dyslipidemia     Dysuria     Hyperlipidemia     Hypertension     Meniere's disease     Mitral and aortic valve disease     Oral candidiasis     Osteoarthritis     Stomatitis      URI, acute     Vaginal candidiasis     Vocal cord dysfunction        Allergies   Allergen Reactions    Z-Ponce [Azithromycin] Swelling    Amoxicillin Other (See Comments)    Cortisone Swelling     Face swelling      Fish-Derived Products      SEAFOOD    Iodinated Contrast Media     Iodine Itching    Lidocaine Other (See Comments)     Shaking, fever when she had mouth numbed at the dentist    Morphine Mental Status Change    Nystatin Other (See Comments)    Other     Sulfa Antibiotics Other (See Comments)    Verapamil Other (See Comments)     Feel funny        Past Surgical History:   Procedure Laterality Date    CHOLECYSTECTOMY      COLONOSCOPY      DILATATION AND CURETTAGE      HEMORRHOIDECTOMY      HERNIA REPAIR      SHOULDER SURGERY      SKIN CANCER EXCISION      on Nose    TONSILLECTOMY         Family History   Problem Relation Age of Onset    Diabetes Mother     Cancer Father     Liver disease Sister     Diabetes Sister     Liver disease Brother     Diabetes Brother     Diabetes Daughter     Cancer Other     Liver disease Other     Breast cancer Neg Hx        Social History     Socioeconomic History    Marital status:    Tobacco Use    Smoking status: Never     Passive exposure: Never    Smokeless tobacco: Never    Tobacco comments:     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   HENT:      Head: Normocephalic and atraumatic.      Right Ear: Tympanic membrane normal.      Left Ear: Tympanic membrane normal.      Nose: Nose normal. No congestion or rhinorrhea.      Mouth/Throat:      Mouth: Mucous membranes are moist.      Pharynx: No oropharyngeal exudate or posterior oropharyngeal erythema.   Eyes:      Extraocular Movements: Extraocular movements intact.      Pupils:  Pupils are equal, round, and reactive to light.   Cardiovascular:      Rate and Rhythm: Normal rate and regular rhythm.   Pulmonary:      Effort: Tachypnea and respiratory distress present.      Breath sounds: Decreased breath sounds present.   Abdominal:      General: Abdomen is flat. Bowel sounds are normal. There is no distension.   Musculoskeletal:         General: No swelling. Normal range of motion.      Cervical back: Normal range of motion. No rigidity or tenderness.   Skin:     General: Skin is warm.      Capillary Refill: Capillary refill takes less than 2 seconds.      Coloration: Skin is not jaundiced or pale.   Neurological:      General: No focal deficit present.      Mental Status: She is alert and oriented to person, place, and time.         Procedures          ED Course  ED Course as of 07/17/25 2241   Thu Jul 17, 2025 2237 Patient care handed off to me pending final dispo.  Patient to be admitted for respiratory failure.  Of note patient has a lactic acidosis of 5.7 however likely secondary to hypoxic respiratory failure on arrival satting in the 60s.  Currently on BiPAP and satting 93 to 94%.  Of note patient started on Rocephin given evidence of pneumonia seen on chest x-ray.  Of note may also warrant cardiac workup given elevated BNP and NSTEMI/Trop elevation likely type II in setting of respiratory failure. Admitted in stable condition. Electronically signed by Brittany Huynh MD, 07/17/25, 10:39 PM EDT.  [LS]      ED Course User Index  [LS] Brittany Huynh MD                                                       Medical Decision Making  Problems Addressed:  Acute respiratory failure with hypoxia: complicated acute illness or injury  Heart failure, unspecified HF chronicity, unspecified heart failure type: complicated acute illness or injury  NSTEMI (non-ST elevated myocardial infarction): complicated acute illness or injury  Pneumonia due to infectious organism, unspecified  laterality, unspecified part of lung: complicated acute illness or injury    Amount and/or Complexity of Data Reviewed  Labs: ordered.  Radiology: ordered and independent interpretation performed.  ECG/medicine tests: ordered.     Details:   Echocardiogram Findings    Left Ventricle Left ventricular ejection fraction appears to be 56 - 60%.   Normal left ventricular cavity size and wall thickness noted. Left ventricular diastolic function is consistent with (grade I) impaired relaxation.  Right Ventricle Normal right ventricular wall thickness, systolic function and septal motion noted. The right ventricular cavity is mildly dilated.  Left Atrium Normal left atrial size and volume noted.  Right Atrium Normal right atrial cavity size noted.  Aortic Valve The aortic valve is abnormal in structure. There is mild calcification of the aortic valve. The aortic valve appears trileaflet. Mild aortic valve regurgitation is present. No aortic valve stenosis is present.  Mitral Valve The mitral valve is structurally normal with no regurgitation or significant stenosis present.  Tricuspid Valve The tricuspid valve is structurally normal with no significant stenosis present. Trace tricuspid valve regurgitation is present. Estimated right ventricular systolic pressure from tricuspid regurgitation is normal (<35 mmHg).  Pulmonic Valve The pulmonic valve is structurally normal with no regurgitation or significant stenosis present.  Greater Vessels No dilation of the aortic root is present.  Pericardium The pericardium is normal. There is no evidence of pericardial effusion. .        Risk  Prescription drug management.  Decision regarding hospitalization.        Final diagnoses:   Pneumonia due to infectious organism, unspecified laterality, unspecified part of lung   Acute respiratory failure with hypoxia   Heart failure, unspecified HF chronicity, unspecified heart failure type   NSTEMI (non-ST elevated myocardial infarction)        ED Disposition  ED Disposition       ED Disposition   Decision to Admit    Condition   --    Comment   Level of Care: Critical Care [6]   Diagnosis: Acute on chronic respiratory failure with hypoxia [8478292]   Admitting Physician: BEN AGUILERA [[id number]]   Attending Physician: BEN AGUILERA [[id number]]   Certification: I Certify That Inpatient Hospital Services Are Medically Necessary For Greater Than 2 Midnights                 No follow-up provider specified.       Medication List      No changes were made to your prescriptions during this visit.            Brittany Huynh MD  07/17/25 2241      Electronically signed by Brittany Huynh MD at 07/17/25 2241       Facility-Administered Medications as of 7/18/2025   Medication Dose Route Frequency Provider Last Rate Last Admin    sennosides-docusate (PERICOLACE) 8.6-50 MG per tablet 2 tablet  2 tablet Oral BID Ben Aguilera MD        And    polyethylene glycol (MIRALAX) packet 17 g  17 g Oral Daily PRN Ben Aguilera MD        And    bisacodyl (DULCOLAX) EC tablet 5 mg  5 mg Oral Daily PRN Ben Aguilera MD        And    bisacodyl (DULCOLAX) suppository 10 mg  10 mg Rectal Daily PRN Ben Aguilera MD        [COMPLETED] cefTRIAXone (ROCEPHIN) 1,000 mg in sodium chloride 0.9 % 100 mL IVPB-VTB  1,000 mg Intravenous Once Brittany Huynh MD   Stopped at 07/17/25 2238    cefTRIAXone (ROCEPHIN) 1,000 mg in sodium chloride 0.9 % 100 mL IVPB-VTB  1,000 mg Intravenous Q24H Ben Aguilera MD        doxycycline (VIBRAMYCIN) 100 mg in sodium chloride 0.9 % 100 mL IVPB-VTB  100 mg Intravenous Q12H Ben Aguilera MD   100 mg at 07/18/25 0927    [COMPLETED] furosemide (LASIX) injection 40 mg  40 mg Intravenous Once Raji Butler MD   40 mg at 07/17/25 1910    heparin (porcine) 5000 UNIT/ML injection 5,000 Units  5,000 Units Subcutaneous Q12H Ben Aguilera MD   5,000 Units at 07/18/25 0927     ipratropium (ATROVENT) nebulizer solution 0.5 mg  0.5 mg Nebulization Q4H - RT Rito Lam MD   0.5 mg at 07/18/25 0530    [COMPLETED] ipratropium-albuterol (DUO-NEB) nebulizer solution 3 mL  3 mL Nebulization Once Brittany Huynh MD   3 mL at 07/17/25 2002    Magnesium Cardiology Dose Replacement - Follow Nurse / BPA Driven Protocol   Not Applicable PRN Anoop Yu MD        [COMPLETED] magnesium sulfate 2g/50 mL (PREMIX) infusion  2 g Intravenous Q2H Anoop Yu MD   2 g at 07/18/25 0456    mupirocin (BACTROBAN) 2 % nasal ointment 1 Application  1 Application Each Nare BID Anoop Yu MD   1 Application at 07/18/25 0928    nitroglycerin (NITROSTAT) SL tablet 0.4 mg  0.4 mg Sublingual Q5 Min PRN Anoop Yu MD        [COMPLETED] potassium chloride 10 mEq in 100 mL IVPB  10 mEq Intravenous Q1H Anoop Yu  mL/hr at 07/18/25 0456 10 mEq at 07/18/25 0456    Potassium Replacement - Follow Nurse / BPA Driven Protocol   Not Applicable PRAnoop Tavarez MD        [COMPLETED] sodium chloride 0.9 % bolus 500 mL  500 mL Intravenous Once Anoop Yu MD 1,000 mL/hr at 07/17/25 2352 500 mL at 07/17/25 2352    sodium chloride 0.9 % flush 10 mL  10 mL Intravenous PRN Anoop Yu MD        sodium chloride 0.9 % flush 10 mL  10 mL Intravenous Q12H Anoop Yu MD   10 mL at 07/18/25 0928    sodium chloride 0.9 % flush 10 mL  10 mL Intravenous PRN Anoop Yu MD        sodium chloride 0.9 % flush 10 mL  10 mL Intravenous Q12H Anoop Yu MD   10 mL at 07/18/25 0928    sodium chloride 0.9 % flush 10 mL  10 mL Intravenous PRN Anoop Yu MD        sodium chloride 0.9 % infusion 40 mL  40 mL Intravenous PRN Anoop Yu MD        sodium chloride 0.9 % infusion 40 mL  40 mL Intravenous PRN Anoop Yu MD            Physician Progress Notes (all)        Anoop Yu  MD Marvin at 07/18/25 0627          Hospitalist Update:    Notified by RN that patient was persistently tachypneic in the 30-40 range in spite of bipap. ABG obtained and overall improved but patient is exhibiting increased work of breathing with use of abdominal accessory muscles. Concern that patient will ultimately frank out if this persists. Went to bedside to speak with patient and her daughter at bedside. At that time, RR was mainly in the low 30s and HR was in the 110s, though her RN informed me that she has had a few runs of pSVT up to the 160s. Patient is currently sleeping which RN states is the first time she has done so since arriving on the floor. She is easy to awaken but appears very weak. I explained to her daughter that if her respirations and/or tachycardia worsen, will have to seriously consider intubating her. I also expressed my concerns to her that given her underlying interstitial lung disease and advanced age, there is a strong possibility that we will not be able to wean her from the ventilator if she goes on it. Her daughter is not sure if she wants to put the patient through this, as she would not want to be on the ventilator long term or ultimately require tracheostomy. She wants to speak with her brother first before making a decision about intubation status, however. Will consult palliative care to help facilitate this discussion.     Electronically signed by Anoop Yu MD at 07/18/25 0700       Progress Notes signed by Rito Lam MD at 07/18/25 0253           [facility] Phys[facility] - Brief Progress Note  PERMANENT  07/18/2025 02:50    [facility] - [place]  [place] - CCU - 10 - JACOB APARICIO ([facility])    ASTER STOKES    Date of Service 07/18/2025 02:50    HPI/Events of Note e alert d/t svt ~160 not sustained; pt now back in st 115. pt however has persistant tachypnea ~30-40 rr in spite of bipap which was just adjusted by RT. will check f/u abg and if she is not  improving will try avaps. also if svt   recurrent will start amiodarone per protocol.      Interventions Major-Arrhythmia - evaluation and management, Hypercarbia - evaluation and management, Respiratory failure - evaluation and management  Intermediate-Communication with other healthcare providers and/or family        Electronically Signed by: Rito Lam) on 07/18/2025 02:53    Electronically signed by Rito Lam MD at 07/18/25 0253       Progress Notes signed by Rito Lam MD at 07/18/25 0041           [facility] Nurse Practitioner - Brief Progress Note  PERMANENT  07/17/2025 23:38    [facility] [place]    ASTER STOKES    Date of Service 07/17/2025 23:38    HPI/Events of Note [facility] Provider Assessment Note    80 year old female admitted to the ICU from the ED for Respiratory Failure     Pt presented to the ED with shortness of breath and nonproductive cough with congestion. Noted to have oxygen desaturation with tachycardia and tachypnea. Labs note lactic acidosis. Was placed on BiPAP due to respiratory failure. CXR indicates PNA. She   was cultured and started on Abx. Imaging also notes pulmonary congestion - and was given diuretics.     PMH: HLD, HTN, Arthritis, OA, Anxiety,   PSH: Cholecystectomy, Tonsillectomy     CXR:   Diffuse increased moderate interstitial opacities throughout the bilateral lungs with peripheral predominance which may reflect edema, atypical pneumonia or fibrosis.  Further evaluation with CT may be obtained.    Recent Vital Signs: Temp 37.4 C, , B/P 116 / 63, RR 30, Oxygen Saturation 91% on NIV     Assessment and Plan:    1. ) Respiratory Failure / PNA   - Pt was placed on BiPAP   - ABG 7.388 / 50.6 / 102 / 30.4   - WBC 13.12   - Cultured  - Started on Rocephin and Doxycycline   - Lactic acid 2.1 down from 5.7   - Bronchodilators  - Pt reportedly allergic to steroids   - COVID and Influenza are  negative   - Ordered legionella and strep pneumo urine antigen     2. ) Pulmonary congestion   - Given lasix   - Monitor I and O   - Conservative IV fluids   - BNP 3178   - ECHO is pending   - Troponin 45 down from 44 ng/L - no ST segment elevation on ECG     __Y___   Video Assessment performed  __Y___   Most recent labs reviewed  __Y___   Vital Signs reviewed  __Y___   Best Practices addressed:                 VTE prophylaxis: Heparin Sub Q                 SUP (when indicated): N/A                  Current Glucose: Blood glucose goal < 180                       Please [facility] bedside physician when present or [facility] if glc > 180 X 2    __Y___     Orders written    Contact [facility] for any needs if bedside physician is not present.       The patient is critically ill with respiratory failure and overwhelming infection  and requires high complexity decision making for assessment and support including extensive interpretation of multiple databases; Critical care; 25 minutes total   evaluation time     Care during the described time interval was provided by me.  I have reviewed this patient's available data, including medical history, events of note, physical examination and test results as part of my evaluation.    Interventions Major-Infection - evaluation and management, Respiratory failure - evaluation and management, Sepsis - evaluation and management  Intermediate-Best-practice therapies (e.g. VTE, beta blocker, etc.), Communication with other healthcare providers and/or family, Diagnostic test evaluation, Medication change / dose adjustment        Electronically Signed by: Drew Reynolds (NP) on 07/17/2025 23:58    Annotated By: Rito Lam)    Date: 07/18/25 00:41  vidio assessment not done: camera not workinig; vs t 99.4 rr 30 bp 108/55 , prule 116 sat 90; chart and above note reveiewed will cont current treatment plan    Electronically signed by Rito Lam MD at 07/18/25 0041        Consult Notes (all)    No notes of this type exist for this encounter.

## 2025-07-18 NOTE — PLAN OF CARE
Goal Outcome Evaluation:           Progress: declining  Outcome Evaluation: Pt transitioned to comfort measures. Currently has nonrebreather in place for comfort. PRN meds administered. Pt's daughter and other family  members present at bedside throughout shift.    Problem: Noninvasive Ventilation Acute  Goal: Effective Unassisted Ventilation and Oxygenation  Outcome: Progressing     Problem: Adult Inpatient Plan of Care  Goal: Plan of Care Review  Outcome: Progressing  Flowsheets (Taken 7/18/2025 1401)  Progress: declining  Outcome Evaluation: Pt transitioned to comfort measures. Currently has nonrebreather in place for comfort. PRN meds administered. Pt's daughter and other family  members present at bedside throughout shift.  Goal: Patient-Specific Goal (Individualized)  Outcome: Progressing  Goal: Absence of Hospital-Acquired Illness or Injury  Outcome: Progressing  Intervention: Identify and Manage Fall Risk  Recent Flowsheet Documentation  Taken 7/18/2025 1400 by Evette Petit RN  Safety Promotion/Fall Prevention:   fall prevention program maintained   safety round/check completed  Taken 7/18/2025 1300 by Evette Petit RN  Safety Promotion/Fall Prevention:   fall prevention program maintained   safety round/check completed  Taken 7/18/2025 1200 by Evette Petit RN  Safety Promotion/Fall Prevention:   fall prevention program maintained   safety round/check completed  Taken 7/18/2025 1100 by Evette Petit RN  Safety Promotion/Fall Prevention:   fall prevention program maintained   safety round/check completed  Taken 7/18/2025 1000 by Evette Petit RN  Safety Promotion/Fall Prevention:   fall prevention program maintained   safety round/check completed  Taken 7/18/2025 0900 by Evette Petit RN  Safety Promotion/Fall Prevention:   fall prevention program maintained   safety round/check completed  Taken 7/18/2025 0800 by Evette Petit RN  Safety Promotion/Fall Prevention:   fall prevention program maintained   safety  round/check completed  Taken 7/18/2025 0700 by Evette Petit RN  Safety Promotion/Fall Prevention:   fall prevention program maintained   safety round/check completed  Intervention: Prevent Skin Injury  Recent Flowsheet Documentation  Taken 7/18/2025 1400 by Evette Petit RN  Body Position:   turned   left   side-lying  Taken 7/18/2025 1200 by Evette Petit RN  Body Position:   turned   right   side-lying  Taken 7/18/2025 1000 by Evette Petit RN  Body Position:   turned   left   side-lying  Taken 7/18/2025 0800 by Evette Petit RN  Body Position:   turned   right   side-lying  Skin Protection:   drying agents applied   incontinence pads utilized   pulse oximeter probe site changed   silicone foam dressing in place   skin sealant/moisture barrier applied   transparent dressing maintained  Intervention: Prevent and Manage VTE (Venous Thromboembolism) Risk  Recent Flowsheet Documentation  Taken 7/18/2025 0800 by Evette Petit RN  VTE Prevention/Management: (subq heparin) other (see comments)  Goal: Optimal Comfort and Wellbeing  Outcome: Progressing  Goal: Readiness for Transition of Care  Outcome: Progressing

## 2025-07-18 NOTE — PROGRESS NOTES
Hospitalist Update:    Notified by RN that patient was persistently tachypneic in the 30-40 range in spite of bipap. ABG obtained and overall improved but patient is exhibiting increased work of breathing with use of abdominal accessory muscles. Concern that patient will ultimately frank out if this persists. Went to bedside to speak with patient and her daughter at bedside. At that time, RR was mainly in the low 30s and HR was in the 110s, though her RN informed me that she has had a few runs of pSVT up to the 160s. Patient is currently sleeping which RN states is the first time she has done so since arriving on the floor. She is easy to awaken but appears very weak. I explained to her daughter that if her respirations and/or tachycardia worsen, will have to seriously consider intubating her. I also expressed my concerns to her that given her underlying interstitial lung disease and advanced age, there is a strong possibility that we will not be able to wean her from the ventilator if she goes on it. Her daughter is not sure if she wants to put the patient through this, as she would not want to be on the ventilator long term or ultimately require tracheostomy. She wants to speak with her brother first before making a decision about intubation status, however. Will consult palliative care to help facilitate this discussion.

## 2025-07-18 NOTE — CASE MANAGEMENT/SOCIAL WORK
Discharge Planning Assessment  [facility]     Patient Name: Edgar Cotto  MRN: [id number]  Today's Date: 7/18/2025    Admit Date: 7/17/2025    Plan: SS received a consult for d/c planning r/t age. SS followed up on this date. Pt lives at home and daughter lives with her. PCP is Abbie Hillman. Patient does not have POA or Living Will. Pt does not utilize home health services at this time. Pt has oxygen 2.5 L, Scooter, BSC, HB, Lift chair, shower bench and wheelchair from [facility]. Pt and family are unsure of discharge plan at this time. SS to follow and assist with discharge planning.   Discharge Needs Assessment       Row Name 07/18/25 1400       Living Environment    People in Home child(eleonora), adult    Current Living Arrangements home    Potentially Unsafe Housing Conditions none    Primary Care Provided by self    Provides Primary Care For no one, unable/limited ability to care for self    Family Caregiver if Needed child(eleonora), adult    Quality of Family Relationships helpful;involved;supportive    Able to Return to Prior Arrangements yes       Resource/Environmental Concerns    Resource/Environmental Concerns none    Transportation Concerns none       Transition Planning    Patient/Family Anticipates Transition to home with family    Patient/Family Anticipated Services at Transition none    Transportation Anticipated family or friend will provide       Discharge Needs Assessment    Equipment Currently Used at Home oxygen;commode;bath bench;lift device;wheelchair    Anticipated Changes Related to Illness none    Equipment Needed After Discharge none         Discharge Plan       Row Name 07/18/25 1400       Plan    Plan SS received a consult for d/c planning r/t age. SS followed up on this date. Pt lives at home and daughter lives with her. PCP is Abbie Hillman. Patient does not have POA or Living Will. Pt does not utilize home health services at this time. Pt has oxygen 2.5 L, Scooter, BSC, HB, Lift  chair, shower bench and wheelchair from [facility]. Pt and family are unsure of discharge plan at this time. SS to follow and assist with discharge planning.    Patient/Family in Agreement with Plan yes        Continued Care and Services - Admitted Since 7/17/2025    No active coordination exists.       Expected Discharge Date and Time       Expected Discharge Date Expected Discharge Time    Jul 21, 2025       Demographic Summary       Row Name 07/18/25 1400       General Information    Admission Type inpatient    Referral Source nursing    Reason for Consult discharge planning  SS received a consult for d/c planning r/t age.      ENRIQUE FoxW

## 2025-07-18 NOTE — H&P
"Hospitalist History and Physical        Patient Identification  Name: Edgar Cotto  Age/Sex: 80 y.o. female  :  1945        MRN: [id number]  Visit Number: [id number]  Admit Date: 2025   PCP: Abbie Hillman DO          Chief complaint respiratory distress    History of Present Illness:  Patient is a 80 y.o. female with history of anxiety, arthritis, HTN, HLD, meniere's disease, oral candidiasis, vocal cord dysfunction, and ILD which her daughter describes as \"stiff lungs\", who presents with reports of rapidly worsening shortness of breath over the last couple days that progressed to respiratory distress by the time she was brought to the ED. Patient's daughter states she herself had a viral URI earlier this week, so she presumes the patient caught it from her. She has been coughing more frequently than usual and bringing up thick gray sputum. She is on 3L NC at baseline, but even with supplemental oxygen on today, her O2 sat was dropping to the 30s per her daughter. She denies any fever, chills, chest pain or lower extremity edema. She does report feeling congested in her chest. In the ED, patient was afebrile but tachycardic up to 130 bpm and tachypneic up to 32 breaths per minute. O2 sat was as low as 63% on home 3L NC. ABG showed severe hypoxia with PO2 36 and sat 67% on 4L, with PCO2 elevated at 45 but pH was compensated at 7.374. Troponin was 21 with repeat 44. BNP was elevated at 3718. Na 130, K 3.5, Cl 91, bicarb 20.8, AG 18, BUN 12, cr 0.71, glucose 207, albumin 3, LFT's normal. Lactate was 5.7 with reflex 2.1. WBC was 13 with 86% neutrophils. Both COVID-19/flu swabs and respiratory PCR swabs were negative. CXR was read by ED as pneumonia with interstitial edema, though per my review she has no pleural effusions and interstitial changes could also be due to her underlying ILD. She received IV lasix and duoneb in the ED. She was placed on bipap and repeat ABG shows overall improvement " with pH 7.388 even though CO2 is up to 50, PO2 102 so turning down Fio2 which will help with CO2, and bicarb 30. Current settings FiO2 60 (going down to 45-50), rate 20 (going up to 24), IPAP 18 and EPAP 8 (going down to 6). Patient is being admitted to the CCU for further management.     Review of Systems  Review of Systems   Constitutional:  Positive for activity change. Negative for chills and fever.   Respiratory:  Positive for cough and shortness of breath. Negative for wheezing.    Cardiovascular:  Negative for chest pain, palpitations and leg swelling.   Gastrointestinal:  Negative for abdominal distention, abdominal pain, constipation, diarrhea, nausea and vomiting.   Genitourinary:  Negative for difficulty urinating and dysuria.       History  Past Medical History:   Diagnosis Date    Abdominal distension     Allergic rhinitis     Anxiety     Arthritis     Closed nondisplaced fracture of surgical neck of left humerus with routine healing 3/9/2021    Cough     Dry eyes     Dyslipidemia     Dysuria     Hyperlipidemia     Hypertension     Meniere's disease     Mitral and aortic valve disease     Oral candidiasis     Osteoarthritis     Stomatitis     URI, acute     Vaginal candidiasis     Vocal cord dysfunction      Past Surgical History:   Procedure Laterality Date    CHOLECYSTECTOMY      COLONOSCOPY      DILATATION AND CURETTAGE      HEMORRHOIDECTOMY      HERNIA REPAIR      SHOULDER SURGERY      SKIN CANCER EXCISION      on Nose    TONSILLECTOMY       Family History   Problem Relation Age of Onset    Diabetes Mother     Cancer Father     Liver disease Sister     Diabetes Sister     Liver disease Brother     Diabetes Brother     Diabetes Daughter     Cancer Other     Liver disease Other     Breast cancer Neg Hx      Social History     Tobacco Use    Smoking status: Never     Passive exposure: Never    Smokeless tobacco: Never    Tobacco comments:     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    Physical Exam:  Physical Exam  Constitutional:       Comments: Elderly female, appears acutely ill but no longer in distress, resting on bipap.    HENT:      Head: Normocephalic and atraumatic.      Right Ear: External ear normal.      Left Ear: External ear normal.      Nose:      Comments: Bipap mask in place     Mouth/Throat:      Comments: Bipap mask in place  Eyes:      Extraocular Movements: Extraocular movements intact.      Conjunctiva/sclera: Conjunctivae normal.      Pupils: Pupils are equal, round, and reactive to light.   Cardiovascular:      Rate and Rhythm: Regular rhythm. Tachycardia present.      Pulses: Normal pulses.      Heart sounds: Normal heart sounds. No murmur heard.  Pulmonary:      Comments: Bipap mask in place. Coarse breath sounds bilaterally. Some velcro like rales appreciated on right side of chest more so than left.   Abdominal:      General: Abdomen is flat. Bowel sounds are normal. There is no distension.      Palpations: Abdomen is soft.      Tenderness: There is no abdominal tenderness.   Musculoskeletal:      Cervical back: Normal range of motion and neck supple. No tenderness.      Right lower leg: No edema.      Left lower leg: No edema.   Lymphadenopathy:      Cervical: No cervical adenopathy.   Skin:     General: Skin is warm and dry.      Capillary Refill: Capillary refill takes less than 2 seconds.      Coloration: Skin is not jaundiced.   Neurological:      General: No focal  "deficit present.      Mental Status: She is oriented to person, place, and time.   Psychiatric:         Mood and Affect: Mood normal.         Behavior: Behavior normal.           Results Review:       Lab Results:  Results from last 7 days   Lab Units 07/17/25  1859   WBC 10*3/mm3 13.12*   HEMOGLOBIN g/dL 12.9   PLATELETS 10*3/mm3 234         Results from last 7 days   Lab Units 07/17/25  1859   SODIUM mmol/L 130*   POTASSIUM mmol/L 3.5   CHLORIDE mmol/L 91*   CO2 mmol/L 20.8*   BUN mg/dL 12.2   CREATININE mg/dL 0.71   CALCIUM mg/dL 9.1   GLUCOSE mg/dL 207*         No results found for: \"HGBA1C\"  Results from last 7 days   Lab Units 07/17/25  1859   BILIRUBIN mg/dL 0.9   ALK PHOS U/L 83   AST (SGOT) U/L 24   ALT (SGPT) U/L 10     Results from last 7 days   Lab Units 07/17/25  2143 07/17/25  1859   HSTROP T ng/L 44* 21*             Results from last 7 days   Lab Units 07/17/25  2152   PH, ARTERIAL pH units 7.388   PO2 ART mm Hg 102.0   PCO2, ARTERIAL mm Hg 50.6*   HCO3 ART mmol/L 30.4*       I have reviewed the patient's laboratory results.    Imaging:  Imaging Results (Last 72 Hours)       Procedure Component Value Units Date/Time    XR Chest 1 View - Preliminary [[id number]] Resulted: 07/17/25 2156     Updated: 07/17/25 2156    This result has not been signed. Information might be incomplete.              I have personally reviewed the patient's radiologic imaging.        EKG:  Sinus tachycardia, , QTc 421  Inferior infarct , age undetermined  Anterolateral infarct (cited on or before 06-Dec-2018)  Abnormal ECG  When compared with ECG of 08-Jun-2024 07:48,  Questionable change in initial forces of Anterolateral leads  Confirmed by Shayna Coleman ([id number]) on 7/17/2025 9:19:59 PM    I have personally reviewed the patient's EKG. Both anterolateral and inferior Q waves were present on prior EKG's from 6/2024 and 5/2021        Assessment & Plan     - Acute on chronic respiratory failure with hypoxia: suspect secondary " to bilateral pneumonia superimposed on ILD. Also question of CHF though clinically she denies orthopnea or lower extremity edema and suspect BNP and troponin are both elevated secondary to severity of hypoxia on presentation. Meets septic shock criteria at time of admission with tachycardia, tachypnea, leukocytosis and lactic acid >4. Did not receive IV fluid bolus due to concerns for CHF, but this is felt to be less likely after examination and review of chart though not completely ruled out. Furthermore, lactate improved significantly from 5.7 to 2.1 after correcting hypoxia, so suspect hypoxia is the primary  of that as well. Treat pneumonia with IV rocephin and doxycycline. Check CRP, procal. Obtain blood cultures. Will order scheduled atrovent while avoiding albuterol due to tachycardia. Unfortunately patient is reportedly very allergic to all steroids. Received dose of IV lasix in the ED but has not had any urine output since. Will give a 500cc IV fluid bolus at this time. Obtain ECHO in the morning. Settings have been adjusted by me since last ABG. Will repeat ABG in about an hour and then again in the morning. Consult pulmonology for assistance with respiratory failure and bipap management.   - Troponin, BNP elevation: again, suspect secondary to severe hypoxia. Troponin trending up from 21 to 44 on repeat, however. If continues to rise on 3rd check, will start IV heparin infusion empirically. See above regarding BNP discussion. Hold off on further diuresis. Obtain ECHO in the morning to evaluate further.   - Low-normal K+ 3.5: replace per protocol. Check mag and replace if indicated.   - Hyperglycemia: likely reactive. A1c 5.7 which is in pre-diabetic range. Recommend lifestyle modification. Continue to monitor with daily labs.     DVT Prophylaxis: SQ heparin    Code Status: full    Estimated Length of Stay >2 midnights    I discussed the patient's findings, assessment and plan with the patient, her  daughter at bedside, and ED provider Dr Huynh    Patient is high risk due to acute on chronic hypoxic respiratory failure, septic shock, pneumonia, superimposed on ILD, troponin and BNP elevation though suspect secondary to severity of hypoxia    Anoop Yu MD  07/17/25  22:48 EDT

## 2025-07-18 NOTE — CASE MANAGEMENT/SOCIAL WORK
Discharge Planning Assessment   [facility]     Patient Name: Edgar Cotto  MRN: [id number]  Today's Date: 7/18/2025    Admit Date: 7/17/2025    Plan: SS received a call from Palliative Care who states pt has been transitioned to comfort measures on this date. SS to follow.       Discharge Plan       Row Name 07/18/25 1420       Plan    Plan SS received a call from Palliative Care who states pt has been transitioned to comfort measures on this date. SS to follow.        Continued Care and Services - Admitted Since 7/17/2025    No active coordination exists.       Expected Discharge Date and Time       Expected Discharge Date Expected Discharge Time    Jul 21, 2025          SAUL Fox

## 2025-07-18 NOTE — PROGRESS NOTES
[facility] Nurse Practitioner - Brief Progress Note  PERMANENT  07/17/2025 23:38    [facility] - CCU - [facility] - [place], [place] ([facility])    ASTER STOKES    Date of Service 07/17/2025 23:38    HPI/Events of Note [facility] Provider Assessment Note    80 year old female admitted to the ICU from the ED for Respiratory Failure     Pt presented to the ED with shortness of breath and nonproductive cough with congestion. Noted to have oxygen desaturation with tachycardia and tachypnea. Labs note lactic acidosis. Was placed on BiPAP due to respiratory failure. CXR indicates PNA. She   was cultured and started on Abx. Imaging also notes pulmonary congestion - and was given diuretics.     PMH: HLD, HTN, Arthritis, OA, Anxiety,   PSH: Cholecystectomy, Tonsillectomy     CXR:   Diffuse increased moderate interstitial opacities throughout the bilateral lungs with peripheral predominance which may reflect edema, atypical pneumonia or fibrosis.  Further evaluation with CT may be obtained.    Recent Vital Signs: Temp 37.4 C, , B/P 116 / 63, RR 30, Oxygen Saturation 91% on NIV     Assessment and Plan:    1. ) Respiratory Failure / PNA   - Pt was placed on BiPAP   - ABG 7.388 / 50.6 / 102 / 30.4   - WBC 13.12   - Cultured  - Started on Rocephin and Doxycycline   - Lactic acid 2.1 down from 5.7   - Bronchodilators  - Pt reportedly allergic to steroids   - COVID and Influenza are negative   - Ordered legionella and strep pneumo urine antigen     2. ) Pulmonary congestion   - Given lasix   - Monitor I and O   - Conservative IV fluids   - BNP 3178   - ECHO is pending   - Troponin 45 down from 44 ng/L - no ST segment elevation on ECG     __Y___   Video Assessment performed  __Y___   Most recent labs reviewed  __Y___   Vital Signs reviewed  __Y___   Best Practices addressed:                 VTE prophylaxis: Heparin Sub Q                 SUP (when indicated): N/A                  Current Glucose: Blood glucose  goal < 180                       Please notify bedside physician when present or [facility] if glc > 180 X 2    __Y___     Orders written    Contact Fab for any needs if bedside physician is not present.       The patient is critically ill with respiratory failure and overwhelming infection  and requires high complexity decision making for assessment and support including extensive interpretation of multiple databases; Critical care; 25 minutes total   evaluation time     Care during the described time interval was provided by me.  I have reviewed this patient's available data, including medical history, events of note, physical examination and test results as part of my evaluation.    Interventions Major-Infection - evaluation and management, Respiratory failure - evaluation and management, Sepsis - evaluation and management  Intermediate-Best-practice therapies (e.g. VTE, beta blocker, etc.), Communication with other healthcare providers and/or family, Diagnostic test evaluation, Medication change / dose adjustment        Electronically Signed by: Drew Reynolds (NP) on 07/17/2025 23:58    Annotated By: Rito Lam)    Date: 07/18/25 00:41  vidio assessment not done: camera not workinig; vs t 99.4 rr 30 bp 108/55 , prule 116 sat 90; chart and above note reveiewed will cont current treatment plan

## 2025-07-18 NOTE — NURSING NOTE
Pt left unit via hospital bed with transport. All belongings present including glasses, mini fan and phone. Pt's daughter and 2 other family members present during transfer. No additional needs at this time.

## 2025-07-18 NOTE — NURSING NOTE
WOCN to assess coccyx, rich area and bilateral gluteals. Patient unstable at this time - -180's. Unable to do skin assessment. Per RAYSA Alas, family will be discussing GOC with palliative and may transition to comfort measures. WOCN will follow up with RN concerning consult.

## 2025-07-18 NOTE — CONSULTS
Pulmonary and critical care consultation note  Referring Provider: Dr. Yu  Reason for Consultation: Respiratory failure on BiPAP      Chief complaint -could not be obtained as on my assessment patient was on BiPAP      History of present illness: Most of the history is obtained from patient's medical chart, RT, RN and patient's daughter at bedside-as on my assessment patient was on continuous BiPAP.  Patient is a 80-year-old female with past medical history positive for anxiety, arthritis, hypertension, hyperlipidemia, Ménière's disease, oral candidiasis, vocal cord dysfunction and ILD presented with worsening shortness of breath which has been progressively getting worse and had increased shortness of breath presented to [facility] ER on 7/17 evening.  On arrival patient was saturating in the low 60s.  Was placed on oxygen and then on BiPAP.    Pulmonary and critical care consulted for respiratory failure, ILD and BiPAP management.    All the labs medications ins and outs vitals-treatment given in the ER then on ICU floor reviewed.    Blood gases images reviewed.  Daughter at bedside.  Case was discussed with her.  Goals of care discussed and patient was made DNR/DNI.  MDR done at bed side with RN, pharmacist, nutrition,    and RT  All the drips,other meds,  labs,ins and outs , abg, fio2 requirement reviewed and dicussed in rounds.   Review of Systems  Could not be obtained as on my assessment patient was on continuous BiPAP        History  Past Medical History:   Diagnosis Date    Abdominal distension     Allergic rhinitis     Anxiety     Arthritis     Closed nondisplaced fracture of surgical neck of left humerus with routine healing 3/9/2021    Cough     Dry eyes     Dyslipidemia     Dysuria     Hyperlipidemia     Hypertension     Meniere's disease     Mitral and aortic valve disease     Oral candidiasis     Osteoarthritis     Stomatitis     URI, acute     Vaginal candidiasis     Vocal cord  dysfunction    ,   Past Surgical History:   Procedure Laterality Date    CHOLECYSTECTOMY      COLONOSCOPY      DILATATION AND CURETTAGE      HEMORRHOIDECTOMY      HERNIA REPAIR      SHOULDER SURGERY      SKIN CANCER EXCISION      on Nose    TONSILLECTOMY     ,   Family History   Problem Relation Age of Onset    Diabetes Mother     Cancer Father     Liver disease Sister     Diabetes Sister     Liver disease Brother     Diabetes Brother     Diabetes Daughter     Cancer Other     Liver disease Other     Breast cancer Neg Hx    ,   Social History     Tobacco Use    Smoking status: Never     Passive exposure: Never    Smokeless tobacco: Never    Tobacco comments:     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 18 g 11 Unknown    budesonide-formoterol (SYMBICORT) 80-4.5 MCG/ACT inhaler Inhale 2 puffs 2 (Two) Times a Day. 10.2 g 3 Unknown    ipratropium-albuterol (DUO-NEB) 0.5-2.5 mg/3 ml nebulizer USE 3 ML VIA NEBULIZER FOUR TIMES DAILY AS NEEDED FOR WHEEZING 320 mL 5     meclizine (ANTIVERT) 25 MG tablet Take 1 tablet by mouth Every 8 (Eight) Hours As Needed for Dizziness. 90 tablet 3     nystatin (MYCOSTATIN) 100,000 unit/mL suspension SWISH AND SWALLOW 5 ML BY MOUTH FOUR TIMES DAILY 120 mL 5     O2 (OXYGEN) Inhale 2 L/min Continuous.       oxyCODONE-acetaminophen (PERCOCET)  MG per tablet Take 1 tablet by mouth Every 6 (Six) Hours As Needed for Moderate Pain.       polyethylene glycol (MiraLax) 17 GM/SCOOP powder Take 17 g by mouth Daily. Indications: Constipation       promethazine (PHENERGAN) 12.5 MG tablet TAKE 1 TABLET BY MOUTH TWICE DAILY FOR 15 DAYS       rosuvastatin (CRESTOR) 5 MG tablet Take 1  tablet by mouth Daily. 90 tablet 1     valsartan (DIOVAN) 40 MG tablet Take 1 tablet by mouth Daily. 90 tablet 1     Vitamins A & D (magic barrier cream) Apply 1 Application topically to the appropriate area as directed 3 (Three) Times a Day As Needed (pressure ulcers). 120 g 5    , Scheduled Meds:  cefTRIAXone, 1,000 mg, Intravenous, Q24H  doxycycline, 100 mg, Intravenous, Q12H  heparin (porcine), 5,000 Units, Subcutaneous, Q12H  ipratropium, 0.5 mg, Nebulization, Q4H - RT  mupirocin, 1 Application, Each Nare, BID  senna-docusate sodium, 2 tablet, Oral, BID  sodium chloride, 10 mL, Intravenous, Q12H  sodium chloride, 10 mL, Intravenous, Q12H    , Continuous Infusions:    and Allergies:  Z-mary [azithromycin], Amoxicillin, Cortisone, Fish-derived products, Iodinated contrast media, Iodine, Lidocaine, Morphine, Nystatin, Other, Sulfa antibiotics, and Verapamil    Objective     Vital Signs   Temp:  [98.2 °F (36.8 °C)-99.4 °F (37.4 °C)] 99 °F (37.2 °C)  Heart Rate:  [106-130] 120  Resp:  [30-38] 33  BP: ()/(52-87) 116/64    Physical Exam:             General-elderly in appearance, on continuous BiPAP.  Was in moderate respiratory distress  HEENT-PERRLA   Neck-supple    Respiratory-on BiPAP, moderate respiratory distress, crackles at bases    Cardiovascular-NSR  GI-nontender nondistended bowel sounds positive    CNS-nonfocal    Musculoskeletal -no edema  Extremities- no obvious deformity noticed     Psychiatric- alert awake oriented  Skin- no visible rash                                                                   Results Review:    LABS:    Lab Results   Component Value Date    GLUCOSE 130 (H) 07/18/2025    BUN 12.3 07/18/2025    CREATININE 0.78 07/18/2025    EGFRIFNONA 70 10/25/2021    EGFRIFAFRI  08/29/2016      Comment:      <15 Indicative of kidney failure.    BCR 15.8 07/18/2025    CO2 23.5 07/18/2025    CALCIUM 8.5 (L) 07/18/2025    ALBUMIN 2.8 (L) 07/18/2025    AST 25 07/18/2025    ALT 10 07/18/2025     WBC 11.12 (H) 07/18/2025    HGB 12.4 07/18/2025    HCT 39.3 07/18/2025    MCV 97.5 (H) 07/18/2025     07/18/2025     (L) 07/18/2025    K 3.4 (L) 07/18/2025    CL 95 (L) 07/18/2025    ANIONGAP 13.5 07/18/2025       Lab Results   Component Value Date    INR 1.05 06/10/2024    INR 1.09 02/19/2019    INR 1.03 06/19/2018    PROTIME 13.8 06/10/2024    PROTIME 14.4 02/19/2019    PROTIME 13.8 06/19/2018                I reviewed the patient's new clinical results.  I reviewed the patient's new imaging results and agree with the interpretation.   Latest Reference Range & Units 07/18/25 04:02 07/18/25 07:02   pH, Arterial 7.350 - 7.450 pH units 7.370 7.406   pCO2, Arterial 35.0 - 45.0 mm Hg 50.9 (H) 45.0   pO2, Arterial 83.0 - 108.0 mm Hg 84.7 172.0 (H)   HCO3, Arterial 20.0 - 26.0 mmol/L 29.4 (H) 28.2 (H)   Base Excess 0.0 - 2.0 mmol/L 3.2 (H) 2.9 (H)   O2 Saturation, Arterial 94.0 - 99.0 % 96.4 >99.2 (H)   CO2 Content 22 - 33 mmol/L 31.0 29.6   A-a DO2 0.0 - 300.0 mmHg 270.3 190.0   Carboxyhemoglobin 0 - 5 % 1.7 1.4   Methemoglobin 0.00 - 3.00 % 0.40 0.40   Oxyhemoglobin 94 - 99 % 94.4 97.7   Hematocrit, Blood Gas 38.0 - 51.0 % 36.7 (L) 36.4 (L)   Hemoglobin, Blood Gas 13.5 - 17.5 g/dL 12.0 (L) 11.9 (L)   Site  Right Radial Right Radial   Issac's Test  N/A Positive   Modality  BiPap BiPap   FIO2 % 60 60   Ventilator Mode  BiPAP BiPAP   Set [facility] Resp Rate  26.0 26.0   Rate Breaths/minute 32    IPAP  20 20   EPAP  6 6   Barometric Pressure for Blood Gas mmHg 729 730   Collected by  [id number]   (H): Data is abnormally high  (L): Data is abnormally low    Procalitonin Results:      Lab 07/18/25  0035   PROCALCITONIN 0.70*      Microbiology Results (last 10 days)       Procedure Component Value - Date/Time    Legionella Antigen, Urine - Urine, Indwelling Urethral Catheter [[id number]]  (Normal) Collected: 07/18/25 0210    Lab Status: Final result Specimen: Urine from Indwelling Urethral Catheter Updated:  07/18/25 0234     LEGIONELLA ANTIGEN, URINE Negative    Narrative:      Presumptive negative for L. pneumophilia serogroup 1 antigen, suggesting no recent or current infection.    S. Pneumo Ag Urine or CSF - Urine, Indwelling Urethral Catheter [[id number]]  (Normal) Collected: 07/18/25 0210    Lab Status: Final result Specimen: Urine from Indwelling Urethral Catheter Updated: 07/18/25 1349     Strep Pneumo Ag Negative    Blood Culture - Blood, Arm, Right [[id number]]  (Normal) Collected: 07/17/25 2203    Lab Status: Preliminary result Specimen: Blood from Arm, Right Updated: 07/18/25 2215     Blood Culture No growth at 24 hours    Blood Culture - Blood, Arm, Left [[id number]]  (Normal) Collected: 07/17/25 2203    Lab Status: Preliminary result Specimen: Blood from Arm, Left Updated: 07/18/25 2215     Blood Culture No growth at 24 hours    Respiratory Panel PCR w/COVID-19(SARS-CoV-2) DEMETRI/LUCIA/ANETTE/PAD/COR/JIAN In-House, NP Swab in UTM/VTM, 2 HR TAT - Swab, Nasopharynx [[id number]]  (Normal) Collected: 07/17/25 2144    Lab Status: Final result Specimen: Swab from Nasopharynx Updated: 07/17/25 2236     ADENOVIRUS, PCR Not Detected     Coronavirus 229E Not Detected     Coronavirus HKU1 Not Detected     Coronavirus NL63 Not Detected     Coronavirus OC43 Not Detected     COVID19 Not Detected     Human Metapneumovirus Not Detected     Human Rhinovirus/Enterovirus Not Detected     Influenza A PCR Not Detected     Influenza B PCR Not Detected     Parainfluenza Virus 1 Not Detected     Parainfluenza Virus 2 Not Detected     Parainfluenza Virus 3 Not Detected     Parainfluenza Virus 4 Not Detected     RSV, PCR Not Detected     Bordetella pertussis pcr Not Detected     Bordetella parapertussis PCR Not Detected     Chlamydophila pneumoniae PCR Not Detected     Mycoplasma pneumo by PCR Not Detected    Narrative:      In the setting of a positive respiratory panel with a viral infection PLUS a negative procalcitonin without other  underlying concern for bacterial infection, consider observing off antibiotics or discontinuation of antibiotics and continue supportive care. If the respiratory panel is positive for atypical bacterial infection (Bordetella pertussis, Chlamydophila pneumoniae, or Mycoplasma pneumoniae), consider antibiotic de-escalation to target atypical bacterial infection.    COVID PRE-OP / PRE-PROCEDURE SCREENING ORDER (NO ISOLATION) - Swab, Nasopharynx [[id number]]  (Normal) Collected: 07/17/25 1859    Lab Status: Final result Specimen: Swab from Nasopharynx Updated: 07/1945    Narrative:      The following orders were created for panel order COVID PRE-OP / PRE-PROCEDURE SCREENING ORDER (NO ISOLATION) - Swab, Nasopharynx.  Procedure                               Abnormality         Status                     ---------                               -----------         ------                     COVID-19 and FLU A/B PCR...[[id number]]  Normal              Final result                 Please view results for these tests on the individual orders.    COVID-19 and FLU A/B PCR, 1 HR TAT - Swab, Nasopharynx [[id number]]  (Normal) Collected: 07/17/25 1859    Lab Status: Final result Specimen: Swab from Nasopharynx Updated: 07/1945     COVID19 Not Detected     Influenza A PCR Not Detected     Influenza B PCR Not Detected           Assessment & Plan       Neurology-alert awake oriented.  Avoid benzodiazepines as can precipitate delirium in elderly    Respiratory-acute on chronic hypoxic respiratory failure-uses 3 L nasal cannula at baseline.  Currently on continuous BiPAP.  Respiratory failure likely due to ILD flareup and pneumonia.  Latest microbiology reviewed.  Procalcitonin elevated.    Latest blood gas reviewed and BiPAP settings adjusted for better minute ventilation.    FiO2 requirement reviewed and adjusted to maintain saturation 88 to 92%.    Bilateral pulmonary infiltrates-flareup of interstitial lung disease-CT chest  shows honeycombing with groundglass opacities-could have ILD flareup.    Continue steroids    Continue antibiotics    Latest microbiology reviewed    Repeat in morning.    Goals of care discussed with patient's daughter at bedside and patient was made DNR/DNI.      Cardiology- hemodynamically -stable.  Continue to monitor  Continue to monitor HR- rate and rhythm, BP     Results for orders placed during the hospital encounter of 07/17/25    Adult Transthoracic Echo Complete W/ Cont if Necessary Per Protocol    Interpretation Summary    Left ventricular systolic function is normal. Calculated left ventricular EF = 57.9% Left ventricular ejection fraction appears to be 56 - 60%.    Left ventricular diastolic function is consistent with (grade I) impaired relaxation.    The right ventricular cavity is mild to moderately dilated.    Estimated right ventricular systolic pressure from tricuspid regurgitation is markedly elevated (>55 mmHg).       Nephrology- Cr and BUN reviewed.  Avoid nephrotoxic agents  I/O-reviewed    GI- PPI prophylaxis  Currently n.p.o.  Can eat for comfort.  Hematology- CBC-latest reviewed      ID  Culture-reviewed  And Antibiotics-continue antibiotics    Endrocrinology- Maintain Blood sugar 140 -180  Blood sugars reviewed    Electrolytes-   Mag and phos       DVT prophylaxis-continue    Bedside rounds were done with RT and patient's nurse. All the lab and clinical findings were discussed with them and plan was also discussed in great detail.    Family member present-daughter at bedside.  Case was discussed with her and answered her questions to her satisfaction  Palliative care consulted.  Overall prognosis poor.  Recommend comfort measures.            Acute on chronic respiratory failure with hypoxia          Wayne Henao MD  07/18/25  09:43 EDT

## 2025-07-18 NOTE — PROGRESS NOTES
"    [facility] HOSPITALIST PROGRESS NOTE     Patient Identification:  Name:  Edgar Cotto  Age:  80 y.o.  Sex:  female  :  1945  MRN:  [id number]  Visit Number:  [id number]  ROOM: [address]     Primary Care Provider:  Abbie Hillman DO    Length of stay in inpatient status:  1    Subjective     Chief Compliant:    Chief Complaint   Patient presents with    Shortness of Breath    Hyperventilating    Leg Swelling     Pt came to the ed breathing heavy tachy and a sat of 65 on 2 litters pt also states she has had some black stools and has pale skin      History of Presenting Illness:    Patient remains ill today, this AM patient in severe respiratory distress, accessory muscle use, ABG did improve some on Bipap, I had long discussion this AM with patient and family (daughter in person and son via phone) about her poor prognosis and likely would be difficult extubation if we were to intubate her with her underlying lung issues, patient was made Do Not Resuscitate/Do Not Intubate this AM but, continued on Bipap at that time.  I discussed this case with Palliative Care who saw the patient later this afternoon and following these discussions patient was made comfort measures and downgraded to medsurg level of care.  During my conversations this AM, these choices are in-line with the patient's expressed desire not to be intubated or receive invasive care. Noted she felt like she was \"smothering\" when she was on the bipap.   Objective     Current Hospital Meds:  sodium chloride, 10 mL, Intravenous, Q12H  sodium chloride, 10 mL, Intravenous, Q12H      ----------------------------------------------------------------------------------------------------------------------  Vital Signs:  Temp:  [98.2 °F (36.8 °C)-99.4 °F (37.4 °C)] 98.6 °F (37 °C)  Heart Rate:  [106-133] 129  Resp:  [30-44] 44  BP: ()/(52-87) 118/60  SpO2:  [63 %-98 %] 91 %  on  Flow (L/min) (Oxygen Therapy):  [4] 4;   Device " "(Oxygen Therapy): NPPV/NIV  Body mass index is 25.96 kg/m².      Intake/Output Summary (Last 24 hours) at 7/18/2025 1402  Last data filed at 7/18/2025 0600  Gross per 24 hour   Intake 1150 ml   Output 600 ml   Net 550 ml      ----------------------------------------------------------------------------------------------------------------------  Physical exam:  Constitutional:  Older than stated age.  Mod acute distress.      HENT:  Head:  Normocephalic and atraumatic.  Mouth:  Moist mucous membranes.    Eyes:  Conjunctivae and EOM are normal. No scleral icterus.    Neck:  Neck supple.  No JVD present.    Cardiovascular:  Tachycardic rate, regular rhythm and normal heart sounds with no murmur.  Pulmonary/Chest:  Mod-severe respiratory distress, + wheezes, on bipap  Abdominal:  Soft. No distension and no tenderness.   Musculoskeletal:  No tenderness, and no deformity.  No red or swollen joints anywhere.    Neurological:  Alert and oriented to person.  No cranial nerve deficit.    Skin:  Skin is warm and dry. No rash noted. No pallor.   Peripheral vascular:  No clubbing, no cyanosis, no edema.  Psychiatric: Appropriate mood and affect  Edited by: Rito Brown MD at 7/18/2025 0898  ----------------------------------------------------------------------------------------------------------------------  WBC/HGB/HCT/PLT   11.12/12.4/39.3/173 (07/18 0035)  BUN/CREAT/GLUC/ALT/AST/LEON/LIP    12.3/0.78/130/10/25/--/-- (07/18 0035)  LYTES - Na/K/Cl/CO2: --/4.3/--/-- (07/18 1050)  pH/pCO2/pO2/HCO3   7.406/45.0/172.0/28.2 (07/18 0702)  No results found for: \"URINECX\"  No results found for: \"BLOODCX\"    I have personally looked at the labs and they are summarized above.  ----------------------------------------------------------------------------------------------------------------------  Detailed radiology reports for the last 24 hours:  XR Chest 1 View  Result Date: 7/17/2025  Diffuse increased moderate interstitial opacities " throughout the bilateral lungs with peripheral predominance which may reflect edema, atypical pneumonia or fibrosis.  Further evaluation with CT may be obtained.  This report was finalized on 7/17/2025 11:43 PM by Jovanny Heller MD.      Assessment & Plan    #Comfort Measures due to Severe Sepsis & Acute on Chronic Hypoxic Respiratory Failure requiring Non-invasive Positive Pressure Ventilation due to Pneumonia, Bacterial, treating for GN Organisms, in setting of superimposed Interstitial Lung Disease and Congestive Heart Failure with Acute Exacerbation   #Elevated HS Troponins, suspected NSTEMI Type II due to Sepsis   #Borderline Hypokalemia   #Hyperglycemia  #Hypertension/Hyperlipidemia  #Anxiety  #Vocal Chord Dysfunction  - Patient met SIRS criteria due to heart rate > 90, respiratory rate > 20, WBC count > 12K. Due to signs of end organ damage and lactate > 2 patient met criteria for Severe Sepsis  - Following conversations with Palliative Care and Patient/Healthcare Surrogate, they discussed patient's poor prognosis and they have agreed to make patient comfort measures.  - Palliative Care consulted and following   - Will start patient on Morphine/Dilaudid for pain and air hunger  - Will add as needed Ativan for anxiety  - Will add Tylenol as needed for fevers  - Will add as needed IV antiemetics for nausea  - Will add Robinul for secretions  - Have discontinued all home oral meds with the exception of those that would provide the patient comfort or prevent the patient harm/discomfort in the future.  - Have discontinued all life prolonging medications and therapies including IV fluids, IV pressors, IV antibiotics, and blood products.  - Admitted to [facility] with cardiopulmonary monitoring with the overall goal of comfort, diet per patient preference if able to tolerate oral, continue to monitor and if persists 24-48hrs will discuss possible placement with hospice vs home hospice w/ Social Work.    F: NPO  E:  Defer checking due to Comfort Measures  N: NPO Diet NPO Type: Ice Chips   PPx: Comfort Measures  Code: Comfort Measures    Dispo: Comfort Measures    Edited by: Rito Brown MD at 7/18/2025 1402  [facility]

## 2025-07-18 NOTE — CONSULTS
Palliative Care Initial Consult     Attending Physician: Rito Brown MD  Referring Provider: Dr. Anoop Yu     assistance with advance directives and assistance with clarification of goals of care  Code Status: changed to comfort measures only   Code Status and Medical Interventions: No CPR (Do Not Attempt to Resuscitate); Comfort Measures   Ordered at: 07/18/25 1343     Code Status (Patient has no pulse and is not breathing):    No CPR (Do Not Attempt to Resuscitate)     Medical Interventions (Patient has pulse or is breathing):    Comfort Measures     Level Of Support Discussed With:    Patient       Next of Kin (If No Surrogate)      Advanced Directives: Advance Directive Status: Patient does not have advance directive   Healthcare surrogate: Ava Andersen- daughter ALEXA, son is also present at bedside. There is also another son who is coming in from [place]. Family has discussed goals together   Goals of Care: Comfort Measures     HPI:  Edgar Cotto is a 80 y.o. female admitted on 7/17/2025 for acute on chronic respiratory failure with hypoxia. She has a past medical history of anxiety, arthritis, HTN, HLD, meniere's disease, oral candidiasis, vocal cord dysfunction, and ILD. Pt had suffered from a URI earlier this week and was brought to the ED after increased shortness of breath and gray sputum. She was on 3lpm n/c baseline however her saturation dropped into the 30% according to daughter. Dr. Yu reports that  In the ED, patient was afebrile but tachycardic up to 130 bpm and tachypneic up to 32 breaths per minute. O2 sat was as low as 63% on home 3L NC. ABG showed severe hypoxia with PO2 36 and sat 67% on 4L, with PCO2 elevated at 45 but pH was compensated at 7.374. Troponin was 21 with repeat 44. BNP was elevated at 3718. Na 130, K 3.5, Cl 91, bicarb 20.8, AG 18, BUN 12, cr 0.71, glucose 207, albumin 3, LFT's normal. Lactate was 5.7 with reflex 2.1. WBC was 13 with 86% neutrophils. Both  COVID-19/flu swabs and respiratory PCR swabs were negative. CXR was read by ED as pneumonia with interstitial edema, though per my review she has no pleural effusions and interstitial changes could also be due to her underlying ILD. She received IV lasix and duoneb in the ED. She was placed on bipap and repeat ABG shows overall improvement with pH 7.388 even though CO2 is up to 50, PO2 102 so turning down Fio2 which will help with CO2, and bicarb 30. Current settings FiO2 60 (going down to 45-50), rate 20 (going up to 24), IPAP 18 and EPAP 8 (going down to 6). Patient is being admitted to the CCU for further management. Today, she has been hypoxic and placed on bipap however is not able to tolerate it due to the constant pressure and feels claustrophobic.She did have an echo, EF 56-60%. Upon entering the room pt's saturation was 89-90% with bipap, RR 40s, hr irregular 140-150s. Pt able to shake yes and no to questions but only able to verbalize with garbled speech/low volume/weakness. Labs today troponin T 40 sodium 132 potassium 3.4 chloride 95 calcium 8.5 albumin 2.8, c reactive protein 33.70 lactate 2.2/2.9 wbc 11.12 bp 118/60. Discuss comfort measures with patient/family, RAYSA Alas and I removed bipap and placed her on 100% nrb mask because patient did not want bipap. She was able to stay between 92/-95% oxygen saturation, RR was still elevated however morphine was given for air hunger and she instantly appeared much more settle, less tachypnea, hr decreased 130s , RR ~ 38 or so. She is alert, oriented, very weak and tiring quickly. She is very pale, little air movement and very diminished in bases. She is nearly too weak to speak. She is tachypnea, having increased anxiety. She denies any pain, no nausea, vomiting, diarrhea or constipation.            ROS: Negative except as above in HPI.     Past Medical History:   Diagnosis Date    Abdominal distension     Allergic rhinitis     Anxiety     Arthritis      Closed nondisplaced fracture of surgical neck of left humerus with routine healing 3/9/2021    Cough     Dry eyes     Dyslipidemia     Dysuria     Hyperlipidemia     Hypertension     Meniere's disease     Mitral and aortic valve disease     Oral candidiasis     Osteoarthritis     Stomatitis     URI, acute     Vaginal candidiasis     Vocal cord dysfunction      Past Surgical History:   Procedure Laterality Date    CHOLECYSTECTOMY      COLONOSCOPY      DILATATION AND CURETTAGE      HEMORRHOIDECTOMY      HERNIA REPAIR      SHOULDER SURGERY      SKIN CANCER EXCISION      on Nose    TONSILLECTOMY       Social History     Socioeconomic History    Marital status:    Tobacco Use    Smoking status: Never     Passive exposure: Never    Smokeless tobacco: Never    Tobacco comments:     She previously worked in a [employer] and is now retired   Vaping Use    Vaping status: Never Used   Substance and Sexual Activity    Alcohol use: No    Drug use: No    Sexual activity: Defer     Family History   Problem Relation Age of Onset    Diabetes Mother     Cancer Father     Liver disease Sister     Diabetes Sister     Liver disease Brother     Diabetes Brother     Diabetes Daughter     Cancer Other     Liver disease Other     Breast cancer Neg Hx        Allergies   Allergen Reactions    Z-Ponce [Azithromycin] Swelling    Amoxicillin Other (See Comments)    Cortisone Swelling     Face swelling      Fish-Derived Products      SEAFOOD    Iodinated Contrast Media     Iodine Itching    Lidocaine Other (See Comments)     Shaking, fever when she had mouth numbed at the dentist    Morphine Mental Status Change    Nystatin Other (See Comments)    Other     Sulfa Antibiotics Other (See Comments)    Verapamil Other (See Comments)     Feel funny        Current Facility-Administered Medications   Medication Dose Route Frequency Provider Last Rate Last Admin    busPIRone (BUSPAR) tablet 10 mg  10 mg Oral TID Rito Brown MD        diazePAM  "(VALIUM) injection 5 mg  5 mg Intravenous Q4H PRN Zakiya Stratton, APRN        meclizine (ANTIVERT) tablet 25 mg  25 mg Oral Q8H PRN Rito Brown MD        morphine injection 2 mg  2 mg Intravenous Q1H PRN Zakiya Stratton, APRN   2 mg at 07/18/25 1345    nystatin (MYCOSTATIN) 100,000 unit/mL suspension 500,000 Units  500,000 Units Swish & Swallow 4x Daily PRN Rito Brown MD        sodium chloride 0.9 % flush 10 mL  10 mL Intravenous PRN Anoop Yu MD        sodium chloride 0.9 % flush 10 mL  10 mL Intravenous Q12H Anoop Yu MD   10 mL at 07/18/25 0928    sodium chloride 0.9 % flush 10 mL  10 mL Intravenous PRN Anoop Yu MD        sodium chloride 0.9 % flush 10 mL  10 mL Intravenous Q12H Anoop Yu MD   10 mL at 07/18/25 0928    sodium chloride 0.9 % flush 10 mL  10 mL Intravenous PRN Anoop Yu MD              diazePAM    meclizine    Morphine    nystatin    sodium chloride    sodium chloride    sodium chloride    Current medication reviewed for route, type, dose and frequency and are current per MAR.    Palliative Performance Scale Score:     /60   Pulse (!) 129   Temp 98.6 °F (37 °C) (Axillary)   Resp (!) 44   Ht 152.4 cm (60\")   Wt 60.3 kg (132 lb 15 oz)   SpO2 91%   BMI 25.96 kg/m²     Intake/Output Summary (Last 24 hours) at 7/18/2025 1435  Last data filed at 7/18/2025 0600  Gross per 24 hour   Intake 1150 ml   Output 600 ml   Net 550 ml       PE:  General Appearance:    Chronically ill appearing, alert, respiratory distress    HEENT:    NC/AT, without obvious abnormality, EOMI, anicteric    Neck:   supple, trachea midline, no JVD   Lungs:     CTAB without w/r/r, very diminished, tight, NRB mask in place for comfort , tachypnea rr 40s     Heart:    Tachycardia, irregular  normal S1 and S2, no M/R/G   Abdomen:     Soft, NT, ND, NABS    Extremities:   Moves all extremities, no edema   Pulses:   Pulses palpable and equal bilaterally, " thready , irregular    Skin:   Pale, clammy    Neurologic:   A/Ox3, very weak    Psych:  Anxious , agitated , restless      Labs:   Results from last 7 days   Lab Units 07/18/25  0035   WBC 10*3/mm3 11.12*   HEMOGLOBIN g/dL 12.4   HEMATOCRIT % 39.3   PLATELETS 10*3/mm3 173     Results from last 7 days   Lab Units 07/18/25  1050 07/18/25  0035   SODIUM mmol/L  --  132*   POTASSIUM mmol/L 4.3 3.4*   CHLORIDE mmol/L  --  95*   CO2 mmol/L  --  23.5   BUN mg/dL  --  12.3   CREATININE mg/dL  --  0.78   GLUCOSE mg/dL  --  130*   CALCIUM mg/dL  --  8.5*     Results from last 7 days   Lab Units 07/18/25  1050 07/18/25  0035   SODIUM mmol/L  --  132*   POTASSIUM mmol/L 4.3 3.4*   CHLORIDE mmol/L  --  95*   CO2 mmol/L  --  23.5   BUN mg/dL  --  12.3   CREATININE mg/dL  --  0.78   CALCIUM mg/dL  --  8.5*   BILIRUBIN mg/dL  --  0.8   ALK PHOS U/L  --  74   ALT (SGPT) U/L  --  10   AST (SGOT) U/L  --  25   GLUCOSE mg/dL  --  130*     Imaging Results (Last 72 Hours)       Procedure Component Value Units Date/Time    XR Chest 1 View [[id number]] Collected: 07/17/25 2342     Updated: 07/17/25 2345    Narrative:      PROCEDURE:  XR Chest, 1 View     CLINICAL INDICATION:  Shortness of breath     TECHNIQUE:  Frontal view of the chest.     COMPARISON:  6/10/2024     FINDINGS:  LUNGS:  Diffuse increased moderate interstitial opacities throughout the  bilateral lungs with peripheral predominance which may reflect edema,  atypical pneumonia or fibrosis.  PLEURAL SPACE:  Normal.  No pneumothorax.  HEART:  Normal.  No cardiomegaly.  BONES/JOINTS:  No acute findings.       Impression:      Diffuse increased moderate interstitial opacities throughout the  bilateral lungs with peripheral predominance which may reflect edema,  atypical pneumonia or fibrosis.  Further evaluation with CT may be  obtained.     This report was finalized on 7/17/2025 11:43 PM by Jovanny Heller MD.               Diagnostics: Reviewed    A: Edgar Cotto is a  80 y.o. female admitted on 7/17/2025 for acute on chronic respiratory failure with hypoxia. She has a past medical history of anxiety, arthritis, HTN, HLD, meniere's disease, oral candidiasis, vocal cord dysfunction, and ILD. Pt had suffered from a URI earlier this week and was brought to the ED after increased shortness of breath and gray sputum. She was on 3lpm n/c baseline however her saturation dropped into the 30% according to daughter. Dr. Yu reports that  In the ED, patient was afebrile but tachycardic up to 130 bpm and tachypneic up to 32 breaths per minute. O2 sat was as low as 63% on home 3L NC. ABG showed severe hypoxia with PO2 36 and sat 67% on 4L, with PCO2 elevated at 45 but pH was compensated at 7.374. Troponin was 21 with repeat 44. BNP was elevated at 3718. Na 130, K 3.5, Cl 91, bicarb 20.8, AG 18, BUN 12, cr 0.71, glucose 207, albumin 3, LFT's normal. Lactate was 5.7 with reflex 2.1. WBC was 13 with 86% neutrophils. Both COVID-19/flu swabs and respiratory PCR swabs were negative. CXR was read by ED as pneumonia with interstitial edema, though per my review she has no pleural effusions and interstitial changes could also be due to her underlying ILD. She received IV lasix and duoneb in the ED. She was placed on bipap and repeat ABG shows overall improvement with pH 7.388 even though CO2 is up to 50, PO2 102 so turning down Fio2 which will help with CO2, and bicarb 30. Current settings FiO2 60 (going down to 45-50), rate 20 (going up to 24), IPAP 18 and EPAP 8 (going down to 6). Patient is being admitted to the CCU for further management. Today, she has been hypoxic and placed on bipap however is not able to tolerate it due to the constant pressure and feels claustrophobic.She did have an echo, EF 56-60%. Upon entering the room pt's saturation was 89-90% with bipap, RR 40s, hr irregular 140-150s. Pt able to shake yes and no to questions but only able to verbalize with garbled speech/low  volume/weakness. Labs today troponin T 40 sodium 132 potassium 3.4 chloride 95 calcium 8.5 albumin 2.8, c reactive protein 33.70 lactate 2.2/2.9 wbc 11.12 bp 118/60. Discuss comfort measures with patient/family, Evette, RN and I removed bipap and placed her on 100% nrb mask because patient did not want bipap. She was able to stay between 92/-95% oxygen saturation, RR was still elevated however morphine was given for air hunger and she instantly appeared much more settle, less tachypnea, hr decreased 130s , RR ~ 38 or so. She is alert, oriented, very weak and tiring quickly. She is very pale, little air movement and very diminished in bases. She is nearly too weak to speak. She is tachypnea, having increased anxiety. She denies any pain, no nausea, vomiting, diarrhea or constipation.          P: After a long discussion with daughter and son at bedside, they have decided on comfort measures for the patient because she is very anxious, scared and having increased respiratory effort despite bipap. Pt is not able to tolerate bipap, has caused increased anxiety and does not like the pressure feeling. They are completely against intubation/life support and want her to remain a DNR. We have discussed risks and benefits of aggressive measures vs. Comfort measures. Their ultimate goal now is comfort. We did place a NRB mask on patient for air hunger per family request. Pt tolerated this much better than bipap. Pt able to mumble a few words. Will continue to provide symptomatic and supportive palliative care for patient and family. Will assist with disposition. Prognosis unclear at this point in time, death is very likely given respiratory status and hypoxia. Family are requesting possible home with hospice but right now far to unstable to transport and would possibly  with EMS.     We appreciate the consult and the opportunity to participate in Edgar TYRON Cotto's care. We will continue to follow along. Please do not  hesitate to contact us regarding further symptom management or goals of care needs, including after hours or on weekends via our on call provider at [phone].     Time: 75 minutes spent reviewing medical and medication records, assessing and examining patient, discussing with family, answering questions, providing some guidance about a plan and documentation of care, and coordinating care with other healthcare members, with > 50% time spent face to face.     Zakiya Stratton, APRN    7/18/2025

## 2025-07-19 NOTE — PROGRESS NOTES
[facility] HOSPITALIST PROGRESS NOTE     Patient Identification:  Name:  Edgar Cotto  Age:  80 y.o.  Sex:  female  :  1945  MRN:  [id number]  Visit Number:  [id number]  ROOM: [address] [place] [address]     Primary Care Provider:  Abbie Hillman DO    Length of stay in inpatient status:  2    Subjective     Chief Compliant:    Chief Complaint   Patient presents with    Shortness of Breath    Hyperventilating    Leg Swelling     Pt came to the ed breathing heavy tachy and a sat of 65 on 2 litters pt also states she has had some black stools and has pale skin      History of Presenting Illness:    Patient remains comfort measures, resting but still breathing heavy this AM, did wake up some, family expressed interest in taking patient home with hospice, Daughter called her two brothers in and we had goals of care conversations outside patient's room, they went and asked their mother if she wanted to return home but she stated no, we will continue comfort measures here inpatient.   Objective     Current Hospital Meds:  busPIRone, 10 mg, Oral, TID  sodium chloride, 10 mL, Intravenous, Q12H  sodium chloride, 10 mL, Intravenous, Q12H         ----------------------------------------------------------------------------------------------------------------------  Vital Signs:  Temp:  [97.8 °F (36.6 °C)-99.2 °F (37.3 °C)] 97.8 °F (36.6 °C)  Heart Rate:  [116-133] 116  Resp:  [24-44] 24  BP: (104-123)/(60-71) 104/63  SpO2:  [79 %-92 %] 79 %  on  Flow (L/min) (Oxygen Therapy):  [12-15] 12;   Device (Oxygen Therapy): nonrebreather mask  Body mass index is 31.34 kg/m².      Intake/Output Summary (Last 24 hours) at 2025 1038  Last data filed at 2025 0300  Gross per 24 hour   Intake 170 ml   Output 600 ml   Net -430 ml      ----------------------------------------------------------------------------------------------------------------------  Physical exam:  Constitutional:  Older than stated age.  Mild acute  "distress.      HENT:  Head:  Normocephalic and atraumatic.  Mouth:  Moist mucous membranes.    Eyes:  Conjunctivae and EOM are normal. No scleral icterus.    Neck:  Neck supple.  No JVD present.    Cardiovascular:  Tachycardic rate, regular rhythm and normal heart sounds with no murmur.  Pulmonary/Chest:  Mod respiratory distress, + wheezes, on NC  Abdominal:  Soft. No distension and no tenderness.   Musculoskeletal:  No tenderness, and no deformity.  No red or swollen joints anywhere.    Neurological:  Alert and oriented to person.  No cranial nerve deficit.    Skin:  Skin is warm and dry. No rash noted. No pallor.   Peripheral vascular:  No clubbing, no cyanosis, no edema.  Psychiatric: Appropriate mood and affect  Edited by: Rito Brown MD at 7/19/2025 1038  ----------------------------------------------------------------------------------------------------------------------        LYTES - Na/K/Cl/CO2: --/4.3/--/-- (07/18 1050)        No results found for: \"URINECX\"  Blood Culture   Date Value Ref Range Status   07/17/2025 No growth at 24 hours  Preliminary   07/17/2025 No growth at 24 hours  Preliminary       I have personally looked at the labs and they are summarized above.  ----------------------------------------------------------------------------------------------------------------------  Detailed radiology reports for the last 24 hours:  XR Chest 1 View  Result Date: 7/17/2025  Diffuse increased moderate interstitial opacities throughout the bilateral lungs with peripheral predominance which may reflect edema, atypical pneumonia or fibrosis.  Further evaluation with CT may be obtained.  This report was finalized on 7/17/2025 11:43 PM by Jovanny Heller MD.      Assessment & Plan    #Comfort Measures due to Severe Sepsis & Acute on Chronic Hypoxic Respiratory Failure requiring Non-invasive Positive Pressure Ventilation due to Pneumonia, Bacterial, treating for GN Organisms, in setting of superimposed " Interstitial Lung Disease and Congestive Heart Failure with Acute Exacerbation   #Elevated HS Troponins, suspected NSTEMI Type II due to Sepsis   #Borderline Hypokalemia   #Hyperglycemia  #Hypertension/Hyperlipidemia  #Anxiety  #Vocal Chord Dysfunction  - Patient met SIRS criteria due to heart rate > 90, respiratory rate > 20, WBC count > 12K. Due to signs of end organ damage and lactate > 2 patient met criteria for Severe Sepsis  - Following conversations with Palliative Care and Patient/Healthcare Surrogate, they discussed patient's poor prognosis and they have agreed to make patient comfort measures.  - Palliative Care consulted and following   - Will start patient on Morphine/Dilaudid for pain and air hunger  - Will add as needed Ativan for anxiety  - Will add Tylenol as needed for fevers  - Will add as needed IV antiemetics for nausea  - Will add Robinul for secretions  - Have discontinued all home oral meds with the exception of those that would provide the patient comfort or prevent the patient harm/discomfort in the future.  - Have discontinued all life prolonging medications and therapies including IV fluids, IV pressors, IV antibiotics, and blood products.  - Admitted to [facility] with cardiopulmonary monitoring with the overall goal of comfort, diet per patient preference if able to tolerate oral    F: NPO  E: Defer checking due to Comfort Measures  N: NPO Diet NPO Type: Ice Chips   PPx: Comfort Measures  Code: Comfort Measures    Dispo: Comfort Measures    Edited by: Rito Brown MD at 7/19/2025 [address] [place], [place] [address]ist

## 2025-07-19 NOTE — PLAN OF CARE
Goal Outcome Evaluation:  Plan of Care Reviewed With: patient        Progress: declining  Outcome Evaluation: Pt resting in bed at this time. Pt has had increased labored breathing. Comfort measurs maintained. Family at bedside and participating in care. 6L humidified NC for comfort. No acute changes noted at this time. Will continue to follow plan of care.

## 2025-07-19 NOTE — PLAN OF CARE
Goal Outcome Evaluation:  Plan of Care Reviewed With: patient        Progress: declining  Outcome Evaluation: Pt resting in bed throughout shift. Nonrebreather mask at 12L for comfort. Family at bedside. PRN meds given for comfort. No concerns or requests at this time. Will continue plan of care.

## 2025-07-20 NOTE — PROGRESS NOTES
[facility] HOSPITALIST PROGRESS NOTE     Patient Identification:  Name:  Edgar Cotto  Age:  80 y.o.  Sex:  female  :  1945  MRN:  [id number]  Visit Number:  [id number]  ROOM: [address] [place] [address]     Primary Care Provider:  Abbie Hillman DO    Length of stay in inpatient status:  3    Subjective     Chief Compliant:    Chief Complaint   Patient presents with    Shortness of Breath    Hyperventilating    Leg Swelling     Pt came to the ed breathing heavy tachy and a sat of 65 on 2 litters pt also states she has had some black stools and has pale skin      History of Presenting Illness:    Patient remains comfort measures, awake and mild respiratory distress today, daughter at bedside checking 02 sat with home pulse ox, we discussed treating air hunger, I let nurse know to give morphine that is due now, continue comfort focused approach.   Objective     Current Hospital Meds:  busPIRone, 10 mg, Oral, TID  sodium chloride, 10 mL, Intravenous, Q12H  sodium chloride, 10 mL, Intravenous, Q12H         ----------------------------------------------------------------------------------------------------------------------  Vital Signs:  Temp:  [98.5 °F (36.9 °C)-99.6 °F (37.6 °C)] 99.6 °F (37.6 °C)  Heart Rate:  [110-115] 115  Resp:  [18-22] 18  BP: (108-134)/(68-76) 134/76     on  Flow (L/min) (Oxygen Therapy):  [6] 6;   Device (Oxygen Therapy): nasal cannula  Body mass index is 31.34 kg/m².      Intake/Output Summary (Last 24 hours) at 2025 1021  Last data filed at 2025 0242  Gross per 24 hour   Intake 240 ml   Output 600 ml   Net -360 ml      ----------------------------------------------------------------------------------------------------------------------  Physical exam:  Constitutional:  Older than stated age.  Mild acute distress.        Cardiovascular:  Tachycardic rate, regular rhythm and normal heart sounds with no murmur.  Pulmonary/Chest:  Mod respiratory distress, on  "NC  Abdominal:  Soft. No distension and no tenderness.   Musculoskeletal:  No tenderness, and no deformity.  No red or swollen joints anywhere.    Neurological:  Alert and oriented to person.  No gross focal deficits   Skin:  Skin is warm and dry. No rash noted. No pallor.   Peripheral vascular:  No clubbing, no cyanosis, no edema.  Psychiatric: Appropriate mood and affect  Edited by: Rito Brown MD at 7/20/2025 1021  ----------------------------------------------------------------------------------------------------------------------                 No results found for: \"URINECX\"  Blood Culture   Date Value Ref Range Status   07/17/2025 No growth at 2 days  Preliminary   07/17/2025 No growth at 2 days  Preliminary       I have personally looked at the labs and they are summarized above.  ----------------------------------------------------------------------------------------------------------------------  Detailed radiology reports for the last 24 hours:  No radiology results for the last day  Assessment & Plan    #Comfort Measures due to Severe Sepsis & Acute on Chronic Hypoxic Respiratory Failure requiring Non-invasive Positive Pressure Ventilation due to Pneumonia, Bacterial, treating for GN Organisms, in setting of superimposed Interstitial Lung Disease and Congestive Heart Failure with Acute Exacerbation   #Elevated HS Troponins, suspected NSTEMI Type II due to Sepsis   #Borderline Hypokalemia   #Hyperglycemia  #Hypertension/Hyperlipidemia  #Anxiety  #Vocal Chord Dysfunction  - Patient met SIRS criteria due to heart rate > 90, respiratory rate > 20, WBC count > 12K. Due to signs of end organ damage and lactate > 2 patient met criteria for Severe Sepsis  - Following conversations with Palliative Care and Patient/Healthcare Surrogate, they discussed patient's poor prognosis and they have agreed to make patient comfort measures.  - Palliative Care consulted and following   - Continue Morphine/Dilaudid for " pain and air hunger  - Will add as needed Ativan for anxiety  - Will add Tylenol as needed for fevers  - Will add as needed IV antiemetics for nausea  - Will add Robinul for secretions  - Have discontinued all home oral meds with the exception of those that would provide the patient comfort or prevent the patient harm/discomfort in the future.  - Have discontinued all life prolonging medications and therapies including IV fluids, IV pressors, IV antibiotics, and blood products.  - [facility] with cardiopulmonary monitoring with the overall goal of comfort, diet per patient preference if able to tolerate oral    F: Oral   E: Defer checking due to Comfort Measures  N: Diet: Regular/House; Texture: Soft to Chew (NDD 3); Soft to Chew: Chopped Meat; Fluid Consistency: Thin (IDDSI 0)   PPx: Comfort Measures  Code: Comfort Measures    Dispo: Comfort Measures    *Plan unchanged today 7/20    Edited by: Rito Brown MD at 7/20/2025 1021  [facility]

## 2025-07-20 NOTE — PLAN OF CARE
Goal Outcome Evaluation:  Plan of Care Reviewed With: patient        Progress: improving  Outcome Evaluation: Pt resting in bed throughout shift. Daughter at bedside. Pt on 6LNC. PRN medications given for comfort. No concerns or requests at this time. Will continue plan of care.

## 2025-07-20 NOTE — PLAN OF CARE
Goal Outcome Evaluation:  Plan of Care Reviewed With: patient           Outcome Evaluation: Patient resting in bed at this time. VSS on 6L NC. Daughter at bedside. Pt comfort measure. PRN pain meds given. No acute changes or concerns at this time. Will continue with plan of care.

## 2025-07-21 NOTE — SIGNIFICANT NOTE
Identity confirmed at bedside by wristband.  Patient in bed, eye's closed, no signs of life.  No respiratory effort noted  No response to verbal stimuli  No response to supraorbital pressure  No carotid pulse palpable  Pupils fixed and dilated bilaterally  No heart sounds noted during three minutes of auscultation  No breath sounds noted after three minutes of auscultation    Death confirmed on 07/20/2025 at 23:12 PM.

## 2025-07-21 NOTE — PROGRESS NOTES
Hospitalist Update:    Notified by House Supervisor that patient had passed away. Time of death 2312. Will notify attending Dr Brown of patient's passing. Formal discharge summary to follow from Dr Brown.

## 2025-07-21 NOTE — PROGRESS NOTES
Enter Query Response Below      Query Response: diastolic heart failure/HFpEF              If applicable, please update the problem list.

## 2025-07-21 NOTE — DISCHARGE SUMMARY
[facility]    Patient Identification:  Name:  Edgar Cotto  Age:  80 y.o.  Sex:  female  :  1945  MRN:  [id number]  Visit Number:  [id number]    Date of Admission: 2025  Date of Discharge:  2025     PCP: Abbie Hillman,     DISCHARGE DIAGNOSIS  Comfort Measures  Severe Sepsis  Acute on Chronic Hypoxic Respiratory Failure requiring Non-invasive Positive Pressure Ventilation  Pneumonia, Bacterial, treating for GN Organisms  Interstitial Lung Disease  Congestive Heart Failure with Acute Exacerbation   Elevated HS Troponins, suspected NSTEMI Type II due to Sepsis   Borderline Hypokalemia   Hyperglycemia  Hypertension/Hyperlipidemia  Anxiety  Vocal Chord Dysfunction    CONSULTS   Consults       Date and Time Order Name Status Description    2025  6:52 AM Inpatient Palliative Care MD Consult Completed     2025  6:52 AM Inpatient Palliative Care MD Consult Completed     2025 11:21 PM Inpatient Pulmonology Consult Completed            PROCEDURES PERFORMED  None    HOSPITAL COURSE  Edgar Cotto, an 80-year-old female with a history of anxiety, arthritis, hypertension, hyperlipidemia, Meniere's disease, oral candidiasis, vocal cord dysfunction, and interstitial lung disease (ILD), was admitted on 2025 for acute on chronic respiratory failure with hypoxia. She presented with rapidly worsening shortness of breath and was found to have severe hypoxia, with an ABG showing a PO2 of 36 and a PCO2 of 45 on 4L oxygen. Initial chest X-ray suggested pneumonia with interstitial edema, and she was treated with IV Rocephin and doxycycline. Despite treatment, her condition met criteria for severe sepsis, and she was placed on BiPAP for respiratory support.    During her hospitalization, her condition was complicated by elevated troponin and BNP levels, suspected to be secondary to severe hypoxia. An echocardiogram revealed normal left  ventricular systolic function but elevated right ventricular systolic pressure, suggesting pulmonary hypertension. Due to her poor prognosis and inability to tolerate BiPAP, a decision was made to transition her to comfort measures only, and she was made DNR/DNI. Palliative care was consulted, and she was managed with morphine for air hunger and anxiety.    Throughout her stay, she experienced persistent tachypnea and tachycardia, and her condition continued to decline despite supportive care. She was maintained on a non-rebreather mask for comfort, and all life-prolonging treatments were discontinued. Edgar Cotto passed away on 2025 at 23:12.     VITAL SIGNS:      PHYSICAL EXAM:  Absent cardiopulmonary sounds  Absent brainstem reflexes    DISCHARGE DISPOSITION       DISCHARGE MEDICATIONS:  N/A     TEST  RESULTS PENDING AT DISCHARGE  Pending Labs       Order Current Status    Blood Culture - Blood, Arm, Left Preliminary result    Blood Culture - Blood, Arm, Right Preliminary result          CODE STATUS  Code Status and Medical Interventions: No CPR (Do Not Attempt to Resuscitate); Comfort Measures   Ordered at: 25 1343     Code Status (Patient has no pulse and is not breathing):    No CPR (Do Not Attempt to Resuscitate)     Medical Interventions (Patient has pulse or is breathing):    Comfort Measures     Level Of Support Discussed With:    Patient       Next of Kin (If No Surrogate)     The ASCVD Risk score (Max MACIAS, et al., 2019) failed to calculate for the following reasons:    Invalid patient status, patients with a status of  cannot receive a score     Rito Brown MD  [facility]  25  07:13 EDT    Please note that this discharge summary required more than 30 minutes to complete.

## 2025-07-22 LAB
BACTERIA SPEC AEROBE CULT: NORMAL
BACTERIA SPEC AEROBE CULT: NORMAL